# Patient Record
Sex: FEMALE | Race: WHITE | NOT HISPANIC OR LATINO | Employment: OTHER | URBAN - METROPOLITAN AREA
[De-identification: names, ages, dates, MRNs, and addresses within clinical notes are randomized per-mention and may not be internally consistent; named-entity substitution may affect disease eponyms.]

---

## 2018-03-06 ENCOUNTER — OFFICE VISIT (OUTPATIENT)
Dept: INTERNAL MEDICINE CLINIC | Facility: CLINIC | Age: 63
End: 2018-03-06

## 2018-03-06 VITALS
WEIGHT: 119.4 LBS | SYSTOLIC BLOOD PRESSURE: 150 MMHG | TEMPERATURE: 98.3 F | HEART RATE: 95 BPM | HEIGHT: 63 IN | DIASTOLIC BLOOD PRESSURE: 82 MMHG | BODY MASS INDEX: 21.16 KG/M2 | OXYGEN SATURATION: 97 %

## 2018-03-06 DIAGNOSIS — M62.830 BACK SPASM: ICD-10-CM

## 2018-03-06 DIAGNOSIS — J45.40 MODERATE PERSISTENT ASTHMATIC BRONCHITIS WITHOUT COMPLICATION: Primary | ICD-10-CM

## 2018-03-06 PROCEDURE — 99202 OFFICE O/P NEW SF 15 MIN: CPT | Performed by: INTERNAL MEDICINE

## 2018-03-06 RX ORDER — SULFAMETHOXAZOLE AND TRIMETHOPRIM 800; 160 MG/1; MG/1
1 TABLET ORAL EVERY 12 HOURS SCHEDULED
Qty: 20 TABLET | Refills: 0 | Status: SHIPPED | OUTPATIENT
Start: 2018-03-06 | End: 2018-03-16

## 2018-03-06 RX ORDER — LANOLIN ALCOHOL/MO/W.PET/CERES
CREAM (GRAM) TOPICAL DAILY
COMMUNITY
End: 2020-08-24 | Stop reason: ALTCHOICE

## 2018-03-06 RX ORDER — CALCIUM CARBONATE 300MG(750)
1 TABLET,CHEWABLE ORAL 2 TIMES DAILY
COMMUNITY
End: 2020-10-09 | Stop reason: ALTCHOICE

## 2018-03-06 RX ORDER — MULTIVITAMIN WITH IRON
100 TABLET ORAL DAILY
COMMUNITY
End: 2020-08-24 | Stop reason: ALTCHOICE

## 2018-03-06 RX ORDER — ALBUTEROL SULFATE 90 UG/1
2 AEROSOL, METERED RESPIRATORY (INHALATION) EVERY 6 HOURS PRN
Qty: 1 INHALER | Refills: 0 | Status: SHIPPED | OUTPATIENT
Start: 2018-03-06 | End: 2018-03-27 | Stop reason: SDUPTHER

## 2018-03-06 RX ORDER — CYCLOBENZAPRINE HCL 10 MG
10 TABLET ORAL 3 TIMES DAILY PRN
Qty: 30 TABLET | Refills: 0 | Status: SHIPPED | OUTPATIENT
Start: 2018-03-06 | End: 2018-04-04 | Stop reason: SDUPTHER

## 2018-03-06 NOTE — PROGRESS NOTES
Assessment/Plan:    No problem-specific Assessment & Plan notes found for this encounter  Diagnoses and all orders for this visit:    Moderate persistent asthmatic bronchitis without complication  -     sulfamethoxazole-trimethoprim (BACTRIM DS) 800-160 mg per tablet; Take 1 tablet by mouth every 12 (twelve) hours for 10 days  -     albuterol (PROAIR HFA) 90 mcg/act inhaler; Inhale 2 puffs every 6 (six) hours as needed for wheezing    Back spasm  -     cyclobenzaprine (FLEXERIL) 10 mg tablet; Take 1 tablet (10 mg total) by mouth 3 (three) times a day as needed for muscle spasms    Other orders  -     cyanocobalamin (VITAMIN B-12) 1,000 mcg tablet; Take by mouth daily  -     pyridoxine (VITAMIN B6) 100 mg tablet; Take 100 mg by mouth daily  -     Cholecalciferol (VITAMIN D3) 24005 units TABS; Take by mouth daily    -     Calcium Carbonate-Vitamin D (OSCAL 500/200 D-3 PO); Take by mouth 3 (three) times a day        A/P: Suspect sinusitis  No money  Will try bactrim  Pt had GI issues with celebrex and should be ok with bactrim  Continue mucinex  Would like to give steroids for the sinuses, bronchospasms and the LBP pain, but declines  Will try MDI for the wheezing  Obtain labs when pt has insurance  RTC one year or PRN  Subjective:      Patient ID: Eliezer Coles is a 46 y o  female  WF w/o a PCP and currently w/o insurance, presents to establish  No medical problems and only meds she takes is supplements  Very active and is a   Recently moved from [place] to [facility]  Several weeks of URI s/s and took a zpak  Continues with dry barking cough, especially when lying down  No SOB  No fever or chills  NO travel history  No smoking and no asthma  No reflux s/s  Also, notes one week h/o LBP and spasms after working out  No radiation  Unable to take NSAID's  Due for labs and pap  Also due for Td  Cough   Associated symptoms include wheezing   Pertinent negatives include no chest pain, chills, ear pain, eye redness, fever, headaches, myalgias, postnasal drip, rhinorrhea, sore throat or shortness of breath  The following portions of the patient's history were reviewed and updated as appropriate:   She  has a past medical history of Arthritis  She There are no active problems to display for this patient  She  has a past surgical history that includes Facial cosmetic surgery; Colonoscopy; and Dental surgery  Her family history includes Aneurysm in her mother; No Known Problems in her brother  She  reports that she has never smoked  She has never used smokeless tobacco  She reports that she does not drink alcohol or use drugs  Current Outpatient Prescriptions   Medication Sig Dispense Refill    Calcium Carbonate-Vitamin D (OSCAL 500/200 D-3 PO) Take by mouth 3 (three) times a day        Cholecalciferol (VITAMIN D3) 84148 units TABS Take by mouth daily        cyanocobalamin (VITAMIN B-12) 1,000 mcg tablet Take by mouth daily      pyridoxine (VITAMIN B6) 100 mg tablet Take 100 mg by mouth daily      albuterol (PROAIR HFA) 90 mcg/act inhaler Inhale 2 puffs every 6 (six) hours as needed for wheezing 1 Inhaler 0    cyclobenzaprine (FLEXERIL) 10 mg tablet Take 1 tablet (10 mg total) by mouth 3 (three) times a day as needed for muscle spasms 30 tablet 0    sulfamethoxazole-trimethoprim (BACTRIM DS) 800-160 mg per tablet Take 1 tablet by mouth every 12 (twelve) hours for 10 days 20 tablet 0     No current facility-administered medications for this visit  No current outpatient prescriptions on file prior to visit  No current facility-administered medications on file prior to visit  She is allergic to celebrex [celecoxib]; ibuprofen; and influenza vaccines       Review of Systems   Constitutional: Negative for activity change, chills, diaphoresis, fatigue and fever  HENT: Positive for congestion   Negative for ear pain, nosebleeds, postnasal drip, rhinorrhea, sinus pressure, sore throat and trouble swallowing  Eyes: Negative for redness  Respiratory: Positive for cough and wheezing  Negative for chest tightness and shortness of breath  Cardiovascular: Negative for chest pain, palpitations and leg swelling  Gastrointestinal: Negative for abdominal pain, constipation, diarrhea, nausea and vomiting  Endocrine: Negative for cold intolerance and heat intolerance  Genitourinary: Negative for difficulty urinating, dysuria and frequency  Musculoskeletal: Positive for back pain  Negative for arthralgias, gait problem and myalgias  Neurological: Negative for seizures, light-headedness and headaches  Psychiatric/Behavioral: Negative for confusion  The patient is not nervous/anxious  Objective:      /82 (BP Location: Left arm, Patient Position: Sitting, Cuff Size: Adult)   Pulse 95   Temp 98 3 °F (36 8 °C) (Tympanic)   Ht 5' 2 5" (1 588 m)   Wt 54 2 kg (119 lb 6 4 oz)   SpO2 97%   BMI 21 49 kg/m²          Physical Exam   Constitutional: She is oriented to person, place, and time  She appears well-developed and well-nourished  No distress  HENT:   Head: Normocephalic and atraumatic  Right Ear: External ear normal    Left Ear: External ear normal    Mouth/Throat: Oropharynx is clear and moist  No oropharyngeal exudate  Eyes: Conjunctivae and EOM are normal  Pupils are equal, round, and reactive to light  Neck: No JVD present  Cardiovascular: Normal rate, regular rhythm and normal heart sounds  Pulmonary/Chest: Effort normal  No respiratory distress  She has wheezes  She has no rales  Coarse and bronchospasms noted with barking cough and hypersensitivity  Abdominal: Soft  Bowel sounds are normal  There is no tenderness  Musculoskeletal: She exhibits tenderness  LS spine w/o gross deformity, increase temp, erythema, or swelling  Tenderness with spasms right of L3-S1  ROM wnl  LE strength 5/5 with tone and ROM wnl      Lymphadenopathy:     She has no cervical adenopathy  Neurological: She is alert and oriented to person, place, and time  Psychiatric: She has a normal mood and affect  Her behavior is normal  Judgment and thought content normal    Nursing note and vitals reviewed

## 2018-03-27 ENCOUNTER — TELEPHONE (OUTPATIENT)
Dept: INTERNAL MEDICINE CLINIC | Facility: CLINIC | Age: 63
End: 2018-03-27

## 2018-03-27 DIAGNOSIS — J45.40 MODERATE PERSISTENT ASTHMATIC BRONCHITIS WITHOUT COMPLICATION: ICD-10-CM

## 2018-03-27 RX ORDER — ALBUTEROL SULFATE 90 UG/1
2 AEROSOL, METERED RESPIRATORY (INHALATION) EVERY 6 HOURS PRN
Qty: 1 INHALER | Refills: 0 | Status: SHIPPED | OUTPATIENT
Start: 2018-03-27 | End: 2018-04-18 | Stop reason: SDUPTHER

## 2018-03-27 NOTE — TELEPHONE ENCOUNTER
Pt needs rx for pro air inhaler, pt wants a paper rx so she can check out prices, will p/u in [facility]

## 2018-04-04 ENCOUNTER — TELEPHONE (OUTPATIENT)
Dept: INTERNAL MEDICINE CLINIC | Facility: CLINIC | Age: 63
End: 2018-04-04

## 2018-04-04 DIAGNOSIS — M62.830 BACK SPASM: ICD-10-CM

## 2018-04-04 RX ORDER — CYCLOBENZAPRINE HCL 10 MG
10 TABLET ORAL 3 TIMES DAILY PRN
Qty: 90 TABLET | Refills: 0 | Status: SHIPPED | OUTPATIENT
Start: 2018-04-04 | End: 2018-05-09 | Stop reason: SDUPTHER

## 2018-04-04 NOTE — TELEPHONE ENCOUNTER
Pt needs Flexeril 10 mg refilled, she requests a hard copy to be picked up because she does not have insurance, new to the area and does not have a pharmacy local

## 2018-04-18 DIAGNOSIS — J45.40 MODERATE PERSISTENT ASTHMATIC BRONCHITIS WITHOUT COMPLICATION: ICD-10-CM

## 2018-04-18 RX ORDER — ALBUTEROL SULFATE 90 UG/1
2 AEROSOL, METERED RESPIRATORY (INHALATION) EVERY 6 HOURS PRN
Qty: 1 INHALER | Refills: 0 | Status: SHIPPED | OUTPATIENT
Start: 2018-04-18 | End: 2018-04-20 | Stop reason: SDUPTHER

## 2018-04-18 NOTE — TELEPHONE ENCOUNTER
Pt needs refill on the albuterol 90 mcg/act inhaler inhale 2 puff by mouth every 6 hrs for [facility]  [address]     Phone:  [phone]    Please advise    [phone]

## 2018-04-20 ENCOUNTER — TELEPHONE (OUTPATIENT)
Dept: INTERNAL MEDICINE CLINIC | Facility: CLINIC | Age: 63
End: 2018-04-20

## 2018-04-20 DIAGNOSIS — J45.40 MODERATE PERSISTENT ASTHMATIC BRONCHITIS WITHOUT COMPLICATION: ICD-10-CM

## 2018-04-20 RX ORDER — ALBUTEROL SULFATE 90 UG/1
2 AEROSOL, METERED RESPIRATORY (INHALATION) EVERY 6 HOURS PRN
Qty: 1 INHALER | Refills: 5 | Status: SHIPPED | OUTPATIENT
Start: 2018-04-20 | End: 2018-05-09 | Stop reason: SDUPTHER

## 2018-05-09 ENCOUNTER — OFFICE VISIT (OUTPATIENT)
Dept: INTERNAL MEDICINE CLINIC | Facility: CLINIC | Age: 63
End: 2018-05-09

## 2018-05-09 VITALS
DIASTOLIC BLOOD PRESSURE: 86 MMHG | OXYGEN SATURATION: 98 % | HEIGHT: 63 IN | TEMPERATURE: 97.9 F | SYSTOLIC BLOOD PRESSURE: 152 MMHG | HEART RATE: 114 BPM | WEIGHT: 120 LBS | BODY MASS INDEX: 21.26 KG/M2

## 2018-05-09 DIAGNOSIS — J45.40 MODERATE PERSISTENT ASTHMATIC BRONCHITIS WITHOUT COMPLICATION: ICD-10-CM

## 2018-05-09 DIAGNOSIS — J30.89 ENVIRONMENTAL AND SEASONAL ALLERGIES: ICD-10-CM

## 2018-05-09 DIAGNOSIS — J45.41 MODERATE PERSISTENT REACTIVE AIRWAY DISEASE WITH ACUTE EXACERBATION: Primary | ICD-10-CM

## 2018-05-09 DIAGNOSIS — M62.830 BACK SPASM: ICD-10-CM

## 2018-05-09 PROCEDURE — 99212 OFFICE O/P EST SF 10 MIN: CPT | Performed by: INTERNAL MEDICINE

## 2018-05-09 RX ORDER — ALBUTEROL SULFATE 90 UG/1
2 AEROSOL, METERED RESPIRATORY (INHALATION) EVERY 6 HOURS PRN
Qty: 1 INHALER | Refills: 0 | Status: SHIPPED | OUTPATIENT
Start: 2018-05-09 | End: 2018-06-04 | Stop reason: SDUPTHER

## 2018-05-09 RX ORDER — PREDNISONE 10 MG/1
TABLET ORAL
Qty: 42 TABLET | Refills: 0 | Status: SHIPPED | OUTPATIENT
Start: 2018-05-09 | End: 2018-05-29

## 2018-05-09 RX ORDER — CYCLOBENZAPRINE HCL 10 MG
10 TABLET ORAL 3 TIMES DAILY PRN
Qty: 90 TABLET | Refills: 0 | Status: SHIPPED | OUTPATIENT
Start: 2018-05-09 | End: 2018-06-08 | Stop reason: SDUPTHER

## 2018-05-09 NOTE — PROGRESS NOTES
Assessment/Plan:    No problem-specific Assessment & Plan notes found for this encounter  Diagnoses and all orders for this visit:    Moderate persistent reactive airway disease with acute exacerbation  -     predniSONE 10 mg tablet; 60mg po q day x2, then 50mg x 2, then 40mg x 2, then 30mg x2, then 20mg x 2, and then 10mg x 2, then d/c  Environmental and seasonal allergies  -     predniSONE 10 mg tablet; 60mg po q day x2, then 50mg x 2, then 40mg x 2, then 30mg x2, then 20mg x 2, and then 10mg x 2, then d/c  Back spasm  -     cyclobenzaprine (FLEXERIL) 10 mg tablet; Take 1 tablet (10 mg total) by mouth 3 (three) times a day as needed for muscle spasms    Moderate persistent asthmatic bronchitis without complication  -     albuterol (PROAIR HFA) 90 mcg/act inhaler; Inhale 2 puffs every 6 (six) hours as needed for wheezing      A/P: Suspect RAD due to allergies  Pt w/o insurance  With give a decadron 4mg IM now and start a steroid taper  Pt to start OTC allergy meds and continue with INS and MDI  Need PFT's, but no insurance  No s/s of infection and will hold on abx  RTC PRN  Subjective:      Patient ID: Louis Dear is a 46 y o  female  Non smoking WF w/o any PMH of any breathing issues, presents for ongoing SOB and wheezing  Pt recently moved to the area and since then, has been having a lot allergy s/s and now wheezing and SOB  Was seen two months ago and had a sinus infection  Treated with abx and MDI with pt refusing steroids  Initially got better, but then her s/s returned  No nasal d/c and a dry cough  Worse lying down or bending forward  No heartburn  Denies PND or rhinorrhea  Shortness of Breath   Associated symptoms include a sore throat and wheezing  Pertinent negatives include no abdominal pain, chest pain, ear pain, fever, headaches, leg swelling, rhinorrhea or vomiting         The following portions of the patient's history were reviewed and updated as appropriate:   She  has a past medical history of Arthritis  She There are no active problems to display for this patient  She  has a past surgical history that includes Facial cosmetic surgery; Colonoscopy; and Dental surgery  Her family history includes Aneurysm in her mother; No Known Problems in her brother  She  reports that she has never smoked  She has never used smokeless tobacco  She reports that she does not drink alcohol or use drugs  Current Outpatient Prescriptions   Medication Sig Dispense Refill    albuterol (PROAIR HFA) 90 mcg/act inhaler Inhale 2 puffs every 6 (six) hours as needed for wheezing 1 Inhaler 0    Calcium Carbonate-Vitamin D (OSCAL 500/200 D-3 PO) Take by mouth 3 (three) times a day        Cholecalciferol (VITAMIN D3) 98794 units TABS Take by mouth daily        cyanocobalamin (VITAMIN B-12) 1,000 mcg tablet Take by mouth daily      cyclobenzaprine (FLEXERIL) 10 mg tablet Take 1 tablet (10 mg total) by mouth 3 (three) times a day as needed for muscle spasms 90 tablet 0    predniSONE 10 mg tablet 60mg po q day x2, then 50mg x 2, then 40mg x 2, then 30mg x2, then 20mg x 2, and then 10mg x 2, then d/c  42 tablet 0    pyridoxine (VITAMIN B6) 100 mg tablet Take 100 mg by mouth daily       No current facility-administered medications for this visit        Current Outpatient Prescriptions on File Prior to Visit   Medication Sig    Calcium Carbonate-Vitamin D (OSCAL 500/200 D-3 PO) Take by mouth 3 (three) times a day      Cholecalciferol (VITAMIN D3) 74155 units TABS Take by mouth daily      cyanocobalamin (VITAMIN B-12) 1,000 mcg tablet Take by mouth daily    pyridoxine (VITAMIN B6) 100 mg tablet Take 100 mg by mouth daily    [DISCONTINUED] albuterol (PROAIR HFA) 90 mcg/act inhaler Inhale 2 puffs every 6 (six) hours as needed for wheezing    [DISCONTINUED] cyclobenzaprine (FLEXERIL) 10 mg tablet Take 1 tablet (10 mg total) by mouth 3 (three) times a day as needed for muscle spasms     No current facility-administered medications on file prior to visit  She is allergic to celebrex [celecoxib]; ibuprofen; and influenza vaccines       Review of Systems   Constitutional: Positive for activity change  Negative for chills, diaphoresis, fatigue and fever  HENT: Positive for sneezing, sore throat and voice change  Negative for congestion, ear discharge, ear pain, facial swelling, nosebleeds, postnasal drip, rhinorrhea, sinus pain and sinus pressure  Eyes: Negative for visual disturbance  Respiratory: Positive for cough, chest tightness, shortness of breath and wheezing  Cardiovascular: Negative for chest pain, palpitations and leg swelling  Gastrointestinal: Negative for abdominal pain, constipation, diarrhea, nausea and vomiting  Genitourinary: Negative for difficulty urinating, dysuria and frequency  Musculoskeletal: Negative for arthralgias, gait problem and myalgias  Neurological: Negative for dizziness, syncope, light-headedness and headaches  Psychiatric/Behavioral: Negative for confusion  The patient is not nervous/anxious  Objective:      /86   Pulse (!) 114   Temp 97 9 °F (36 6 °C)   Ht 5' 2 5" (1 588 m)   Wt 54 4 kg (120 lb)   SpO2 98%   BMI 21 60 kg/m²          Physical Exam   Constitutional: She is oriented to person, place, and time  She appears well-developed and well-nourished  No distress  HENT:   Head: Normocephalic and atraumatic  Right Ear: External ear normal    Mouth/Throat: Oropharynx is clear and moist  No oropharyngeal exudate  Eyes: Conjunctivae and EOM are normal  Pupils are equal, round, and reactive to light  Cardiovascular: Normal rate, regular rhythm and normal heart sounds  No murmur heard  Pulmonary/Chest: No respiratory distress  She has wheezes  Voice changed and sounds nasal  Diffuse wheezing with decrease BS  WOB wnl  No rales or rhonchi  Abdominal: Soft   Bowel sounds are normal    Neurological: She is alert and oriented to person, place, and time  Psychiatric: She has a normal mood and affect  Her behavior is normal  Judgment and thought content normal    Nursing note and vitals reviewed

## 2018-05-09 NOTE — PATIENT INSTRUCTIONS
Allergies   AMBULATORY CARE:   Allergies  are an immune system reaction to a substance called an allergen  Your immune system sees the allergen as harmful and attacks it  Common signs and symptoms include the following:   · Mild symptoms  include sneezing and a runny, itchy, or stuffy nose  You may also have swollen, watery, or itchy eyes, or skin itching  You may have swelling or pain where an insect bit or stung you  · Anaphylaxis symptoms  include trouble breathing or swallowing, a rash or hives, or severe swelling  You may also have a cough, wheezing, or feel lightheaded or dizzy  Anaphylaxis is a sudden, life-threatening reaction that needs immediate treatment  Call 911 for signs or symptoms of anaphylaxis,  such as trouble breathing, swelling in your mouth or throat, or wheezing  You may also have itching, a rash, hives, or feel like you are going to faint  Seek care immediately if:   · You have tingling in your hands or feet  · Your skin is red or flushed  Contact your healthcare provider if:   · You have questions or concerns about your condition or care  Steps to take for signs or symptoms of anaphylaxis:   · Immediately  give 1 shot of epinephrine only into the outer thigh muscle  · Leave the shot in place  as directed  Your healthcare provider may recommend you leave it in place for up to 10 seconds before you remove it  This helps make sure all of the epinephrine is delivered  · Call 911 and go to the emergency department,  even if the shot improved symptoms  Do not drive yourself  Bring the used epinephrine shot with you  Treatment for allergies  may include any of the following:  · Antihistamines  help decrease itching, sneezing, and swelling  You may take them as a pill or use drops in your nose or eyes  · Decongestants  help your nose feel less stuffy  · Steroids  decrease swelling and redness  · Topical treatments  help decrease itching or swelling   You also may be given nasal sprays or eyedrops  · Epinephrine  is medicine used to treat severe allergic reactions such as anaphylaxis  · Desensitization  gets your body used to allergens you cannot avoid  Your healthcare provider will give you a shot that contains a small amount of an allergen  He will treat any allergic reaction you have  He will give you more of the allergen a little at a time until your body gets used to it  Your reaction to the allergen may be less serious after this treatment  Your healthcare provider will tell you how long to get the shots  Safety precautions to take if you are at risk for anaphylaxis:   · Keep 2 shots of epinephrine with you at all times  You may need a second shot, because epinephrine only works for about 20 minutes and symptoms may return  Your healthcare provider can show you and family members how to give the shot  Check the expiration date every month and replace it before it expires  · Create an action plan  Your healthcare provider can help you create a written plan that explains the allergy and an emergency plan to treat a reaction  The plan explains when to give a second epinephrine shot if symptoms return or do not improve after the first  Give copies of the action plan and emergency instructions to family members, work and school staff, and  providers  Show them how to give a shot of epinephrine  · Be careful when you exercise  If you have had exercise-induced anaphylaxis, do not exercise right after you eat  Stop exercising right away if you start to develop any signs or symptoms of anaphylaxis  You may first feel tired, warm, or have itchy skin  Hives, swelling, and severe breathing problems may develop if you continue to exercise  · Carry medical alert identification  Wear medical alert jewelry or carry a card that explains the allergy  Ask your healthcare provider where to get these items  · Inform all healthcare providers of the allergy  This includes dentists, nurses, doctors, and surgeons  Manage allergies:   · Use nasal rinses as directed  Rinse with a saline solution daily to help clear your nose of allergens  · Do not smoke  Allergy symptoms may decrease if you are not around smoke  Nicotine and other chemicals in cigarettes and cigars can cause lung damage  Ask your healthcare provider for information if you currently smoke and need help to quit  E-cigarettes or smokeless tobacco still contain nicotine  Talk to your healthcare provider before you use these products  Prevent an allergic reaction:   · Do not go outside when pollen counts are high if you have seasonal allergies  Your symptoms may be better if you go outside only in the morning or evening  Use your air conditioner, and change air filters often  · Avoid dust, fur, and mold  Dust and vacuum your home often  You may want to wear a mask when you vacuum  Keep pets in certain rooms, and bathe them often  Use a dehumidifier (machine that decreases moisture) to help prevent mold  · Do not use products that contain latex if you have a latex allergy  Use nonlatex gloves if you work in healthcare or in food preparation  Always tell healthcare providers about a latex allergy  · Avoid areas that attract insects if you have an insect bite or sting allergy  Areas include trash cans, gardens, and picnics  Do not wear bright clothing or strong scents when you will be outside  Follow up with your healthcare provider as directed:  Write down your questions so you remember to ask them during your visits  When you have an allergic reaction, write down everything you were exposed to in the 2 hours before the reaction  Take that information to your next visit  © 2017 [employer] [employer] Information is for End User's use only and may not be sold, redistributed or otherwise used for commercial purposes   All illustrations and images included in CareNotes® are the copyrighted property of [employer]  or [address]  The above information is an  only  It is not intended as medical advice for individual conditions or treatments  Talk to your doctor, nurse or pharmacist before following any medical regimen to see if it is safe and effective for you

## 2018-05-17 ENCOUNTER — TELEPHONE (OUTPATIENT)
Dept: INTERNAL MEDICINE CLINIC | Facility: CLINIC | Age: 63
End: 2018-05-17

## 2018-05-17 NOTE — TELEPHONE ENCOUNTER
----- Message from Ray Zavala DO sent at 5/9/2018  1:41 PM EDT -----  Call pt in one week and get update on breathing

## 2018-05-29 ENCOUNTER — TELEPHONE (OUTPATIENT)
Dept: INTERNAL MEDICINE CLINIC | Facility: CLINIC | Age: 63
End: 2018-05-29

## 2018-05-29 DIAGNOSIS — J45.41 MODERATE PERSISTENT REACTIVE AIRWAY DISEASE WITH ACUTE EXACERBATION: ICD-10-CM

## 2018-05-29 DIAGNOSIS — J45.41 MODERATE PERSISTENT REACTIVE AIRWAY DISEASE WITH ACUTE EXACERBATION: Primary | ICD-10-CM

## 2018-05-29 RX ORDER — PREDNISONE 10 MG/1
TABLET ORAL
Qty: 42 TABLET | Refills: 0 | Status: SHIPPED | OUTPATIENT
Start: 2018-05-29 | End: 2018-05-29 | Stop reason: SDUPTHER

## 2018-05-29 RX ORDER — PREDNISONE 10 MG/1
TABLET ORAL
Qty: 42 TABLET | Refills: 0 | Status: SHIPPED | OUTPATIENT
Start: 2018-05-29 | End: 2018-06-25 | Stop reason: SDUPTHER

## 2018-05-29 NOTE — TELEPHONE ENCOUNTER
Pt called, saw you last time for cough, was given injection and also prednisone rx, you told her to hold off to get rx filled, now wants to fill it but lost the rx, can you send prednisone to [facility] ?  Still has cough

## 2018-06-04 ENCOUNTER — TELEPHONE (OUTPATIENT)
Dept: INTERNAL MEDICINE CLINIC | Facility: CLINIC | Age: 63
End: 2018-06-04

## 2018-06-04 DIAGNOSIS — J45.40 MODERATE PERSISTENT ASTHMATIC BRONCHITIS WITHOUT COMPLICATION: ICD-10-CM

## 2018-06-04 RX ORDER — ALBUTEROL SULFATE 90 UG/1
2 AEROSOL, METERED RESPIRATORY (INHALATION) EVERY 6 HOURS PRN
Qty: 18 G | Refills: 5 | Status: SHIPPED | OUTPATIENT
Start: 2018-06-04 | End: 2018-09-25

## 2018-06-04 NOTE — TELEPHONE ENCOUNTER
Pt needs Proair HFA refilled, she wants to  the script at the office, she needs to find a pharmacy in the area that is cheaper

## 2018-06-08 ENCOUNTER — TELEPHONE (OUTPATIENT)
Dept: INTERNAL MEDICINE CLINIC | Facility: CLINIC | Age: 63
End: 2018-06-08

## 2018-06-08 DIAGNOSIS — M62.830 BACK SPASM: ICD-10-CM

## 2018-06-08 RX ORDER — CYCLOBENZAPRINE HCL 10 MG
10 TABLET ORAL 3 TIMES DAILY PRN
Qty: 90 TABLET | Refills: 0 | Status: SHIPPED | OUTPATIENT
Start: 2018-06-08 | End: 2018-07-10 | Stop reason: SDUPTHER

## 2018-06-25 DIAGNOSIS — J45.41 MODERATE PERSISTENT REACTIVE AIRWAY DISEASE WITH ACUTE EXACERBATION: ICD-10-CM

## 2018-06-25 RX ORDER — PREDNISONE 10 MG/1
TABLET ORAL
Qty: 42 TABLET | Refills: 0 | Status: SHIPPED | OUTPATIENT
Start: 2018-06-25 | End: 2018-08-15

## 2018-07-10 DIAGNOSIS — M62.830 BACK SPASM: ICD-10-CM

## 2018-07-10 RX ORDER — CYCLOBENZAPRINE HCL 10 MG
10 TABLET ORAL 3 TIMES DAILY PRN
Qty: 90 TABLET | Refills: 3 | Status: SHIPPED | OUTPATIENT
Start: 2018-07-10 | End: 2018-11-06 | Stop reason: SDUPTHER

## 2018-07-10 NOTE — TELEPHONE ENCOUNTER
Pt needs refill on cyclobenzaprine 10 mg takes 3 time daily prn  # 90 Refill? Patient would like a Paper script  She doesn't know where she will be at to get is filled      Phone : [phone]

## 2018-08-15 ENCOUNTER — OFFICE VISIT (OUTPATIENT)
Dept: INTERNAL MEDICINE CLINIC | Facility: CLINIC | Age: 63
End: 2018-08-15
Payer: COMMERCIAL

## 2018-08-15 ENCOUNTER — TRANSCRIBE ORDERS (OUTPATIENT)
Dept: RADIOLOGY | Facility: CLINIC | Age: 63
End: 2018-08-15

## 2018-08-15 ENCOUNTER — APPOINTMENT (OUTPATIENT)
Dept: RADIOLOGY | Facility: CLINIC | Age: 63
End: 2018-08-15
Payer: COMMERCIAL

## 2018-08-15 VITALS
RESPIRATION RATE: 18 BRPM | OXYGEN SATURATION: 97 % | WEIGHT: 120.4 LBS | DIASTOLIC BLOOD PRESSURE: 80 MMHG | BODY MASS INDEX: 21.33 KG/M2 | TEMPERATURE: 98.3 F | HEIGHT: 63 IN | SYSTOLIC BLOOD PRESSURE: 132 MMHG | HEART RATE: 92 BPM

## 2018-08-15 DIAGNOSIS — M26.621 ARTHRALGIA OF RIGHT TEMPOROMANDIBULAR JOINT: ICD-10-CM

## 2018-08-15 DIAGNOSIS — Z12.11 SCREEN FOR COLON CANCER: ICD-10-CM

## 2018-08-15 DIAGNOSIS — R05.3 CHRONIC COUGH: ICD-10-CM

## 2018-08-15 DIAGNOSIS — J45.41 MODERATE PERSISTENT REACTIVE AIRWAY DISEASE WITH ACUTE EXACERBATION: Primary | ICD-10-CM

## 2018-08-15 DIAGNOSIS — J30.89 ENVIRONMENTAL AND SEASONAL ALLERGIES: ICD-10-CM

## 2018-08-15 DIAGNOSIS — Z12.39 SCREENING FOR BREAST CANCER: ICD-10-CM

## 2018-08-15 DIAGNOSIS — N92.0 MENORRHAGIA WITH REGULAR CYCLE: ICD-10-CM

## 2018-08-15 DIAGNOSIS — Z11.59 NEED FOR HEPATITIS C SCREENING TEST: ICD-10-CM

## 2018-08-15 DIAGNOSIS — Z13.220 SCREENING FOR LIPID DISORDERS: ICD-10-CM

## 2018-08-15 DIAGNOSIS — Z13.820 SCREENING FOR OSTEOPOROSIS: ICD-10-CM

## 2018-08-15 DIAGNOSIS — R51.9 ACUTE NONINTRACTABLE HEADACHE, UNSPECIFIED HEADACHE TYPE: ICD-10-CM

## 2018-08-15 PROBLEM — J45.909 REACTIVE AIRWAY DISEASE: Status: ACTIVE | Noted: 2018-08-15

## 2018-08-15 PROBLEM — Z78.0 POST-MENOPAUSAL: Status: RESOLVED | Noted: 2018-08-15 | Resolved: 2018-08-15

## 2018-08-15 PROBLEM — Z78.0 POST-MENOPAUSAL: Status: ACTIVE | Noted: 2018-08-15

## 2018-08-15 PROCEDURE — 71046 X-RAY EXAM CHEST 2 VIEWS: CPT

## 2018-08-15 PROCEDURE — 99214 OFFICE O/P EST MOD 30 MIN: CPT | Performed by: INTERNAL MEDICINE

## 2018-08-15 RX ORDER — BUTALBITAL, ASPIRIN, AND CAFFEINE 50; 325; 40 MG/1; MG/1; MG/1
1 CAPSULE ORAL EVERY 4 HOURS PRN
Qty: 30 CAPSULE | Refills: 0 | Status: SHIPPED | OUTPATIENT
Start: 2018-08-15 | End: 2018-08-27 | Stop reason: SDUPTHER

## 2018-08-15 NOTE — PROGRESS NOTES
Assessment/Plan:    No problem-specific Assessment & Plan notes found for this encounter  Diagnoses and all orders for this visit:    Moderate persistent reactive airway disease with acute exacerbation  -     CBC and differential; Future  -     Comprehensive metabolic panel; Future  -     TSH, 3rd generation with Free T4 reflex; Future    Environmental and seasonal allergies    Screening for lipid disorders  -     Lipid Panel with Direct LDL reflex; Future  -     TSH, 3rd generation with Free T4 reflex; Future    Screen for colon cancer  -     Ambulatory referral to Gastroenterology; Future    Screening for breast cancer  -     Mammo screening bilateral w 3d & cad; Future    Screening for osteoporosis  -     DXA bone density spine hip and pelvis; Future    Need for hepatitis C screening test  -     Hepatitis C antibody; Future    Acute nonintractable headache, unspecified headache type  -     CBC and differential; Future  -     Comprehensive metabolic panel; Future  -     butalbital-aspirin-caffeine (FIORINAL) -40 mg capsule; Take 1 capsule by mouth every 4 (four) hours as needed for headaches    Chronic cough  -     XR chest pa & lateral; Future  -     Complete PFT with Methacholine Challenge; Future    Menorrhagia with regular cycle  -     FSH and LH; Future      A/P; PE unimpressive  Will check labs including a Hep C  Suspect asthma and will check CXR and PFT with [facility]  Hold on any test so as to not interfere with the test  Suspect migraine  Will start fiorinal and check labs  Has been taking ASA w/o problems  May need additional treatment  ??not perimenopausal  Check labs  Order mammo  May need a dexa  Check on vaccines next visit  Order CRC  RTC two weeks  Subjective:      Patient ID: Alexus De La Torre is a 58 y o  female  WF presents with several months for worsening SOB and cough along with a three day h/o headaches  Has headaches every couple of months, but no official dx of migraines   No head injuries  No known triggers  Sharp pain behind the right eye and then spreads across the head  Taking ASA w/o relief since she can't take other NSAID's like motrin/aleve, etc  Some photo phobia  No prodrome or aura  No n/v  ??asthma, but never tested  No smoking or exposures  Had a rescue MDI and now not affective  Had steroids in the past  Dry cough  No heartburn  Still neneses regularly, but heavy at times  Overdue for labs and mammo and CRC  Migraine    Associated symptoms include coughing  Pertinent negatives include no abdominal pain, dizziness, ear pain, fever, nausea, rhinorrhea, seizures, sinus pressure, vomiting or weakness  Cough   Associated symptoms include headaches and shortness of breath  Pertinent negatives include no chest pain, chills, ear pain, fever, myalgias, postnasal drip, rhinorrhea or wheezing  Her past medical history is significant for environmental allergies  The following portions of the patient's history were reviewed and updated as appropriate:   She  has a past medical history of Arthritis  She   Patient Active Problem List    Diagnosis Date Noted    Reactive airway disease 08/15/2018    Environmental and seasonal allergies 08/15/2018     She  has a past surgical history that includes Facial cosmetic surgery; Colonoscopy; and Dental surgery  Her family history includes Aneurysm in her mother; No Known Problems in her brother  She  reports that she has never smoked  She has never used smokeless tobacco  She reports that she does not drink alcohol or use drugs    Current Outpatient Prescriptions   Medication Sig Dispense Refill    albuterol (PROAIR HFA) 90 mcg/act inhaler Inhale 2 puffs every 6 (six) hours as needed for wheezing 18 g 5    Calcium Carbonate-Vitamin D (OSCAL 500/200 D-3 PO) Take by mouth 3 (three) times a day        Cholecalciferol (VITAMIN D3) 86923 units TABS Take by mouth daily        cyanocobalamin (VITAMIN B-12) 1,000 mcg tablet Take by mouth daily      cyclobenzaprine (FLEXERIL) 10 mg tablet Take 1 tablet (10 mg total) by mouth 3 (three) times a day as needed for muscle spasms 90 tablet 3    pyridoxine (VITAMIN B6) 100 mg tablet Take 100 mg by mouth daily      butalbital-aspirin-caffeine (FIORINAL) -40 mg capsule Take 1 capsule by mouth every 4 (four) hours as needed for headaches 30 capsule 0     No current facility-administered medications for this visit  Current Outpatient Prescriptions on File Prior to Visit   Medication Sig    albuterol (PROAIR HFA) 90 mcg/act inhaler Inhale 2 puffs every 6 (six) hours as needed for wheezing    Calcium Carbonate-Vitamin D (OSCAL 500/200 D-3 PO) Take by mouth 3 (three) times a day      Cholecalciferol (VITAMIN D3) 48748 units TABS Take by mouth daily      cyanocobalamin (VITAMIN B-12) 1,000 mcg tablet Take by mouth daily    cyclobenzaprine (FLEXERIL) 10 mg tablet Take 1 tablet (10 mg total) by mouth 3 (three) times a day as needed for muscle spasms    pyridoxine (VITAMIN B6) 100 mg tablet Take 100 mg by mouth daily    [DISCONTINUED] predniSONE 10 mg tablet 60mg po q day x2, then 50mg x 2, then 40mg x 2, then 30mg x2, then 20mg x 2, and then 10mg x 2, then d/c  (Patient not taking: Reported on 8/15/2018 )     No current facility-administered medications on file prior to visit  She is allergic to celebrex [celecoxib]; ibuprofen; and influenza vaccines       Review of Systems   Constitutional: Negative for activity change, chills, diaphoresis, fatigue and fever  HENT: Negative for congestion, ear discharge, ear pain, postnasal drip, rhinorrhea, sinus pain and sinus pressure  Eyes: Negative for visual disturbance  Respiratory: Positive for cough and shortness of breath  Negative for chest tightness and wheezing  Cardiovascular: Negative for chest pain, palpitations and leg swelling  Gastrointestinal: Negative for abdominal pain, constipation, diarrhea, nausea and vomiting  Endocrine: Negative for cold intolerance and heat intolerance  Genitourinary: Positive for menstrual problem  Negative for difficulty urinating, dysuria, frequency and pelvic pain  Musculoskeletal: Negative for arthralgias, gait problem and myalgias  Allergic/Immunologic: Positive for environmental allergies  Neurological: Positive for headaches  Negative for dizziness, seizures, syncope, weakness and light-headedness  Psychiatric/Behavioral: Negative for confusion, dysphoric mood and sleep disturbance  The patient is not nervous/anxious  Objective:      /80 (BP Location: Left arm, Patient Position: Sitting, Cuff Size: Adult)   Pulse 92   Temp 98 3 °F (36 8 °C) (Tympanic)   Resp 18   Ht 5' 2 5" (1 588 m)   Wt 54 6 kg (120 lb 6 4 oz)   SpO2 97%   BMI 21 67 kg/m²          Physical Exam   Constitutional: She is oriented to person, place, and time  She appears well-developed and well-nourished  No distress  HENT:   Head: Normocephalic and atraumatic  Right Ear: External ear normal    Left Ear: External ear normal    Nose: Nose normal    Mouth/Throat: Oropharynx is clear and moist    Eyes: Conjunctivae and EOM are normal  Pupils are equal, round, and reactive to light  Neck: Neck supple  No JVD present  No tracheal deviation present  No thyromegaly present  Cardiovascular: Normal rate, regular rhythm and normal heart sounds  No murmur heard  Pulmonary/Chest: Effort normal and breath sounds normal  No respiratory distress  She has no wheezes  Abdominal: Soft  Bowel sounds are normal  There is no tenderness  Musculoskeletal: She exhibits no edema  Lymphadenopathy:     She has no cervical adenopathy  Neurological: She is alert and oriented to person, place, and time  No cranial nerve deficit  Coordination normal    Psychiatric: She has a normal mood and affect  Her behavior is normal  Judgment and thought content normal    Nursing note and vitals reviewed

## 2018-08-16 ENCOUNTER — TELEPHONE (OUTPATIENT)
Dept: INTERNAL MEDICINE CLINIC | Facility: CLINIC | Age: 63
End: 2018-08-16

## 2018-08-16 NOTE — TELEPHONE ENCOUNTER
Pt states she feels she needs an inhaler to help her breath  ProAir is no longer helping her  Is there another inhaler she could use? She is scheduled for pulmonary test 8/23/18 at [facility]  Could you please advise   Thanks

## 2018-08-16 NOTE — TELEPHONE ENCOUNTER
See if pt can get by until the PFT's with the albuterol  Don't want to mask anything  If she can't will add a steroid MDI, but well need to stop several days prior to testing

## 2018-08-17 ENCOUNTER — TELEPHONE (OUTPATIENT)
Dept: INTERNAL MEDICINE CLINIC | Facility: CLINIC | Age: 63
End: 2018-08-17

## 2018-08-17 DIAGNOSIS — J45.41 MODERATE PERSISTENT REACTIVE AIRWAY DISEASE WITH ACUTE EXACERBATION: Primary | ICD-10-CM

## 2018-08-17 DIAGNOSIS — J45.909 REACTIVE AIRWAY DISEASE WITHOUT COMPLICATION, UNSPECIFIED ASTHMA SEVERITY, UNSPECIFIED WHETHER PERSISTENT: Primary | ICD-10-CM

## 2018-08-17 NOTE — TELEPHONE ENCOUNTER
Generic advair sent in and needs to take one puff twice a day, every day and should stop it prior to her PFT's

## 2018-08-17 NOTE — TELEPHONE ENCOUNTER
Pt states she can't wait till PFT's next week   needs something now for breathing  She understands she must stop it prior to testing

## 2018-08-17 NOTE — TELEPHONE ENCOUNTER
Pt cannot get in for PFT appointment  8/23/18    But she cannot lay down, sit or bend over so she did not sleep because she can only stand up    Can you send in something for her to pharmacy for her breathing        Pharm[place] [facility] [place]    Phone :  [phone]

## 2018-08-23 ENCOUNTER — HOSPITAL ENCOUNTER (OUTPATIENT)
Dept: PULMONOLOGY | Facility: HOSPITAL | Age: 63
Discharge: HOME | End: 2018-08-23
Attending: INTERNAL MEDICINE
Payer: COMMERCIAL

## 2018-08-23 DIAGNOSIS — R05.3 CHRONIC COUGH: ICD-10-CM

## 2018-08-23 PROCEDURE — 94760 N-INVAS EAR/PLS OXIMETRY 1: CPT

## 2018-08-23 PROCEDURE — 94726 PLETHYSMOGRAPHY LUNG VOLUMES: CPT | Performed by: INTERNAL MEDICINE

## 2018-08-23 PROCEDURE — 94729 DIFFUSING CAPACITY: CPT | Performed by: INTERNAL MEDICINE

## 2018-08-23 PROCEDURE — 94070 EVALUATION OF WHEEZING: CPT | Performed by: INTERNAL MEDICINE

## 2018-08-23 PROCEDURE — 94729 DIFFUSING CAPACITY: CPT

## 2018-08-23 PROCEDURE — 94070 EVALUATION OF WHEEZING: CPT

## 2018-08-23 PROCEDURE — 94060 EVALUATION OF WHEEZING: CPT

## 2018-08-23 PROCEDURE — 94727 GAS DIL/WSHOT DETER LNG VOL: CPT

## 2018-08-23 RX ORDER — ALBUTEROL SULFATE 2.5 MG/3ML
2.5 SOLUTION RESPIRATORY (INHALATION) ONCE AS NEEDED
Status: COMPLETED | OUTPATIENT
Start: 2018-08-23 | End: 2018-08-23

## 2018-08-23 RX ADMIN — METHACHOLINE CHLORIDE 5 BREATH: 100 POWDER, FOR SOLUTION RESPIRATORY (INHALATION) at 14:02

## 2018-08-23 RX ADMIN — ALBUTEROL SULFATE 2.5 MG: 2.5 SOLUTION RESPIRATORY (INHALATION) at 14:28

## 2018-08-24 ENCOUNTER — TRANSCRIBE ORDERS (OUTPATIENT)
Dept: LAB | Facility: CLINIC | Age: 63
End: 2018-08-24

## 2018-08-24 ENCOUNTER — APPOINTMENT (OUTPATIENT)
Dept: LAB | Facility: CLINIC | Age: 63
End: 2018-08-24
Payer: COMMERCIAL

## 2018-08-24 DIAGNOSIS — Z13.220 SCREENING FOR LIPID DISORDERS: ICD-10-CM

## 2018-08-24 DIAGNOSIS — R51.9 ACUTE NONINTRACTABLE HEADACHE, UNSPECIFIED HEADACHE TYPE: ICD-10-CM

## 2018-08-24 DIAGNOSIS — N92.0 MENORRHAGIA WITH REGULAR CYCLE: Primary | ICD-10-CM

## 2018-08-24 DIAGNOSIS — J45.41 MODERATE PERSISTENT REACTIVE AIRWAY DISEASE WITH ACUTE EXACERBATION: ICD-10-CM

## 2018-08-24 DIAGNOSIS — Z11.59 NEED FOR HEPATITIS C SCREENING TEST: ICD-10-CM

## 2018-08-24 DIAGNOSIS — N92.0 MENORRHAGIA WITH REGULAR CYCLE: ICD-10-CM

## 2018-08-24 LAB
ALBUMIN SERPL BCP-MCNC: 3.5 G/DL (ref 3.5–5)
ALP SERPL-CCNC: 50 U/L (ref 46–116)
ALT SERPL W P-5'-P-CCNC: 20 U/L (ref 12–78)
ANION GAP SERPL CALCULATED.3IONS-SCNC: 5 MMOL/L (ref 4–13)
AST SERPL W P-5'-P-CCNC: 20 U/L (ref 5–45)
BASOPHILS # BLD AUTO: 0.05 THOUSANDS/ΜL (ref 0–0.1)
BASOPHILS NFR BLD AUTO: 1 % (ref 0–1)
BILIRUB SERPL-MCNC: 0.34 MG/DL (ref 0.2–1)
BUN SERPL-MCNC: 21 MG/DL (ref 5–25)
CALCIUM SERPL-MCNC: 9.7 MG/DL (ref 8.3–10.1)
CHLORIDE SERPL-SCNC: 104 MMOL/L (ref 100–108)
CHOLEST SERPL-MCNC: 211 MG/DL (ref 50–200)
CO2 SERPL-SCNC: 29 MMOL/L (ref 21–32)
CREAT SERPL-MCNC: 1.12 MG/DL (ref 0.6–1.3)
EOSINOPHIL # BLD AUTO: 0.75 THOUSAND/ΜL (ref 0–0.61)
EOSINOPHIL NFR BLD AUTO: 9 % (ref 0–6)
ERYTHROCYTE [DISTWIDTH] IN BLOOD BY AUTOMATED COUNT: 13 % (ref 11.6–15.1)
FSH SERPL-ACNC: 79.4 MIU/ML
GFR SERPL CREATININE-BSD FRML MDRD: 53 ML/MIN/1.73SQ M
GLUCOSE P FAST SERPL-MCNC: 88 MG/DL (ref 65–99)
HCT VFR BLD AUTO: 39.4 % (ref 34.8–46.1)
HDLC SERPL-MCNC: 73 MG/DL (ref 40–60)
HGB BLD-MCNC: 12.6 G/DL (ref 11.5–15.4)
IMM GRANULOCYTES # BLD AUTO: 0.02 THOUSAND/UL (ref 0–0.2)
IMM GRANULOCYTES NFR BLD AUTO: 0 % (ref 0–2)
LDLC SERPL CALC-MCNC: 126 MG/DL (ref 0–100)
LH SERPL-ACNC: 57 MIU/ML
LYMPHOCYTES # BLD AUTO: 3 THOUSANDS/ΜL (ref 0.6–4.47)
LYMPHOCYTES NFR BLD AUTO: 38 % (ref 14–44)
MCH RBC QN AUTO: 31 PG (ref 26.8–34.3)
MCHC RBC AUTO-ENTMCNC: 32 G/DL (ref 31.4–37.4)
MCV RBC AUTO: 97 FL (ref 82–98)
MONOCYTES # BLD AUTO: 0.6 THOUSAND/ΜL (ref 0.17–1.22)
MONOCYTES NFR BLD AUTO: 8 % (ref 4–12)
NEUTROPHILS # BLD AUTO: 3.56 THOUSANDS/ΜL (ref 1.85–7.62)
NEUTS SEG NFR BLD AUTO: 44 % (ref 43–75)
NRBC BLD AUTO-RTO: 0 /100 WBCS
PLATELET # BLD AUTO: 357 THOUSANDS/UL (ref 149–390)
PMV BLD AUTO: 10.3 FL (ref 8.9–12.7)
POTASSIUM SERPL-SCNC: 3.8 MMOL/L (ref 3.5–5.3)
PROT SERPL-MCNC: 6.9 G/DL (ref 6.4–8.2)
RBC # BLD AUTO: 4.07 MILLION/UL (ref 3.81–5.12)
SODIUM SERPL-SCNC: 138 MMOL/L (ref 136–145)
TRIGL SERPL-MCNC: 58 MG/DL
TSH SERPL DL<=0.05 MIU/L-ACNC: 1.91 UIU/ML (ref 0.36–3.74)
WBC # BLD AUTO: 7.98 THOUSAND/UL (ref 4.31–10.16)

## 2018-08-24 PROCEDURE — 83002 ASSAY OF GONADOTROPIN (LH): CPT

## 2018-08-24 PROCEDURE — 83001 ASSAY OF GONADOTROPIN (FSH): CPT

## 2018-08-24 PROCEDURE — 80061 LIPID PANEL: CPT

## 2018-08-24 PROCEDURE — 84443 ASSAY THYROID STIM HORMONE: CPT

## 2018-08-24 PROCEDURE — 85025 COMPLETE CBC W/AUTO DIFF WBC: CPT

## 2018-08-24 PROCEDURE — 86803 HEPATITIS C AB TEST: CPT

## 2018-08-24 PROCEDURE — 80053 COMPREHEN METABOLIC PANEL: CPT

## 2018-08-24 PROCEDURE — 36415 COLL VENOUS BLD VENIPUNCTURE: CPT

## 2018-08-25 LAB — HCV AB SER QL: NORMAL

## 2018-08-27 ENCOUNTER — TELEPHONE (OUTPATIENT)
Dept: INTERNAL MEDICINE CLINIC | Facility: CLINIC | Age: 63
End: 2018-08-27

## 2018-08-27 DIAGNOSIS — R51.9 ACUTE NONINTRACTABLE HEADACHE, UNSPECIFIED HEADACHE TYPE: ICD-10-CM

## 2018-08-27 RX ORDER — BUTALBITAL, ASPIRIN, AND CAFFEINE 50; 325; 40 MG/1; MG/1; MG/1
1 CAPSULE ORAL EVERY 4 HOURS PRN
Qty: 30 CAPSULE | Refills: 0 | Status: SHIPPED | OUTPATIENT
Start: 2018-08-27 | End: 2018-09-25

## 2018-08-29 ENCOUNTER — OFFICE VISIT (OUTPATIENT)
Dept: INTERNAL MEDICINE CLINIC | Facility: CLINIC | Age: 63
End: 2018-08-29
Payer: COMMERCIAL

## 2018-08-29 ENCOUNTER — APPOINTMENT (OUTPATIENT)
Dept: LAB | Facility: CLINIC | Age: 63
End: 2018-08-29
Payer: COMMERCIAL

## 2018-08-29 VITALS
WEIGHT: 121 LBS | HEIGHT: 63 IN | OXYGEN SATURATION: 97 % | HEART RATE: 83 BPM | DIASTOLIC BLOOD PRESSURE: 80 MMHG | SYSTOLIC BLOOD PRESSURE: 130 MMHG | BODY MASS INDEX: 21.44 KG/M2 | TEMPERATURE: 97.9 F | RESPIRATION RATE: 18 BRPM

## 2018-08-29 DIAGNOSIS — G89.29 CHRONIC HIP PAIN, LEFT: ICD-10-CM

## 2018-08-29 DIAGNOSIS — M25.552 CHRONIC HIP PAIN, LEFT: ICD-10-CM

## 2018-08-29 DIAGNOSIS — S72.92XA CLOSED FRACTURE OF LEFT FEMUR, UNSPECIFIED FRACTURE MORPHOLOGY, UNSPECIFIED PORTION OF FEMUR, INITIAL ENCOUNTER (HCC): ICD-10-CM

## 2018-08-29 DIAGNOSIS — R51.9 ACUTE NONINTRACTABLE HEADACHE, UNSPECIFIED HEADACHE TYPE: ICD-10-CM

## 2018-08-29 DIAGNOSIS — J45.41 MODERATE PERSISTENT REACTIVE AIRWAY DISEASE WITH ACUTE EXACERBATION: Primary | ICD-10-CM

## 2018-08-29 DIAGNOSIS — F43.23 ADJUSTMENT DISORDER WITH MIXED ANXIETY AND DEPRESSED MOOD: ICD-10-CM

## 2018-08-29 DIAGNOSIS — R05.3 CHRONIC COUGH: ICD-10-CM

## 2018-08-29 PROBLEM — E78.9 BORDERLINE HIGH SERUM CHOLESTEROL: Status: ACTIVE | Noted: 2018-08-29

## 2018-08-29 LAB — 25(OH)D3 SERPL-MCNC: 42.7 NG/ML (ref 30–100)

## 2018-08-29 PROCEDURE — 36415 COLL VENOUS BLD VENIPUNCTURE: CPT

## 2018-08-29 PROCEDURE — 99214 OFFICE O/P EST MOD 30 MIN: CPT | Performed by: INTERNAL MEDICINE

## 2018-08-29 PROCEDURE — 82306 VITAMIN D 25 HYDROXY: CPT

## 2018-08-29 RX ORDER — MONTELUKAST SODIUM 10 MG/1
10 TABLET ORAL
Qty: 90 TABLET | Refills: 0 | Status: SHIPPED | OUTPATIENT
Start: 2018-08-29 | End: 2018-11-26

## 2018-08-29 NOTE — PROGRESS NOTES
Assessment/Plan:    No problem-specific Assessment & Plan notes found for this encounter  Diagnoses and all orders for this visit:    Moderate persistent reactive airway disease with acute exacerbation  -     montelukast (SINGULAIR) 10 mg tablet; Take 1 tablet (10 mg total) by mouth daily at bedtime    Chronic cough  -     montelukast (SINGULAIR) 10 mg tablet; Take 1 tablet (10 mg total) by mouth daily at bedtime    Acute nonintractable headache, unspecified headache type    Chronic hip pain, left  -     Vitamin D 25 hydroxy; Future  -     Ambulatory referral to Physical Therapy; Future  -     DXA bone density spine hip and pelvis; Future  -     Ambulatory referral to Orthopedic Surgery; Future    Closed fracture of left femur, unspecified fracture morphology, unspecified portion of femur, initial encounter ([facility] [address] )  -     Vitamin D 25 hydroxy; Future  -     Ambulatory referral to Physical Therapy; Future  -     DXA bone density spine hip and pelvis; Future  -     Ambulatory referral to Orthopedic Surgery; Future    Adjustment disorder with mixed anxiety and depressed mood      A/P: Await official reading of PFT's  Discussed the need to use the maintenance MDI daily  Will add singulair and may need to increase her meds  Hold on steroids as she has had several courses and has femoral fx in the past  ??why she would have fx  Will check a vitamin d level and dexa  Refer to ortho and start PT  Consider adding an SNRI for pain, depression, etc next visit  Don't want to add too many meds at once  Given a temporary disability status for now  Subjective pain much greater than her objective  RTC in two weeks for f/u  Subjective:      Patient ID: Martin Torres is a 58 y o  female  WF RTC for f/u suspected RAD as the cause of her chronic cough and SOB  Also for f/u labs  Now reports worsening of her left hip pain that she fx w/o abnormal trauma over a year ago and is applying for SSI   Pain is 10/10 and using a lot of ASA  Unable to use tylenol due to liver issues, but recent CMP was ok  Not using the maintainence  MDI properly and using in PRN  PFTs results are pending  Wants disability papers filled out as well  Labs ok except for borderline total cholesterol  The following portions of the patient's history were reviewed and updated as appropriate:   She  has a past medical history of Arthritis  She   Patient Active Problem List    Diagnosis Date Noted    Borderline high serum cholesterol 08/29/2018    Chronic cough     Reactive airway disease 08/15/2018    Environmental and seasonal allergies 08/15/2018    Arthralgia of right temporomandibular joint 08/15/2018     She  has a past surgical history that includes Facial cosmetic surgery; Colonoscopy; and Dental surgery  Her family history includes Aneurysm in her mother; No Known Problems in her brother  She  reports that she has never smoked  She has never used smokeless tobacco  She reports that she does not drink alcohol or use drugs  Current Outpatient Prescriptions   Medication Sig Dispense Refill    albuterol (PROAIR HFA) 90 mcg/act inhaler Inhale 2 puffs every 6 (six) hours as needed for wheezing 18 g 5    butalbital-aspirin-caffeine (FIORINAL) -40 mg capsule Take 1 capsule by mouth every 4 (four) hours as needed for headaches 30 capsule 0    Calcium Carbonate-Vitamin D (OSCAL 500/200 D-3 PO) Take by mouth 3 (three) times a day        Cholecalciferol (VITAMIN D3) 29804 units TABS Take by mouth daily        cyanocobalamin (VITAMIN B-12) 1,000 mcg tablet Take by mouth daily      cyclobenzaprine (FLEXERIL) 10 mg tablet Take 1 tablet (10 mg total) by mouth 3 (three) times a day as needed for muscle spasms 90 tablet 3    mometasone-formoterol (DULERA) 100-5 MCG/ACT inhaler Inhale 2 puffs 2 (two) times a day Rinse mouth after use   1 Inhaler 5    mometasone-formoterol (DULERA) 100-5 MCG/ACT inhaler Inhale 2 puffs 2 (two) times a day Rinse mouth after use  3 Inhaler 0    pyridoxine (VITAMIN B6) 100 mg tablet Take 100 mg by mouth daily      montelukast (SINGULAIR) 10 mg tablet Take 1 tablet (10 mg total) by mouth daily at bedtime 90 tablet 0     No current facility-administered medications for this visit  Current Outpatient Prescriptions on File Prior to Visit   Medication Sig    albuterol (PROAIR HFA) 90 mcg/act inhaler Inhale 2 puffs every 6 (six) hours as needed for wheezing    butalbital-aspirin-caffeine (FIORINAL) -40 mg capsule Take 1 capsule by mouth every 4 (four) hours as needed for headaches    Calcium Carbonate-Vitamin D (OSCAL 500/200 D-3 PO) Take by mouth 3 (three) times a day      Cholecalciferol (VITAMIN D3) 56484 units TABS Take by mouth daily      cyanocobalamin (VITAMIN B-12) 1,000 mcg tablet Take by mouth daily    cyclobenzaprine (FLEXERIL) 10 mg tablet Take 1 tablet (10 mg total) by mouth 3 (three) times a day as needed for muscle spasms    mometasone-formoterol (DULERA) 100-5 MCG/ACT inhaler Inhale 2 puffs 2 (two) times a day Rinse mouth after use   mometasone-formoterol (DULERA) 100-5 MCG/ACT inhaler Inhale 2 puffs 2 (two) times a day Rinse mouth after use   pyridoxine (VITAMIN B6) 100 mg tablet Take 100 mg by mouth daily     No current facility-administered medications on file prior to visit  She is allergic to celebrex [celecoxib]; ibuprofen; and influenza vaccines       Review of Systems   Constitutional: Positive for activity change and fatigue  Negative for chills, diaphoresis and fever  HENT: Negative  Respiratory: Positive for cough, chest tightness, shortness of breath and wheezing  Cardiovascular: Negative for chest pain, palpitations and leg swelling  Gastrointestinal: Negative for abdominal pain, constipation, diarrhea, nausea and vomiting  Genitourinary: Negative for difficulty urinating, dysuria and frequency  Musculoskeletal: Positive for arthralgias and gait problem   Negative for myalgias  Allergic/Immunologic: Positive for environmental allergies  Neurological: Positive for headaches  Negative for dizziness, seizures, weakness and light-headedness  Psychiatric/Behavioral: Positive for dysphoric mood and sleep disturbance  Negative for confusion  The patient is nervous/anxious  Objective:      /80 (BP Location: Left arm, Patient Position: Sitting, Cuff Size: Adult)   Pulse 83   Temp 97 9 °F (36 6 °C) (Tympanic)   Resp 18   Ht 5' 2 5" (1 588 m)   Wt 54 9 kg (121 lb)   SpO2 97%   BMI 21 78 kg/m²          Physical Exam   Constitutional: She is oriented to person, place, and time  She appears well-developed and well-nourished  She appears distressed  HENT:   Head: Normocephalic and atraumatic  Eyes: Conjunctivae and EOM are normal  Pupils are equal, round, and reactive to light  Cardiovascular: Normal rate, regular rhythm and normal heart sounds  No murmur heard  Pulmonary/Chest: Effort normal  She has no wheezes  She has no rales  Hyperactive response with deep inspiration  Wheezing with forced expiration  Musculoskeletal: Normal range of motion  She exhibits tenderness  She exhibits no edema or deformity  Bilat hips w/o gross deformity, increase temp, erythema, or swelling  Tenderness over the femoral heads and bursa  ROM wnl  Joint integrity intact  Neurological: She is alert and oriented to person, place, and time  Psychiatric: Her behavior is normal  Judgment and thought content normal    Crying and very emotional     Nursing note and vitals reviewed

## 2018-08-31 ENCOUNTER — APPOINTMENT (OUTPATIENT)
Dept: RADIOLOGY | Facility: CLINIC | Age: 63
End: 2018-08-31
Payer: COMMERCIAL

## 2018-08-31 ENCOUNTER — OFFICE VISIT (OUTPATIENT)
Dept: OBGYN CLINIC | Facility: CLINIC | Age: 63
End: 2018-08-31
Payer: COMMERCIAL

## 2018-08-31 VITALS
HEIGHT: 63 IN | RESPIRATION RATE: 18 BRPM | SYSTOLIC BLOOD PRESSURE: 140 MMHG | BODY MASS INDEX: 21.44 KG/M2 | HEART RATE: 78 BPM | WEIGHT: 121 LBS | DIASTOLIC BLOOD PRESSURE: 92 MMHG

## 2018-08-31 DIAGNOSIS — G89.29 CHRONIC HIP PAIN, LEFT: ICD-10-CM

## 2018-08-31 DIAGNOSIS — M16.12 ARTHRITIS OF LEFT HIP: Primary | ICD-10-CM

## 2018-08-31 DIAGNOSIS — M25.552 CHRONIC HIP PAIN, LEFT: ICD-10-CM

## 2018-08-31 PROCEDURE — 99244 OFF/OP CNSLTJ NEW/EST MOD 40: CPT | Performed by: FAMILY MEDICINE

## 2018-08-31 PROCEDURE — 73503 X-RAY EXAM HIP UNI 4/> VIEWS: CPT

## 2018-08-31 RX ORDER — TRAMADOL HYDROCHLORIDE 50 MG/1
50 TABLET ORAL EVERY 8 HOURS PRN
Qty: 21 TABLET | Refills: 0 | Status: SHIPPED | OUTPATIENT
Start: 2018-08-31 | End: 2018-09-18 | Stop reason: SDUPTHER

## 2018-08-31 NOTE — PROGRESS NOTES
Assessment/Plan:  Assessment/Plan   Diagnoses and all orders for this visit:    Arthritis of left hip  -     MRI hip left wo contrast; Future    Chronic hip pain, left  -     Ambulatory referral to Orthopedic Surgery  -     XR hip/pelv 4+ vw left if performed  -     MRI hip left wo contrast; Future  -     traMADol (ULTRAM) 50 mg tablet; Take 1 tablet (50 mg total) by mouth every 8 (eight) hours as needed for moderate pain      58year-old female with left hip pain more  Than 1 year duration  Discussed with patient physical exam, radiographs, impression, and plan  X-rays noted for significant arthritis of the left hip  Physical exam is noted for limited range of motion in all planes due to pain, and significant reproduction of pain with EMILEE and FADDIR maneuvers  Since she has not improved with more than 3 months of formal physical therapy and rest, and with use of crutches to limit weight-bearing, I will refer her for MRI of the left hip to evaluate for avascular necrosis, as pending results surgical intervention may be required  She will follow up with me after getting MRI done  Subjective:   Patient ID: Mumtaz Buckley is a 58 y o  female  Chief Complaint   Patient presents with    Left Thigh - Pain, Fracture        70-year-old female presents for evaluation of left hip pain of more than 1 year duration  She reports that 1 and a half years ago while participating in a running competition some runners tripped causing her along with other runners to fall  She immediately had pain that was described as localized to the anterior groin of both hips, sharp, radiating distally along the anterior thighs, and worse with bearing weight  She reports still being able to bear weight however with significant antalgia  The next day she was unable to bear weight on the right lower extremity    She was referred for imaging by primary care provider and MRI of the right hip done April 2017 was noted for impacted fracture of the right femoral head to right femoral neck with right hip joint effusion and osteoarthritis of the superior lateral aspect of the left hip joint with effusion  She was advised on nonweightbearing on the right lower extremity and to do physical therapy  She reports having done physical therapy  Today she reports that right hip pain has improved however pain of the left hip has significantly worsened  She has pain that is described as localized to the left anterior groin, sharp, constant, radiates down to the left foot, and worse with movement  She works as an  has difficulty with doing tasks such as bending, squatting, and rising from a resting position, and reports that due to the difficulty she has not been working  She denies any associated numbness /tingling  Hip Pain   This is a chronic problem  The current episode started more than 1 year ago  The problem occurs constantly  The problem has been gradually worsening  Associated symptoms include arthralgias and joint swelling  Pertinent negatives include no abdominal pain, chest pain, chills, fever, numbness, rash, sore throat or weakness  The symptoms are aggravated by standing, walking and twisting  She has tried rest (Physical therapy) for the symptoms  The treatment provided no relief  The following portions of the patient's history were reviewed and updated as appropriate: She  has a past medical history of Arthritis  She  has a past surgical history that includes Facial cosmetic surgery; Colonoscopy; and Dental surgery  Her family history includes Aneurysm in her mother; No Known Problems in her brother  She  reports that she has never smoked  She has never used smokeless tobacco  She reports that she does not drink alcohol or use drugs  She is allergic to nsaids; celebrex [celecoxib]; ibuprofen; and influenza vaccines       Review of Systems   Constitutional: Negative for chills and fever     HENT: Negative for sore throat  Eyes: Negative for visual disturbance  Respiratory: Negative for shortness of breath  Cardiovascular: Negative for chest pain  Gastrointestinal: Negative for abdominal pain  Genitourinary: Negative for flank pain  Musculoskeletal: Positive for arthralgias and joint swelling  Skin: Negative for rash and wound  Neurological: Negative for weakness and numbness  Hematological: Does not bruise/bleed easily  Psychiatric/Behavioral: Negative for self-injury  Objective:  Vitals:    08/31/18 1129   BP: 140/92   BP Location: Right arm   Patient Position: Sitting   Cuff Size: Standard   Pulse: 78   Resp: 18   Weight: 54 9 kg (121 lb)   Height: 5' 2 5" (1 588 m)     Right Hip Exam     Muscle Strength   Flexion: 5/5     Tests   EMILEE: negative    Comments:  Mild pain with FADDIR      Left Hip Exam     Tenderness   The patient is experiencing tenderness in the anterior  Range of Motion   Internal Rotation: 5   External Rotation: 20     Muscle Strength   Flexion: 2/5     Tests   EMILEE: positive    Comments:  Positive FADDIR    2/5 strength hip flexion due to pain      Back Exam     Tenderness   The patient is experiencing no tenderness  Range of Motion   The patient has normal back ROM  Muscle Strength   Right Quadriceps:  5/5   Left Quadriceps:  5/5     Reflexes   Patellar: 1/4    Other   Sensation: normal            Physical Exam   Constitutional: She is oriented to person, place, and time  She appears well-developed  No distress  HENT:   Head: Normocephalic  Eyes: Conjunctivae are normal    Neck: No tracheal deviation present  Cardiovascular: Normal rate  Pulmonary/Chest: Effort normal  No respiratory distress  Abdominal: She exhibits no distension  Neurological: She is alert and oriented to person, place, and time  Skin: Skin is warm and dry  Psychiatric: She has a normal mood and affect  Her behavior is normal    Nursing note and vitals reviewed        I have personally reviewed pertinent films in PACS and my interpretation is Significant left hip arthritis

## 2018-08-31 NOTE — LETTER
August 31, 2018     Lynette Hernandez DO  [facility] [address] [facility]    Patient: Alexa Underwood   YOB: 1955   Date of Visit: 8/31/2018       Dear Dr Su Arias: Thank you for referring Alexa Underwood to me for evaluation  Below are my notes for this consultation  If you have questions, please do not hesitate to call me  I look forward to following your patient along with you  Sincerely,        [employer], DO        CC: No Recipients  [employer],   8/31/2018 12:40 PM  Sign at close encounter  Assessment/Plan:  Assessment/Plan   Diagnoses and all orders for this visit:    Arthritis of left hip  -     MRI hip left wo contrast; Future    Chronic hip pain, left  -     Ambulatory referral to Orthopedic Surgery  -     XR hip/pelv 4+ vw left if performed  -     MRI hip left wo contrast; Future  -     traMADol (ULTRAM) 50 mg tablet; Take 1 tablet (50 mg total) by mouth every 8 (eight) hours as needed for moderate pain      58year-old female with left hip pain more  Than 1 year duration  Discussed with patient physical exam, radiographs, impression, and plan  X-rays noted for significant arthritis of the left hip  Physical exam is noted for limited range of motion in all planes due to pain, and significant reproduction of pain with EMILEE and FADDIR maneuvers  Since she has not improved with more than 3 months of formal physical therapy and rest, and with use of crutches to limit weight-bearing, I will refer her for MRI of the left hip to evaluate for avascular necrosis, as pending results surgical intervention may be required  She will follow up with me after getting MRI done  Subjective:   Patient ID: Alexa Underwood is a 58 y o  female  Chief Complaint   Patient presents with    Left Thigh - Pain, Fracture        20-year-old female presents for evaluation of left hip pain of more than 1 year duration   She reports that 1 and a half years ago while participating in a running competition some runners tripped causing her along with other runners to fall  She immediately had pain that was described as localized to the anterior groin of both hips, sharp, radiating distally along the anterior thighs, and worse with bearing weight  She reports still being able to bear weight however with significant antalgia  The next day she was unable to bear weight on the right lower extremity  She was referred for imaging by primary care provider and MRI of the right hip done April 2017 was noted for impacted fracture of the right femoral head to right femoral neck with right hip joint effusion and osteoarthritis of the superior lateral aspect of the left hip joint with effusion  She was advised on nonweightbearing on the right lower extremity and to do physical therapy  She reports having done physical therapy  Today she reports that right hip pain has improved however pain of the left hip has significantly worsened  She has pain that is described as localized to the left anterior groin, sharp, constant, radiates down to the left foot, and worse with movement  She works as an  has difficulty with doing tasks such as bending, squatting, and rising from a resting position, and reports that due to the difficulty she has not been working  She denies any associated numbness /tingling  Hip Pain   This is a chronic problem  The current episode started more than 1 year ago  The problem occurs constantly  The problem has been gradually worsening  Associated symptoms include arthralgias and joint swelling  Pertinent negatives include no abdominal pain, chest pain, chills, fever, numbness, rash, sore throat or weakness  The symptoms are aggravated by standing, walking and twisting  She has tried rest (Physical therapy) for the symptoms  The treatment provided no relief             The following portions of the patient's history were reviewed and updated as appropriate: She  has a past medical history of Arthritis  She  has a past surgical history that includes Facial cosmetic surgery; Colonoscopy; and Dental surgery  Her family history includes Aneurysm in her mother; No Known Problems in her brother  She  reports that she has never smoked  She has never used smokeless tobacco  She reports that she does not drink alcohol or use drugs  She is allergic to nsaids; celebrex [celecoxib]; ibuprofen; and influenza vaccines       Review of Systems   Constitutional: Negative for chills and fever  HENT: Negative for sore throat  Eyes: Negative for visual disturbance  Respiratory: Negative for shortness of breath  Cardiovascular: Negative for chest pain  Gastrointestinal: Negative for abdominal pain  Genitourinary: Negative for flank pain  Musculoskeletal: Positive for arthralgias and joint swelling  Skin: Negative for rash and wound  Neurological: Negative for weakness and numbness  Hematological: Does not bruise/bleed easily  Psychiatric/Behavioral: Negative for self-injury  Objective:  Vitals:    08/31/18 1129   BP: 140/92   BP Location: Right arm   Patient Position: Sitting   Cuff Size: Standard   Pulse: 78   Resp: 18   Weight: 54 9 kg (121 lb)   Height: 5' 2 5" (1 588 m)     Right Hip Exam     Muscle Strength   Flexion: 5/5     Tests   EMILEE: negative    Comments:  Mild pain with FADDIR      Left Hip Exam     Tenderness   The patient is experiencing tenderness in the anterior  Range of Motion   Internal Rotation: 5   External Rotation: 20     Muscle Strength   Flexion: 2/5     Tests   EMILEE: positive    Comments:  Positive FADDIR    2/5 strength hip flexion due to pain      Back Exam     Tenderness   The patient is experiencing no tenderness  Range of Motion   The patient has normal back ROM      Muscle Strength   Right Quadriceps:  5/5   Left Quadriceps:  5/5     Reflexes   Patellar: 1/4    Other   Sensation: normal            Physical Exam   Constitutional: She is oriented to person, place, and time  She appears well-developed  No distress  HENT:   Head: Normocephalic  Eyes: Conjunctivae are normal    Neck: No tracheal deviation present  Cardiovascular: Normal rate  Pulmonary/Chest: Effort normal  No respiratory distress  Abdominal: She exhibits no distension  Neurological: She is alert and oriented to person, place, and time  Skin: Skin is warm and dry  Psychiatric: She has a normal mood and affect  Her behavior is normal    Nursing note and vitals reviewed  I have personally reviewed pertinent films in PACS and my interpretation is Significant left hip arthritis

## 2018-09-04 ENCOUNTER — TELEPHONE (OUTPATIENT)
Dept: INTERNAL MEDICINE CLINIC | Facility: CLINIC | Age: 63
End: 2018-09-04

## 2018-09-04 NOTE — TELEPHONE ENCOUNTER
Pt called and said her insur is not going to cover the proair  They will cover ventolin inhaler      Can you send in for ventolin for her-[facility]

## 2018-09-05 DIAGNOSIS — J45.909 ASTHMA, UNSPECIFIED ASTHMA SEVERITY, UNSPECIFIED WHETHER COMPLICATED, UNSPECIFIED WHETHER PERSISTENT: Primary | ICD-10-CM

## 2018-09-05 RX ORDER — ALBUTEROL SULFATE 90 UG/1
2 AEROSOL, METERED RESPIRATORY (INHALATION) EVERY 6 HOURS PRN
Qty: 18 G | Refills: 5 | Status: SHIPPED | OUTPATIENT
Start: 2018-09-05 | End: 2019-09-13 | Stop reason: SDUPTHER

## 2018-09-18 DIAGNOSIS — G89.29 CHRONIC HIP PAIN, LEFT: ICD-10-CM

## 2018-09-18 DIAGNOSIS — M25.552 CHRONIC HIP PAIN, LEFT: ICD-10-CM

## 2018-09-18 RX ORDER — TRAMADOL HYDROCHLORIDE 50 MG/1
50 TABLET ORAL EVERY 8 HOURS PRN
Qty: 21 TABLET | Refills: 0 | Status: SHIPPED | OUTPATIENT
Start: 2018-09-18 | End: 2018-10-05 | Stop reason: SDUPTHER

## 2018-09-19 ENCOUNTER — HOSPITAL ENCOUNTER (OUTPATIENT)
Dept: MRI IMAGING | Facility: HOSPITAL | Age: 63
Discharge: HOME | End: 2018-09-19
Payer: COMMERCIAL

## 2018-09-19 DIAGNOSIS — M16.12 ARTHRITIS OF LEFT HIP: ICD-10-CM

## 2018-09-19 DIAGNOSIS — G89.29 CHRONIC HIP PAIN, LEFT: ICD-10-CM

## 2018-09-19 DIAGNOSIS — M25.552 CHRONIC HIP PAIN, LEFT: ICD-10-CM

## 2018-09-19 PROCEDURE — 73721 MRI JNT OF LWR EXTRE W/O DYE: CPT

## 2018-09-21 ENCOUNTER — OFFICE VISIT (OUTPATIENT)
Dept: OBGYN CLINIC | Facility: CLINIC | Age: 63
End: 2018-09-21
Payer: COMMERCIAL

## 2018-09-21 VITALS
DIASTOLIC BLOOD PRESSURE: 80 MMHG | HEART RATE: 100 BPM | HEIGHT: 63 IN | BODY MASS INDEX: 21.44 KG/M2 | SYSTOLIC BLOOD PRESSURE: 180 MMHG | WEIGHT: 121 LBS

## 2018-09-21 DIAGNOSIS — M16.12 ARTHRITIS OF LEFT HIP: Primary | ICD-10-CM

## 2018-09-21 DIAGNOSIS — M25.552 PAIN IN LEFT HIP: ICD-10-CM

## 2018-09-21 PROCEDURE — 99213 OFFICE O/P EST LOW 20 MIN: CPT | Performed by: FAMILY MEDICINE

## 2018-09-21 NOTE — PROGRESS NOTES
Assessment/Plan:  Assessment/Plan   Diagnoses and all orders for this visit:    Arthritis of left hip  -     FL injection left hip (non arthrogram); Future    Pain in left hip  -     FL injection left hip (non arthrogram); Future        51-year-old female with chronic left hip pain  Discussed with patient physical exam, MRI findings, impression, and plan  MRI of the left hip is noted for advanced degenerative change in left hip with degenerative labral tear, without any evidence of AVN or focal osseous lesions  I discussed with patient treatment option of corticosteroid injection to which she agreed  I will refer her for fluoroscopic guided intra-articular hip injection and she will follow up with me after getting injection done  Subjective:   Patient ID: Ella Orozco is a 58 y o  female  Chief Complaint   Patient presents with    Left Hip - Follow-up       51-year-old female following up for left hip pain more than 1 year duration  She was last seen 3 weeks ago at which point x-rays reviewed her were noted for significant degenerative arthritis of the left hip  She was referred for MRI to evaluate for avascular necrosis  Today she reports still having significant pain of the left hip described as localized to the left anterior groin, sharp, constant, radiating along the medial aspect of the anterior thigh, worse with movement of the hip, and associated with limited range of motion with ambulation  Pain is improved with nonweightbearing so she has been using crutches  Hip Pain   This is a chronic problem  The current episode started more than 1 year ago  The problem occurs constantly  The problem has been gradually worsening  Associated symptoms include arthralgias  Pertinent negatives include no numbness or weakness  The symptoms are aggravated by twisting, walking and standing  She has tried rest for the symptoms  The treatment provided mild relief                 Review of Systems   Musculoskeletal: Positive for arthralgias  Neurological: Negative for weakness and numbness  Objective:  Vitals:    09/21/18 1500   BP: (!) 180/80   Pulse: 100   Weight: 54 9 kg (121 lb)   Height: 5' 2 5" (1 588 m)     Ortho Exam    Physical Exam   Constitutional: She is oriented to person, place, and time  She appears well-developed  No distress  HENT:   Head: Normocephalic  Eyes: Conjunctivae are normal    Neck: No tracheal deviation present  Cardiovascular: Normal rate  Pulmonary/Chest: Effort normal  No respiratory distress  Abdominal: She exhibits no distension  Neurological: She is alert and oriented to person, place, and time  Skin: Skin is warm and dry  Psychiatric: She has a normal mood and affect  Her behavior is normal    Nursing note and vitals reviewed  I have personally reviewed pertinent films in PACS and my interpretation is Significant osteoarthritis of the left hip

## 2018-09-24 ENCOUNTER — TELEPHONE (OUTPATIENT)
Dept: PAIN MEDICINE | Facility: CLINIC | Age: 63
End: 2018-09-24

## 2018-09-25 ENCOUNTER — OFFICE VISIT (OUTPATIENT)
Dept: INTERNAL MEDICINE CLINIC | Facility: CLINIC | Age: 63
End: 2018-09-25
Payer: COMMERCIAL

## 2018-09-25 ENCOUNTER — HOSPITAL ENCOUNTER (OUTPATIENT)
Dept: BONE DENSITY | Facility: HOSPITAL | Age: 63
Discharge: HOME | End: 2018-09-25
Payer: COMMERCIAL

## 2018-09-25 ENCOUNTER — TELEPHONE (OUTPATIENT)
Dept: RADIOLOGY | Facility: CLINIC | Age: 63
End: 2018-09-25

## 2018-09-25 VITALS
DIASTOLIC BLOOD PRESSURE: 84 MMHG | RESPIRATION RATE: 18 BRPM | HEIGHT: 63 IN | SYSTOLIC BLOOD PRESSURE: 180 MMHG | TEMPERATURE: 98.7 F | HEART RATE: 86 BPM

## 2018-09-25 DIAGNOSIS — G89.29 CHRONIC NONINTRACTABLE HEADACHE, UNSPECIFIED HEADACHE TYPE: Primary | ICD-10-CM

## 2018-09-25 DIAGNOSIS — R03.0 ELEVATED BP WITHOUT DIAGNOSIS OF HYPERTENSION: ICD-10-CM

## 2018-09-25 DIAGNOSIS — F43.23 ADJUSTMENT DISORDER WITH MIXED ANXIETY AND DEPRESSED MOOD: ICD-10-CM

## 2018-09-25 DIAGNOSIS — M25.552 CHRONIC HIP PAIN, LEFT: ICD-10-CM

## 2018-09-25 DIAGNOSIS — J45.909 ASTHMA, UNSPECIFIED ASTHMA SEVERITY, UNSPECIFIED WHETHER COMPLICATED, UNSPECIFIED WHETHER PERSISTENT: ICD-10-CM

## 2018-09-25 DIAGNOSIS — G89.29 CHRONIC HIP PAIN, LEFT: ICD-10-CM

## 2018-09-25 DIAGNOSIS — S72.92XA CLOSED FRACTURE OF LEFT FEMUR, UNSPECIFIED FRACTURE MORPHOLOGY, UNSPECIFIED PORTION OF FEMUR, INITIAL ENCOUNTER (HCC): ICD-10-CM

## 2018-09-25 DIAGNOSIS — J43.9 PULMONARY EMPHYSEMA, UNSPECIFIED EMPHYSEMA TYPE (HCC): ICD-10-CM

## 2018-09-25 DIAGNOSIS — R51.9 CHRONIC NONINTRACTABLE HEADACHE, UNSPECIFIED HEADACHE TYPE: Primary | ICD-10-CM

## 2018-09-25 DIAGNOSIS — H53.9 VISUAL DISTURBANCE: ICD-10-CM

## 2018-09-25 PROCEDURE — 99214 OFFICE O/P EST MOD 30 MIN: CPT | Performed by: INTERNAL MEDICINE

## 2018-09-25 PROCEDURE — 77080 DXA BONE DENSITY AXIAL: CPT

## 2018-09-25 RX ORDER — BUTALBITAL, ACETAMINOPHEN AND CAFFEINE 50; 325; 40 MG/1; MG/1; MG/1
1 TABLET ORAL EVERY 4 HOURS PRN
Qty: 30 TABLET | Refills: 0 | Status: SHIPPED | OUTPATIENT
Start: 2018-09-25 | End: 2018-11-26

## 2018-09-25 RX ORDER — FLUTICASONE PROPIONATE AND SALMETEROL 113; 14 UG/1; UG/1
1 POWDER, METERED RESPIRATORY (INHALATION) 2 TIMES DAILY
Qty: 3 EACH | Refills: 0 | Status: SHIPPED | OUTPATIENT
Start: 2018-09-25 | End: 2018-09-25

## 2018-09-25 NOTE — TELEPHONE ENCOUNTER
----- Message from Lennox Edward sent at 9/24/2018  7:32 AM EDT -----  Regarding: [place] hip inj ref?     ----- Message -----  From: Silva Lerner MA  Sent: 9/21/2018   3:42 PM  To: [facility]    Please call pt for fl lt hip corticosteroid injection     #     [facility] or [facility] would be ideal

## 2018-09-25 NOTE — PROGRESS NOTES
Assessment/Plan:    No problem-specific Assessment & Plan notes found for this encounter  Diagnoses and all orders for this visit:    Chronic nonintractable headache, unspecified headache type  -     butalbital-acetaminophen-caffeine (FIORICET,ESGIC) -40 mg per tablet; Take 1 tablet by mouth every 4 (four) hours as needed for headaches  -     Ambulatory Referral to Ophthalmology; Future    Asthma, unspecified asthma severity, unspecified whether complicated, unspecified whether persistent  -     Discontinue: Fluticasone-Salmeterol 113-14 MCG/ACT AEPB; Inhale 1 puff 2 (two) times a day  -     mometasone-formoterol (DULERA) 200-5 MCG/ACT inhaler; Inhale 2 puffs 2 (two) times a day Rinse mouth after use  Chronic hip pain, left    Visual disturbance  -     Ambulatory Referral to Ophthalmology; Future    Pulmonary emphysema, unspecified emphysema type (HCC)  -     Discontinue: Fluticasone-Salmeterol 113-14 MCG/ACT AEPB; Inhale 1 puff 2 (two) times a day  -     mometasone-formoterol (DULERA) 200-5 MCG/ACT inhaler; Inhale 2 puffs 2 (two) times a day Rinse mouth after use  Elevated BP without diagnosis of hypertension    Adjustment disorder with mixed anxiety and depressed mood      A/P: Will increase the maintenance MDI  Recommend she start PT and see ortho for injections  Discussed the headaches and recommended a trial of prophylactic treatment  Recommended topamax due to the COPD/asthma and not want to try beta blocker, but deferred both meds actually  Will switch to non NSAID butalbital compound for now  Refer to get her eye checked  Pt's BP is up, but very emotional  ??cause of her headaches  Will have pt RTC one week for f/u on BP, COPD, and ZACK  May benefit from treatment of her ZACK  Recommend flu shot, but reports an egg allergy and told her we have egg free vaccines and will think about it  Subjective:      Patient ID: Jammie Espinoza is a 58 y o  female      WF RTC for f/u chronic left hip pain that has worsened and difficulty breathing after starting maintenance inhaler for suspect RAD  PFT's with some COPD and ?asthma as well  Decent response to MDI use  Doing a little better with the breathing, but still needing the rescue MDI daily  Pt again is very emotional  Now using crutches for her left hip pain and seen by sports medicine and MRI showed a labral tear  Recommended to start PT and get injections, but hasn't seen ortho or started PT  Now reports daily frequent headaches and that the ASA in the meds are causing GI irritation  Was seen for SSI and told her vision is not normal          The following portions of the patient's history were reviewed and updated as appropriate:   She  has a past medical history of Arthritis  She   Patient Active Problem List    Diagnosis Date Noted    Borderline high serum cholesterol 08/29/2018    Chronic cough     Reactive airway disease 08/15/2018    Environmental and seasonal allergies 08/15/2018    Arthralgia of right temporomandibular joint 08/15/2018     She  has a past surgical history that includes Facial cosmetic surgery; Colonoscopy; and Dental surgery  Her family history includes Aneurysm in her mother; No Known Problems in her brother  She  reports that she has never smoked  She has never used smokeless tobacco  She reports that she does not drink alcohol or use drugs    Current Outpatient Prescriptions   Medication Sig Dispense Refill    albuterol (VENTOLIN HFA) 90 mcg/act inhaler Inhale 2 puffs every 6 (six) hours as needed for wheezing 18 g 5    Calcium Carbonate-Vitamin D (OSCAL 500/200 D-3 PO) Take by mouth 3 (three) times a day        Cholecalciferol (VITAMIN D3) 42146 units TABS Take by mouth daily        cyanocobalamin (VITAMIN B-12) 1,000 mcg tablet Take by mouth daily      cyclobenzaprine (FLEXERIL) 10 mg tablet Take 1 tablet (10 mg total) by mouth 3 (three) times a day as needed for muscle spasms 90 tablet 3    montelukast (SINGULAIR) 10 mg tablet Take 1 tablet (10 mg total) by mouth daily at bedtime 90 tablet 0    pyridoxine (VITAMIN B6) 100 mg tablet Take 100 mg by mouth daily      traMADol (ULTRAM) 50 mg tablet Take 1 tablet (50 mg total) by mouth every 8 (eight) hours as needed for moderate pain 21 tablet 0    butalbital-acetaminophen-caffeine (FIORICET,ESGIC) -40 mg per tablet Take 1 tablet by mouth every 4 (four) hours as needed for headaches 30 tablet 0    mometasone-formoterol (DULERA) 200-5 MCG/ACT inhaler Inhale 2 puffs 2 (two) times a day Rinse mouth after use  3 Inhaler 0     No current facility-administered medications for this visit  Current Outpatient Prescriptions on File Prior to Visit   Medication Sig    albuterol (VENTOLIN HFA) 90 mcg/act inhaler Inhale 2 puffs every 6 (six) hours as needed for wheezing    Calcium Carbonate-Vitamin D (OSCAL 500/200 D-3 PO) Take by mouth 3 (three) times a day      Cholecalciferol (VITAMIN D3) 98814 units TABS Take by mouth daily      cyanocobalamin (VITAMIN B-12) 1,000 mcg tablet Take by mouth daily    cyclobenzaprine (FLEXERIL) 10 mg tablet Take 1 tablet (10 mg total) by mouth 3 (three) times a day as needed for muscle spasms    montelukast (SINGULAIR) 10 mg tablet Take 1 tablet (10 mg total) by mouth daily at bedtime    pyridoxine (VITAMIN B6) 100 mg tablet Take 100 mg by mouth daily    traMADol (ULTRAM) 50 mg tablet Take 1 tablet (50 mg total) by mouth every 8 (eight) hours as needed for moderate pain    [DISCONTINUED] albuterol (PROAIR HFA) 90 mcg/act inhaler Inhale 2 puffs every 6 (six) hours as needed for wheezing    [DISCONTINUED] butalbital-aspirin-caffeine (FIORINAL) -40 mg capsule Take 1 capsule by mouth every 4 (four) hours as needed for headaches    [DISCONTINUED] mometasone-formoterol (DULERA) 100-5 MCG/ACT inhaler Inhale 2 puffs 2 (two) times a day Rinse mouth after use      [DISCONTINUED] mometasone-formoterol (DULERA) 100-5 MCG/ACT inhaler Inhale 2 puffs 2 (two) times a day Rinse mouth after use  (Patient not taking: Reported on 9/25/2018 )     No current facility-administered medications on file prior to visit  She is allergic to nsaids; celebrex [celecoxib]; ibuprofen; and influenza vaccines       Review of Systems   Constitutional: Positive for activity change  Negative for chills, diaphoresis, fatigue and fever  HENT: Negative  Eyes: Positive for visual disturbance  Respiratory: Negative for cough, chest tightness, shortness of breath and wheezing  Cardiovascular: Negative for chest pain, palpitations and leg swelling  Gastrointestinal: Positive for abdominal pain  Negative for constipation, diarrhea, nausea and vomiting  Genitourinary: Negative for difficulty urinating, dysuria and frequency  Musculoskeletal: Positive for arthralgias and gait problem  Negative for myalgias  Neurological: Positive for headaches  Negative for dizziness, tremors, seizures, syncope, weakness and light-headedness  Psychiatric/Behavioral: Positive for dysphoric mood and sleep disturbance  Negative for confusion  The patient is nervous/anxious  Objective:      BP (!) 180/84   Pulse 86   Temp 98 7 °F (37 1 °C) (Tympanic)   Resp 18   Ht 5' 2 5" (1 588 m)          Physical Exam   Constitutional: She is oriented to person, place, and time  She appears well-developed and well-nourished  HENT:   Head: Normocephalic and atraumatic  Mouth/Throat: Oropharynx is clear and moist    Eyes: Conjunctivae and EOM are normal  Pupils are equal, round, and reactive to light  Neck: Neck supple  No JVD present  Cardiovascular: Normal rate, regular rhythm and normal heart sounds  No murmur heard  Pulmonary/Chest: Effort normal  She has no wheezes  She has no rales  Coarse and decreased BS    Abdominal: Soft  Bowel sounds are normal  She exhibits no distension  There is no tenderness  There is no rebound and no guarding     Neurological: She is alert and oriented to person, place, and time  Psychiatric: Her behavior is normal  Judgment and thought content normal    Very tearful and anxious  Nursing note and vitals reviewed

## 2018-09-28 NOTE — TELEPHONE ENCOUNTER
I called and spoke with patient, she is a little apprehensive about getting the injection done and would like to first discuss with her PCP before scheduling any injections  Patient will call back if she decides to have the injection done   Patient was pleasant and appreciative of the calls

## 2018-10-05 ENCOUNTER — OFFICE VISIT (OUTPATIENT)
Dept: OBGYN CLINIC | Facility: CLINIC | Age: 63
End: 2018-10-05
Payer: COMMERCIAL

## 2018-10-05 VITALS
HEIGHT: 62 IN | BODY MASS INDEX: 22.26 KG/M2 | SYSTOLIC BLOOD PRESSURE: 136 MMHG | WEIGHT: 121 LBS | DIASTOLIC BLOOD PRESSURE: 90 MMHG

## 2018-10-05 DIAGNOSIS — M16.12 ARTHRITIS OF LEFT HIP: Primary | ICD-10-CM

## 2018-10-05 DIAGNOSIS — G89.29 CHRONIC HIP PAIN, LEFT: ICD-10-CM

## 2018-10-05 DIAGNOSIS — M25.552 CHRONIC HIP PAIN, LEFT: ICD-10-CM

## 2018-10-05 PROCEDURE — 99213 OFFICE O/P EST LOW 20 MIN: CPT | Performed by: FAMILY MEDICINE

## 2018-10-05 RX ORDER — TRAMADOL HYDROCHLORIDE 50 MG/1
50 TABLET ORAL EVERY 8 HOURS PRN
Qty: 21 TABLET | Refills: 0 | Status: SHIPPED | OUTPATIENT
Start: 2018-10-05 | End: 2018-10-23 | Stop reason: SDUPTHER

## 2018-10-05 NOTE — PROGRESS NOTES
Assessment/Plan:  Assessment/Plan   Diagnoses and all orders for this visit:    Arthritis of left hip    Chronic hip pain, left  -     traMADol (ULTRAM) 50 mg tablet; Take 1 tablet (50 mg total) by mouth every 8 (eight) hours as needed for moderate pain      69-year-old female with advanced arthritis of the left hip  Discussed with patient bone density results, impression, and plan  Bone density scan is noted for T-score -1 2 in the left hip consistent with osteopenia  She reports being concern about receiving corticosteroid injection to the left hip joint presence of osteopenia, and I discussed with patient that since steroid injection is not a prolonged course of treatment there is very low risk of worsening of bone density  I recommend that she proceed with intra-articular hip injection for treatment of her pain and she will follow up with me after getting injection done  Subjective:   Patient ID: Vikram Henson is a 58 y o  female  Chief Complaint   Patient presents with    Left Hip - Follow-up       69-year-old female presents for follow-up of significant left hip arthritis with left hip pain more than 1 year duration  She was last seen 2 weeks ago at which point MRI was reviewed her and found to be unremarkable for avascular necrosis, and she was referred for fluoroscopic guided intra-articular hip injection  She reports that she has since had bone density scan done and shows osteopenia of the left hip, and she is concerned about receiving corticosteroid injection due to having osteopenia  She still continues have pain that is described as high intensity, worse at the anterior groin, constant, worse with bearing weight and move the hip, and improved with rest   She has been ambulating with use of crutches to limit bearing weight  Hip Pain   This is a chronic problem  The current episode started more than 1 year ago  The problem occurs constantly  The problem has been unchanged   Associated symptoms include arthralgias  Pertinent negatives include no joint swelling, numbness or weakness  The symptoms are aggravated by standing, twisting and walking  She has tried rest and oral narcotics for the symptoms  The treatment provided mild relief  Review of Systems   Musculoskeletal: Positive for arthralgias  Negative for joint swelling  Neurological: Negative for weakness and numbness  Objective:  Vitals:    10/05/18 1528   BP: 136/90   Weight: 54 9 kg (121 lb)   Height: 5' 2" (1 575 m)     Ortho Exam    Physical Exam   Constitutional: She is oriented to person, place, and time  She appears well-developed  No distress  HENT:   Head: Normocephalic  Eyes: Conjunctivae are normal    Neck: No tracheal deviation present  Pulmonary/Chest: Effort normal  No respiratory distress  Abdominal: She exhibits no distension  Neurological: She is alert and oriented to person, place, and time  Skin: Skin is warm and dry  Psychiatric: She has a normal mood and affect  Her behavior is normal    Nursing note and vitals reviewed

## 2018-10-08 ENCOUNTER — TELEPHONE (OUTPATIENT)
Dept: INTERNAL MEDICINE CLINIC | Facility: CLINIC | Age: 63
End: 2018-10-08

## 2018-10-08 DIAGNOSIS — G89.29 CHRONIC HIP PAIN, UNSPECIFIED LATERALITY: Primary | ICD-10-CM

## 2018-10-08 DIAGNOSIS — M25.559 CHRONIC HIP PAIN, UNSPECIFIED LATERALITY: Primary | ICD-10-CM

## 2018-10-09 ENCOUNTER — EVALUATION (OUTPATIENT)
Dept: PHYSICAL THERAPY | Facility: CLINIC | Age: 63
End: 2018-10-09
Payer: COMMERCIAL

## 2018-10-09 DIAGNOSIS — G89.29 CHRONIC LEFT HIP PAIN: Primary | ICD-10-CM

## 2018-10-09 DIAGNOSIS — M25.552 CHRONIC LEFT HIP PAIN: Primary | ICD-10-CM

## 2018-10-09 DIAGNOSIS — M25.552 PAIN OF LEFT HIP JOINT: Primary | ICD-10-CM

## 2018-10-09 PROCEDURE — G8979 MOBILITY GOAL STATUS: HCPCS

## 2018-10-09 PROCEDURE — G8978 MOBILITY CURRENT STATUS: HCPCS

## 2018-10-09 PROCEDURE — 97163 PT EVAL HIGH COMPLEX 45 MIN: CPT

## 2018-10-09 NOTE — PROGRESS NOTES
PT Evaluation     Today's date: 10/9/2018  Patient name: Buddy Mustafa  : 1955  MRN: [id number]  Referring provider: Joselin Henderson DO  Dx:   Encounter Diagnosis     ICD-10-CM    1  Pain of left hip joint M25 552                   Assessment  Impairments: abnormal or restricted ROM, abnormal movement, activity intolerance, impaired physical strength, lacks appropriate home exercise program, pain with function and weight-bearing intolerance  Functional limitations: unable to run, unable to work, diff bathing and dressing, wears slip on shoes, diff with prolonged sitting, standing, diff wt bearing L hip  Symptom irritability: high  Assessment details: Buddy Mustafa is a 58 y o  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 and  presenting to outpatient physical therapy with diagnosis of Pain of left hip joint for approx 1 year  Pain began after a fall in a race where she fx'ed the R femoral head    Patient presents with severe L hip pain , reduced range of motion L hip, reduced strength, and reduced functional activity tolerance  Patient to benefit from skilled outpatient physical therapy to reduce pain, maximize pain free range of motion, increase strength and stability, and improve functional mobility/functional activity in order to maximize return to prior level of function with reduced limitations  She was dx'ed with OA  L hip by MRI   Currently seeking a second ortho opinion  Has applied for SSI  Thank you for your referral     Barriers to therapy: Pain L hip, Pt reluctance to participate  Understanding of Dx/Px/POC: excellent   Prognosis: fair  Prognosis details: Pt stating she is in need of a THR    Goals  STGs to be achieved in 4 weeks:  1  Pt to demonstrate reduced subjective pain rating "at worst" by at least 2-3 points from Initial Eval in order to allow for reduced pain with ADLs and improved functional activity tolerance     2  Pt to demonstrate increased AROM of L hip flex, Abd, IR and ER by at least 5-10 degrees in order to allow for greater ease and independence with ADLs and functional mobility  3  Pt to demonstrate full PROM of L hip  in order to maximize joint mobility and function and allow for progression of exercise program and achievement of goals  4  Pt to demonstrate increased MMT of L hip flex,Abd, IR and ER by at least 1/2-1 grade in order to improve safety and stability with ADLs and functional mobility  LTGs to be achieved in 6-8 weeks:  1  Pt will be I with HEP in order to continue to improve quality of life and independence and reduce risk for re-injury  2  Pt to demonstrate return to amb w/o crutches  without limitations or restrictions  3  Pt to demonstrate improved function as noted by achieving or exceeding predicted score on FOTO outcomes assessment tool  Plan  Planned modality interventions: thermotherapy: hydrocollator packs  Planned therapy interventions: therapeutic exercise, stretching, strengthening, home exercise program and gait training  Frequency: 2x week  Duration in visits: 12  Duration in weeks: 6  Plan of Care beginning date: 10/9/2018  Plan of Care expiration date: 11/20/2018  Treatment plan discussed with: patient        Re-eval Date: 11/20/18    Date 10/9/18       Visit Count        FOTO        Pain In        Pain Out              Subjective Evaluation    History of Present Illness  Date of onset: 3/10/2017  Mechanism of injury: Pt was running in a race and fell over someone who hadfallen in front of her  Finished the race walking and through the night pt could not walk to the bathroom  Pt called family phys and he ordered an MRI which  showed fx of R femoral neck  and L hip  OA and jt effusion  Pt reports severe pain with poor ROM L hip and recent onset of clicking from forward bent trunk to upright position  Recent bone density test showed osteopenia L hip and lumbar area  Pt reports she will be seeking a second opinion from another ortho phys because she does not want to have an injection  Recurrent probem    Quality of life: poor    Pain  Current pain rating: 10  At best pain ratin  At worst pain rating: 10  Location: L hip anterior area  Quality: burning, knife-like, sharp, throbbing and tight  Relieving factors: heat, change in position, rest, relaxation and medications  Aggravating factors: standing, walking, stair climbing and sitting  Progression: no change    Social Support  Steps to enter house: no  Stairs in house: no   Lives in: [place] house (ramps to enter)  Lives with: alone    Employment status: not working  Hand dominance: right  Exercise history: runner, 10 miles per day,  x 12 years      Diagnostic Tests  X-ray: abnormal  MRI studies: abnormal  Bone scan: abnormal  Treatments  Previous treatment: medication  Current treatment: medication and physical therapy  Patient Goals  Patient goals for therapy: decreased pain, increased strength, increased motion, return to sport/leisure activities, return to work and improved balance          Objective     Observations     Additional Observation Details  Pt arrived to PT amb with crutches WBAT LLE but placing approx 25% wt thru LLE    Lumbar Screen  Lumbar range of motion within normal limits      Neurological Testing     Sensation     Hip   Left Hip   Intact: light touch, hot/cold discrimination, kinesthesia and proprioception    Active Range of Motion   Left Hip   Flexion: 60 (supne) degrees   Abduction: 18 degrees   Adduction: 15 degrees   External rotation (90/90): 12 degrees   Internal rotation (90/90): 18 degrees     Passive Range of Motion   Left Hip   Flexion: 115 degrees     Strength/Myotome Testing     Left Hip   Planes of Motion   Flexion: 3-  Extension: 3-  Abduction: 3+  Adduction: 3+  External rotation: 3  Internal rotation: 3    Right Hip   Normal muscle strength    Tests     Additional Tests Details  Pt is unable to tolerate special testing            Precautions: severe L hip pain    Daily Treatment Diary     Manual  10/9            Ham stretch             L hip flex str                                                        Exercise Diary  10/9            Nustep             Heel slides             SAQs             SLRs              Sup Abd/Add                                                                                                                                                                                                                    Modalities  10/9            HP to L ant hip

## 2018-10-15 ENCOUNTER — OFFICE VISIT (OUTPATIENT)
Dept: PHYSICAL THERAPY | Facility: CLINIC | Age: 63
End: 2018-10-15
Payer: COMMERCIAL

## 2018-10-15 DIAGNOSIS — M25.552 PAIN OF LEFT HIP JOINT: Primary | ICD-10-CM

## 2018-10-15 PROCEDURE — 97010 HOT OR COLD PACKS THERAPY: CPT

## 2018-10-15 PROCEDURE — 97140 MANUAL THERAPY 1/> REGIONS: CPT

## 2018-10-15 NOTE — PROGRESS NOTES
Daily Note     Today's date: 10/15/2018  Patient name: Rober Sue  : 1955  MRN: [id number]  Referring provider: Taya Chamberlain DO  Dx:   Encounter Diagnosis     ICD-10-CM    1  Pain of left hip joint M25 552      Re-eval Date: 18    Date 10/9/18 10 15 18      Visit Count  2      FOTO        Pain In  10/10      Pain Out  10/10                   Subjective:  Pt  States her Pain level is = "10"/10 at L groin area  Objective: See treatment diary below      Assessment: Tolerated treatment poor  Patient not able to accomplish much due to high level of Pain  All exercises/stretches performed/applied gingerly and cautiously  t/o Rx session        Plan: Con't  services as per POC/Goals      Precautions: severe L hip pain    Daily Treatment Diary     Manual  10/9 10 15 18      Ham stretch  L 5x 5-10 sec  Gingerly, cautiously      L hip flex str  L 5x 5-10 sec  Gingerly, cautiously                                    Exercise Diary  10/9 10 15 18      Nustep  Not attempted        Heel slides  Not able to perform        SAQs  Not able to perform        SLRs   Not able to perform      Sup Abd/Add  Not able to perform                                                                                                                                      Modalities  10/9 10 15 18      HP to L ant hip  10 min  To begin Rx

## 2018-10-22 ENCOUNTER — OFFICE VISIT (OUTPATIENT)
Dept: PHYSICAL THERAPY | Facility: CLINIC | Age: 63
End: 2018-10-22
Payer: COMMERCIAL

## 2018-10-22 DIAGNOSIS — M25.552 PAIN OF LEFT HIP JOINT: Primary | ICD-10-CM

## 2018-10-22 PROCEDURE — 97010 HOT OR COLD PACKS THERAPY: CPT

## 2018-10-22 PROCEDURE — 97140 MANUAL THERAPY 1/> REGIONS: CPT

## 2018-10-22 NOTE — PROGRESS NOTES
Daily Note     Today's date: 10/22/2018  Patient name: Chiqui Pinzon  : 1955  MRN: [id number]  Referring provider: John Alba DO  Dx:   Encounter Diagnosis     ICD-10-CM    1  Pain of left hip joint M25 552          Re-eval Date: 18    Date 10/9/18 10 15 18 10 22 18     Visit Count  2 3     FOTO        Pain In  10/10 10/10     Pain Out  10/10 9/10               Subjective:  Pt  States pain level @ L ant  Hip area is still "10/10"  Says she is anticipating "second opinion" consult with Dr Stuart Moscoso in mid-November  Objective: See treatment diary below      Assessment: Tolerated treatment FA[facility]  Patient would benefit from continued PT      Plan: Progress treatment as tolerated        Precautions: severe L hip pain    Daily Treatment Diary     Manual  10/9 10 15 18 10 22 18     Ham stretch  L 5x 5-10 sec  Gingerly, cautiously L 5x 5-10 sec  Gingerly, cautiously     L hip flex str  L 5x 5-10 sec  Gingerly, cautiously   L 5x 5-10 sec  Gingerly, cautiously                                 Exercise Diary  10/9 10 15 18 10 22 18     Nustep  Not attempted   Not attempted     Heel slides  Not able to perform   Passively w/  Gentle movements  1 x 10 reps  L LE       SAQs  Not able to perform   Passively w/  Gentle movements  1 x 15 reps  L LE     SLRs   Not able to perform Passively w/  Gentle movements  1 x 10   L LE     Sup Abd/Add  Not able to perform   Passively w/  Gentle movements  1 x 10   L LE                                                                                                                                   Modalities  10/9 10 15 18 10 22 18     HP to L ant hip  10 min  To begin Rx 10 min  To begin Rx

## 2018-10-23 ENCOUNTER — OFFICE VISIT (OUTPATIENT)
Dept: INTERNAL MEDICINE CLINIC | Facility: CLINIC | Age: 63
End: 2018-10-23
Payer: COMMERCIAL

## 2018-10-23 VITALS
BODY MASS INDEX: 22.13 KG/M2 | HEART RATE: 84 BPM | SYSTOLIC BLOOD PRESSURE: 122 MMHG | TEMPERATURE: 98.2 F | DIASTOLIC BLOOD PRESSURE: 76 MMHG | HEIGHT: 62 IN

## 2018-10-23 DIAGNOSIS — G89.29 CHRONIC LEFT HIP PAIN: ICD-10-CM

## 2018-10-23 DIAGNOSIS — M25.552 CHRONIC HIP PAIN, LEFT: ICD-10-CM

## 2018-10-23 DIAGNOSIS — M85.89 OSTEOPENIA OF MULTIPLE SITES: ICD-10-CM

## 2018-10-23 DIAGNOSIS — G89.29 CHRONIC HIP PAIN, LEFT: ICD-10-CM

## 2018-10-23 DIAGNOSIS — J43.9 PULMONARY EMPHYSEMA, UNSPECIFIED EMPHYSEMA TYPE (HCC): ICD-10-CM

## 2018-10-23 DIAGNOSIS — M25.552 CHRONIC LEFT HIP PAIN: ICD-10-CM

## 2018-10-23 DIAGNOSIS — R51.9 CHRONIC NONINTRACTABLE HEADACHE, UNSPECIFIED HEADACHE TYPE: ICD-10-CM

## 2018-10-23 DIAGNOSIS — G89.29 CHRONIC NONINTRACTABLE HEADACHE, UNSPECIFIED HEADACHE TYPE: ICD-10-CM

## 2018-10-23 DIAGNOSIS — F43.23 ADJUSTMENT DISORDER WITH MIXED ANXIETY AND DEPRESSED MOOD: ICD-10-CM

## 2018-10-23 DIAGNOSIS — J45.40 MODERATE PERSISTENT ASTHMA WITHOUT COMPLICATION: Primary | ICD-10-CM

## 2018-10-23 DIAGNOSIS — R03.0 ELEVATED BP WITHOUT DIAGNOSIS OF HYPERTENSION: ICD-10-CM

## 2018-10-23 PROCEDURE — 99214 OFFICE O/P EST MOD 30 MIN: CPT | Performed by: INTERNAL MEDICINE

## 2018-10-23 PROCEDURE — 1036F TOBACCO NON-USER: CPT | Performed by: INTERNAL MEDICINE

## 2018-10-23 RX ORDER — TRAMADOL HYDROCHLORIDE 50 MG/1
50 TABLET ORAL EVERY 8 HOURS PRN
Qty: 21 TABLET | Refills: 0 | Status: SHIPPED | OUTPATIENT
Start: 2018-10-23 | End: 2018-12-11

## 2018-10-23 NOTE — PATIENT INSTRUCTIONS
Osteopenia   AMBULATORY CARE:   Osteopenia  is a condition that causes bone loss and low bone mineral density (BMD)  Low BMD can weaken your bones and increase your risk for fractures  Osteopenia does not cause signs or symptoms  You may not know you have it until you break a bone  Osteopenia must be managed or treated so it does not worsen and become osteoporosis  Osteoporosis is a serious condition that causes bones to become weak, brittle, and easily fractured  Treatment for osteopenia  depends on your risk for fracture  If your risk is low, you may only need to make exercise, nutrition, and other lifestyle changes to manage osteopenia  The changes can help prevent more bone mineral loss and reduce your risk for fractures  If your risk is high, you may be given medicines to help strengthen your bones, prevent bone breakdown, or increase bone formation  Manage osteopenia:   · Exercise as directed  Do weight-bearing exercises, such as brisk walking, dancing, or yoga  Weight-bearing exercises help build or maintain bone  Exercises such as swimming and bike riding are non-weight-bearing and will not build or maintain bone  Weightlifting also helps strengthen bones and build muscle  Extra muscle can help protect your bones  Your healthcare provider may recommend weightlifting 3 times per week as part of your exercise routine  · Increase calcium and vitamin D as directed  Calcium and vitamin D work together to help build bone  The body deposits calcium into the bones until about age 27  You will then need to get enough calcium and vitamin D to maintain what your bones are storing  Your healthcare provider may tell you to eat more dairy products, such as milk and cheese, for calcium  Spinach, salmon, and dried beans are also good sources of calcium  Cereal, bread, and orange juice may be fortified with vitamin D  You also get vitamin D from exposure to sunlight   Your healthcare provider may also suggest a calcium or vitamin D supplement  Do not take supplements unless directed  · Limit or do not drink alcohol as directed  Alcohol can take calcium from your bones and increase your risk for fractures  Ask your healthcare provider if it is safe for you to drink alcohol  Women should limit alcohol to 1 drink per day  Men should limit alcohol to 2 drinks per day  A drink of alcohol is 12 ounces of beer, 5 ounces of wine, or 1½ ounces of liquor  · Do not smoke  Nicotine can damage blood vessels and make it more difficult to manage your osteopenia  Smoking also affects bone density and increases your risk for bone fractures  Do not use e-cigarettes or smokeless tobacco in place of cigarettes or to help you quit  They still contain nicotine  Ask your healthcare provider for information if you currently smoke and need help quitting  Ask your healthcare provider for information if you need help quitting  · Prevent falls  Decrease your risk for falling by removing items from the floor and removing loose carpets  Turn the lights on where you will be walking  Follow up with your healthcare provider as directed:  Write down your questions so you remember to ask them during your visits  © 2017 [facility] Information is for End User's use only and may not be sold, redistributed or otherwise used for commercial purposes  All illustrations and images included in CareNotes® are the copyrighted property of A D A M [employer] Inc  or [employer]ribe  The above information is an  only  It is not intended as medical advice for individual conditions or treatments  Talk to your doctor, nurse or pharmacist before following any medical regimen to see if it is safe and effective for you

## 2018-10-23 NOTE — PROGRESS NOTES
Assessment/Plan:    No problem-specific Assessment & Plan notes found for this encounter  Diagnoses and all orders for this visit:    Moderate persistent asthma without complication  -     umeclidinium bromide (INCRUSE ELLIPTA) 62 5 mcg/inh AEPB inhaler; Inhale 1 puff daily    Elevated BP without diagnosis of hypertension    Chronic nonintractable headache, unspecified headache type    Chronic left hip pain    Pulmonary emphysema, unspecified emphysema type (HCC)  -     umeclidinium bromide (INCRUSE ELLIPTA) 62 5 mcg/inh AEPB inhaler; Inhale 1 puff daily    Adjustment disorder with mixed anxiety and depressed mood    Osteopenia of multiple sites      A/P: Overall doing a little better  BP is good and will monitor  ZACK is better and will continue with counseling  Await second opinion from ortho  Continue with PT  Recommend prophylactic treatment of migraines, but refuses  Continue vit d and calcium, but refusing other meds for osteopenia given past reactions  RAD not controlled and will add LAMA to her ICS/LABA  RTC two months for routine  Subjective:      Patient ID: Phillip Wellington is a 58 y o  female  WF RTC for f/u elevated BP, adjustment disorder, chronic left hip pain with gait abnormality, mixed RAD, migraines, and dexa results  Pt seen by sports medicine and recommended injections for the chronic hip pain and is in PT  Pt wants a second opinion and is to see Dr Martina Ibrahim in several weeks  Breathing is still not ideal with pt using her rescue MDI several times a week with the change in weather  Now seeing counselor and ZACK/MDD is improving  Headaches are slightly better, but several episodes a week  Has an appt for an eye exam  Dexa with osteopenia, but reports this is better since she had osteoporosis years ago   Reports being 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 and can't get the flu shot and had severe bone pain with fosamax in the past          The following portions of the patient's history were reviewed and updated as appropriate:   She  has a past medical history of Arthritis; Moderate persistent asthma without complication (99/40/3188); and Pulmonary emphysema ([facility] [address] ) (10/23/2018)  She   Patient Active Problem List    Diagnosis Date Noted    Moderate persistent asthma without complication 10/00/9240    Chronic nonintractable headache 10/23/2018    Pulmonary emphysema ([facility] [address] ) 10/23/2018    Adjustment disorder with mixed anxiety and depressed mood 10/23/2018    Borderline high serum cholesterol 08/29/2018    Chronic cough     Reactive airway disease 08/15/2018    Environmental and seasonal allergies 08/15/2018    Arthralgia of right temporomandibular joint 08/15/2018     She  has a past surgical history that includes Facial cosmetic surgery; Colonoscopy; and Dental surgery  Her family history includes Aneurysm in her mother; No Known Problems in her brother  She  reports that she has never smoked  She has never used smokeless tobacco  She reports that she does not drink alcohol or use drugs  Current Outpatient Prescriptions   Medication Sig Dispense Refill    albuterol (VENTOLIN HFA) 90 mcg/act inhaler Inhale 2 puffs every 6 (six) hours as needed for wheezing 18 g 5    butalbital-acetaminophen-caffeine (FIORICET,ESGIC) -40 mg per tablet Take 1 tablet by mouth every 4 (four) hours as needed for headaches 30 tablet 0    Calcium Carbonate-Vitamin D (OSCAL 500/200 D-3 PO) Take by mouth 3 (three) times a day        Cholecalciferol (VITAMIN D3) 01309 units TABS Take by mouth daily        cyanocobalamin (VITAMIN B-12) 1,000 mcg tablet Take by mouth daily      cyclobenzaprine (FLEXERIL) 10 mg tablet Take 1 tablet (10 mg total) by mouth 3 (three) times a day as needed for muscle spasms 90 tablet 3    mometasone-formoterol (DULERA) 200-5 MCG/ACT inhaler Inhale 2 puffs 2 (two) times a day Rinse mouth after use   3 Inhaler 0    montelukast (SINGULAIR) 10 mg tablet Take 1 tablet (10 mg total) by mouth daily at bedtime 90 tablet 0    pyridoxine (VITAMIN B6) 100 mg tablet Take 100 mg by mouth daily      traMADol (ULTRAM) 50 mg tablet Take 1 tablet (50 mg total) by mouth every 8 (eight) hours as needed for moderate pain 21 tablet 0    umeclidinium bromide (INCRUSE ELLIPTA) 62 5 mcg/inh AEPB inhaler Inhale 1 puff daily 3 Inhaler 0     No current facility-administered medications for this visit  Current Outpatient Prescriptions on File Prior to Visit   Medication Sig    albuterol (VENTOLIN HFA) 90 mcg/act inhaler Inhale 2 puffs every 6 (six) hours as needed for wheezing    butalbital-acetaminophen-caffeine (FIORICET,ESGIC) -40 mg per tablet Take 1 tablet by mouth every 4 (four) hours as needed for headaches    Calcium Carbonate-Vitamin D (OSCAL 500/200 D-3 PO) Take by mouth 3 (three) times a day      Cholecalciferol (VITAMIN D3) 51351 units TABS Take by mouth daily      cyanocobalamin (VITAMIN B-12) 1,000 mcg tablet Take by mouth daily    cyclobenzaprine (FLEXERIL) 10 mg tablet Take 1 tablet (10 mg total) by mouth 3 (three) times a day as needed for muscle spasms    mometasone-formoterol (DULERA) 200-5 MCG/ACT inhaler Inhale 2 puffs 2 (two) times a day Rinse mouth after use   montelukast (SINGULAIR) 10 mg tablet Take 1 tablet (10 mg total) by mouth daily at bedtime    pyridoxine (VITAMIN B6) 100 mg tablet Take 100 mg by mouth daily    [DISCONTINUED] traMADol (ULTRAM) 50 mg tablet Take 1 tablet (50 mg total) by mouth every 8 (eight) hours as needed for moderate pain     No current facility-administered medications on file prior to visit  She is allergic to nsaids; celebrex [celecoxib]; fosamax [alendronate]; ibuprofen; and influenza vaccines       Review of Systems   Constitutional: Negative for activity change, chills, diaphoresis, fatigue and fever  Respiratory: Positive for shortness of breath and wheezing  Negative for cough and chest tightness      Cardiovascular: Negative for chest pain, palpitations and leg swelling  Gastrointestinal: Negative for abdominal pain, constipation, diarrhea, nausea and vomiting  Genitourinary: Negative for difficulty urinating, dysuria and frequency  Musculoskeletal: Positive for arthralgias and gait problem  Negative for myalgias  Neurological: Negative for dizziness, seizures, weakness, light-headedness and headaches  Psychiatric/Behavioral: Negative for confusion and dysphoric mood  The patient is not nervous/anxious  Objective:      /76   Pulse 84   Temp 98 2 °F (36 8 °C) (Tympanic)   Ht 5' 2" (1 575 m)   BMI 22 13 kg/m²          Physical Exam   Constitutional: She is oriented to person, place, and time  She appears well-developed and well-nourished  No distress  HENT:   Head: Normocephalic and atraumatic  Mouth/Throat: Oropharynx is clear and moist    Eyes: Pupils are equal, round, and reactive to light  Conjunctivae and EOM are normal    Cardiovascular: Normal rate and regular rhythm  No murmur heard  Pulmonary/Chest: Effort normal and breath sounds normal  No respiratory distress  She has no wheezes  Abdominal: Soft  Bowel sounds are normal  She exhibits no distension  There is no tenderness  Musculoskeletal: She exhibits tenderness  She exhibits no edema  Neurological: She is alert and oriented to person, place, and time  Psychiatric: She has a normal mood and affect  Her behavior is normal  Judgment and thought content normal    Nursing note and vitals reviewed

## 2018-11-01 ENCOUNTER — OFFICE VISIT (OUTPATIENT)
Dept: PHYSICAL THERAPY | Facility: CLINIC | Age: 63
End: 2018-11-01
Payer: COMMERCIAL

## 2018-11-01 DIAGNOSIS — M25.552 PAIN OF LEFT HIP JOINT: Primary | ICD-10-CM

## 2018-11-01 PROCEDURE — 97010 HOT OR COLD PACKS THERAPY: CPT

## 2018-11-01 PROCEDURE — 97140 MANUAL THERAPY 1/> REGIONS: CPT

## 2018-11-01 PROCEDURE — 97110 THERAPEUTIC EXERCISES: CPT

## 2018-11-01 NOTE — PROGRESS NOTES
Daily Note     Today's date: 2018  Patient name: Beni Burton  : 1955  MRN: [id number]  Referring provider: Scott Flowers DO  Dx:   Encounter Diagnosis     ICD-10-CM    1  Pain of left hip joint M25 552          Re-eval Date: 18    Date 10/9/18 10 15 18 10 22 18 11  1 18    Visit Count  2 3 4    FOTO        Pain In  10/10 10/10 9/10    Pain Out  10/10 9/10 8/10              Subjective:  Pt  States Pain persists at L hip/acetab region  Says she has consult scheduled with Ortho MD next week  Objective: See treatment diary below      Assessment: Tolerated treatment FAIR Minus due to high level of Pain    Patient would benefit from continued PT      Plan: Progress treatment as tolerated  Precautions: severe L hip pain    Daily Treatment Diary     Manual  10/9 10 15 18 10 22 18 11  1 18    Ham stretch  L 5x 5-10 sec  Gingerly, cautiously L 5x 5-10 sec  Gingerly, cautiously L 5x 5-10 sec  Gingerly, cautiously    L hip flex str  L 5x 5-10 sec  Gingerly, cautiously   L 5x 5-10 sec  Gingerly, cautiously L 5x 5-10 sec  Gingerly, cautiously    PROM to L LE    15 min                        Exercise Diary  10/9 10 15 18 10 22 18 11  1 18    Nustep  Not attempted   Not attempted Not attempted    Heel slides  Not able to perform   Passively w/  Gentle movements  1 x 10 reps  L LE   Passively w/  Gentle movements  1 x 10 reps  L LE    *Active, short range  1x5    SAQs  Not able to perform   Passively w/  Gentle movements  1 x 15 reps  L LE Passively w/  Gentle movements  1 x 15 reps  L LE    *Active, short range  1x10    SLRs   Not able to perform Passively w/  Gentle movements  1 x 10   L LE Passively w/  Gentle movements  2 x 10   L LE    Sup Abd/Add  Not able to perform   Passively w/  Gentle movements  1 x 10   L LE Passively w/  Gentle movements  1 x 10   L LE    * Active, short range  1x10 Modalities  10/9 10 15 18 10 22 18 11  1 18    HP to L ant hip  10 min  To begin Rx 10 min  To begin Rx 10 min  To begin Rx

## 2018-11-06 ENCOUNTER — APPOINTMENT (OUTPATIENT)
Dept: PHYSICAL THERAPY | Facility: CLINIC | Age: 63
End: 2018-11-06
Payer: COMMERCIAL

## 2018-11-06 DIAGNOSIS — M62.830 BACK SPASM: ICD-10-CM

## 2018-11-06 RX ORDER — CYCLOBENZAPRINE HCL 10 MG
10 TABLET ORAL 3 TIMES DAILY PRN
Qty: 90 TABLET | Refills: 0 | Status: SHIPPED | OUTPATIENT
Start: 2018-11-06 | End: 2018-12-11 | Stop reason: SDUPTHER

## 2018-11-06 NOTE — TELEPHONE ENCOUNTER
Pt needs refill on cyclobenzaprine 10mg, pls send to [facility]

## 2018-11-19 ENCOUNTER — TELEPHONE (OUTPATIENT)
Dept: PAIN MEDICINE | Facility: MEDICAL CENTER | Age: 63
End: 2018-11-19

## 2018-11-19 NOTE — TELEPHONE ENCOUNTER
New pt being referred by Dr Coletta Kanner for hip pain    No prior pain mgt  Mri done in [facility]    Pt scheduled next week 11/26 in [facility]

## 2018-11-26 ENCOUNTER — TELEPHONE (OUTPATIENT)
Dept: OBGYN CLINIC | Facility: CLINIC | Age: 63
End: 2018-11-26

## 2018-11-26 ENCOUNTER — CONSULT (OUTPATIENT)
Dept: PAIN MEDICINE | Facility: CLINIC | Age: 63
End: 2018-11-26
Payer: COMMERCIAL

## 2018-11-26 VITALS
BODY MASS INDEX: 22.26 KG/M2 | HEIGHT: 62 IN | TEMPERATURE: 98.6 F | SYSTOLIC BLOOD PRESSURE: 154 MMHG | WEIGHT: 121 LBS | DIASTOLIC BLOOD PRESSURE: 84 MMHG | RESPIRATION RATE: 16 BRPM

## 2018-11-26 DIAGNOSIS — G89.4 CHRONIC PAIN SYNDROME: ICD-10-CM

## 2018-11-26 DIAGNOSIS — M16.12 OSTEOARTHRITIS OF LEFT HIP, UNSPECIFIED OSTEOARTHRITIS TYPE: Primary | ICD-10-CM

## 2018-11-26 DIAGNOSIS — S73.192S TEAR OF LEFT ACETABULAR LABRUM, SEQUELA: ICD-10-CM

## 2018-11-26 DIAGNOSIS — F11.20 UNCOMPLICATED OPIOID DEPENDENCE (HCC): ICD-10-CM

## 2018-11-26 PROBLEM — S73.192A TEAR OF LEFT ACETABULAR LABRUM: Status: ACTIVE | Noted: 2018-11-26

## 2018-11-26 PROCEDURE — 99204 OFFICE O/P NEW MOD 45 MIN: CPT | Performed by: ANESTHESIOLOGY

## 2018-11-26 RX ORDER — HYDROCODONE BITARTRATE AND ACETAMINOPHEN 5; 325 MG/1; MG/1
1 TABLET ORAL EVERY 6 HOURS
Refills: 0 | COMMUNITY
Start: 2018-11-15 | End: 2018-11-26

## 2018-11-26 RX ORDER — PENICILLIN V POTASSIUM 500 MG/1
500 TABLET ORAL EVERY 6 HOURS
Refills: 0 | COMMUNITY
Start: 2018-11-13 | End: 2018-11-26

## 2018-11-26 NOTE — PROGRESS NOTES
Pt c/o left hip pain    Assessment:  1  Osteoarthritis of left hip, unspecified osteoarthritis type    2  Tear of left acetabular labrum, sequela    3  Chronic pain syndrome    4  Uncomplicated opioid dependence ([address] )        Plan:  Patient is a 51-year-old female with complaints of left hip pain presents office for initial consultation  MRI of left hip shows advanced degenerative changes with associated of labral tear  Patient was seen by Ortho who requests a left hip intra-articular steroid injection  Patient is involved in physical therapy and not some ROM improvement  1   We will schedule patient for left hip intra-articular steroid injection  2  We will not be providing opioid pain regimen for pain control pt can go PCP for medication mgmt  At this time pt is not ready for injection  There are risks associated with opioid medications, including dependence, addiction and tolerance  The patient understands and agrees to use these medications only as prescribed  Potential side effects of the medications include, but are not limited to, constipation, drowsiness, addiction, impaired judgment and risk of fatal overdose if not taken as prescribed  The patient was warned against driving while taking sedation medications  Sharing medications is a felony  At this point in time, the patient is showing no signs of addiction, abuse, diversion or suicidal ideation  [place] Prescription Drug Monitoring Program report was reviewed and was appropriate     Complete risks and benefits including bleeding, infection, tissue reaction, nerve injury and allergic reaction were discussed  The approach was demonstrated using models and literature was provided  Verbal and written consent was obtained  My impressions and treatment recommendations were discussed in detail with the patient who verbalized understanding and had no further questions  Discharge instructions were provided   I personally saw and examined the patient and I agree with the above discussed plan of care  No orders of the defined types were placed in this encounter  No orders of the defined types were placed in this encounter  History of Present Illness:    Aleena Saul is a 61 y o  female with complaints of right hip pain presents office for initial consultation  Patient reports her left hip started hurting 1 year ago while participating in a marathon on 04/17 2017  Patient rates her pain 10/10, constant, worse in the morning  Patient describes her pain as throbbing associated weakness in the left extremity secondary to left hip pain  Pain is increased by lying down  Pain does not change with kneeling, standing, bending, sitting, walking, exercise, relaxation, coughing/sneezing, bowel movements  Patient requires crutches with walking at this time  Patient reported moderate relief from exercise, heat/ice treatment, osteopathic manipulation  Patient is currently taking tramadol q 8 hours which she reports provides a relief most of the time  I have personally reviewed and/or updated the patient's past medical history, past surgical history, family history, social history, current medications, allergies, and vital signs today  Review of Systems:    Review of Systems   Constitutional: Negative for fever and unexpected weight change  HENT: Negative for trouble swallowing  Eyes: Negative for visual disturbance  Respiratory: Positive for wheezing  Negative for shortness of breath  Cardiovascular: Negative for chest pain and palpitations  Gastrointestinal: Negative for constipation, diarrhea, nausea and vomiting  Endocrine: Negative for cold intolerance, heat intolerance and polydipsia  Genitourinary: Negative for difficulty urinating and frequency  Musculoskeletal: Negative for arthralgias, gait problem, joint swelling and myalgias  Skin: Negative for rash  Neurological: Positive for weakness   Negative for dizziness, seizures, syncope and headaches  Hematological: Does not bruise/bleed easily  Psychiatric/Behavioral: Negative for dysphoric mood  All other systems reviewed and are negative        Patient Active Problem List   Diagnosis    Reactive airway disease    Environmental and seasonal allergies    Arthralgia of right temporomandibular joint    Chronic cough    Borderline high serum cholesterol    Moderate persistent asthma without complication    Chronic nonintractable headache    Pulmonary emphysema (HCC)    Adjustment disorder with mixed anxiety and depressed mood       Past Medical History:   Diagnosis Date    Arthritis     Moderate persistent asthma without complication 50/66/9652    Pulmonary emphysema ([address] ) 10/23/2018       Past Surgical History:   Procedure Laterality Date    COLONOSCOPY      DENTAL SURGERY      FACIAL COSMETIC SURGERY         Family History   Problem Relation Age of Onset    Aneurysm Mother     No Known Problems Brother         eye issues       Social History     Occupational History    personal fitness      Social History Main Topics    Smoking status: Never Smoker    Smokeless tobacco: Never Used    Alcohol use No    Drug use: No    Sexual activity: Not Currently     Partners: Male       Current Outpatient Prescriptions on File Prior to Visit   Medication Sig    albuterol (VENTOLIN HFA) 90 mcg/act inhaler Inhale 2 puffs every 6 (six) hours as needed for wheezing    butalbital-acetaminophen-caffeine (FIORICET,ESGIC) -40 mg per tablet Take 1 tablet by mouth every 4 (four) hours as needed for headaches    Calcium Carbonate-Vitamin D (OSCAL 500/200 D-3 PO) Take by mouth 3 (three) times a day      Cholecalciferol (VITAMIN D3) 94016 units TABS Take by mouth daily      cyanocobalamin (VITAMIN B-12) 1,000 mcg tablet Take by mouth daily    cyclobenzaprine (FLEXERIL) 10 mg tablet Take 1 tablet (10 mg total) by mouth 3 (three) times a day as needed for muscle spasms    mometasone-formoterol (DULERA) 200-5 MCG/ACT inhaler Inhale 2 puffs 2 (two) times a day Rinse mouth after use   montelukast (SINGULAIR) 10 mg tablet Take 1 tablet (10 mg total) by mouth daily at bedtime    pyridoxine (VITAMIN B6) 100 mg tablet Take 100 mg by mouth daily    traMADol (ULTRAM) 50 mg tablet Take 1 tablet (50 mg total) by mouth every 8 (eight) hours as needed for moderate pain    umeclidinium bromide (INCRUSE ELLIPTA) 62 5 mcg/inh AEPB inhaler Inhale 1 puff daily     No current facility-administered medications on file prior to visit  Allergies   Allergen Reactions    Nsaids GI Bleeding    Celebrex [Celecoxib] Swelling    Fosamax [Alendronate]      Bone pain    Ibuprofen Swelling    Influenza Vaccines        Physical Exam:    There were no vitals taken for this visit  Constitutional: normal, well developed, well nourished, alert, in no distress and non-toxic and no overt pain behavior  Eyes: anicteric  HEENT: grossly intact  Neck: supple, symmetric, trachea midline and no masses   Pulmonary:even and unlabored  Cardiovascular:No edema or pitting edema present  Skin:Normal without rashes or lesions and well hydrated  Psychiatric:Mood and affect appropriate  Neurologic:Cranial Nerves II-XII grossly intact  Musculoskeletal:antalgic and With crutches for gait assistance  Patient has notable pain with internal external rotation of the left hip in addition to 3/5 weakness with hip flexion extension abduction and abduction    Imaging      INDICATION:  Unilateral primary osteoarthritis left hip  Left hip pain      ORDERING PROVIDER:  EDNA SALOMON      TECHNIQUE:  Multi planar fat and water sensitive sequences of the left hip  were obtained without intravenous contrast      COMPARISON:  Left hip XR 8/31/2018      FINDINGS:  Alignment and marrow signal of the pelvis is within normal limits    There is prominent reactive edema on both sides of the joint with some  mild femoral head flattening likely related to advanced chronic  degenerative change  There is advanced degenerative change in the left hip with degenerative  labral tear  Mild degenerative change in the right hip noted  There is no evidence of fracture, stress reaction, AVN, or focal osseous  lesions in the visualized portions of the pelvis and hips  Visualized intrapelvic contents are unremarkable      There is no significant joint effusion  No discrete labral tear can be identified  The anterior capsule is unremarkable      Rectus femoris tendon insertion is within normal limits  Iliopsoas tendon is of normal signal and morphology without evidence of  injury or surrounding inflammation  Gluteus medius and minimus tendon insertions on the greater trochanter  appear normal   Hamstring insertion on the ischial tuberosity appears normal aside from  minor insertional tendinopathy  Visualized short adductors of the hip are within normal limits  IMPRESSION:  1    Advanced degenerative change in the left hip with associated  degenerative labral tear               Signed by Mendoza Chun MD

## 2018-11-27 ENCOUNTER — TELEPHONE (OUTPATIENT)
Dept: OBGYN CLINIC | Facility: CLINIC | Age: 63
End: 2018-11-27

## 2018-12-11 ENCOUNTER — OFFICE VISIT (OUTPATIENT)
Dept: INTERNAL MEDICINE CLINIC | Facility: CLINIC | Age: 63
End: 2018-12-11
Payer: COMMERCIAL

## 2018-12-11 VITALS
OXYGEN SATURATION: 99 % | SYSTOLIC BLOOD PRESSURE: 160 MMHG | DIASTOLIC BLOOD PRESSURE: 98 MMHG | HEIGHT: 62 IN | RESPIRATION RATE: 18 BRPM | HEART RATE: 116 BPM | TEMPERATURE: 98.7 F | WEIGHT: 116 LBS | BODY MASS INDEX: 21.35 KG/M2

## 2018-12-11 DIAGNOSIS — M24.849 LOCKING FINGER JOINT: ICD-10-CM

## 2018-12-11 DIAGNOSIS — M79.644 PAIN IN FINGER OF RIGHT HAND: Primary | ICD-10-CM

## 2018-12-11 DIAGNOSIS — F41.1 GAD (GENERALIZED ANXIETY DISORDER): ICD-10-CM

## 2018-12-11 DIAGNOSIS — M62.830 BACK SPASM: ICD-10-CM

## 2018-12-11 PROCEDURE — 99213 OFFICE O/P EST LOW 20 MIN: CPT | Performed by: INTERNAL MEDICINE

## 2018-12-11 PROCEDURE — 1036F TOBACCO NON-USER: CPT | Performed by: INTERNAL MEDICINE

## 2018-12-11 PROCEDURE — 3008F BODY MASS INDEX DOCD: CPT | Performed by: INTERNAL MEDICINE

## 2018-12-11 RX ORDER — CYCLOBENZAPRINE HCL 10 MG
10 TABLET ORAL 3 TIMES DAILY PRN
Qty: 90 TABLET | Refills: 0 | Status: SHIPPED | OUTPATIENT
Start: 2018-12-11 | End: 2019-02-13 | Stop reason: SDUPTHER

## 2018-12-11 RX ORDER — TRAMADOL HYDROCHLORIDE 50 MG/1
50 TABLET ORAL EVERY 6 HOURS PRN
Qty: 5 TABLET | Refills: 0 | Status: SHIPPED | OUTPATIENT
Start: 2018-12-11 | End: 2019-01-21

## 2018-12-11 NOTE — PROGRESS NOTES
Assessment/Plan:    No problem-specific Assessment & Plan notes found for this encounter  Diagnoses and all orders for this visit:    Pain in finger of right hand  -     Ambulatory referral to Orthopedic Surgery; Future  -     traMADol (ULTRAM) 50 mg tablet; Take 1 tablet (50 mg total) by mouth every 6 (six) hours as needed for moderate pain    Locking finger joint  -     Ambulatory referral to Orthopedic Surgery; Future  -     traMADol (ULTRAM) 50 mg tablet; Take 1 tablet (50 mg total) by mouth every 6 (six) hours as needed for moderate pain    Back spasm  -     cyclobenzaprine (FLEXERIL) 10 mg tablet; Take 1 tablet (10 mg total) by mouth 3 (three) times a day as needed for muscle spasms    ZACK (generalized anxiety disorder)      A/P: PE not overly impressive  ??cause  Pt's subjective doesn't correspond to the objective finding  No s/s of CTS or cervical radiculopathy  No evidence of calcified tendinosis  Will refer to ortho and pt refusing to see [facility] ortho due to past experiences  Pt requesting stronger pain meds and discussed that I don't want to go down that road  Will give several days of ultram to be used at HS so she can sleep  RTC as scheduled  Subjective:      Patient ID: Tirso Power is a 61 y o  female  WF presents with a three day h/o acute onset of right ring and small finger pain and locking while sleeping  Awakens her several times a night and needs to physically open them with the other hand  Pt is right handed and no prior occurrences  No neck pain  No recent trauma  Some weakness  Pain is a 10/10  Taking tylenol w/o relief and needs stronger pain meds  Unable to take NSAID's  No numbness or tingling  Getting panic attacks due to the pain  The following portions of the patient's history were reviewed and updated as appropriate:   She  has a past medical history of Arthritis; ZACK (generalized anxiety disorder) (12/11/2018);  Moderate persistent asthma without complication (10/23/2018); and Pulmonary emphysema ([facility] [address] ) (10/23/2018)  She   Patient Active Problem List    Diagnosis Date Noted    ZACK (generalized anxiety disorder) 12/11/2018    Osteoarthritis of left hip 78/17/2430    Uncomplicated opioid dependence ([facility] [address] ) 11/26/2018    Chronic pain syndrome 11/26/2018    Tear of left acetabular labrum 11/26/2018    Moderate persistent asthma without complication 03/81/2570    Chronic nonintractable headache 10/23/2018    Pulmonary emphysema ([facility] ) 10/23/2018    Adjustment disorder with mixed anxiety and depressed mood 10/23/2018    Borderline high serum cholesterol 08/29/2018    Chronic cough     Reactive airway disease 08/15/2018    Environmental and seasonal allergies 08/15/2018    Arthralgia of right temporomandibular joint 08/15/2018     She  has a past surgical history that includes Facial cosmetic surgery; Colonoscopy; and Dental surgery  Her family history includes Aneurysm in her mother; No Known Problems in her brother  She  reports that she has never smoked  She has never used smokeless tobacco  She reports that she does not drink alcohol or use drugs  Current Outpatient Prescriptions   Medication Sig Dispense Refill    albuterol (VENTOLIN HFA) 90 mcg/act inhaler Inhale 2 puffs every 6 (six) hours as needed for wheezing 18 g 5    Calcium Carbonate-Vitamin D (OSCAL 500/200 D-3 PO) Take by mouth 3 (three) times a day        Cholecalciferol (VITAMIN D3) 28251 units TABS Take by mouth daily        cyanocobalamin (VITAMIN B-12) 1,000 mcg tablet Take by mouth daily      cyclobenzaprine (FLEXERIL) 10 mg tablet Take 1 tablet (10 mg total) by mouth 3 (three) times a day as needed for muscle spasms 90 tablet 0    mometasone-formoterol (DULERA) 200-5 MCG/ACT inhaler Inhale 2 puffs 2 (two) times a day Rinse mouth after use   3 Inhaler 0    pyridoxine (VITAMIN B6) 100 mg tablet Take 100 mg by mouth daily      traMADol (ULTRAM) 50 mg tablet Take 1 tablet (50 mg total) by mouth every 6 (six) hours as needed for moderate pain 5 tablet 0     No current facility-administered medications for this visit  Current Outpatient Prescriptions on File Prior to Visit   Medication Sig    albuterol (VENTOLIN HFA) 90 mcg/act inhaler Inhale 2 puffs every 6 (six) hours as needed for wheezing    Calcium Carbonate-Vitamin D (OSCAL 500/200 D-3 PO) Take by mouth 3 (three) times a day      Cholecalciferol (VITAMIN D3) 95595 units TABS Take by mouth daily      cyanocobalamin (VITAMIN B-12) 1,000 mcg tablet Take by mouth daily    mometasone-formoterol (DULERA) 200-5 MCG/ACT inhaler Inhale 2 puffs 2 (two) times a day Rinse mouth after use   pyridoxine (VITAMIN B6) 100 mg tablet Take 100 mg by mouth daily    [DISCONTINUED] cyclobenzaprine (FLEXERIL) 10 mg tablet Take 1 tablet (10 mg total) by mouth 3 (three) times a day as needed for muscle spasms    [DISCONTINUED] DULERA 100-5 MCG/ACT inhaler     [DISCONTINUED] traMADol (ULTRAM) 50 mg tablet Take 1 tablet (50 mg total) by mouth every 8 (eight) hours as needed for moderate pain (Patient not taking: Reported on 12/11/2018 )     No current facility-administered medications on file prior to visit  She is allergic to nsaids; celebrex [celecoxib]; fosamax [alendronate]; ibuprofen; and influenza vaccines       Review of Systems   Constitutional: Negative for activity change, chills, diaphoresis, fatigue and fever  Respiratory: Negative for cough, chest tightness, shortness of breath and wheezing  Cardiovascular: Negative for chest pain, palpitations and leg swelling  Gastrointestinal: Negative for abdominal pain, constipation, diarrhea, nausea and vomiting  Genitourinary: Negative for difficulty urinating, dysuria and frequency  Musculoskeletal: Positive for arthralgias  Negative for gait problem, joint swelling, myalgias, neck pain and neck stiffness  Neurological: Positive for weakness   Negative for light-headedness, numbness and headaches  Psychiatric/Behavioral: Positive for sleep disturbance  Negative for confusion  The patient is nervous/anxious  Objective:      /98 (BP Location: Left arm, Patient Position: Sitting, Cuff Size: Adult)   Pulse (!) 116   Temp 98 7 °F (37 1 °C) (Tympanic)   Resp 18   Ht 5' 2" (1 575 m)   Wt 52 6 kg (116 lb)   SpO2 99%   BMI 21 22 kg/m²          Physical Exam   Constitutional: She is oriented to person, place, and time  She appears well-developed and well-nourished  No distress  HENT:   Head: Normocephalic and atraumatic  Eyes: Pupils are equal, round, and reactive to light  Conjunctivae and EOM are normal    Musculoskeletal: She exhibits tenderness  She exhibits no edema or deformity  Cspine w/o any gross deformity, increase temp, erythema, or swelling  ROM wnl  No tenderness or spasms  Right UE strength 5/5 with tone/rom wnl  Wrist wnl an no tinel or phalen's  Fingers w/o any calcified nodes  ROM wnl  Lumbicles strength ok except ? Weakness in the ring and pinky  No locking at this time  Neurological: She is alert and oriented to person, place, and time  Psychiatric: She has a normal mood and affect  Her behavior is normal  Judgment and thought content normal    Nursing note and vitals reviewed

## 2019-01-21 ENCOUNTER — OFFICE VISIT (OUTPATIENT)
Dept: INTERNAL MEDICINE CLINIC | Facility: CLINIC | Age: 64
End: 2019-01-21
Payer: COMMERCIAL

## 2019-01-21 VITALS
HEART RATE: 113 BPM | DIASTOLIC BLOOD PRESSURE: 90 MMHG | SYSTOLIC BLOOD PRESSURE: 140 MMHG | HEIGHT: 62 IN | WEIGHT: 116.8 LBS | RESPIRATION RATE: 18 BRPM | BODY MASS INDEX: 21.49 KG/M2 | TEMPERATURE: 97.8 F | OXYGEN SATURATION: 99 %

## 2019-01-21 DIAGNOSIS — G89.4 CHRONIC PAIN SYNDROME: ICD-10-CM

## 2019-01-21 DIAGNOSIS — M24.849 LOCKING FINGER JOINT: ICD-10-CM

## 2019-01-21 DIAGNOSIS — R03.0 ELEVATED BP WITHOUT DIAGNOSIS OF HYPERTENSION: ICD-10-CM

## 2019-01-21 DIAGNOSIS — J43.9 PULMONARY EMPHYSEMA, UNSPECIFIED EMPHYSEMA TYPE (HCC): ICD-10-CM

## 2019-01-21 DIAGNOSIS — Z12.39 SCREENING FOR BREAST CANCER: ICD-10-CM

## 2019-01-21 DIAGNOSIS — F43.23 ADJUSTMENT DISORDER WITH MIXED ANXIETY AND DEPRESSED MOOD: ICD-10-CM

## 2019-01-21 DIAGNOSIS — Z12.11 SCREEN FOR COLON CANCER: ICD-10-CM

## 2019-01-21 DIAGNOSIS — J30.89 ENVIRONMENTAL AND SEASONAL ALLERGIES: ICD-10-CM

## 2019-01-21 DIAGNOSIS — M16.12 OSTEOARTHRITIS OF LEFT HIP, UNSPECIFIED OSTEOARTHRITIS TYPE: ICD-10-CM

## 2019-01-21 DIAGNOSIS — E28.39 MENOPAUSE OVARIAN FAILURE: ICD-10-CM

## 2019-01-21 DIAGNOSIS — J45.40 MODERATE PERSISTENT ASTHMA WITHOUT COMPLICATION: Primary | ICD-10-CM

## 2019-01-21 DIAGNOSIS — E78.9 BORDERLINE HIGH SERUM CHOLESTEROL: ICD-10-CM

## 2019-01-21 DIAGNOSIS — M79.644 PAIN IN FINGER OF RIGHT HAND: ICD-10-CM

## 2019-01-21 PROCEDURE — 99214 OFFICE O/P EST MOD 30 MIN: CPT | Performed by: INTERNAL MEDICINE

## 2019-01-21 RX ORDER — TRAMADOL HYDROCHLORIDE 50 MG/1
50 TABLET ORAL EVERY 6 HOURS PRN
Qty: 30 TABLET | Refills: 0 | Status: SHIPPED | OUTPATIENT
Start: 2019-01-21 | End: 2019-02-22 | Stop reason: SDUPTHER

## 2019-01-21 RX ORDER — MONTELUKAST SODIUM 10 MG/1
10 TABLET ORAL
Qty: 90 TABLET | Refills: 0 | Status: SHIPPED | OUTPATIENT
Start: 2019-01-21 | End: 2019-01-29

## 2019-01-21 NOTE — PATIENT INSTRUCTIONS
Asthma   AMBULATORY CARE:   Asthma  is a lung disease that makes breathing difficult  Chronic inflammation and reactions to triggers narrow the airways in your lungs  Asthma can become life-threatening if it is not managed  Cough-variant asthma  is a type of asthma that causes a dry cough that keeps coming back  A dry cough may be your only symptom, or you may also have chest tightness  These symptoms may be caused by exercise or exposure to odors, allergens, or respiratory tract infections  Cough-variant asthma is treated the same way as typical asthma  Common symptoms include the following:   · Coughing     · Wheezing     · Shortness of breath     · Chest tightness  Seek care immediately if:   · You have severe shortness of breath  · Your lips or nails turn blue or gray  · The skin around your neck and ribs pulls in with each breath  · You have shortness of breath, even after you take your short-term medicine as directed  · Your peak flow numbers are in the red zone of your AAP  Contact your healthcare provider if:   · You run out of medicine before your next refill is due  · Your symptoms get worse  · You need to take more medicine than usual to control your symptoms  · You have questions or concerns about your condition or care  Treatment for asthma  will depend on how severe your asthma is  Medicine may decrease inflammation, open airways, and make it easier to breathe  Medicines may be inhaled, taken as a pill, or injected  Short-term medicines relieve your symptoms quickly  Long-term medicines are used to prevent future attacks  You may also need medicine to help control your allergies  Manage and prevent future asthma attacks:   · Follow your asthma action plan  This is a written plan that you and your healthcare provider create  It explains which medicine you need and when to change doses if necessary   It also explains how you can monitor symptoms and use a peak flow meter  The meter measures how well your lungs are working  · Manage other health conditions , such as allergies, acid reflux, and sleep apnea  · Identify and avoid triggers  These may include pets, dust mites, mold, and cockroaches  · Do not smoke or be around others who smoke  Nicotine and other chemicals in cigarettes and cigars can cause lung damage  Ask your healthcare provider for information if you currently smoke and need help to quit  E-cigarettes or smokeless tobacco still contain nicotine  Talk to your healthcare provider before you use these products  · Ask about the flu vaccine  The flu can make your asthma worse  You may need a yearly flu shot  Follow up with your healthcare provider as directed: You will need to return to make sure your medicine is working and your symptoms are controlled  You may be referred to an asthma or allergy specialist  Jemmacharan Vázquez may be asked to keep a record of your peak flow values and bring it with you to your appointments  Write down your questions so you remember to ask them during your visits  © 2017 [address] Information is for End User's use only and may not be sold, redistributed or otherwise used for commercial purposes  All illustrations and images included in CareNotes® are the copyrighted property of [employer]  or [employer]  The above information is an  only  It is not intended as medical advice for individual conditions or treatments  Talk to your doctor, nurse or pharmacist before following any medical regimen to see if it is safe and effective for you

## 2019-01-21 NOTE — PROGRESS NOTES
Assessment/Plan:    No problem-specific Assessment & Plan notes found for this encounter  Diagnoses and all orders for this visit:    Moderate persistent asthma without complication  -     montelukast (SINGULAIR) 10 mg tablet; Take 1 tablet (10 mg total) by mouth daily at bedtime    Pulmonary emphysema, unspecified emphysema type (HCC)    Osteoarthritis of left hip, unspecified osteoarthritis type    Adjustment disorder with mixed anxiety and depressed mood    Borderline high serum cholesterol    Environmental and seasonal allergies  -     montelukast (SINGULAIR) 10 mg tablet; Take 1 tablet (10 mg total) by mouth daily at bedtime    Screen for colon cancer  -     Cancel: Occult Blood, Fecal Immunochemical; Future    Screening for breast cancer  -     Mammo screening bilateral w 3d & cad; Future    Menopause ovarian failure  -     Cancel: DXA bone density spine hip and pelvis; Future    Chronic pain syndrome  -     Cancel: Toxicology screen, urine  -     TRAMADOL, URINE    Elevated BP without diagnosis of hypertension    Pain in finger of right hand  -     traMADol (ULTRAM) 50 mg tablet; Take 1 tablet (50 mg total) by mouth every 6 (six) hours as needed for moderate pain    Locking finger joint  -     traMADol (ULTRAM) 50 mg tablet; Take 1 tablet (50 mg total) by mouth every 6 (six) hours as needed for moderate pain    Other orders  -     Discontinue: DULERA 100-5 MCG/ACT inhaler;       A/P: Will get pt to see our psych PA  Recommend she see another ortho to see if she can get her THR sooner  Will add singulair for the asthma and allergies  May need to consider a LAMA  Will give a few ultram for the pain  Has a referral for a mammo  Refusing all vaccines  Had a colonoscopy just a few years ago and states she isn't due for one  BP is better, but still borderline  Consider adding a beta blocker for ZACK and BP, but asthma may prevent this  RTC one month for f/u       Subjective:      Patient ID: Jenny Boyle is a 61 y o  female  WF RTC for f/u asthma, ZACK, etc  Doing poorly  Continues with limited activity due to ongoing hip issues and is having problems with pain management and ortho  Unable to get an injection and needs a THR, but can't get scheduled for several months  Pt's psych has canceled on her several times and her depression and ZACK is up  Reports allergies and asthma/COPD is bad  Increase use of the rescue inhaler noted  Due for mammo and vaccines  The following portions of the patient's history were reviewed and updated as appropriate:   She  has a past medical history of Arthritis; ZACK (generalized anxiety disorder) (12/11/2018); Moderate persistent asthma without complication (03/15/7694); and Pulmonary emphysema ([facility] [address] ) (10/23/2018)  She   Patient Active Problem List    Diagnosis Date Noted    ZACK (generalized anxiety disorder) 12/11/2018    Osteoarthritis of left hip 49/36/1601    Uncomplicated opioid dependence ([facility] [address] ) 11/26/2018    Chronic pain syndrome 11/26/2018    Tear of left acetabular labrum 11/26/2018    Moderate persistent asthma without complication 42/10/6625    Chronic nonintractable headache 10/23/2018    Pulmonary emphysema ([facility] [address] ) 10/23/2018    Adjustment disorder with mixed anxiety and depressed mood 10/23/2018    Borderline high serum cholesterol 08/29/2018    Chronic cough     Reactive airway disease 08/15/2018    Environmental and seasonal allergies 08/15/2018    Arthralgia of right temporomandibular joint 08/15/2018     She  has a past surgical history that includes Facial cosmetic surgery; Colonoscopy; and Dental surgery  Her family history includes Aneurysm in her mother; No Known Problems in her brother  She  reports that she has never smoked  She has never used smokeless tobacco  She reports that she does not drink alcohol or use drugs    Current Outpatient Prescriptions   Medication Sig Dispense Refill    albuterol (VENTOLIN HFA) 90 mcg/act inhaler Inhale 2 puffs every 6 (six) hours as needed for wheezing 18 g 5    Calcium Carbonate-Vitamin D (OSCAL 500/200 D-3 PO) Take by mouth 3 (three) times a day        Cholecalciferol (VITAMIN D3) 94514 units TABS Take by mouth daily        cyanocobalamin (VITAMIN B-12) 1,000 mcg tablet Take by mouth daily      cyclobenzaprine (FLEXERIL) 10 mg tablet Take 1 tablet (10 mg total) by mouth 3 (three) times a day as needed for muscle spasms 90 tablet 0    mometasone-formoterol (DULERA) 200-5 MCG/ACT inhaler Inhale 2 puffs 2 (two) times a day Rinse mouth after use  3 Inhaler 0    pyridoxine (VITAMIN B6) 100 mg tablet Take 100 mg by mouth daily      montelukast (SINGULAIR) 10 mg tablet Take 1 tablet (10 mg total) by mouth daily at bedtime 90 tablet 0    traMADol (ULTRAM) 50 mg tablet Take 1 tablet (50 mg total) by mouth every 6 (six) hours as needed for moderate pain 30 tablet 0     No current facility-administered medications for this visit  Current Outpatient Prescriptions on File Prior to Visit   Medication Sig    albuterol (VENTOLIN HFA) 90 mcg/act inhaler Inhale 2 puffs every 6 (six) hours as needed for wheezing    Calcium Carbonate-Vitamin D (OSCAL 500/200 D-3 PO) Take by mouth 3 (three) times a day      Cholecalciferol (VITAMIN D3) 15505 units TABS Take by mouth daily      cyanocobalamin (VITAMIN B-12) 1,000 mcg tablet Take by mouth daily    cyclobenzaprine (FLEXERIL) 10 mg tablet Take 1 tablet (10 mg total) by mouth 3 (three) times a day as needed for muscle spasms    mometasone-formoterol (DULERA) 200-5 MCG/ACT inhaler Inhale 2 puffs 2 (two) times a day Rinse mouth after use   pyridoxine (VITAMIN B6) 100 mg tablet Take 100 mg by mouth daily    [DISCONTINUED] traMADol (ULTRAM) 50 mg tablet Take 1 tablet (50 mg total) by mouth every 6 (six) hours as needed for moderate pain (Patient not taking: Reported on 1/21/2019 )     No current facility-administered medications on file prior to visit        She is allergic to nsaids; celebrex [celecoxib]; fosamax [alendronate]; ibuprofen; and influenza vaccines       Review of Systems   Constitutional: Negative for activity change, chills, diaphoresis, fatigue and fever  HENT: Positive for congestion and postnasal drip  Negative for rhinorrhea, sinus pain, sinus pressure and trouble swallowing  Eyes: Negative for visual disturbance  Respiratory: Positive for cough, chest tightness, shortness of breath and wheezing  Cardiovascular: Negative for chest pain, palpitations and leg swelling  Gastrointestinal: Negative for abdominal pain, constipation, diarrhea, nausea and vomiting  Endocrine: Negative for cold intolerance and heat intolerance  Genitourinary: Negative for difficulty urinating, dysuria and frequency  Musculoskeletal: Positive for arthralgias and gait problem  Negative for myalgias  Allergic/Immunologic: Positive for environmental allergies  Neurological: Negative for dizziness, seizures, syncope, weakness, light-headedness and headaches  Psychiatric/Behavioral: Positive for dysphoric mood and sleep disturbance  Negative for confusion and suicidal ideas  The patient is nervous/anxious  Objective:      /90 (BP Location: Left arm, Patient Position: Sitting, Cuff Size: Adult)   Pulse (!) 113   Temp 97 8 °F (36 6 °C) (Tympanic)   Resp 18   Ht 5' 2" (1 575 m)   Wt 53 kg (116 lb 12 8 oz)   SpO2 99%   BMI 21 36 kg/m²          Physical Exam   Constitutional: She is oriented to person, place, and time  She appears well-developed and well-nourished  No distress  HENT:   Head: Normocephalic and atraumatic  Mouth/Throat: Oropharynx is clear and moist    Eyes: Pupils are equal, round, and reactive to light  Conjunctivae and EOM are normal    Neck: Neck supple  No JVD present  Cardiovascular: Normal rate, regular rhythm and normal heart sounds  No murmur heard    Pulmonary/Chest: Effort normal and breath sounds normal  No respiratory distress  She has no wheezes  She has no rales  Abdominal: Soft  Bowel sounds are normal  She exhibits no distension  There is no tenderness  Musculoskeletal: She exhibits tenderness  She exhibits no edema or deformity  Neurological: She is alert and oriented to person, place, and time  Psychiatric: Her behavior is normal  Judgment and thought content normal    Crying and anxious  Nursing note and vitals reviewed

## 2019-01-24 ENCOUNTER — HOSPITAL ENCOUNTER (OUTPATIENT)
Dept: MAMMOGRAPHY | Facility: HOSPITAL | Age: 64
Discharge: HOME | End: 2019-01-24
Payer: COMMERCIAL

## 2019-01-24 VITALS — BODY MASS INDEX: 21.35 KG/M2 | HEIGHT: 62 IN | WEIGHT: 116 LBS

## 2019-01-24 DIAGNOSIS — Z12.39 SCREENING FOR BREAST CANCER: ICD-10-CM

## 2019-01-24 PROCEDURE — 77067 SCR MAMMO BI INCL CAD: CPT

## 2019-01-24 PROCEDURE — 77063 BREAST TOMOSYNTHESIS BI: CPT

## 2019-01-29 ENCOUNTER — TELEPHONE (OUTPATIENT)
Dept: INTERNAL MEDICINE CLINIC | Facility: CLINIC | Age: 64
End: 2019-01-29

## 2019-01-29 ENCOUNTER — OFFICE VISIT (OUTPATIENT)
Dept: INTERNAL MEDICINE CLINIC | Facility: CLINIC | Age: 64
End: 2019-01-29
Payer: COMMERCIAL

## 2019-01-29 VITALS
TEMPERATURE: 99.2 F | WEIGHT: 115.2 LBS | HEIGHT: 62 IN | RESPIRATION RATE: 16 BRPM | SYSTOLIC BLOOD PRESSURE: 186 MMHG | HEART RATE: 111 BPM | DIASTOLIC BLOOD PRESSURE: 90 MMHG | OXYGEN SATURATION: 93 % | BODY MASS INDEX: 21.2 KG/M2

## 2019-01-29 DIAGNOSIS — M16.12 OSTEOARTHRITIS OF LEFT HIP, UNSPECIFIED OSTEOARTHRITIS TYPE: ICD-10-CM

## 2019-01-29 DIAGNOSIS — F41.1 GAD (GENERALIZED ANXIETY DISORDER): Primary | ICD-10-CM

## 2019-01-29 PROCEDURE — 3008F BODY MASS INDEX DOCD: CPT | Performed by: PHYSICIAN ASSISTANT

## 2019-01-29 PROCEDURE — 99214 OFFICE O/P EST MOD 30 MIN: CPT | Performed by: PHYSICIAN ASSISTANT

## 2019-01-29 RX ORDER — CITALOPRAM 20 MG/1
20 TABLET ORAL DAILY
Qty: 30 TABLET | Refills: 5 | Status: SHIPPED | OUTPATIENT
Start: 2019-01-29 | End: 2019-04-03

## 2019-01-29 RX ORDER — BUSPIRONE HYDROCHLORIDE 10 MG/1
10 TABLET ORAL 2 TIMES DAILY
Qty: 60 TABLET | Refills: 1 | Status: SHIPPED | OUTPATIENT
Start: 2019-01-29 | End: 2019-04-30

## 2019-01-29 NOTE — PROGRESS NOTES
Assessment/Plan:    ZACK (generalized anxiety disorder)  Will start pt with celexa every evening and buspar BID prn for intermittent acute anxiety  Directions for use and possible side effects discussed and patient verbalized understanding of these  Diagnoses and all orders for this visit:    Osteoarthritis of left hip, unspecified osteoarthritis type  -     Ambulatory referral to Orthopedic Surgery; Future    ZACK (generalized anxiety disorder)  -     citalopram (CeleXA) 20 mg tablet; Take 1 tablet (20 mg total) by mouth daily  -     busPIRone (BUSPAR) 10 mg tablet; Take 1 tablet (10 mg total) by mouth 2 (two) times a day          Subjective:      Patient ID: Neha Jade is a 61 y o  female  Pt presents for initial psych evaluation  She c/o increased panic attacks and states she gets about 15 per day  She was seeing a therapist who had been cancelling routinely on her  Symptoms are worse in the evening when she tries to sleep  She notes increased sweating, racing heart  Pt notes constant worrying, thinking, and dwelling  No previous medications tried for this  She has stressors with her father who is ill with prostate CA and also with her chronic pain and need for a hip replacement  The following portions of the patient's history were reviewed and updated as appropriate:   She  has a past medical history of Arthritis; ZACK (generalized anxiety disorder) (12/11/2018); Moderate persistent asthma without complication (84/83/1246); and Pulmonary emphysema ([address] ) (10/23/2018)    She   Patient Active Problem List    Diagnosis Date Noted    ZACK (generalized anxiety disorder) 12/11/2018    Osteoarthritis of left hip 58/20/0070    Uncomplicated opioid dependence ([address] ) 11/26/2018    Chronic pain syndrome 11/26/2018    Tear of left acetabular labrum 11/26/2018    Moderate persistent asthma without complication 99/02/9498    Chronic nonintractable headache 10/23/2018    Pulmonary emphysema ([address] ) 10/23/2018  Adjustment disorder with mixed anxiety and depressed mood 10/23/2018    Borderline high serum cholesterol 08/29/2018    Chronic cough     Reactive airway disease 08/15/2018    Environmental and seasonal allergies 08/15/2018    Arthralgia of right temporomandibular joint 08/15/2018     She  has a past surgical history that includes Facial cosmetic surgery; Colonoscopy; and Dental surgery  Her family history includes Aneurysm in her mother; No Known Problems in her brother  She  reports that she has never smoked  She has never used smokeless tobacco  She reports that she does not drink alcohol or use drugs  Current Outpatient Prescriptions   Medication Sig Dispense Refill    albuterol (VENTOLIN HFA) 90 mcg/act inhaler Inhale 2 puffs every 6 (six) hours as needed for wheezing 18 g 5    Calcium Carbonate-Vitamin D (OSCAL 500/200 D-3 PO) Take by mouth 3 (three) times a day        Cholecalciferol (VITAMIN D3) 24073 units TABS Take by mouth daily        cyanocobalamin (VITAMIN B-12) 1,000 mcg tablet Take by mouth daily      cyclobenzaprine (FLEXERIL) 10 mg tablet Take 1 tablet (10 mg total) by mouth 3 (three) times a day as needed for muscle spasms 90 tablet 0    mometasone-formoterol (DULERA) 200-5 MCG/ACT inhaler Inhale 2 puffs 2 (two) times a day Rinse mouth after use  3 Inhaler 0    pyridoxine (VITAMIN B6) 100 mg tablet Take 100 mg by mouth daily      traMADol (ULTRAM) 50 mg tablet Take 1 tablet (50 mg total) by mouth every 6 (six) hours as needed for moderate pain 30 tablet 0    busPIRone (BUSPAR) 10 mg tablet Take 1 tablet (10 mg total) by mouth 2 (two) times a day 60 tablet 1    citalopram (CeleXA) 20 mg tablet Take 1 tablet (20 mg total) by mouth daily 30 tablet 5     No current facility-administered medications for this visit        Current Outpatient Prescriptions on File Prior to Visit   Medication Sig    albuterol (VENTOLIN HFA) 90 mcg/act inhaler Inhale 2 puffs every 6 (six) hours as needed for wheezing    Calcium Carbonate-Vitamin D (OSCAL 500/200 D-3 PO) Take by mouth 3 (three) times a day      Cholecalciferol (VITAMIN D3) 30885 units TABS Take by mouth daily      cyanocobalamin (VITAMIN B-12) 1,000 mcg tablet Take by mouth daily    cyclobenzaprine (FLEXERIL) 10 mg tablet Take 1 tablet (10 mg total) by mouth 3 (three) times a day as needed for muscle spasms    mometasone-formoterol (DULERA) 200-5 MCG/ACT inhaler Inhale 2 puffs 2 (two) times a day Rinse mouth after use   pyridoxine (VITAMIN B6) 100 mg tablet Take 100 mg by mouth daily    traMADol (ULTRAM) 50 mg tablet Take 1 tablet (50 mg total) by mouth every 6 (six) hours as needed for moderate pain    [DISCONTINUED] montelukast (SINGULAIR) 10 mg tablet Take 1 tablet (10 mg total) by mouth daily at bedtime (Patient not taking: Reported on 1/29/2019 )     No current facility-administered medications on file prior to visit  She is allergic to nsaids; celebrex [celecoxib]; fosamax [alendronate]; ibuprofen; and influenza vaccines       Review of Systems   Constitutional: Negative for chills and fever  HENT: Negative for congestion, ear pain, hearing loss, postnasal drip, rhinorrhea, sinus pain, sinus pressure, sore throat and trouble swallowing  Eyes: Negative for pain and visual disturbance  Respiratory: Negative for cough, chest tightness, shortness of breath and wheezing  Cardiovascular: Negative  Negative for chest pain, palpitations and leg swelling  Gastrointestinal: Negative for abdominal pain, blood in stool, constipation, diarrhea, nausea and vomiting  Endocrine: Negative for cold intolerance, heat intolerance, polydipsia, polyphagia and polyuria  Genitourinary: Negative for difficulty urinating, dysuria, flank pain and urgency  Musculoskeletal: Negative for arthralgias, back pain, gait problem and myalgias  Skin: Negative for rash  Allergic/Immunologic: Negative      Neurological: Negative for dizziness, weakness, light-headedness and headaches  Hematological: Negative  Psychiatric/Behavioral: Negative for behavioral problems, dysphoric mood and sleep disturbance  The patient is nervous/anxious  Objective:      BP (!) 186/90 (BP Location: Left arm, Patient Position: Sitting, Cuff Size: Large)   Pulse (!) 111   Temp 99 2 °F (37 3 °C)   Resp 16   Ht 5' 2" (1 575 m)   Wt 52 3 kg (115 lb 3 2 oz)   SpO2 93%   BMI 21 07 kg/m²          Physical Exam   Constitutional: She is oriented to person, place, and time  She appears well-developed and well-nourished  No distress  HENT:   Head: Normocephalic and atraumatic  Right Ear: External ear normal    Left Ear: External ear normal    Nose: Nose normal    Mouth/Throat: Oropharynx is clear and moist  No oropharyngeal exudate  Eyes: Pupils are equal, round, and reactive to light  Conjunctivae and EOM are normal  Right eye exhibits no discharge  Left eye exhibits no discharge  No scleral icterus  Neck: Normal range of motion  Neck supple  No thyromegaly present  Cardiovascular: Normal rate, regular rhythm and normal heart sounds  Exam reveals no gallop and no friction rub  No murmur heard  Pulmonary/Chest: Effort normal and breath sounds normal  No respiratory distress  She has no wheezes  She has no rales  Abdominal: Soft  Bowel sounds are normal  She exhibits no distension  There is no tenderness  Musculoskeletal: Normal range of motion  She exhibits no edema, tenderness or deformity  Neurological: She is alert and oriented to person, place, and time  No cranial nerve deficit  Skin: Skin is warm and dry  She is not diaphoretic  Psychiatric: Her behavior is normal  Judgment and thought content normal  Her mood appears anxious

## 2019-01-29 NOTE — ASSESSMENT & PLAN NOTE
Will start pt with celexa every evening and buspar BID prn for intermittent acute anxiety  Directions for use and possible side effects discussed and patient verbalized understanding of these

## 2019-02-05 ENCOUNTER — HOSPITAL ENCOUNTER (OUTPATIENT)
Dept: ULTRASOUND IMAGING | Facility: HOSPITAL | Age: 64
Discharge: HOME | End: 2019-02-05
Payer: COMMERCIAL

## 2019-02-05 ENCOUNTER — HOSPITAL ENCOUNTER (OUTPATIENT)
Dept: MAMMOGRAPHY | Facility: HOSPITAL | Age: 64
Discharge: HOME | End: 2019-02-05
Payer: COMMERCIAL

## 2019-02-05 DIAGNOSIS — R92.8 ABNORMAL MAMMOGRAM: ICD-10-CM

## 2019-02-05 PROCEDURE — 76642 ULTRASOUND BREAST LIMITED: CPT

## 2019-02-05 PROCEDURE — G0279 TOMOSYNTHESIS, MAMMO: HCPCS

## 2019-02-05 PROCEDURE — 77065 DX MAMMO INCL CAD UNI: CPT

## 2019-02-13 DIAGNOSIS — M62.830 BACK SPASM: ICD-10-CM

## 2019-02-13 RX ORDER — CYCLOBENZAPRINE HCL 10 MG
10 TABLET ORAL 3 TIMES DAILY PRN
Qty: 90 TABLET | Refills: 1 | Status: SHIPPED | OUTPATIENT
Start: 2019-02-13 | End: 2019-05-07 | Stop reason: SDUPTHER

## 2019-02-13 NOTE — TELEPHONE ENCOUNTER
Pt needs refill on cyclobenzaprine 10mg,  pls send to [facility]

## 2019-02-22 ENCOUNTER — OFFICE VISIT (OUTPATIENT)
Dept: INTERNAL MEDICINE CLINIC | Facility: CLINIC | Age: 64
End: 2019-02-22
Payer: COMMERCIAL

## 2019-02-22 VITALS
RESPIRATION RATE: 12 BRPM | HEART RATE: 80 BPM | SYSTOLIC BLOOD PRESSURE: 158 MMHG | BODY MASS INDEX: 20.98 KG/M2 | WEIGHT: 114 LBS | TEMPERATURE: 99 F | DIASTOLIC BLOOD PRESSURE: 90 MMHG | HEIGHT: 62 IN

## 2019-02-22 DIAGNOSIS — J30.89 ENVIRONMENTAL AND SEASONAL ALLERGIES: ICD-10-CM

## 2019-02-22 DIAGNOSIS — G89.4 CHRONIC PAIN SYNDROME: ICD-10-CM

## 2019-02-22 DIAGNOSIS — R03.0 ELEVATED BP WITHOUT DIAGNOSIS OF HYPERTENSION: ICD-10-CM

## 2019-02-22 DIAGNOSIS — J43.9 PULMONARY EMPHYSEMA, UNSPECIFIED EMPHYSEMA TYPE (HCC): ICD-10-CM

## 2019-02-22 DIAGNOSIS — M16.12 OSTEOARTHRITIS OF LEFT HIP, UNSPECIFIED OSTEOARTHRITIS TYPE: Primary | ICD-10-CM

## 2019-02-22 DIAGNOSIS — F43.23 ADJUSTMENT DISORDER WITH MIXED ANXIETY AND DEPRESSED MOOD: ICD-10-CM

## 2019-02-22 DIAGNOSIS — J45.909 ASTHMA, UNSPECIFIED ASTHMA SEVERITY, UNSPECIFIED WHETHER COMPLICATED, UNSPECIFIED WHETHER PERSISTENT: ICD-10-CM

## 2019-02-22 PROCEDURE — 99214 OFFICE O/P EST MOD 30 MIN: CPT | Performed by: INTERNAL MEDICINE

## 2019-02-22 PROCEDURE — 1036F TOBACCO NON-USER: CPT | Performed by: INTERNAL MEDICINE

## 2019-02-22 RX ORDER — TRAMADOL HYDROCHLORIDE 50 MG/1
50 TABLET ORAL EVERY 6 HOURS PRN
Qty: 30 TABLET | Refills: 0 | Status: SHIPPED | OUTPATIENT
Start: 2019-02-22 | End: 2019-03-20 | Stop reason: SDUPTHER

## 2019-02-22 NOTE — PROGRESS NOTES
Assessment/Plan:    No problem-specific Assessment & Plan notes found for this encounter  Diagnoses and all orders for this visit:    Osteoarthritis of left hip, unspecified osteoarthritis type    Adjustment disorder with mixed anxiety and depressed mood    Pulmonary emphysema, unspecified emphysema type ([address] )  -     mometasone-formoterol (DULERA) 200-5 MCG/ACT inhaler; Inhale 2 puffs 2 (two) times a day Rinse mouth after use  Asthma, unspecified asthma severity, unspecified whether complicated, unspecified whether persistent  -     mometasone-formoterol (DULERA) 200-5 MCG/ACT inhaler; Inhale 2 puffs 2 (two) times a day Rinse mouth after use  Environmental and seasonal allergies  -     mometasone-formoterol (DULERA) 200-5 MCG/ACT inhaler; Inhale 2 puffs 2 (two) times a day Rinse mouth after use  Chronic pain syndrome  -     traMADol (ULTRAM) 50 mg tablet; Take 1 tablet (50 mg total) by mouth every 6 (six) hours as needed for moderate pain    Elevated BP without diagnosis of hypertension      A/P: Doing a little better whether or not the pt believes it  Will continue current treatment  Discussed the BP and will hold on treating it for now  Pt to get additional readings as an OP  Keep f/u with new counselor, psych PA, and ortho  RTC one month for f/u  Subjective:      Patient ID: Aleena Saul is a 61 y o  female  WF RTC for f/u several issues including adjustment disorder, asthma/COPD, allergies, etc  Seen by psych PA and started an SSRI and buspar  Seen by a new ortho and THR in the near future scheduled  Started on singulair for the allergies and RAD  Reports the RAD and allergies are better with minimal rescue MDI use  ZACK/depresson not much better, but just started the meds  Has appt with a new counsellor  BP is up, but reports OP readings are good  No new issues         The following portions of the patient's history were reviewed and updated as appropriate:   She  has a past medical history of Arthritis, ZACK (generalized anxiety disorder) (12/11/2018), Moderate persistent asthma without complication (78/38/9089), and Pulmonary emphysema ([facility] [address] ) (10/23/2018)  She   Patient Active Problem List    Diagnosis Date Noted    ZACK (generalized anxiety disorder) 12/11/2018    Osteoarthritis of left hip 52/05/5703    Uncomplicated opioid dependence ([facility] [address] ) 11/26/2018    Chronic pain syndrome 11/26/2018    Tear of left acetabular labrum 11/26/2018    Moderate persistent asthma without complication 54/66/2346    Chronic nonintractable headache 10/23/2018    Pulmonary emphysema ([facility] [address] ) 10/23/2018    Adjustment disorder with mixed anxiety and depressed mood 10/23/2018    Borderline high serum cholesterol 08/29/2018    Chronic cough     Reactive airway disease 08/15/2018    Environmental and seasonal allergies 08/15/2018    Arthralgia of right temporomandibular joint 08/15/2018     She  has a past surgical history that includes Facial cosmetic surgery; Colonoscopy; and Dental surgery  Her family history includes Aneurysm in her mother; No Known Problems in her brother  She  reports that she has never smoked  She has never used smokeless tobacco  She reports that she does not drink alcohol or use drugs    Current Outpatient Medications   Medication Sig Dispense Refill    albuterol (VENTOLIN HFA) 90 mcg/act inhaler Inhale 2 puffs every 6 (six) hours as needed for wheezing 18 g 5    busPIRone (BUSPAR) 10 mg tablet Take 1 tablet (10 mg total) by mouth 2 (two) times a day 60 tablet 1    Calcium Carbonate-Vitamin D (OSCAL 500/200 D-3 PO) Take by mouth 3 (three) times a day        Cholecalciferol (VITAMIN D3) 49832 units TABS Take by mouth daily        citalopram (CeleXA) 20 mg tablet Take 1 tablet (20 mg total) by mouth daily 30 tablet 5    cyanocobalamin (VITAMIN B-12) 1,000 mcg tablet Take by mouth daily      cyclobenzaprine (FLEXERIL) 10 mg tablet Take 1 tablet (10 mg total) by mouth 3 (three) times a day as needed for muscle spasms 90 tablet 1    mometasone-formoterol (DULERA) 200-5 MCG/ACT inhaler Inhale 2 puffs 2 (two) times a day Rinse mouth after use  3 Inhaler 0    pyridoxine (VITAMIN B6) 100 mg tablet Take 100 mg by mouth daily      traMADol (ULTRAM) 50 mg tablet Take 1 tablet (50 mg total) by mouth every 6 (six) hours as needed for moderate pain 30 tablet 0     No current facility-administered medications for this visit  Current Outpatient Medications on File Prior to Visit   Medication Sig    albuterol (VENTOLIN HFA) 90 mcg/act inhaler Inhale 2 puffs every 6 (six) hours as needed for wheezing    busPIRone (BUSPAR) 10 mg tablet Take 1 tablet (10 mg total) by mouth 2 (two) times a day    Calcium Carbonate-Vitamin D (OSCAL 500/200 D-3 PO) Take by mouth 3 (three) times a day      Cholecalciferol (VITAMIN D3) 07015 units TABS Take by mouth daily      citalopram (CeleXA) 20 mg tablet Take 1 tablet (20 mg total) by mouth daily    cyanocobalamin (VITAMIN B-12) 1,000 mcg tablet Take by mouth daily    cyclobenzaprine (FLEXERIL) 10 mg tablet Take 1 tablet (10 mg total) by mouth 3 (three) times a day as needed for muscle spasms    pyridoxine (VITAMIN B6) 100 mg tablet Take 100 mg by mouth daily    [DISCONTINUED] mometasone-formoterol (DULERA) 200-5 MCG/ACT inhaler Inhale 2 puffs 2 (two) times a day Rinse mouth after use   [DISCONTINUED] traMADol (ULTRAM) 50 mg tablet Take 1 tablet (50 mg total) by mouth every 6 (six) hours as needed for moderate pain     No current facility-administered medications on file prior to visit  She is allergic to nsaids; celebrex [celecoxib]; fosamax [alendronate]; ibuprofen; and influenza vaccines       Review of Systems   Constitutional: Positive for activity change  Negative for chills, diaphoresis, fatigue and fever  HENT: Negative  Respiratory: Negative for cough, chest tightness, shortness of breath and wheezing      Cardiovascular: Negative for chest pain, palpitations and leg swelling  Gastrointestinal: Negative for abdominal pain, constipation, diarrhea, nausea and vomiting  Genitourinary: Negative for difficulty urinating, dysuria and frequency  Musculoskeletal: Negative for arthralgias, gait problem and myalgias  Neurological: Negative for light-headedness and headaches  Psychiatric/Behavioral: Positive for dysphoric mood  Negative for confusion, self-injury, sleep disturbance and suicidal ideas  The patient is nervous/anxious  Objective:      /90   Pulse 80   Temp 99 °F (37 2 °C)   Resp 12   Ht 5' 2" (1 575 m)   Wt 51 7 kg (114 lb)   BMI 20 85 kg/m²          Physical Exam   Constitutional: She is oriented to person, place, and time  She appears well-developed and well-nourished  No distress  HENT:   Head: Normocephalic and atraumatic  Mouth/Throat: Oropharynx is clear and moist    Eyes: Pupils are equal, round, and reactive to light  Conjunctivae and EOM are normal    Cardiovascular: Normal rate, regular rhythm and normal heart sounds  Pulmonary/Chest: Effort normal and breath sounds normal  No respiratory distress  She has no wheezes  She has no rales  Neurological: She is alert and oriented to person, place, and time  Psychiatric: She has a normal mood and affect  Her behavior is normal  Judgment and thought content normal    Nursing note and vitals reviewed

## 2019-03-18 ENCOUNTER — TELEPHONE (OUTPATIENT)
Dept: INTERNAL MEDICINE CLINIC | Facility: CLINIC | Age: 64
End: 2019-03-18

## 2019-03-18 DIAGNOSIS — J30.89 ENVIRONMENTAL AND SEASONAL ALLERGIES: ICD-10-CM

## 2019-03-18 DIAGNOSIS — J45.909 ASTHMA, UNSPECIFIED ASTHMA SEVERITY, UNSPECIFIED WHETHER COMPLICATED, UNSPECIFIED WHETHER PERSISTENT: ICD-10-CM

## 2019-03-18 DIAGNOSIS — J43.9 PULMONARY EMPHYSEMA, UNSPECIFIED EMPHYSEMA TYPE (HCC): ICD-10-CM

## 2019-03-18 NOTE — TELEPHONE ENCOUNTER
INHALER MATA WAS SENT INTO PHARMACY [facility] BUT HER INSURANCE WILL NOT COVER  PLEASE SEND IN [facility] TO [facility]

## 2019-03-20 DIAGNOSIS — G89.4 CHRONIC PAIN SYNDROME: ICD-10-CM

## 2019-03-20 RX ORDER — TRAMADOL HYDROCHLORIDE 50 MG/1
50 TABLET ORAL EVERY 6 HOURS PRN
Qty: 30 TABLET | Refills: 0 | Status: SHIPPED | OUTPATIENT
Start: 2019-03-20 | End: 2019-04-18 | Stop reason: SDUPTHER

## 2019-04-03 ENCOUNTER — OFFICE VISIT (OUTPATIENT)
Dept: INTERNAL MEDICINE CLINIC | Facility: CLINIC | Age: 64
End: 2019-04-03
Payer: COMMERCIAL

## 2019-04-03 VITALS
HEART RATE: 88 BPM | WEIGHT: 115 LBS | DIASTOLIC BLOOD PRESSURE: 88 MMHG | TEMPERATURE: 97.5 F | SYSTOLIC BLOOD PRESSURE: 150 MMHG | HEIGHT: 62 IN | RESPIRATION RATE: 18 BRPM | BODY MASS INDEX: 21.16 KG/M2

## 2019-04-03 DIAGNOSIS — F41.1 GAD (GENERALIZED ANXIETY DISORDER): ICD-10-CM

## 2019-04-03 DIAGNOSIS — G89.4 CHRONIC PAIN SYNDROME: ICD-10-CM

## 2019-04-03 DIAGNOSIS — F43.23 ADJUSTMENT DISORDER WITH MIXED ANXIETY AND DEPRESSED MOOD: Primary | ICD-10-CM

## 2019-04-03 PROCEDURE — 99213 OFFICE O/P EST LOW 20 MIN: CPT | Performed by: PHYSICIAN ASSISTANT

## 2019-04-03 PROCEDURE — 1036F TOBACCO NON-USER: CPT | Performed by: PHYSICIAN ASSISTANT

## 2019-04-03 PROCEDURE — 3008F BODY MASS INDEX DOCD: CPT | Performed by: PHYSICIAN ASSISTANT

## 2019-04-03 RX ORDER — DULOXETIN HYDROCHLORIDE 60 MG/1
60 CAPSULE, DELAYED RELEASE ORAL DAILY
Qty: 30 CAPSULE | Refills: 2 | Status: SHIPPED | OUTPATIENT
Start: 2019-04-03 | End: 2019-04-30

## 2019-04-03 RX ORDER — TRAMADOL HYDROCHLORIDE 50 MG/1
50 TABLET ORAL EVERY 6 HOURS PRN
Qty: 30 TABLET | Refills: 0 | Status: CANCELLED | OUTPATIENT
Start: 2019-04-03

## 2019-04-18 DIAGNOSIS — G89.4 CHRONIC PAIN SYNDROME: ICD-10-CM

## 2019-04-18 RX ORDER — TRAMADOL HYDROCHLORIDE 50 MG/1
50 TABLET ORAL EVERY 6 HOURS PRN
Qty: 30 TABLET | Refills: 0 | Status: SHIPPED | OUTPATIENT
Start: 2019-04-18 | End: 2019-05-15 | Stop reason: SDUPTHER

## 2019-04-30 ENCOUNTER — OFFICE VISIT (OUTPATIENT)
Dept: INTERNAL MEDICINE CLINIC | Facility: CLINIC | Age: 64
End: 2019-04-30
Payer: COMMERCIAL

## 2019-04-30 VITALS
RESPIRATION RATE: 18 BRPM | BODY MASS INDEX: 21.38 KG/M2 | TEMPERATURE: 98.4 F | DIASTOLIC BLOOD PRESSURE: 84 MMHG | HEART RATE: 96 BPM | OXYGEN SATURATION: 96 % | WEIGHT: 116.2 LBS | SYSTOLIC BLOOD PRESSURE: 180 MMHG | HEIGHT: 62 IN

## 2019-04-30 DIAGNOSIS — I10 ESSENTIAL HYPERTENSION: Primary | ICD-10-CM

## 2019-04-30 DIAGNOSIS — J45.40 MODERATE PERSISTENT ASTHMA WITHOUT COMPLICATION: ICD-10-CM

## 2019-04-30 DIAGNOSIS — R94.31 ABNORMAL EKG: ICD-10-CM

## 2019-04-30 DIAGNOSIS — M65.341 TRIGGER RING FINGER OF RIGHT HAND: ICD-10-CM

## 2019-04-30 DIAGNOSIS — J30.2 SEASONAL ALLERGIES: ICD-10-CM

## 2019-04-30 DIAGNOSIS — Z12.11 SCREEN FOR COLON CANCER: ICD-10-CM

## 2019-04-30 DIAGNOSIS — F43.23 ADJUSTMENT DISORDER WITH MIXED ANXIETY AND DEPRESSED MOOD: ICD-10-CM

## 2019-04-30 DIAGNOSIS — M16.12 OSTEOARTHRITIS OF LEFT HIP, UNSPECIFIED OSTEOARTHRITIS TYPE: ICD-10-CM

## 2019-04-30 PROCEDURE — 99214 OFFICE O/P EST MOD 30 MIN: CPT | Performed by: INTERNAL MEDICINE

## 2019-04-30 PROCEDURE — 93000 ELECTROCARDIOGRAM COMPLETE: CPT | Performed by: INTERNAL MEDICINE

## 2019-04-30 RX ORDER — LISINOPRIL 10 MG/1
10 TABLET ORAL DAILY
Qty: 90 TABLET | Refills: 3 | Status: SHIPPED | OUTPATIENT
Start: 2019-04-30 | End: 2019-11-19

## 2019-04-30 RX ORDER — AMLODIPINE BESYLATE 5 MG/1
5 TABLET ORAL DAILY
Qty: 90 TABLET | Refills: 0 | Status: SHIPPED | OUTPATIENT
Start: 2019-04-30 | End: 2019-04-30

## 2019-05-06 ENCOUNTER — TELEPHONE (OUTPATIENT)
Dept: INTERNAL MEDICINE CLINIC | Facility: CLINIC | Age: 64
End: 2019-05-06

## 2019-05-07 DIAGNOSIS — M62.830 BACK SPASM: ICD-10-CM

## 2019-05-07 RX ORDER — CYCLOBENZAPRINE HCL 10 MG
10 TABLET ORAL 3 TIMES DAILY PRN
Qty: 90 TABLET | Refills: 1 | Status: SHIPPED | OUTPATIENT
Start: 2019-05-07 | End: 2019-12-26 | Stop reason: ALTCHOICE

## 2019-05-09 ENCOUNTER — OFFICE VISIT (OUTPATIENT)
Dept: INTERNAL MEDICINE CLINIC | Facility: CLINIC | Age: 64
End: 2019-05-09
Payer: COMMERCIAL

## 2019-05-09 VITALS
RESPIRATION RATE: 18 BRPM | HEART RATE: 86 BPM | BODY MASS INDEX: 20.54 KG/M2 | HEIGHT: 62 IN | SYSTOLIC BLOOD PRESSURE: 156 MMHG | TEMPERATURE: 99.2 F | DIASTOLIC BLOOD PRESSURE: 84 MMHG | OXYGEN SATURATION: 96 % | WEIGHT: 111.6 LBS

## 2019-05-09 DIAGNOSIS — F41.1 GAD (GENERALIZED ANXIETY DISORDER): Primary | ICD-10-CM

## 2019-05-09 PROCEDURE — 99213 OFFICE O/P EST LOW 20 MIN: CPT | Performed by: PHYSICIAN ASSISTANT

## 2019-05-09 RX ORDER — AMLODIPINE BESYLATE 5 MG/1
5 TABLET ORAL DAILY
Refills: 0 | COMMUNITY
Start: 2019-04-30 | End: 2019-08-07

## 2019-05-09 RX ORDER — QUETIAPINE FUMARATE 50 MG/1
50 TABLET, EXTENDED RELEASE ORAL
Qty: 30 TABLET | Refills: 1 | Status: SHIPPED | OUTPATIENT
Start: 2019-05-09 | End: 2019-11-19 | Stop reason: ALTCHOICE

## 2019-05-15 DIAGNOSIS — G89.4 CHRONIC PAIN SYNDROME: ICD-10-CM

## 2019-05-15 RX ORDER — TRAMADOL HYDROCHLORIDE 50 MG/1
50 TABLET ORAL EVERY 6 HOURS PRN
Qty: 30 TABLET | Refills: 0 | Status: SHIPPED | OUTPATIENT
Start: 2019-05-15 | End: 2019-06-11 | Stop reason: SDUPTHER

## 2019-06-03 ENCOUNTER — OFFICE VISIT (OUTPATIENT)
Dept: INTERNAL MEDICINE CLINIC | Facility: CLINIC | Age: 64
End: 2019-06-03
Payer: COMMERCIAL

## 2019-06-03 VITALS
WEIGHT: 107.2 LBS | TEMPERATURE: 98.2 F | RESPIRATION RATE: 18 BRPM | DIASTOLIC BLOOD PRESSURE: 92 MMHG | OXYGEN SATURATION: 99 % | BODY MASS INDEX: 19.73 KG/M2 | HEIGHT: 62 IN | SYSTOLIC BLOOD PRESSURE: 160 MMHG | HEART RATE: 86 BPM

## 2019-06-03 DIAGNOSIS — R94.31 ABNORMAL EKG: ICD-10-CM

## 2019-06-03 DIAGNOSIS — J30.2 SEASONAL ALLERGIES: ICD-10-CM

## 2019-06-03 DIAGNOSIS — I10 ESSENTIAL HYPERTENSION: Primary | ICD-10-CM

## 2019-06-03 DIAGNOSIS — M54.31 SCIATICA OF RIGHT SIDE: ICD-10-CM

## 2019-06-03 DIAGNOSIS — J45.40 MODERATE PERSISTENT ASTHMA WITHOUT COMPLICATION: ICD-10-CM

## 2019-06-03 DIAGNOSIS — F43.23 ADJUSTMENT DISORDER WITH MIXED ANXIETY AND DEPRESSED MOOD: ICD-10-CM

## 2019-06-03 PROCEDURE — 3008F BODY MASS INDEX DOCD: CPT | Performed by: INTERNAL MEDICINE

## 2019-06-03 PROCEDURE — 1036F TOBACCO NON-USER: CPT | Performed by: INTERNAL MEDICINE

## 2019-06-03 PROCEDURE — 99214 OFFICE O/P EST MOD 30 MIN: CPT | Performed by: INTERNAL MEDICINE

## 2019-06-03 RX ORDER — METHYLPREDNISOLONE 4 MG/1
TABLET ORAL
Qty: 21 EACH | Refills: 0 | Status: SHIPPED | OUTPATIENT
Start: 2019-06-03 | End: 2019-08-07

## 2019-06-05 ENCOUNTER — APPOINTMENT (OUTPATIENT)
Dept: LAB | Facility: CLINIC | Age: 64
End: 2019-06-05
Payer: COMMERCIAL

## 2019-06-06 DIAGNOSIS — R73.9 ELEVATED BLOOD SUGAR: Primary | ICD-10-CM

## 2019-06-11 DIAGNOSIS — G89.4 CHRONIC PAIN SYNDROME: ICD-10-CM

## 2019-06-11 RX ORDER — TRAMADOL HYDROCHLORIDE 50 MG/1
50 TABLET ORAL EVERY 6 HOURS PRN
Qty: 30 TABLET | Refills: 0 | Status: SHIPPED | OUTPATIENT
Start: 2019-06-11 | End: 2019-11-01 | Stop reason: SDUPTHER

## 2019-07-26 ENCOUNTER — TELEPHONE (OUTPATIENT)
Dept: INTERNAL MEDICINE CLINIC | Facility: CLINIC | Age: 64
End: 2019-07-26

## 2019-07-26 DIAGNOSIS — B37.0 THRUSH: Primary | ICD-10-CM

## 2019-07-26 NOTE — TELEPHONE ENCOUNTER
Pt has oral thrush from using her inhaler  She used the oral Nystatin before to treat this  Will you prescribe this for her? She uses [facility] on [address] [employer]

## 2019-08-02 DIAGNOSIS — I10 ESSENTIAL HYPERTENSION: ICD-10-CM

## 2019-08-03 RX ORDER — AMLODIPINE BESYLATE 5 MG/1
TABLET ORAL
Qty: 90 TABLET | Refills: 0 | Status: SHIPPED | OUTPATIENT
Start: 2019-08-03 | End: 2019-11-01 | Stop reason: SDUPTHER

## 2019-08-07 ENCOUNTER — OFFICE VISIT (OUTPATIENT)
Dept: INTERNAL MEDICINE CLINIC | Facility: CLINIC | Age: 64
End: 2019-08-07
Payer: COMMERCIAL

## 2019-08-07 VITALS
BODY MASS INDEX: 18.95 KG/M2 | TEMPERATURE: 98.3 F | OXYGEN SATURATION: 98 % | WEIGHT: 103 LBS | RESPIRATION RATE: 18 BRPM | DIASTOLIC BLOOD PRESSURE: 78 MMHG | HEART RATE: 85 BPM | SYSTOLIC BLOOD PRESSURE: 140 MMHG | HEIGHT: 62 IN

## 2019-08-07 DIAGNOSIS — I10 ESSENTIAL HYPERTENSION: Primary | ICD-10-CM

## 2019-08-07 DIAGNOSIS — Z13.1 SCREENING FOR DIABETES MELLITUS (DM): ICD-10-CM

## 2019-08-07 DIAGNOSIS — M16.12 OSTEOARTHRITIS OF LEFT HIP, UNSPECIFIED OSTEOARTHRITIS TYPE: ICD-10-CM

## 2019-08-07 DIAGNOSIS — E78.9 BORDERLINE HIGH SERUM CHOLESTEROL: ICD-10-CM

## 2019-08-07 DIAGNOSIS — F43.23 ADJUSTMENT DISORDER WITH MIXED ANXIETY AND DEPRESSED MOOD: ICD-10-CM

## 2019-08-07 DIAGNOSIS — G89.4 CHRONIC PAIN SYNDROME: ICD-10-CM

## 2019-08-07 DIAGNOSIS — J45.40 MODERATE PERSISTENT ASTHMA WITHOUT COMPLICATION: ICD-10-CM

## 2019-08-07 DIAGNOSIS — J43.9 PULMONARY EMPHYSEMA, UNSPECIFIED EMPHYSEMA TYPE (HCC): ICD-10-CM

## 2019-08-07 PROCEDURE — 3008F BODY MASS INDEX DOCD: CPT | Performed by: INTERNAL MEDICINE

## 2019-08-07 PROCEDURE — 99214 OFFICE O/P EST MOD 30 MIN: CPT | Performed by: INTERNAL MEDICINE

## 2019-08-07 PROCEDURE — 1036F TOBACCO NON-USER: CPT | Performed by: INTERNAL MEDICINE

## 2019-08-07 RX ORDER — ASPIRIN 81 MG/1
81 TABLET ORAL DAILY
COMMUNITY
End: 2019-10-24

## 2019-08-07 NOTE — PATIENT INSTRUCTIONS

## 2019-08-07 NOTE — PROGRESS NOTES
Assessment/Plan:  Problem List Items Addressed This Visit        Respiratory    Moderate persistent asthma without complication    Pulmonary emphysema (HCC)       Cardiovascular and Mediastinum    Essential hypertension - Primary    Relevant Orders    Comprehensive metabolic panel       Musculoskeletal and Integument    Osteoarthritis of left hip       Other    Borderline high serum cholesterol    Relevant Orders    LDL cholesterol, direct    Triglycerides    Adjustment disorder with mixed anxiety and depressed mood    Chronic pain syndrome      Other Visit Diagnoses     Screening for diabetes mellitus (DM)        Relevant Orders    Hemoglobin A1C           Diagnoses and all orders for this visit:    Essential hypertension  -     Comprehensive metabolic panel; Future    Moderate persistent asthma without complication    Pulmonary emphysema, unspecified emphysema type (HCC)    Osteoarthritis of left hip, unspecified osteoarthritis type    Adjustment disorder with mixed anxiety and depressed mood    Borderline high serum cholesterol  -     LDL cholesterol, direct; Future  -     Triglycerides; Future    Chronic pain syndrome    Screening for diabetes mellitus (DM)  -     Hemoglobin A1C; Future    Other orders  -     aspirin (ECOTRIN LOW STRENGTH) 81 mg EC tablet; Take 81 mg by mouth daily        No problem-specific Assessment & Plan notes found for this encounter  A/P: Doing better overall  BP borderline, but OP are good and reportedly has some pain currently  Will hold on adjusting the meds  Will check labs  Continue current treatment and keep f/u with ortho and PT  RTC four months for routine and will need vaccines  Subjective:      Patient ID: Mumtaz Buckley is a 61 y o  female  WF RTC for f/u htn, borderline cholesterol, etc  Doing better and is s/p left hip sx  Attending PT  Some pain yet  Activity is improving  RAD is better with the addition of the LAMA  Reports OP BP are good  No new c/o's   Still with pain in the right knee and wrist and needs sx as well  ZACK/MDD is stable  Due for labs  The following portions of the patient's history were reviewed and updated as appropriate:   She has a past medical history of Arthritis, Essential hypertension (8/7/2019), ZACK (generalized anxiety disorder) (12/11/2018), Moderate persistent asthma without complication (46/29/5311), and Pulmonary emphysema ([address] ) (10/23/2018)  ,  does not have any pertinent problems on file  ,   has a past surgical history that includes Facial cosmetic surgery; Colonoscopy; and Dental surgery  ,  family history includes Aneurysm in her mother; No Known Problems in her brother  ,   reports that she has never smoked  She has never used smokeless tobacco  She reports that she does not drink alcohol or use drugs  ,  is allergic to nsaids; celebrex [celecoxib]; fosamax [alendronate]; ibuprofen; and influenza vaccines     Current Outpatient Medications   Medication Sig Dispense Refill    albuterol (VENTOLIN HFA) 90 mcg/act inhaler Inhale 2 puffs every 6 (six) hours as needed for wheezing 18 g 5    amLODIPine (NORVASC) 5 mg tablet take 1 tablet by mouth once daily 90 tablet 0    aspirin (ECOTRIN LOW STRENGTH) 81 mg EC tablet Take 81 mg by mouth daily      Calcium Carbonate-Vitamin D (OSCAL 500/200 D-3 PO) Take by mouth 3 (three) times a day        Cholecalciferol (VITAMIN D3) 03703 units TABS Take by mouth daily        cyanocobalamin (VITAMIN B-12) 1,000 mcg tablet Take by mouth daily      cyclobenzaprine (FLEXERIL) 10 mg tablet Take 1 tablet (10 mg total) by mouth 3 (three) times a day as needed for muscle spasms 90 tablet 1    lisinopril (ZESTRIL) 10 mg tablet Take 1 tablet (10 mg total) by mouth daily 90 tablet 3    mometasone-formoterol (DULERA) 100-5 MCG/ACT inhaler Inhale 2 puffs 2 (two) times a day Rinse mouth after use   1 Inhaler 5    nystatin (MYCOSTATIN) 500,000 units/5 mL suspension Apply 5 mL (500,000 Units total) to the mouth or throat 4 (four) times a day 473 mL 0    pyridoxine (VITAMIN B6) 100 mg tablet Take 100 mg by mouth daily      QUEtiapine (SEROquel XR) 50 mg Take 1 tablet (50 mg total) by mouth daily at bedtime 30 tablet 1    traMADol (ULTRAM) 50 mg tablet Take 1 tablet (50 mg total) by mouth every 6 (six) hours as needed for moderate pain 30 tablet 0     No current facility-administered medications for this visit  Review of Systems   Constitutional: Positive for activity change  Negative for chills, diaphoresis, fatigue and fever  HENT: Negative  Eyes: Negative for visual disturbance  Respiratory: Negative for cough, chest tightness, shortness of breath and wheezing  Cardiovascular: Negative for chest pain, palpitations and leg swelling  Gastrointestinal: Negative for abdominal pain, constipation, diarrhea, nausea and vomiting  Endocrine: Negative for cold intolerance and heat intolerance  Genitourinary: Negative for difficulty urinating, dysuria and frequency  Musculoskeletal: Positive for arthralgias and gait problem  Negative for myalgias  Neurological: Negative for dizziness, seizures, syncope, weakness, light-headedness, numbness and headaches  Psychiatric/Behavioral: Positive for dysphoric mood  Negative for confusion and sleep disturbance  The patient is nervous/anxious  Objective:  Vitals:    08/07/19 1049   BP: 140/78   BP Location: Left arm   Patient Position: Sitting   Cuff Size: Adult   Pulse: 85   Resp: 18   Temp: 98 3 °F (36 8 °C)   TempSrc: Tympanic   SpO2: 98%   Weight: 46 7 kg (103 lb)   Height: 5' 2" (1 575 m)     Body mass index is 18 84 kg/m²  Physical Exam   Constitutional: She is oriented to person, place, and time  She appears well-developed and well-nourished  No distress  HENT:   Head: Normocephalic and atraumatic  Mouth/Throat: Oropharynx is clear and moist    Eyes: Pupils are equal, round, and reactive to light   Conjunctivae and EOM are normal    Neck: Normal range of motion  Neck supple  No JVD present  Cardiovascular: Normal rate, regular rhythm and normal heart sounds  Pulmonary/Chest: Effort normal and breath sounds normal  No respiratory distress  She has no wheezes  She has no rales  Abdominal: Soft  Bowel sounds are normal  She exhibits no distension  There is no tenderness  Musculoskeletal: Normal range of motion  She exhibits no edema, tenderness or deformity  Neurological: She is alert and oriented to person, place, and time  Psychiatric: She has a normal mood and affect  Her behavior is normal  Judgment and thought content normal    Nursing note and vitals reviewed

## 2019-08-08 ENCOUNTER — APPOINTMENT (OUTPATIENT)
Dept: LAB | Facility: CLINIC | Age: 64
End: 2019-08-08
Payer: COMMERCIAL

## 2019-08-08 DIAGNOSIS — Z13.1 SCREENING FOR DIABETES MELLITUS (DM): ICD-10-CM

## 2019-08-08 DIAGNOSIS — R73.9 ELEVATED BLOOD SUGAR: ICD-10-CM

## 2019-08-08 DIAGNOSIS — E78.9 BORDERLINE HIGH SERUM CHOLESTEROL: ICD-10-CM

## 2019-08-08 DIAGNOSIS — I10 ESSENTIAL HYPERTENSION: ICD-10-CM

## 2019-08-08 LAB
ALBUMIN SERPL BCP-MCNC: 3.8 G/DL (ref 3.5–5)
ALP SERPL-CCNC: 69 U/L (ref 46–116)
ALT SERPL W P-5'-P-CCNC: 18 U/L (ref 12–78)
ANION GAP SERPL CALCULATED.3IONS-SCNC: 4 MMOL/L (ref 4–13)
AST SERPL W P-5'-P-CCNC: 16 U/L (ref 5–45)
BILIRUB SERPL-MCNC: 0.19 MG/DL (ref 0.2–1)
BUN SERPL-MCNC: 22 MG/DL (ref 5–25)
CALCIUM SERPL-MCNC: 9.7 MG/DL (ref 8.3–10.1)
CHLORIDE SERPL-SCNC: 107 MMOL/L (ref 100–108)
CO2 SERPL-SCNC: 27 MMOL/L (ref 21–32)
CREAT SERPL-MCNC: 1.51 MG/DL (ref 0.6–1.3)
EST. AVERAGE GLUCOSE BLD GHB EST-MCNC: 103 MG/DL
GFR SERPL CREATININE-BSD FRML MDRD: 37 ML/MIN/1.73SQ M
GLUCOSE P FAST SERPL-MCNC: 86 MG/DL (ref 65–99)
HBA1C MFR BLD: 5.2 % (ref 4.2–6.3)
LDLC SERPL DIRECT ASSAY-MCNC: 91 MG/DL (ref 0–100)
POTASSIUM SERPL-SCNC: 4.3 MMOL/L (ref 3.5–5.3)
PROT SERPL-MCNC: 7.2 G/DL (ref 6.4–8.2)
SODIUM SERPL-SCNC: 138 MMOL/L (ref 136–145)
TRIGL SERPL-MCNC: 46 MG/DL

## 2019-08-08 PROCEDURE — 83036 HEMOGLOBIN GLYCOSYLATED A1C: CPT

## 2019-08-08 PROCEDURE — 84478 ASSAY OF TRIGLYCERIDES: CPT

## 2019-08-08 PROCEDURE — 36415 COLL VENOUS BLD VENIPUNCTURE: CPT

## 2019-08-08 PROCEDURE — 83721 ASSAY OF BLOOD LIPOPROTEIN: CPT

## 2019-08-08 PROCEDURE — 80053 COMPREHEN METABOLIC PANEL: CPT

## 2019-08-21 ENCOUNTER — TELEPHONE (OUTPATIENT)
Dept: INTERNAL MEDICINE CLINIC | Facility: CLINIC | Age: 64
End: 2019-08-21

## 2019-08-21 DIAGNOSIS — I10 ESSENTIAL HYPERTENSION: Primary | ICD-10-CM

## 2019-08-21 NOTE — TELEPHONE ENCOUNTER
Pt called office stating she is to repeat blood work for kidney function  I do not see any open labs in chart, do you want her to repeat these labs? If so, could you please put labs in and we can call her back and let her know   Thanks

## 2019-08-22 ENCOUNTER — APPOINTMENT (OUTPATIENT)
Dept: LAB | Facility: CLINIC | Age: 64
End: 2019-08-22
Payer: COMMERCIAL

## 2019-08-22 DIAGNOSIS — I10 ESSENTIAL HYPERTENSION: ICD-10-CM

## 2019-08-22 LAB
ANION GAP SERPL CALCULATED.3IONS-SCNC: 8 MMOL/L (ref 4–13)
BUN SERPL-MCNC: 22 MG/DL (ref 5–25)
CALCIUM SERPL-MCNC: 10 MG/DL (ref 8.3–10.1)
CHLORIDE SERPL-SCNC: 107 MMOL/L (ref 100–108)
CO2 SERPL-SCNC: 26 MMOL/L (ref 21–32)
CREAT SERPL-MCNC: 1.31 MG/DL (ref 0.6–1.3)
GFR SERPL CREATININE-BSD FRML MDRD: 43 ML/MIN/1.73SQ M
GLUCOSE SERPL-MCNC: 82 MG/DL (ref 65–140)
POTASSIUM SERPL-SCNC: 4.4 MMOL/L (ref 3.5–5.3)
SODIUM SERPL-SCNC: 141 MMOL/L (ref 136–145)

## 2019-08-22 PROCEDURE — 80048 BASIC METABOLIC PNL TOTAL CA: CPT

## 2019-08-22 PROCEDURE — 36415 COLL VENOUS BLD VENIPUNCTURE: CPT

## 2019-09-13 DIAGNOSIS — J45.909 ASTHMA, UNSPECIFIED ASTHMA SEVERITY, UNSPECIFIED WHETHER COMPLICATED, UNSPECIFIED WHETHER PERSISTENT: ICD-10-CM

## 2019-09-13 RX ORDER — ALBUTEROL SULFATE 90 UG/1
2 AEROSOL, METERED RESPIRATORY (INHALATION) EVERY 6 HOURS PRN
Qty: 18 G | Refills: 5 | Status: SHIPPED | OUTPATIENT
Start: 2019-09-13 | End: 2020-07-08 | Stop reason: SDUPTHER

## 2019-10-01 DIAGNOSIS — J30.89 ENVIRONMENTAL AND SEASONAL ALLERGIES: ICD-10-CM

## 2019-10-01 DIAGNOSIS — J45.909 ASTHMA, UNSPECIFIED ASTHMA SEVERITY, UNSPECIFIED WHETHER COMPLICATED, UNSPECIFIED WHETHER PERSISTENT: ICD-10-CM

## 2019-10-01 DIAGNOSIS — J43.9 PULMONARY EMPHYSEMA, UNSPECIFIED EMPHYSEMA TYPE (HCC): ICD-10-CM

## 2019-10-23 ENCOUNTER — TELEPHONE (OUTPATIENT)
Dept: OBGYN CLINIC | Facility: HOSPITAL | Age: 64
End: 2019-10-23

## 2019-10-23 NOTE — TELEPHONE ENCOUNTER
Pt is calling for a 2nd opinion after having a hip replacement back in June of 2019 she would like to see Dr Jose Menon for another opinion  Pt will bring in surgery notes and most recent xray for Dr Jose Menon to review

## 2019-10-24 ENCOUNTER — OFFICE VISIT (OUTPATIENT)
Dept: INTERNAL MEDICINE CLINIC | Facility: CLINIC | Age: 64
End: 2019-10-24
Payer: COMMERCIAL

## 2019-10-24 ENCOUNTER — APPOINTMENT (OUTPATIENT)
Dept: LAB | Facility: CLINIC | Age: 64
End: 2019-10-24
Payer: COMMERCIAL

## 2019-10-24 ENCOUNTER — APPOINTMENT (OUTPATIENT)
Dept: RADIOLOGY | Facility: CLINIC | Age: 64
End: 2019-10-24
Payer: COMMERCIAL

## 2019-10-24 VITALS
WEIGHT: 94.6 LBS | TEMPERATURE: 98 F | RESPIRATION RATE: 16 BRPM | DIASTOLIC BLOOD PRESSURE: 82 MMHG | SYSTOLIC BLOOD PRESSURE: 116 MMHG | BODY MASS INDEX: 17.41 KG/M2 | OXYGEN SATURATION: 100 % | HEIGHT: 62 IN | HEART RATE: 84 BPM

## 2019-10-24 DIAGNOSIS — M79.652 LEFT THIGH PAIN: ICD-10-CM

## 2019-10-24 DIAGNOSIS — R63.4 WEIGHT LOSS: ICD-10-CM

## 2019-10-24 DIAGNOSIS — F41.8 ANXIETY WITH DEPRESSION: ICD-10-CM

## 2019-10-24 DIAGNOSIS — R10.84 GENERALIZED ABDOMINAL PAIN: ICD-10-CM

## 2019-10-24 DIAGNOSIS — J02.9 SORE THROAT: ICD-10-CM

## 2019-10-24 DIAGNOSIS — G89.29 CHRONIC NONINTRACTABLE HEADACHE, UNSPECIFIED HEADACHE TYPE: ICD-10-CM

## 2019-10-24 DIAGNOSIS — F32.0 MILD MAJOR DEPRESSION, SINGLE EPISODE (HCC): ICD-10-CM

## 2019-10-24 DIAGNOSIS — R63.4 WEIGHT LOSS: Primary | ICD-10-CM

## 2019-10-24 DIAGNOSIS — R11.0 NAUSEA: ICD-10-CM

## 2019-10-24 DIAGNOSIS — R51.9 CHRONIC NONINTRACTABLE HEADACHE, UNSPECIFIED HEADACHE TYPE: ICD-10-CM

## 2019-10-24 DIAGNOSIS — Z13.31 POSITIVE DEPRESSION SCREENING: ICD-10-CM

## 2019-10-24 LAB
ALBUMIN SERPL BCP-MCNC: 4.3 G/DL (ref 3.5–5)
ALP SERPL-CCNC: 58 U/L (ref 46–116)
ALT SERPL W P-5'-P-CCNC: 16 U/L (ref 12–78)
AMYLASE SERPL-CCNC: 80 IU/L (ref 25–115)
ANION GAP SERPL CALCULATED.3IONS-SCNC: 3 MMOL/L (ref 4–13)
AST SERPL W P-5'-P-CCNC: 15 U/L (ref 5–45)
BACTERIA UR QL AUTO: NORMAL /HPF
BILIRUB SERPL-MCNC: 0.32 MG/DL (ref 0.2–1)
BILIRUB UR QL STRIP: NEGATIVE
BUN SERPL-MCNC: 38 MG/DL (ref 5–25)
CALCIUM SERPL-MCNC: 9.6 MG/DL (ref 8.3–10.1)
CHLORIDE SERPL-SCNC: 105 MMOL/L (ref 100–108)
CLARITY UR: CLEAR
CO2 SERPL-SCNC: 30 MMOL/L (ref 21–32)
COLOR UR: YELLOW
CREAT SERPL-MCNC: 1.81 MG/DL (ref 0.6–1.3)
EST. AVERAGE GLUCOSE BLD GHB EST-MCNC: 131 MG/DL
GFR SERPL CREATININE-BSD FRML MDRD: 29 ML/MIN/1.73SQ M
GLUCOSE SERPL-MCNC: 84 MG/DL (ref 65–140)
GLUCOSE UR STRIP-MCNC: NEGATIVE MG/DL
HBA1C MFR BLD: 6.2 % (ref 4.2–6.3)
HGB UR QL STRIP.AUTO: ABNORMAL
HYALINE CASTS #/AREA URNS LPF: NORMAL /LPF
KETONES UR STRIP-MCNC: NEGATIVE MG/DL
LEUKOCYTE ESTERASE UR QL STRIP: NEGATIVE
LIPASE SERPL-CCNC: 162 U/L (ref 73–393)
NITRITE UR QL STRIP: NEGATIVE
NON-SQ EPI CELLS URNS QL MICRO: NORMAL /HPF
PH UR STRIP.AUTO: 6 [PH]
POTASSIUM SERPL-SCNC: 4.3 MMOL/L (ref 3.5–5.3)
PROT SERPL-MCNC: 7 G/DL (ref 6.4–8.2)
PROT UR STRIP-MCNC: NEGATIVE MG/DL
RBC #/AREA URNS AUTO: NORMAL /HPF
SODIUM SERPL-SCNC: 138 MMOL/L (ref 136–145)
SP GR UR STRIP.AUTO: 1.02 (ref 1–1.03)
TSH SERPL DL<=0.05 MIU/L-ACNC: 1.03 UIU/ML (ref 0.36–3.74)
UROBILINOGEN UR QL STRIP.AUTO: 0.2 E.U./DL
WBC #/AREA URNS AUTO: NORMAL /HPF

## 2019-10-24 PROCEDURE — 81001 URINALYSIS AUTO W/SCOPE: CPT | Performed by: INTERNAL MEDICINE

## 2019-10-24 PROCEDURE — 71046 X-RAY EXAM CHEST 2 VIEWS: CPT

## 2019-10-24 PROCEDURE — 74019 RADEX ABDOMEN 2 VIEWS: CPT

## 2019-10-24 PROCEDURE — 84443 ASSAY THYROID STIM HORMONE: CPT

## 2019-10-24 PROCEDURE — 73552 X-RAY EXAM OF FEMUR 2/>: CPT

## 2019-10-24 PROCEDURE — 86430 RHEUMATOID FACTOR TEST QUAL: CPT

## 2019-10-24 PROCEDURE — 36415 COLL VENOUS BLD VENIPUNCTURE: CPT

## 2019-10-24 PROCEDURE — 83036 HEMOGLOBIN GLYCOSYLATED A1C: CPT

## 2019-10-24 PROCEDURE — 99214 OFFICE O/P EST MOD 30 MIN: CPT | Performed by: INTERNAL MEDICINE

## 2019-10-24 PROCEDURE — 80053 COMPREHEN METABOLIC PANEL: CPT

## 2019-10-24 PROCEDURE — 86038 ANTINUCLEAR ANTIBODIES: CPT

## 2019-10-24 PROCEDURE — 82150 ASSAY OF AMYLASE: CPT

## 2019-10-24 PROCEDURE — 83690 ASSAY OF LIPASE: CPT

## 2019-10-24 PROCEDURE — 86618 LYME DISEASE ANTIBODY: CPT

## 2019-10-24 RX ORDER — FAMOTIDINE 20 MG/1
20 TABLET, FILM COATED ORAL 2 TIMES DAILY
Qty: 180 TABLET | Refills: 0 | Status: SHIPPED | OUTPATIENT
Start: 2019-10-24 | End: 2020-10-09 | Stop reason: ALTCHOICE

## 2019-10-24 NOTE — PATIENT INSTRUCTIONS
Depression   AMBULATORY CARE:   Depression  is a medical condition that causes feelings of sadness or hopelessness that do not go away  Depression may cause you to lose interest in things you used to enjoy  These feelings may interfere with your daily life  Common symptoms include the following:   · Appetite changes, or weight gain or loss    · Trouble going to sleep or staying asleep, or sleeping too much    · Fatigue or lack of energy    · Feeling restless, irritable, or withdrawn    · Feeling worthless, hopeless, discouraged, or guilty    · Trouble concentrating, remembering things, doing daily tasks, or making decisions    · Thoughts about hurting or killing yourself  Call 911 for any of the following:   · You think about harming yourself or someone else  Contact your healthcare provider if:   · Your symptoms do not improve  · You cannot make it to your next appointment  · You have new symptoms  · You have questions or concerns about your condition or care  Treatment for depression  may include medicine to improve or balance your mood  Therapy may also be used to treat your depression  A therapist will help you learn to cope with your thoughts and feelings  Therapy can be done alone or in a group  It may also be done with family members or a significant other  Self-care:   · Get regular physical activity  Try to exercise for 30 minutes, 3 to 5 days a week  Work with your healthcare provider to develop an exercise plan that you enjoy  Physical activity may improve your symptoms  · Get enough sleep  Create a routine to help you relax before bed  You can listen to music, read, or do yoga  Try to go to bed and wake up at the same time every day  Sleep is important for emotional health  · Eat a variety of healthy foods from all of the food groups  A healthy meal plan is low in fat, salt, and added sugar   Ask your healthcare provider for more information about a meal plan that is right for you  · Do not drink alcohol or use drugs  Alcohol and drugs can make your symptoms worse  Follow up with your healthcare provider as directed: Your healthcare provider will monitor your progress at follow-up visits  He or she will also monitor your medicine if you take antidepressants  Your healthcare provider will ask if the medicine is helping  Tell him or her about any side effects or problems you may have with your medicine  The type or amount of medicine may need to be changed  Write down your questions so you remember to ask them during your visits  © 2017 [employer]  Information is for End User's use only and may not be sold, redistributed or otherwise used for commercial purposes  All illustrations and images included in CareNotes® are the copyrighted property of [employer]  or [employer]  The above information is an  only  It is not intended as medical advice for individual conditions or treatments  Talk to your doctor, nurse or pharmacist before following any medical regimen to see if it is safe and effective for you

## 2019-10-25 DIAGNOSIS — R94.4 ABNORMAL RENAL FUNCTION TEST: Primary | ICD-10-CM

## 2019-10-25 LAB
B BURGDOR IGG+IGM SER-ACNC: <0.91 ISR (ref 0–0.9)
RHEUMATOID FACT SER QL LA: NEGATIVE
RYE IGE QN: NEGATIVE

## 2019-10-28 ENCOUNTER — EVALUATION (OUTPATIENT)
Dept: PHYSICAL THERAPY | Facility: CLINIC | Age: 64
End: 2019-10-28
Payer: COMMERCIAL

## 2019-10-28 DIAGNOSIS — M70.62 TROCHANTERIC BURSITIS OF LEFT HIP: ICD-10-CM

## 2019-10-28 DIAGNOSIS — Z96.642 HISTORY OF TOTAL HIP REPLACEMENT, LEFT: ICD-10-CM

## 2019-10-28 DIAGNOSIS — M16.11 PRIMARY OSTEOARTHRITIS OF RIGHT HIP: Primary | ICD-10-CM

## 2019-10-28 PROCEDURE — 97163 PT EVAL HIGH COMPLEX 45 MIN: CPT

## 2019-10-28 NOTE — PROGRESS NOTES
PT Evaluation     Today's date: 10/28/2019  Patient name: Jaret Joiner  : 1955  MRN: [id number]  Referring provider: Sunil Brock DO  Dx:   Encounter Diagnosis     ICD-10-CM    1  Primary osteoarthritis of right hip M16 11    2  Trochanteric bursitis of left hip M70 62    3  History of total hip replacement, left [id number] [id number]                   Assessment  Assessment details: Jaret Joiner is a 59 y o  female presenting to outpatient physical therapy with diagnosis of Primary osteoarthritis of right hip and   Trochanteric bursitis of left hip  History of total hip replacement, left 19  Patient's current impairments include L hip and thigh pain, impaired soft tissue mobility, reduced L hip range of motion, reduced L hip  strength, reduced postural awareness, and reduced activity tolerance  Patient's present functional limitations include difficulty with ADLs with increased need for assistance, reliance on medication and/or modalities for pain relief, poor tolerance for functional mobility and activity, and difficulty completing [place]  responsibilities  Patient to benefit from skilled outpatient physical therapy 2x/week for 6-8 weeks in order to reduce pain, maximize pain free range of motion, increase strength and stability, and improve functional mobility/functional activity in order to maximize return to prior level of function with reduced limitations  Thank you for your referral   Impairments: abnormal or restricted ROM, activity intolerance, impaired physical strength, pain with function and poor posture   Barriers to therapy: Perceived pain  Understanding of Dx/Px/POC: good   Prognosis: fair    Goals  STGs to be achieved in 4 weeks:  1  Pt to demonstrate reduced subjective pain rating "at worst" by at least 2-3 points from Initial Eval in order to allow for reduced pain with ADLs and improved functional activity tolerance     2  Pt to demonstrate increased AROM of L hip  by at least 5-10 degrees in order to allow for greater ease and independence with ADLs and functional mobility  3  Pt to demonstrate full PROM of L hip in order to maximize joint mobility and function and allow for progression of exercise program and achievement of goals  4  Pt to demonstrate increased MMT of L hip  by at least 1/2-1 grade in order to improve safety and stability with ADLs and functional mobility  LTGs to be achieved in 6-8 weeks:  1  Pt will be I with HEP in order to continue to improve quality of life and independence and reduce risk for re-injury  2  Pt to demonstrate return to painfree amb  without limitations or restrictions  3  Pt to demonstrate improved function as noted by achieving or exceeding predicted score on FOTO outcomes assessment tool  Plan  Patient would benefit from: skilled physical therapy  Planned modality interventions: thermotherapy: hydrocollator packs  Planned therapy interventions: manual therapy, strengthening, stretching, therapeutic exercise, therapeutic activities, gait training and balance  Frequency: 2x week  Duration in weeks: 6  Plan of Care beginning date: 10/28/2019  Plan of Care expiration date: 12/9/2019  Treatment plan discussed with: patient        Subjective Evaluation    History of Present Illness  Date of surgery: 6/14/2019  Mechanism of injury: surgery  Mechanism of injury: Pt underwent L THR 6/14/19 at [facility] [address]  Pt had anterior approach L THR  Pt is having a CTscan of the L hip tomorrow due to ongoing pain  Pt has been attending PT at [facility] since the surgery  Pt states she has had complications after surgery including a swollen face and scratched cornea, kidney ds and R leg injury from the OR table  States RLE was swollen and ecchymotic  Pt is having pain in L groin wrapping around to posterior L hip and down to knee  Dxed with troch bursitis  Pt states doctor wanted to inject the hip but pt refused stating it is too tender            Not a recurrent problem   Quality of life: poor    Pain  Current pain ratin  At best pain ratin  At worst pain rating: 10  Quality: knife-like, sharp and burning  Relieving factors: heat and medications  Aggravating factors: standing, walking and sitting  Progression: worsening    Social Support  Steps to enter house: yes (6)  Stairs in house: no   Lives in: [place]  Lives with: alone    Employment status: not working  Hand dominance: right      Diagnostic Tests  Abnormal x-ray: back, L hip   Abnormal CT scan: tomorrow  Treatments  Previous treatment: physical therapy and medication  Current treatment: medication and physical therapy  Patient Goals  Patient goals for therapy: decreased pain, increased strength and independence with ADLs/IADLs  Patient goal: painfree mobility         Objective     Observations   Left Hip  Positive for incision (well healed anterior L hip )  Additional Observation Details  Leg length discrep L longer by approx 1/4 inch in supine    Tenderness     Left Hip   Tenderness in the greater trochanter  Additional Tenderness Details  Extremely sensitive to touch L gr troch and thigh  (symptom magnification)    Lumbar Screen  Lumbar range of motion within normal limits  Neurological Testing     Sensation     Hip   Left Hip   Diminished: light touch    Reflexes   Left   Patellar (L4): normal (2+)    Right   Patellar (L4): normal (2+)    Active Range of Motion   Left Hip   Flexion: 125 degrees   Abduction: 30 degrees     Right Hip   Flexion: 135 degrees   Abduction: 45 degrees     Strength/Myotome Testing     Left Hip   Planes of Motion   Flexion: 4-  Extension: 4  Abduction: 4+  Adduction: 4+    Right Hip   Planes of Motion   Flexion: 4+  Extension: 4+  Abduction: 4+  Adduction: 4+    Left Knee   Flexion: 4-  Extension: 3+    Right Knee   Flexion: 4+  Extension: 5    Additional Strength Details  Pt puts forth questionable effort for MMT      Ambulation   Weight-Bearing Status Weight-Bearing Status (Left): full weight bearing   Weight-Bearing Status (Right): full weight-bearing    Assistive device used: none (pt states she may need to go back on crutches )    Ambulation: Level Surfaces   Ambulation without assistive device: independent    Ambulation: Stairs   Ascend stairs: independent  Pattern: non-reciprocal  Railings: one rail  Descend stairs: independent  Pattern: non-reciprocal  Railings: one rail    Observational Gait   Gait: antalgic   Right step length within functional limits  Increased right stance time  Decreased walking speed, stride length, left stance time, left swing time, right swing time and left step length     Left foot contact pattern: foot flat  Right foot contact pattern: heel to toe      Flowsheet Rows      Most Recent Value   PT/OT G-Codes   Current Score  28   Projected Score  40             Precautions: symptom magnification,chronic pain syndrome, anxiety    Re-eval Date:12/9/19    Date 10/28       Visit Count 1       FOTO completed       Pain In See IE       Pain Out                Manual  10/28            Ham stretch             Piriformis stretch             ITB stretch                                         Date 10/28            [facility] vs  Nustep             Hip flexor stretch               Heel slides             SAQs             Hip add squeezes              hooklying abd w/TB                Clamshells                90/90 SL abd              SLRs                 Bridges               TR/HR               Mini squats               Step ups fwd/lateral               Leg press                         Modalities  10/28            HP to L thigh/hip

## 2019-10-30 ENCOUNTER — APPOINTMENT (OUTPATIENT)
Dept: LAB | Facility: CLINIC | Age: 64
End: 2019-10-30
Payer: COMMERCIAL

## 2019-10-30 DIAGNOSIS — R94.4 ABNORMAL RENAL FUNCTION TEST: ICD-10-CM

## 2019-10-30 LAB
ANION GAP SERPL CALCULATED.3IONS-SCNC: 5 MMOL/L (ref 4–13)
BUN SERPL-MCNC: 27 MG/DL (ref 5–25)
CALCIUM SERPL-MCNC: 9.6 MG/DL (ref 8.3–10.1)
CHLORIDE SERPL-SCNC: 105 MMOL/L (ref 100–108)
CO2 SERPL-SCNC: 27 MMOL/L (ref 21–32)
CREAT SERPL-MCNC: 1.62 MG/DL (ref 0.6–1.3)
GFR SERPL CREATININE-BSD FRML MDRD: 33 ML/MIN/1.73SQ M
GLUCOSE SERPL-MCNC: 142 MG/DL (ref 65–140)
POTASSIUM SERPL-SCNC: 3.9 MMOL/L (ref 3.5–5.3)
SODIUM SERPL-SCNC: 137 MMOL/L (ref 136–145)

## 2019-10-30 PROCEDURE — 36415 COLL VENOUS BLD VENIPUNCTURE: CPT

## 2019-10-30 PROCEDURE — 80048 BASIC METABOLIC PNL TOTAL CA: CPT

## 2019-11-01 ENCOUNTER — OFFICE VISIT (OUTPATIENT)
Dept: INTERNAL MEDICINE CLINIC | Facility: CLINIC | Age: 64
End: 2019-11-01
Payer: COMMERCIAL

## 2019-11-01 VITALS
SYSTOLIC BLOOD PRESSURE: 140 MMHG | HEART RATE: 85 BPM | HEIGHT: 63 IN | WEIGHT: 95.13 LBS | BODY MASS INDEX: 16.86 KG/M2 | OXYGEN SATURATION: 95 % | TEMPERATURE: 98.7 F | DIASTOLIC BLOOD PRESSURE: 80 MMHG

## 2019-11-01 DIAGNOSIS — Z86.19 HISTORY OF THRUSH: ICD-10-CM

## 2019-11-01 DIAGNOSIS — G89.29 CHRONIC NONINTRACTABLE HEADACHE, UNSPECIFIED HEADACHE TYPE: ICD-10-CM

## 2019-11-01 DIAGNOSIS — R63.4 WEIGHT LOSS: ICD-10-CM

## 2019-11-01 DIAGNOSIS — R31.9 HEMATURIA, UNSPECIFIED TYPE: ICD-10-CM

## 2019-11-01 DIAGNOSIS — R94.4 ABNORMAL RENAL FUNCTION TEST: Primary | ICD-10-CM

## 2019-11-01 DIAGNOSIS — G89.4 CHRONIC PAIN SYNDROME: ICD-10-CM

## 2019-11-01 DIAGNOSIS — F41.8 ANXIETY WITH DEPRESSION: ICD-10-CM

## 2019-11-01 DIAGNOSIS — R11.0 NAUSEA: ICD-10-CM

## 2019-11-01 DIAGNOSIS — R10.84 GENERALIZED ABDOMINAL PAIN: ICD-10-CM

## 2019-11-01 DIAGNOSIS — I10 ESSENTIAL HYPERTENSION: ICD-10-CM

## 2019-11-01 DIAGNOSIS — M79.652 LEFT THIGH PAIN: ICD-10-CM

## 2019-11-01 DIAGNOSIS — R51.9 CHRONIC NONINTRACTABLE HEADACHE, UNSPECIFIED HEADACHE TYPE: ICD-10-CM

## 2019-11-01 PROCEDURE — 3008F BODY MASS INDEX DOCD: CPT | Performed by: INTERNAL MEDICINE

## 2019-11-01 PROCEDURE — 99214 OFFICE O/P EST MOD 30 MIN: CPT | Performed by: INTERNAL MEDICINE

## 2019-11-01 RX ORDER — TRAMADOL HYDROCHLORIDE 50 MG/1
50 TABLET ORAL EVERY 6 HOURS PRN
Qty: 30 TABLET | Refills: 0 | OUTPATIENT
Start: 2019-11-01

## 2019-11-01 RX ORDER — AMLODIPINE BESYLATE 5 MG/1
5 TABLET ORAL DAILY
Qty: 90 TABLET | Refills: 0 | Status: SHIPPED | OUTPATIENT
Start: 2019-11-01 | End: 2020-01-28 | Stop reason: SDUPTHER

## 2019-11-01 RX ORDER — TRAMADOL HYDROCHLORIDE 50 MG/1
50 TABLET ORAL EVERY 6 HOURS PRN
Qty: 30 TABLET | Refills: 0 | Status: SHIPPED | OUTPATIENT
Start: 2019-11-01

## 2019-11-01 RX ORDER — TRAMADOL HYDROCHLORIDE 50 MG/1
50 TABLET ORAL EVERY 6 HOURS PRN
Qty: 30 TABLET | Refills: 0 | Status: SHIPPED | OUTPATIENT
Start: 2019-11-01 | End: 2019-11-01

## 2019-11-01 NOTE — PROGRESS NOTES
Assessment/Plan:  Problem List Items Addressed This Visit        Cardiovascular and Mediastinum    Essential hypertension    Relevant Medications    amLODIPine (NORVASC) 5 mg tablet       Other    Chronic nonintractable headache    Chronic pain syndrome    Relevant Medications    traMADol (ULTRAM) 50 mg tablet      Other Visit Diagnoses     Abnormal renal function test    -  Primary    Relevant Orders    Basic metabolic panel    Weight loss        Relevant Orders    Ambulatory referral to Gastroenterology    Generalized abdominal pain        Relevant Orders    Ambulatory referral to Gastroenterology    Nausea        Relevant Orders    Ambulatory referral to Gastroenterology    Left thigh pain        Anxiety with depression        Hematuria, unspecified type        History of thrush        Relevant Orders    Ambulatory referral to Gastroenterology           Diagnoses and all orders for this visit:    Abnormal renal function test  -     Basic metabolic panel; Future    Weight loss  -     Ambulatory referral to Gastroenterology; Future    Chronic nonintractable headache, unspecified headache type    Generalized abdominal pain  -     Ambulatory referral to Gastroenterology; Future    Nausea  -     Ambulatory referral to Gastroenterology; Future    Left thigh pain    Anxiety with depression    Hematuria, unspecified type    Chronic pain syndrome  -     Discontinue: traMADol (ULTRAM) 50 mg tablet; Take 1 tablet (50 mg total) by mouth every 6 (six) hours as needed for moderate pain  -     traMADol (ULTRAM) 50 mg tablet; Take 1 tablet (50 mg total) by mouth every 6 (six) hours as needed for moderate pain    Essential hypertension  -     amLODIPine (NORVASC) 5 mg tablet; Take 1 tablet (5 mg total) by mouth daily    History of thrush  -     Ambulatory referral to Gastroenterology; Future        No problem-specific Assessment & Plan notes found for this encounter  A/P: Doing poorly emotionally  PE not overly impressive  Discussed labs and xrays  Started PT and will continue  Has an appt with a rehab doctor next week and distant appointment with pain management, but needs her meds refilled  Discussed the renal function  Will continue off the ACEI and increase po fluids  Recheck labs next week and if up, order US and consider referral to nephro  Urine with trace blood and ?need for US or cysto  Unable to get into her GI doctor and will give a referral  If s/s continue, consider abdominal US and ? UGI with SBF  Watch bp, may need additional meds next visit  RTC 10 days for f/u  Subjective:      Patient ID: Lotus Elisa is a 59 y o  female  WF RTC for f/u wt loss, abdominal pain, left thigh pain, etc  Xray was negative  Labs ok except renal function was elevated  Stopped the ACEI and slight improvement in f/u lab  UA with a trace of blood  Pt no better except abdominal pain is less  Did not start the H2 blocker as it is on back order  Pain persists and is having major conflicts with her ortho doctor  The following portions of the patient's history were reviewed and updated as appropriate:   She has a past medical history of Arthritis, Essential hypertension (8/7/2019), ZACK (generalized anxiety disorder) (12/11/2018), Moderate persistent asthma without complication (33/97/0312), and Pulmonary emphysema ([address] ) (10/23/2018)  ,  does not have any pertinent problems on file  ,   has a past surgical history that includes Facial cosmetic surgery; Colonoscopy; Dental surgery; and Hip surgery (Left, 06/2019)  ,  family history includes Aneurysm in her mother; No Known Problems in her brother  ,   reports that she has never smoked  She has never used smokeless tobacco  She reports that she does not drink alcohol or use drugs  ,  is allergic to nsaids; celebrex [celecoxib]; fosamax [alendronate]; ibuprofen; and influenza vaccines     Current Outpatient Medications   Medication Sig Dispense Refill    albuterol (VENTOLIN HFA) 90 mcg/act inhaler Inhale 2 puffs every 6 (six) hours as needed for wheezing 18 g 5    amLODIPine (NORVASC) 5 mg tablet Take 1 tablet (5 mg total) by mouth daily 90 tablet 0    Calcium Carbonate-Vitamin D (OSCAL 500/200 D-3 PO) Take by mouth 3 (three) times a day        Cholecalciferol (VITAMIN D3) 28924 units TABS Take by mouth daily        cyanocobalamin (VITAMIN B-12) 1,000 mcg tablet Take by mouth daily      cyclobenzaprine (FLEXERIL) 10 mg tablet Take 1 tablet (10 mg total) by mouth 3 (three) times a day as needed for muscle spasms 90 tablet 1    famotidine (PEPCID) 20 mg tablet Take 1 tablet (20 mg total) by mouth 2 (two) times a day 180 tablet 0    lisinopril (ZESTRIL) 10 mg tablet Take 1 tablet (10 mg total) by mouth daily 90 tablet 3    mometasone-formoterol (DULERA) 100-5 MCG/ACT inhaler Inhale 2 puffs 2 (two) times a day Rinse mouth after use  1 Inhaler 5    nystatin (MYCOSTATIN) 500,000 units/5 mL suspension Apply 5 mL (500,000 Units total) to the mouth or throat 4 (four) times a day 473 mL 0    pyridoxine (VITAMIN B6) 100 mg tablet Take 100 mg by mouth daily      QUEtiapine (SEROquel XR) 50 mg Take 1 tablet (50 mg total) by mouth daily at bedtime 30 tablet 1    traMADol (ULTRAM) 50 mg tablet Take 1 tablet (50 mg total) by mouth every 6 (six) hours as needed for moderate pain 30 tablet 0     No current facility-administered medications for this visit  Review of Systems   Constitutional: Positive for activity change, appetite change and unexpected weight change  Negative for chills, diaphoresis, fatigue and fever  Respiratory: Negative for cough, chest tightness, shortness of breath and wheezing  Cardiovascular: Negative for chest pain, palpitations and leg swelling  Gastrointestinal: Negative for abdominal pain, constipation, diarrhea, nausea and vomiting  Genitourinary: Negative for difficulty urinating, dysuria and frequency     Musculoskeletal: Positive for arthralgias, back pain, gait problem and myalgias  Negative for joint swelling  Neurological: Negative for seizures, weakness, light-headedness and headaches  Psychiatric/Behavioral: Positive for dysphoric mood  Negative for confusion  The patient is nervous/anxious  PHQ-9 Depression Screening    PHQ-9:    Frequency of the following problems over the past two weeks:             Objective:  Vitals:    11/01/19 1359   BP: 140/80   Pulse: 85   Temp: 98 7 °F (37 1 °C)   SpO2: 95%   Weight: 43 1 kg (95 lb 2 oz)   Height: 5' 3" (1 6 m)     Body mass index is 16 85 kg/m²  Physical Exam   Constitutional: She appears well-developed and well-nourished  She appears distressed  HENT:   Head: Normocephalic and atraumatic  Mouth/Throat: Oropharynx is clear and moist    Eyes: Pupils are equal, round, and reactive to light  Conjunctivae and EOM are normal    Cardiovascular: Normal rate, regular rhythm and normal heart sounds  Pulmonary/Chest: Effort normal and breath sounds normal  No respiratory distress  She has no wheezes  She has no rales  Abdominal: Soft  Bowel sounds are normal    Neurological: She is alert  Psychiatric: She has a normal mood and affect  Her behavior is normal  Judgment and thought content normal    Nursing note and vitals reviewed

## 2019-11-04 ENCOUNTER — OFFICE VISIT (OUTPATIENT)
Dept: PHYSICAL THERAPY | Facility: CLINIC | Age: 64
End: 2019-11-04
Payer: COMMERCIAL

## 2019-11-04 DIAGNOSIS — M16.12 PRIMARY OSTEOARTHRITIS OF LEFT HIP: Primary | ICD-10-CM

## 2019-11-04 DIAGNOSIS — Z96.642 HISTORY OF TOTAL HIP REPLACEMENT, LEFT: ICD-10-CM

## 2019-11-04 DIAGNOSIS — M70.62 TROCHANTERIC BURSITIS OF LEFT HIP: ICD-10-CM

## 2019-11-04 PROCEDURE — 97110 THERAPEUTIC EXERCISES: CPT

## 2019-11-05 ENCOUNTER — OFFICE VISIT (OUTPATIENT)
Dept: PHYSICAL THERAPY | Facility: CLINIC | Age: 64
End: 2019-11-05
Payer: COMMERCIAL

## 2019-11-05 ENCOUNTER — TELEPHONE (OUTPATIENT)
Dept: GASTROENTEROLOGY | Facility: CLINIC | Age: 64
End: 2019-11-05

## 2019-11-05 DIAGNOSIS — M16.12 PRIMARY OSTEOARTHRITIS OF LEFT HIP: Primary | ICD-10-CM

## 2019-11-05 PROCEDURE — 97110 THERAPEUTIC EXERCISES: CPT

## 2019-11-05 PROCEDURE — 97140 MANUAL THERAPY 1/> REGIONS: CPT

## 2019-11-05 NOTE — TELEPHONE ENCOUNTER
Patients GI provider: New pt    Number to return call: [phone]    Reason for call: Pt called to r/s her sched office visit w/Dr Jaclyn Petersen from 11/07/19  Pt would like to be r/s sched w/Dr Jaclyn Petersen before the end of the year      Scheduled procedure/appointment date if applicable: NA

## 2019-11-05 NOTE — PROGRESS NOTES
Daily Note     Today's date: 2019  Patient name: Lotus Pérez  : 1955  MRN: [id number]  Referring provider: Nan Do DO  Dx:   Encounter Diagnosis     ICD-10-CM    1  Primary osteoarthritis of left hip M16 12                   Subjective:  Pt  requests to perform only exercises/activities here in clinic that she cannot perform at home, i e , machines, Aerobic equip, Manual therapy , and MHP applic  Objective: See treatment diary below      Assessment: Tolerated treatment Fair to Fair+ overall with performance and tolerance to ther exer  and manual stretch   Plan: Progress treatment as tolerated  Con't services 2x/week            Precautions: symptom magnification,chronic pain syndrome, anxiety    Re-eval Date:19    Date 10/28 11/4 11 5 19     Visit Count 1 2 3     FOTO completed       Pain In See IE 7 8/10     Pain Out   -6/10             Manual  10/28 11/4 11 5 19     Ham stretch   *L 4x/30"       Piriformis stretch    **    ITB stretch    **    Gastroc/soleus   *L "                 Date 10/28 11/4 11 5 19     [address] vs  [facility]  L1 10' L 1 x 7 min     Hip flexor stretch      **    Heel slides  30 *Pt  requests to perform as HEP Only       SAQs  2# 30x *Pt  requests to perform as HEP Only       Hip add squeezes   [id number] *Pt  requests to perform as HEP Only     hooklying abd w/TB     grn  [id number] *Pt  requests to perform as HEP Only     Clamshells     SL R *Pt  requests to perform as HEP Only     90/90 SL abd         SLRs      2x10 *Pt  requests to perform as HEP Only     Bridges    30x5 sec *Pt  requests to perform as HEP Only     TR/HR          Mini squats          Step ups fwd/lateral          Leg ext mach    11#  30 11# 35x     Leg  curl mach    11#  30 11# 35x     Resisted amb in [place]  4 dir      **            Leg press    60#  30 60# 30               Modalities  10/28  11 5 19     HP to L thigh/hip   Semi-reclined to L hip 10 min

## 2019-11-06 ENCOUNTER — TELEPHONE (OUTPATIENT)
Dept: INTERNAL MEDICINE CLINIC | Facility: CLINIC | Age: 64
End: 2019-11-06

## 2019-11-06 DIAGNOSIS — K08.89 PAIN, DENTAL: Primary | ICD-10-CM

## 2019-11-06 RX ORDER — AMOXICILLIN 500 MG/1
CAPSULE ORAL
Qty: 4 CAPSULE | Refills: 2 | Status: SHIPPED | OUTPATIENT
Start: 2019-11-06 | End: 2019-11-07

## 2019-11-11 ENCOUNTER — OFFICE VISIT (OUTPATIENT)
Dept: PHYSICAL THERAPY | Facility: CLINIC | Age: 64
End: 2019-11-11
Payer: COMMERCIAL

## 2019-11-11 DIAGNOSIS — M16.11 PRIMARY OSTEOARTHRITIS OF RIGHT HIP: ICD-10-CM

## 2019-11-11 DIAGNOSIS — M16.12 PRIMARY OSTEOARTHRITIS OF LEFT HIP: Primary | ICD-10-CM

## 2019-11-11 DIAGNOSIS — M70.62 TROCHANTERIC BURSITIS OF LEFT HIP: ICD-10-CM

## 2019-11-11 DIAGNOSIS — Z96.642 HISTORY OF TOTAL HIP REPLACEMENT, LEFT: ICD-10-CM

## 2019-11-11 PROCEDURE — 97110 THERAPEUTIC EXERCISES: CPT

## 2019-11-11 PROCEDURE — 97140 MANUAL THERAPY 1/> REGIONS: CPT

## 2019-11-11 NOTE — PROGRESS NOTES
Daily Note     Today's date: 2019  Patient name: Ignacia Santana  : 1955  MRN: [id number]  Referring provider: Delaney Huertas DO  Dx:   Encounter Diagnosis     ICD-10-CM    1  Primary osteoarthritis of left hip M16 12    2  Trochanteric bursitis of left hip M70 62    3  History of total hip replacement, left Z96 642    4  Primary osteoarthritis of right hip M16 11                   Subjective: Pt states she has a new Rx for PT for back pain  States pain level is 8/10 L hip  Objective: See treatment diary below      Assessment: Tolerated treatment fair  Patient would benefit from continued PT   Pt demonstrated fair technique with ex  Has very limited tolerance to ex  Pt amb with slight antalgic gt today but improved from last visit  No asst devices for gait  Plan: Continue per plan of care   Will re-eval to add back tx to program      Precautions: symptom magnification,chronic pain syndrome, anxiety    Re-eval Date:19    Date 10/28 11/4 11 5 19 19    Visit Count 1 2 3 4    FOTO completed       Pain In See IE 7 8/10 8    Pain Out   5-6/10 8            Manual  10/28 11/4 11 5 19 19    Ham stretch   *L 4x/30"   B 3x30"    Piriformis stretch        ITB stretch    **B 3x30"    Gastroc/soleus   *L 5/30"                 Date 10/28 11/4 11 5 19 19    [address] [facility] vs  [facility]  L1 10' L 1 x 7 min L1  10'    Hip flexor stretch          Heel slides  30 *Pt  requests to perform as HEP Only       SAQs  2# 30x *Pt  requests to perform as HEP Only       Hip add squeezes   [id number] *Pt  requests to perform as HEP Only     hooklying abd w/TB     grn  [id number] *Pt  requests to perform as HEP Only     Clamshells     SL R *Pt  requests to perform as HEP Only     90/90 SL abd         SLRs      2x10 *Pt  requests to perform as HEP Only     Bridges    30x5 sec *Pt  requests to perform as HEP Only     TR/HR          Wobble board  Side-side  Fwd/back      30 ea    elliptical          Leg ext mach    11#  30 11# 35x 11# 40    Leg  curl mach    11#  30 11# 35x 11#  40    Resisted amb in [place]  4 dir      **3# 5x ea dir    Obstacle course        Leg press    60#  30 60# 30 65# 40              Modalities  10/28  11 5 19 11/11    HP to L thigh/hip   Semi-reclined to L hip 10 min L hip 10'

## 2019-11-14 NOTE — TELEPHONE ENCOUNTER
Trying to get patient records from [facility],   will call pt to schedule an appointment

## 2019-11-14 NOTE — TELEPHONE ENCOUNTER
Called patient to set up appointment, patient does need to get all records for [facility] also with any disk for X-ray's or MRI's along with reports

## 2019-11-15 ENCOUNTER — APPOINTMENT (OUTPATIENT)
Dept: LAB | Facility: CLINIC | Age: 64
End: 2019-11-15
Payer: COMMERCIAL

## 2019-11-15 ENCOUNTER — EVALUATION (OUTPATIENT)
Dept: PHYSICAL THERAPY | Facility: CLINIC | Age: 64
End: 2019-11-15
Payer: COMMERCIAL

## 2019-11-15 DIAGNOSIS — M70.62 TROCHANTERIC BURSITIS OF LEFT HIP: ICD-10-CM

## 2019-11-15 DIAGNOSIS — Z96.642 HISTORY OF TOTAL HIP REPLACEMENT, LEFT: ICD-10-CM

## 2019-11-15 DIAGNOSIS — M16.11 PRIMARY OSTEOARTHRITIS OF RIGHT HIP: ICD-10-CM

## 2019-11-15 DIAGNOSIS — G89.29 CHRONIC BILATERAL LOW BACK PAIN WITHOUT SCIATICA: ICD-10-CM

## 2019-11-15 DIAGNOSIS — R94.4 ABNORMAL RENAL FUNCTION TEST: ICD-10-CM

## 2019-11-15 DIAGNOSIS — M54.50 CHRONIC BILATERAL LOW BACK PAIN WITHOUT SCIATICA: ICD-10-CM

## 2019-11-15 DIAGNOSIS — M16.12 PRIMARY OSTEOARTHRITIS OF LEFT HIP: Primary | ICD-10-CM

## 2019-11-15 LAB
ANION GAP SERPL CALCULATED.3IONS-SCNC: 7 MMOL/L (ref 4–13)
BUN SERPL-MCNC: 27 MG/DL (ref 5–25)
CALCIUM SERPL-MCNC: 9.4 MG/DL (ref 8.3–10.1)
CHLORIDE SERPL-SCNC: 109 MMOL/L (ref 100–108)
CO2 SERPL-SCNC: 28 MMOL/L (ref 21–32)
CREAT SERPL-MCNC: 1.01 MG/DL (ref 0.6–1.3)
GFR SERPL CREATININE-BSD FRML MDRD: 59 ML/MIN/1.73SQ M
GLUCOSE P FAST SERPL-MCNC: 77 MG/DL (ref 65–99)
POTASSIUM SERPL-SCNC: 3.8 MMOL/L (ref 3.5–5.3)
SODIUM SERPL-SCNC: 144 MMOL/L (ref 136–145)

## 2019-11-15 PROCEDURE — 97110 THERAPEUTIC EXERCISES: CPT

## 2019-11-15 PROCEDURE — 80048 BASIC METABOLIC PNL TOTAL CA: CPT

## 2019-11-15 PROCEDURE — 36415 COLL VENOUS BLD VENIPUNCTURE: CPT

## 2019-11-15 PROCEDURE — 95851 RANGE OF MOTION MEASUREMENTS: CPT

## 2019-11-15 NOTE — PROGRESS NOTES
PT Evaluation     Today's date: 11/15/2019  Patient name: Eileen Hunter  : 1955  MRN: [id number]  Referring provider: Sean Richey DO  Dx:   Encounter Diagnosis     ICD-10-CM    1  Primary osteoarthritis of left hip M16 12    2  Trochanteric bursitis of left hip M70 62    3  History of total hip replacement, left Z96 642    4  Primary osteoarthritis of right hip M16 11    5  Chronic bilateral low back pain without sciatica M54 5     G89 29                   Assessment  Assessment details: Eileen Hunter is a 59 y o  female presenting to outpatient physical therapy with diagnosis of Primary osteoarthritis of right hip and   Trochanteric bursitis of left hip  History of total hip replacement, left 19  Patient's current impairments include L hip and thigh pain, impaired soft tissue mobility, reduced L hip range of motion, reduced L hip  strength, reduced postural awareness, and reduced activity tolerance  Patient's present functional limitations include difficulty with ADLs with increased need for assistance, reliance on medication and/or modalities for pain relief, poor tolerance for functional mobility and activity, and difficulty completing [place]  responsibilities  Patient to benefit from skilled outpatient physical therapy 2x/week for 6-8 weeks in order to reduce pain, maximize pain free range of motion, increase strength and stability, and improve functional mobility/functional activity in order to maximize return to prior level of function with reduced limitations  Thank you for your referral     11/15/19 Re-eval for LBP  Pt has long hx of B LBP w/o radic sx  Pt reports diff standing,walking,cleaning,bending and performing self care due to same  Has functional limitations as listed above  Plan to add back stretching and strengthening ex to current program and cont with current goals    Impairments: abnormal or restricted ROM, activity intolerance, impaired physical strength, pain with function and poor posture   Barriers to therapy: Perceived pain ex to current program   Understanding of Dx/Px/POC: good   Prognosis: fair    Goals  STGs to be achieved in 4 weeks:  1  Pt to demonstrate reduced subjective pain rating "at worst" by at least 2-3 points from Initial Eval in order to allow for reduced pain with ADLs and improved functional activity tolerance  2  Pt to demonstrate increased AROM of L hip  by at least 5-10 degrees in order to allow for greater ease and independence with ADLs and functional mobility  3  Pt to demonstrate full PROM of L hip in order to maximize joint mobility and function and allow for progression of exercise program and achievement of goals  4  Pt to demonstrate increased MMT of L hip  by at least 1/2-1 grade in order to improve safety and stability with ADLs and functional mobility  LTGs to be achieved in 6-8 weeks:  1  Pt will be I with HEP in order to continue to improve quality of life and independence and reduce risk for re-injury  2  Pt to demonstrate return to painfree amb  without limitations or restrictions  3  Pt to demonstrate improved function as noted by achieving or exceeding predicted score on FOTO outcomes assessment tool  Plan  Patient would benefit from: skilled physical therapy  Planned modality interventions: thermotherapy: hydrocollator packs  Planned therapy interventions: manual therapy, strengthening, stretching, therapeutic exercise, therapeutic activities, gait training and balance  Frequency: 2x week  Duration in weeks: 6  Plan of Care beginning date: 11/15/2019  Plan of Care expiration date: 12/9/2019  Treatment plan discussed with: patient        Subjective Evaluation    History of Present Illness  Date of surgery: 6/14/2019  Mechanism of injury: surgery  Mechanism of injury: Pt underwent L THR 6/14/19 at [facility] [employer]  Pt had anterior approach L THR  Pt is having a CTscan of the L hip tomorrow due to ongoing pain   Pt has been attending PT at [facility] since the surgery  Pt states she has had complications after surgery including a swollen face and scratched cornea, kidney ds and R leg injury from the OR table  States RLE was swollen and ecchymotic  Pt is having pain in L groin wrapping around to posterior L hip and down to knee  Dxed with troch bursitis  Pt states doctor wanted to inject the hip but pt refused stating it is too tender  11/15/19 LBP eval;: Pt states she has near constant pain in her low back and she feels she has to stretch it  Sleeping is difficult as is bending  Pain w/rotation, tieing shoes, lifting objects, showering, cleaning, vacuuming  Recurrent probem    Quality of life: poor    Pain  Current pain ratin  At best pain ratin  At worst pain ratin  Location: B LBP L>R  Quality: dull ache  Relieving factors: heat and medications (stretching)  Aggravating factors: standing, walking and lifting (bending)  Progression: worsening    Social Support  Steps to enter house: yes (6)  Stairs in house: no   Lives in: [place] house  Lives with: alone    Employment status: not working  Hand dominance: right      Diagnostic Tests  Abnormal x-ray: back, L hip   Abnormal CT scan: tomorrow  Treatments  Previous treatment: medication and chiropractic  Current treatment: massage, medication and physical therapy  Patient Goals  Patient goals for therapy: decreased pain, increased strength, independence with ADLs/IADLs and increased motion  Patient goal: painfree mobility         Objective     Concurrent Complaints  Positive for disturbed sleep, kidney problem and stomach problem  Postural Observations  Seated posture: good  Correction of posture: makes symptoms better    Additional Postural Observation Details  Decreased thoracic kyphosis and decreased lumbar curve    Observations   Left Hip  Positive for incision (well healed anterior L hip )       Additional Observation Details  Leg length discrep L longer by approx 1/4 inch in supine    Palpation   Left   Tenderness of the erector spinae, iliacus, iliopsoas, lumbar interspinals, lumbar paraspinals and quadratus lumborum  Right   Tenderness of the erector spinae  Tenderness     Left Hip   Tenderness in the greater trochanter  Additional Tenderness Details  Extremely sensitive to touch L gr troch and thigh  (symptom magnification)    Lumbar Screen  Lumbar range of motion within normal limits  Neurological Testing     Sensation     Lumbar   Left   Diminished: light touch    Right   Intact: light touch    Comments   Left light touch: L thigh, lat thigh, glut    Hip   Left Hip   Diminished: light touch    Reflexes   Left   Patellar (L4): normal (2+)    Right   Patellar (L4): normal (2+)    Active Range of Motion     Lumbar   Flexion:  WFL  Extension:  Restriction level: moderate  Left lateral flexion:  WFL Restriction level: minimal  Right lateral flexion:  WFL Restriction level: minimal  Left rotation:  WFL Restriction level: minimal  Right rotation:  WFL Restriction level: minimal  Left Hip   Flexion: 125 degrees   Abduction: 30 degrees     Right Hip   Flexion: 135 degrees   Abduction: 45 degrees   Mechanical Assessment    Cervical      Thoracic      Lumbar    Standing flexion: repeated movements   Pain location:no change  Pain intensity: worse  Pain level: increased  Standing extension: repeated movements  Pain location: no change  Pain intensity: worse  Pain level: increased    Strength/Myotome Testing     Left Hip   Planes of Motion   Flexion: 4-  Extension: 4  Abduction: 4+  Adduction: 4+    Right Hip   Planes of Motion   Flexion: 4+  Extension: 4+  Abduction: 4+  Adduction: 4+    Left Knee   Flexion: 4-  Extension: 3+    Right Knee   Flexion: 4+  Extension: 5    Additional Strength Details  Pt puts forth questionable effort for MMT      Tests     Lumbar     Left   Negative slump test      Right   Negative slump test      Left Pelvic Girdle/Sacrum Negative: active SLR test      Right Pelvic Girdle/Sacrum   Negative: active SLR test      Left Hip   Negative long sit  Right Hip   Negative long sit  Ambulation   Weight-Bearing Status   Weight-Bearing Status (Left): full weight bearing   Weight-Bearing Status (Right): full weight-bearing    Assistive device used: none (pt states she may need to go back on crutches )    Ambulation: Level Surfaces   Ambulation without assistive device: independent    Ambulation: Stairs   Ascend stairs: independent  Pattern: non-reciprocal  Railings: one rail  Descend stairs: independent  Pattern: non-reciprocal  Railings: one rail    Observational Gait   Gait: antalgic   Right step length within functional limits  Increased right stance time  Decreased walking speed, stride length, left stance time, left swing time, right swing time and left step length  Left foot contact pattern: foot flat  Right foot contact pattern: heel to toe    General Comments:    Lower quarter screen   Knees: unremarkable  Foot/ankle: unremarkable    Hip Comments   Recent L anterior approach hip repl   6/14/19             Precautions: symptom magnification,chronic pain syndrome, anxiety    Re-eval Date:12/9/19        Date 10/28 11/4 11 5 19 11/11/19 11/15   Visit Count 1 2 3 4 5   FOTO completed       Pain In See IE 7 8/10 8 8   Pain Out   5-6/10 8 8           Manual  10/28 11/4 11 5 19 11/11/19 11/15   Ham stretch   *L 4x/30"   B 3x30" B 3x30"   Piriformis stretch        ITB stretch    **B 3x30" B 3x30"   Gastroc/soleus   *L 5/30"                 Date 10/28 11/4 11 5 19 11/11/19 11/15   [facility] vs  [place]  L1 10' L 1 x 7 min L1  10'    Hip flexor stretch          Heel slides  30 *Pt  requests to perform as HEP Only       SAQs  2# 30x *Pt  requests to perform as HEP Only       Hip add squeezes   [id number] *Pt  requests to perform as HEP Only     hooklying abd w/TB     grn  [id number] *Pt  requests to perform as HEP Only     Clamshells     SL R *Pt  requests to perform as HEP Only     90/90 SL abd         SLRs      2x10 *Pt  requests to perform as HEP Only     Bridges    30x5 sec *Pt  requests to perform as HEP Only     TR/HR          Wobble board  Side-side  Fwd/back      30 ea    elliptical          Leg ext mach    11#  30 11# 35x 11# 40    Leg  curl mach    11#  30 11# 35x 11#  40    Resisted amb in [place]  4 dir      **3# 5x ea dir    Obstacle course        Leg press    60#  30 60# 30 65# 40              Modalities  10/28  11 5 19 11/11 11/15   HP to L thigh/hip   Semi-reclined to L hip 10 min L hip 10'

## 2019-11-15 NOTE — LETTER
2019    Samantha Ferrara [address] [place] [address] [id number] [address]    Patient: Cas Wu   YOB: 1955   Date of Visit: 11/15/2019     Encounter Diagnosis     ICD-10-CM    1  Primary osteoarthritis of left hip M16 12    2  Trochanteric bursitis of left hip M70 62    3  History of total hip replacement, left Z96 642    4  Primary osteoarthritis of right hip M16 11    5  Chronic bilateral low back pain without sciatica M54 5     G89 29        Dear Dr Mihaela Simon: Thank you for your recent referral of Cas Wu  Please review the attached evaluation summary from Lay's recent visit  Please verify that you agree with the plan of care by signing the attached order  If you have any questions or concerns, please do not hesitate to call  I sincerely appreciate the opportunity to share in the care of one of your patients and hope to have another opportunity to work with you in the near future  Sincerely,    Pooja Whitley, PT      Referring Provider:      I certify that I have read the below Plan of Care and certify the need for these services furnished under this plan of treatment while under my care  Samantha Ferrara [address] [place] [address] [id number]  VIA Facsimile: [phone]          PT Evaluation     Today's date: 11/15/2019  Patient name: Cas Wu  : 1955  MRN: [id number]  Referring provider: Lina Estrada DO  Dx:   Encounter Diagnosis     ICD-10-CM    1  Primary osteoarthritis of left hip M16 12    2  Trochanteric bursitis of left hip M70 62    3  History of total hip replacement, left Z96 642    4  Primary osteoarthritis of right hip M16 11    5   Chronic bilateral low back pain without sciatica M54 5     G89 29                   Assessment  Assessment details: Cas Wu is a 59 y o  female presenting to outpatient physical therapy with diagnosis of Primary osteoarthritis of right hip and   Trochanteric bursitis of left hip  History of total hip replacement, left 6/14/19  Patient's current impairments include L hip and thigh pain, impaired soft tissue mobility, reduced L hip range of motion, reduced L hip  strength, reduced postural awareness, and reduced activity tolerance  Patient's present functional limitations include difficulty with ADLs with increased need for assistance, reliance on medication and/or modalities for pain relief, poor tolerance for functional mobility and activity, and difficulty completing [facility]  responsibilities  Patient to benefit from skilled outpatient physical therapy 2x/week for 6-8 weeks in order to reduce pain, maximize pain free range of motion, increase strength and stability, and improve functional mobility/functional activity in order to maximize return to prior level of function with reduced limitations  Thank you for your referral     11/15/19 Re-eval for LBP  Pt has long hx of B LBP w/o radic sx  Pt reports diff standing,walking,cleaning,bending and performing self care due to same  Has functional limitations as listed above  Plan to add back stretching and strengthening ex to current program and cont with current goals  Impairments: abnormal or restricted ROM, activity intolerance, impaired physical strength, pain with function and poor posture   Barriers to therapy: Perceived pain ex to current program   Understanding of Dx/Px/POC: good   Prognosis: fair    Goals  STGs to be achieved in 4 weeks:  1  Pt to demonstrate reduced subjective pain rating "at worst" by at least 2-3 points from Initial Eval in order to allow for reduced pain with ADLs and improved functional activity tolerance  2  Pt to demonstrate increased AROM of L hip  by at least 5-10 degrees in order to allow for greater ease and independence with ADLs and functional mobility     3  Pt to demonstrate full PROM of L hip in order to maximize joint mobility and function and allow for progression of exercise program and achievement of goals  4  Pt to demonstrate increased MMT of L hip  by at least 1/2-1 grade in order to improve safety and stability with ADLs and functional mobility  LTGs to be achieved in 6-8 weeks:  1  Pt will be I with HEP in order to continue to improve quality of life and independence and reduce risk for re-injury  2  Pt to demonstrate return to painfree amb  without limitations or restrictions  3  Pt to demonstrate improved function as noted by achieving or exceeding predicted score on FOTO outcomes assessment tool  Plan  Patient would benefit from: skilled physical therapy  Planned modality interventions: thermotherapy: hydrocollator packs  Planned therapy interventions: manual therapy, strengthening, stretching, therapeutic exercise, therapeutic activities, gait training and balance  Frequency: 2x week  Duration in weeks: 6  Plan of Care beginning date: 11/15/2019  Plan of Care expiration date: 12/9/2019  Treatment plan discussed with: patient        Subjective Evaluation    History of Present Illness  Date of surgery: 6/14/2019  Mechanism of injury: surgery  Mechanism of injury: Pt underwent L THR 6/14/19 at [facility]  Pt had anterior approach L THR  Pt is having a CTscan of the L hip tomorrow due to ongoing pain  Pt has been attending PT at [facility] since the surgery  Pt states she has had complications after surgery including a swollen face and scratched cornea, kidney ds and R leg injury from the OR table  States RLE was swollen and ecchymotic  Pt is having pain in L groin wrapping around to posterior L hip and down to knee  Dxed with troch bursitis  Pt states doctor wanted to inject the hip but pt refused stating it is too tender  11/15/19 LBP eval;: Pt states she has near constant pain in her low back and she feels she has to stretch it  Sleeping is difficult as is bending   Pain w/rotation, tieing shoes, lifting objects, showering, cleaning, vacuuming  Recurrent probem    Quality of life: poor    Pain  Current pain ratin  At best pain ratin  At worst pain ratin  Location: B LBP L>R  Quality: dull ache  Relieving factors: heat and medications (stretching)  Aggravating factors: standing, walking and lifting (bending)  Progression: worsening    Social Support  Steps to enter house: yes (6)  Stairs in house: no   Lives in: [place] house  Lives with: alone    Employment status: not working  Hand dominance: right      Diagnostic Tests  Abnormal x-ray: back, L hip   Abnormal CT scan: tomorrow  Treatments  Previous treatment: medication and chiropractic  Current treatment: massage, medication and physical therapy  Patient Goals  Patient goals for therapy: decreased pain, increased strength, independence with ADLs/IADLs and increased motion  Patient goal: painfree mobility         Objective     Concurrent Complaints  Positive for disturbed sleep, kidney problem and stomach problem  Postural Observations  Seated posture: good  Correction of posture: makes symptoms better    Additional Postural Observation Details  Decreased thoracic kyphosis and decreased lumbar curve    Observations   Left Hip  Positive for incision (well healed anterior L hip )  Additional Observation Details  Leg length discrep L longer by approx 1/4 inch in supine    Palpation   Left   Tenderness of the erector spinae, iliacus, iliopsoas, lumbar interspinals, lumbar paraspinals and quadratus lumborum  Right   Tenderness of the erector spinae  Tenderness     Left Hip   Tenderness in the greater trochanter  Additional Tenderness Details  Extremely sensitive to touch L gr troch and thigh  (symptom magnification)    Lumbar Screen  Lumbar range of motion within normal limits      Neurological Testing     Sensation     Lumbar   Left   Diminished: light touch    Right   Intact: light touch    Comments   Left light touch: L thigh, lat thigh, glut    Hip   Left Hip   Diminished: light touch    Reflexes   Left   Patellar (L4): normal (2+)    Right   Patellar (L4): normal (2+)    Active Range of Motion     Lumbar   Flexion:  WFL  Extension:  Restriction level: moderate  Left lateral flexion:  WFL Restriction level: minimal  Right lateral flexion:  WFL Restriction level: minimal  Left rotation:  WFL Restriction level: minimal  Right rotation:  WFL Restriction level: minimal  Left Hip   Flexion: 125 degrees   Abduction: 30 degrees     Right Hip   Flexion: 135 degrees   Abduction: 45 degrees   Mechanical Assessment    Cervical      Thoracic      Lumbar    Standing flexion: repeated movements   Pain location:no change  Pain intensity: worse  Pain level: increased  Standing extension: repeated movements  Pain location: no change  Pain intensity: worse  Pain level: increased    Strength/Myotome Testing     Left Hip   Planes of Motion   Flexion: 4-  Extension: 4  Abduction: 4+  Adduction: 4+    Right Hip   Planes of Motion   Flexion: 4+  Extension: 4+  Abduction: 4+  Adduction: 4+    Left Knee   Flexion: 4-  Extension: 3+    Right Knee   Flexion: 4+  Extension: 5    Additional Strength Details  Pt puts forth questionable effort for MMT  Tests     Lumbar     Left   Negative slump test      Right   Negative slump test      Left Pelvic Girdle/Sacrum   Negative: active SLR test      Right Pelvic Girdle/Sacrum   Negative: active SLR test      Left Hip   Negative long sit  Right Hip   Negative long sit       Ambulation   Weight-Bearing Status   Weight-Bearing Status (Left): full weight bearing   Weight-Bearing Status (Right): full weight-bearing    Assistive device used: none (pt states she may need to go back on crutches )    Ambulation: Level Surfaces   Ambulation without assistive device: independent    Ambulation: Stairs   Ascend stairs: independent  Pattern: non-reciprocal  Railings: one rail  Descend stairs: independent  Pattern: non-reciprocal  Railings: one rail    Observational Gait   Gait: antalgic   Right step length within functional limits  Increased right stance time  Decreased walking speed, stride length, left stance time, left swing time, right swing time and left step length     Left foot contact pattern: foot flat  Right foot contact pattern: heel to toe    General Comments:    Lower quarter screen   Knees: unremarkable  Foot/ankle: unremarkable    Hip Comments   Recent L anterior approach hip repl   6/14/19             Precautions: symptom magnification,chronic pain syndrome, anxiety    Re-eval Date:12/9/19        Date 10/28 11/4 11 5 19 11/11/19 11/15   Visit Count 1 2 3 4 5   FOTO completed       Pain In See IE 7 8/10 8 8   Pain Out   5-6/10 8 8           Manual  10/28 11/4 11 5 19 11/11/19 11/15   Ham stretch   *L 4x/30"   B 3x30" B 3x30"   Piriformis stretch        ITB stretch    **B 3x30" B 3x30"   Gastroc/soleus   *L 5/30"                 Date 10/28 11/4 11 5 19 11/11/19 11/15   [facility] vs  [employer]  L1 10' L 1 x 7 min L1  10'    Hip flexor stretch          Heel slides  30 *Pt  requests to perform as HEP Only       SAQs  2# 30x *Pt  requests to perform as HEP Only       Hip add squeezes   [id number] *Pt  requests to perform as HEP Only     hooklying abd w/TB     grn  [id number] *Pt  requests to perform as HEP Only     Clamshells     SL R *Pt  requests to perform as HEP Only     90/90 SL abd         SLRs      2x10 *Pt  requests to perform as HEP Only     Bridges    30x5 sec *Pt  requests to perform as HEP Only     TR/HR          Wobble board  Side-side  Fwd/back      30 ea    elliptical          Leg ext mach    11#  30 11# 35x 11# 40    Leg  curl mach    11#  30 11# 35x 11#  40    Resisted amb in [place]  4 dir      **3# 5x ea dir    Obstacle course        Leg press    60#  30 60# 30 65# 40              Modalities  10/28  11 5 19 11/11 11/15   HP to L thigh/hip   Semi-reclined to L hip 10 min L hip 10'

## 2019-11-18 ENCOUNTER — OFFICE VISIT (OUTPATIENT)
Dept: PHYSICAL THERAPY | Facility: CLINIC | Age: 64
End: 2019-11-18
Payer: COMMERCIAL

## 2019-11-18 ENCOUNTER — TRANSCRIBE ORDERS (OUTPATIENT)
Dept: PHYSICAL THERAPY | Facility: CLINIC | Age: 64
End: 2019-11-18

## 2019-11-18 DIAGNOSIS — M16.12 PRIMARY OSTEOARTHRITIS OF LEFT HIP: Primary | ICD-10-CM

## 2019-11-18 PROCEDURE — 97140 MANUAL THERAPY 1/> REGIONS: CPT

## 2019-11-18 PROCEDURE — 97110 THERAPEUTIC EXERCISES: CPT

## 2019-11-18 NOTE — PROGRESS NOTES
Daily Note     Today's date: 2019  Patient name: Cas Wu  : 1955  MRN: [id number]  Referring provider: Adela Lisa DO  Dx:   Encounter Diagnosis     ICD-10-CM    1  Primary osteoarthritis of left hip M16 12                   Subjective:  Pt  States she's "a mess" today  Says her LB, L hip, and kidneys are all an issue  Objective: See treatment diary below      Assessment: Tolerated treatment Fair+ Fairly well overall with performance and tolerance to Ther Exer, Manual Stretch,  and also re-intro to past exer  which have been added, as per 1* therapist's discretion, for LB  Plan: Progress treatment as tolerated  Con't services 2x/week            Precautions: symptom magnification,chronic pain syndrome, anxiety    Re-eval Date:19        Date  11  19 11/11/19 11/15   Visit Count 6 2 3 4 5   FOTO Due NV **      Pain In 8/10 7 8/10 8 8   Pain Out 8/10  5-6/10 8 8   Next MD Sandor ?               Manual   11 5 19 11/11/19 11/15   Ham stretch B 4x30"  *L 4x/30"   B 3x30" B 3x30"   Piriformis stretch **Light, gentle  3x/30"       ITB stretch B 4x30"   **B 3x30" B 3x30"   Gastroc/soleus L 5x/30"  *L 5/30"                 Date  11 5 19 11/11/19 11/15   [address] [facility] vs  [facility] No Resistance  10 min *SRC L1 10' L 1 x 7 min L1  10'    Hip flexor stretch   *B 3x/30"       Heel slides  30 *Pt  requests to perform as HEP Only       SAQs  2# 30x *Pt  requests to perform as HEP Only       Hip add squeezes   [id number] *Pt  requests to perform as HEP Only     hooklying abd w/TB     grn  [id number] *Pt  requests to perform as HEP Only     Clamshells    *re-introduced  sidely on R 30x SL R *Pt  requests to perform as HEP Only     90/90 SL abd         SLRs      2x10 *Pt  requests to perform as HEP Only     Bridges   *Re-introduced  15x5 sec 30x5 sec *Pt  requests to perform as HEP Only     TR/HR          Wobble board  Side-side  Fwd/back      30 ea    elliptical          Leg ext mach   22# 30x 11#  30 11# 35x 11# 40    Leg  curl mach   11# 40x 11#  30 11# 35x 11#  40    Resisted amb in [place]  4 dir   *deferred    **3# 5x ea dir    Obstacle course        Leg press   65# 40 60#  30 60# 30 65# 40    SKTC  DKTC *B 10x/5"  *10x/5"       LTR *15x/5"       Bridge *10x/5"       Clamshells *Sidely on R only 30x                         Modalities  11 18 19  11 5 19 11/11 11/15   HP to L thigh/hip Semi-reclined to L hip and LB   10 min  Semi-reclined to L hip 10 min L hip 10'

## 2019-11-19 ENCOUNTER — OFFICE VISIT (OUTPATIENT)
Dept: INTERNAL MEDICINE CLINIC | Facility: CLINIC | Age: 64
End: 2019-11-19
Payer: COMMERCIAL

## 2019-11-19 VITALS
HEART RATE: 81 BPM | HEIGHT: 63 IN | TEMPERATURE: 97.2 F | SYSTOLIC BLOOD PRESSURE: 144 MMHG | WEIGHT: 95 LBS | DIASTOLIC BLOOD PRESSURE: 82 MMHG | BODY MASS INDEX: 16.83 KG/M2 | OXYGEN SATURATION: 98 %

## 2019-11-19 DIAGNOSIS — R10.84 GENERALIZED ABDOMINAL PAIN: ICD-10-CM

## 2019-11-19 DIAGNOSIS — R63.4 WEIGHT LOSS: ICD-10-CM

## 2019-11-19 DIAGNOSIS — I10 ESSENTIAL HYPERTENSION: ICD-10-CM

## 2019-11-19 DIAGNOSIS — R94.4 ABNORMAL RENAL FUNCTION TEST: Primary | ICD-10-CM

## 2019-11-19 DIAGNOSIS — R11.0 NAUSEA: ICD-10-CM

## 2019-11-19 DIAGNOSIS — M79.652 LEFT THIGH PAIN: ICD-10-CM

## 2019-11-19 PROCEDURE — 99214 OFFICE O/P EST MOD 30 MIN: CPT | Performed by: INTERNAL MEDICINE

## 2019-11-19 RX ORDER — METOPROLOL SUCCINATE 25 MG/1
25 TABLET, EXTENDED RELEASE ORAL DAILY
Qty: 90 TABLET | Refills: 1 | Status: SHIPPED | OUTPATIENT
Start: 2019-11-19 | End: 2020-04-21 | Stop reason: SDUPTHER

## 2019-11-19 RX ORDER — PREDNISONE 10 MG/1
TABLET ORAL
Refills: 0 | COMMUNITY
Start: 2019-11-07 | End: 2019-12-11

## 2019-11-19 RX ORDER — SERTRALINE HYDROCHLORIDE 25 MG/1
25 TABLET, FILM COATED ORAL DAILY
Refills: 1 | COMMUNITY
Start: 2019-11-15 | End: 2020-08-24 | Stop reason: ALTCHOICE

## 2019-11-19 NOTE — PROGRESS NOTES
WF RAssessment/Plan:  Problem List Items Addressed This Visit        Cardiovascular and Mediastinum    Essential hypertension    Relevant Medications    metoprolol succinate (TOPROL XL) 25 mg 24 hr tablet      Other Visit Diagnoses     Abnormal renal function test    -  Primary    Weight loss        Relevant Orders    US abdomen complete    Generalized abdominal pain        Relevant Orders    US abdomen complete    Nausea        Relevant Orders    US abdomen complete    Left thigh pain        Relevant Orders    MRI hip left wo contrast           Diagnoses and all orders for this visit:    Abnormal renal function test    Weight loss  -     US abdomen complete; Future    Generalized abdominal pain  -     US abdomen complete; Future    Nausea  -     US abdomen complete; Future    Left thigh pain  -     MRI hip left wo contrast; Future    Essential hypertension  -     metoprolol succinate (TOPROL XL) 25 mg 24 hr tablet; Take 1 tablet (25 mg total) by mouth daily    Other orders  -     predniSONE 10 mg tablet; take 4 tablets by mouth once daily for 3 days 3 once daily for 3    (REFER TO PRESCRIPTION NOTES)  -     sertraline (ZOLOFT) 25 mg tablet        No problem-specific Assessment & Plan notes found for this encounter  A/P: Doing poorly, especially emotionally  Wanted to switch to an SNRI for ZACK, MDD, sleep, and pain, but reports she tried this and others and only zoloft works  Wanted to increase the zoloft, but reports that higher doses cause side effects  Discussed lyrica, boyd, etc for pain and reports they caused side effects or aren't covered  Discussed labs and will d/c ACEI and start beta blocker for the BP and ZACK  Watch due to the asthma  Pain management has her on steroids and is at risk of GI issues  Pt to see GI and will continue the H2 blocker  Will check an US and may need a HIDA since s/s are now more related to food ingestion  Will most likely need an EGD and colonoscopy   Need to r/o UGI bleed gastritis given the steroid use  Continue to push po  Discussed the MRI and will order it with the understanding that she will pay for it  Keep f/u with PT, ortho, and GI  RTC one month for f/u BP and the rest and will need labs again  Subjective:      Patient ID: Stacy Sanchez is a 59 y o  female  WF RTC for f/u CINTHIA and stopping her ACEI, left thigh pain s/p hip sx, Htn after holding the ACEI, depression, wt loss and generalized abd pain with nausea  Repeat labs showed renal function back to normal  Attending PT  Started an H2 blocker  No better  Continues with generalized abd pain and nausea  Wt is stable  Continues with severe left hip and thigh pain along with LBP  No new c/o's  Requesting an MRI of the hip and she will pay for it  The following portions of the patient's history were reviewed and updated as appropriate:   She has a past medical history of Arthritis, Essential hypertension (8/7/2019), ZACK (generalized anxiety disorder) (12/11/2018), Moderate persistent asthma without complication (33/60/1046), and Pulmonary emphysema ([facility] [address] ) (10/23/2018)  ,  does not have any pertinent problems on file  ,   has a past surgical history that includes Facial cosmetic surgery; Colonoscopy; Dental surgery; and Hip surgery (Left, 06/2019)  ,  family history includes Aneurysm in her mother; No Known Problems in her brother  ,   reports that she has never smoked  She has never used smokeless tobacco  She reports that she does not drink alcohol or use drugs  ,  is allergic to nsaids; celebrex [celecoxib]; fosamax [alendronate]; ibuprofen; and influenza vaccines     Current Outpatient Medications   Medication Sig Dispense Refill    albuterol (VENTOLIN HFA) 90 mcg/act inhaler Inhale 2 puffs every 6 (six) hours as needed for wheezing 18 g 5    amLODIPine (NORVASC) 5 mg tablet Take 1 tablet (5 mg total) by mouth daily 90 tablet 0    Calcium Carbonate-Vitamin D (OSCAL 500/200 D-3 PO) Take by mouth 3 (three) times a day  Cholecalciferol (VITAMIN D3) 51157 units TABS Take by mouth daily        cyanocobalamin (VITAMIN B-12) 1,000 mcg tablet Take by mouth daily      cyclobenzaprine (FLEXERIL) 10 mg tablet Take 1 tablet (10 mg total) by mouth 3 (three) times a day as needed for muscle spasms 90 tablet 1    famotidine (PEPCID) 20 mg tablet Take 1 tablet (20 mg total) by mouth 2 (two) times a day 180 tablet 0    mometasone-formoterol (DULERA) 100-5 MCG/ACT inhaler Inhale 2 puffs 2 (two) times a day Rinse mouth after use  1 Inhaler 5    nystatin (MYCOSTATIN) 500,000 units/5 mL suspension Apply 5 mL (500,000 Units total) to the mouth or throat 4 (four) times a day 473 mL 0    predniSONE 10 mg tablet take 4 tablets by mouth once daily for 3 days 3 once daily for 3    (REFER TO PRESCRIPTION NOTES)  0    pyridoxine (VITAMIN B6) 100 mg tablet Take 100 mg by mouth daily      sertraline (ZOLOFT) 25 mg tablet   1    traMADol (ULTRAM) 50 mg tablet Take 1 tablet (50 mg total) by mouth every 6 (six) hours as needed for moderate pain 30 tablet 0    metoprolol succinate (TOPROL XL) 25 mg 24 hr tablet Take 1 tablet (25 mg total) by mouth daily 90 tablet 1     No current facility-administered medications for this visit  Review of Systems   Constitutional: Positive for activity change  Negative for chills, diaphoresis, fatigue and fever  HENT: Negative  Eyes: Negative for visual disturbance  Respiratory: Negative for cough, chest tightness, shortness of breath and wheezing  Cardiovascular: Negative for chest pain, palpitations and leg swelling  Gastrointestinal: Positive for abdominal pain and nausea  Negative for abdominal distention, anal bleeding, blood in stool, constipation, diarrhea, rectal pain and vomiting  Endocrine: Negative for cold intolerance and heat intolerance  Genitourinary: Negative for difficulty urinating, dysuria and frequency  Musculoskeletal: Positive for arthralgias and back pain   Negative for gait problem, joint swelling and myalgias  Neurological: Negative for dizziness, seizures, syncope, weakness, light-headedness and headaches  Psychiatric/Behavioral: Positive for dysphoric mood and sleep disturbance  Negative for confusion and suicidal ideas  The patient is nervous/anxious  PHQ-9 Depression Screening    PHQ-9:    Frequency of the following problems over the past two weeks:             Objective:  Vitals:    11/19/19 1320   BP: 144/82   Pulse: 81   Temp: (!) 97 2 °F (36 2 °C)   SpO2: 98%   Weight: 43 1 kg (95 lb)   Height: 5' 3" (1 6 m)     Body mass index is 16 83 kg/m²  Physical Exam   Constitutional: She is oriented to person, place, and time  She appears well-developed and well-nourished  No distress  HENT:   Head: Normocephalic and atraumatic  Mouth/Throat: Oropharynx is clear and moist    Eyes: Pupils are equal, round, and reactive to light  Conjunctivae and EOM are normal    Neck: Neck supple  No JVD present  Cardiovascular: Normal rate, regular rhythm and normal heart sounds  Pulmonary/Chest: Effort normal and breath sounds normal  No respiratory distress  She has no wheezes  She has no rales  Abdominal: Soft  Bowel sounds are normal  She exhibits no distension  There is no tenderness  Musculoskeletal: She exhibits no edema  Neurological: She is alert and oriented to person, place, and time  Psychiatric: She has a normal mood and affect  Her behavior is normal  Judgment and thought content normal    Nursing note and vitals reviewed

## 2019-11-20 ENCOUNTER — CONSULT (OUTPATIENT)
Dept: GASTROENTEROLOGY | Facility: MEDICAL CENTER | Age: 64
End: 2019-11-20
Payer: COMMERCIAL

## 2019-11-20 VITALS
DIASTOLIC BLOOD PRESSURE: 60 MMHG | HEART RATE: 80 BPM | WEIGHT: 96 LBS | BODY MASS INDEX: 17.01 KG/M2 | HEIGHT: 63 IN | SYSTOLIC BLOOD PRESSURE: 110 MMHG | TEMPERATURE: 99 F

## 2019-11-20 DIAGNOSIS — R10.84 GENERALIZED ABDOMINAL PAIN: ICD-10-CM

## 2019-11-20 DIAGNOSIS — R63.4 WEIGHT LOSS: Primary | ICD-10-CM

## 2019-11-20 DIAGNOSIS — R11.0 NAUSEA: ICD-10-CM

## 2019-11-20 DIAGNOSIS — Z86.19 HISTORY OF THRUSH: ICD-10-CM

## 2019-11-20 PROCEDURE — 99244 OFF/OP CNSLTJ NEW/EST MOD 40: CPT | Performed by: INTERNAL MEDICINE

## 2019-11-20 RX ORDER — ONDANSETRON 4 MG/1
4 TABLET, FILM COATED ORAL EVERY 8 HOURS PRN
Qty: 20 TABLET | Refills: 0 | Status: SHIPPED | OUTPATIENT
Start: 2019-11-20 | End: 2019-12-26 | Stop reason: ALTCHOICE

## 2019-11-20 RX ORDER — FERROUS SULFATE 325(65) MG
325 TABLET ORAL
COMMUNITY
End: 2020-10-09 | Stop reason: ALTCHOICE

## 2019-11-20 RX ORDER — FLUCONAZOLE 100 MG/1
100 TABLET ORAL DAILY
Qty: 14 TABLET | Refills: 0 | Status: SHIPPED | OUTPATIENT
Start: 2019-11-20 | End: 2019-12-04

## 2019-11-20 NOTE — PATIENT INSTRUCTIONS
you will take Zofran every 8 hours while you taking your prep for your colonoscopy  We will plan for EGD and colonoscopy at our outpatient ambulatory center  Will also give you GoLYTELY lesion distal the straw or chill to help the taste    The patient is scheduled on 12/17/19 with Dr Dasia Liang at [facility] for a colon/egd  Golytely/Dulcolax prep and all instructions have been gone over with the patient in the office

## 2019-11-20 NOTE — PROGRESS NOTES
[facility] - Outpatient Consultation  Agustina Justice 59 y o  female MRN: [id number]  Encounter: [id number]          ASSESSMENT AND PLAN:      1  Weight loss  - Colonoscopy; Future  - EGD; Future  - polyethylene glycol (GOLYTELY) 4000 mL solution; Take 4,000 mL by mouth once for 1 dose  Dispense: 4000 mL; Refill: 0    2  Generalized abdominal pain  3  Nausea  - ondansetron (ZOFRAN) 4 mg tablet; Take 1 tablet (4 mg total) by mouth every 8 (eight) hours as needed for nausea or vomiting  Dispense: 20 tablet; Refill: 0    4  History of thrush  - fluconazole (DIFLUCAN) 100 mg tablet; Take 1 tablet (100 mg total) by mouth daily for 14 days  Dispense: 14 tablet; Refill: 0    I suspect her overall symptoms are likely in setting of IBS and candidiasis  She does have oral thrush I will empirically treat her with fluconazole with renal dosing  She likely has symptoms in setting of multifactorial causes  She underwent surgery of the hip and has had complicated course over the last several months  Fortunately things are stable now but she still continues to lose weight  Weight loss is likely secondary to oral candidiasis and possible esophageal involvement  Nystatin has not been effective in treating this  Will plan for EGD and colonoscopy for further evaluation as well due to her BMI of 17  I will provide her with medication for nausea so she can tolerate the preparation as well  Generalized abdominal pain may be secondary to irritable bowel syndrome in setting of excessive stress  Will plan for EGD and colonoscopy evaluation if negative can consider further treatment for IBS D with rifaximin, FODMAP diet and probiotics      We also discuss there is no recommendations for preop antibiotics despite having recent hip replacement   ______________________________________________________________________    HPI:        She is a 70-year-old presents here for evaluation of unintentional weight loss over the last several months currently with a BMI of 17, nausea, generalized abdominal discomfort and poor appetite/taste  Her last colonoscopy was over 20 years ago and was asked repeat in 10 years  Denies any other acute distress at this time  She has had progressive weight loss which has been unintentional   This is associated nausea and generalized abdominal discomfort  She states symptoms started after she underwent hip surgery which was complicated  Overall symptoms of abdominal discomfort diffuse and debilitating  Symptoms are constant associated with distension and bloating  She also has ongoing nausea  She does attribute some of her symptoms to stress  No family history of colorectal cancer  REVIEW OF SYSTEMS:    CONSTITUTIONAL: Denies any fever, chills, rigors, and weight loss  HEENT: No earache or tinnitus  Denies hearing loss or visual disturbances  CARDIOVASCULAR: No chest pain or palpitations  RESPIRATORY: Denies any cough, hemoptysis, shortness of breath or dyspnea on exertion  GASTROINTESTINAL: As noted in the History of Present Illness  GENITOURINARY: No problems with urination  Denies any hematuria or dysuria  NEUROLOGIC: No dizziness or vertigo, denies headaches  MUSCULOSKELETAL: Denies any muscle or joint pain  SKIN: Denies skin rashes or itching  ENDOCRINE: Denies excessive thirst  Denies intolerance to heat or cold  PSYCHOSOCIAL: Denies depression or anxiety  Denies any recent memory loss         Historical Information   Past Medical History:   Diagnosis Date    Arthritis     Essential hypertension 8/7/2019    ZACK (generalized anxiety disorder) 12/11/2018    Moderate persistent asthma without complication 16/85/4539    Pulmonary emphysema ([facility] [address] ) 10/23/2018     Past Surgical History:   Procedure Laterality Date    COLONOSCOPY      DENTAL SURGERY      FACIAL COSMETIC SURGERY      HIP SURGERY Left 06/2019     Social History   Social History     Substance and Sexual Activity   Alcohol Use No     Social History     Substance and Sexual Activity   Drug Use No     Social History     Tobacco Use   Smoking Status Never Smoker   Smokeless Tobacco Never Used     Family History   Problem Relation Age of Onset    Aneurysm Mother     No Known Problems Brother         eye issues       Meds/Allergies       Current Outpatient Medications:     albuterol (VENTOLIN HFA) 90 mcg/act inhaler    amLODIPine (NORVASC) 5 mg tablet    Calcium Carbonate-Vitamin D (OSCAL 500/200 D-3 PO)    Cholecalciferol (VITAMIN D3) 78772 units TABS    cyanocobalamin (VITAMIN B-12) 1,000 mcg tablet    cyclobenzaprine (FLEXERIL) 10 mg tablet    ferrous sulfate 325 (65 Fe) mg tablet    metoprolol succinate (TOPROL XL) 25 mg 24 hr tablet    mometasone-formoterol (DULERA) 100-5 MCG/ACT inhaler    nystatin (MYCOSTATIN) 500,000 units/5 mL suspension    pyridoxine (VITAMIN B6) 100 mg tablet    sertraline (ZOLOFT) 25 mg tablet    traMADol (ULTRAM) 50 mg tablet    famotidine (PEPCID) 20 mg tablet    fluconazole (DIFLUCAN) 100 mg tablet    ondansetron (ZOFRAN) 4 mg tablet    polyethylene glycol (GOLYTELY) 4000 mL solution    predniSONE 10 mg tablet    Allergies   Allergen Reactions    Nsaids GI Bleeding    Celebrex [Celecoxib] Swelling    Fosamax [Alendronate]      Bone pain    Ibuprofen Swelling    Influenza Vaccines            Objective     Blood pressure 110/60, pulse 80, temperature 99 °F (37 2 °C), temperature source Tympanic, height 5' 3" (1 6 m), weight 43 5 kg (96 lb)  Body mass index is 17 01 kg/m²  PHYSICAL EXAM:      General Appearance:   Alert, cooperative, no distress   HEENT:   Normocephalic, atraumatic, anicteric      Neck:  Supple, symmetrical, trachea midline   Lungs:   Clear to auscultation bilaterally; no rales, rhonchi or wheezing; respirations unlabored    Heart[de-identified]   Regular rate and rhythm; no murmur, rub, or gallop     Abdomen:   Soft, non-tender, non-distended; normal bowel sounds; no masses, no organomegaly    Genitalia:   Deferred    Rectal:   Deferred    Extremities:  No cyanosis, clubbing or edema    Pulses:  2+ and symmetric    Skin:  No jaundice, rashes, or lesions    Lymph nodes:  No palpable cervical lymphadenopathy        Lab Results:   No visits with results within 1 Day(s) from this visit  Latest known visit with results is:   Appointment on 11/15/2019   Component Date Value    Sodium 11/15/2019 144     Potassium 11/15/2019 3 8     Chloride 11/15/2019 109*    CO2 11/15/2019 28     ANION GAP 11/15/2019 7     BUN 11/15/2019 27*    Creatinine 11/15/2019 1 01     Glucose, Fasting 11/15/2019 77     Calcium 11/15/2019 9 4     eGFR 11/15/2019 59          Radiology Results:   Xr Chest Pa & Lateral    Result Date: 10/25/2019  Narrative: CHEST INDICATION:   R63 4: Abnormal weight loss R51: Headache R10 84: Generalized abdominal pain R11 0: Nausea M79 652: Pain in left thigh  COMPARISON:  Two-view chest 8/15/2018 EXAM PERFORMED/VIEWS:  XR CHEST PA & LATERAL FINDINGS: Cardiomediastinal silhouette appears unremarkable  The lungs are clear  No pneumothorax or pleural effusion  Osseous structures appear within normal limits for patient age  Impression: No acute cardiopulmonary disease  Workstation performed: [id number]     Xr Femur 2 Vw Left    Result Date: 10/25/2019  Narrative: LEFT FEMUR INDICATION:   R63 4: Abnormal weight loss R51: Headache R10 84: Generalized abdominal pain R11 0: Nausea M79 652: Pain in left thigh  COMPARISON:  None VIEWS:  XR FEMUR 2 VW LEFT FINDINGS: Intact left total hip arthroplasty  No complications No fracture or dislocation  No degenerative changes  No lytic or blastic lesions are seen  Soft tissues are unremarkable  Impression: Intact left total hip arthroplasty  No complications No fracture or dislocation   Workstation performed: [id number]     Xr Abdomen Complete Inc Upright And/or Decubitus    Result Date: 10/25/2019  Narrative: ABDOMEN INDICATION:   R63 4: Abnormal weight loss R51: Headache R10 84: Generalized abdominal pain R11 0: Nausea M79 652: Pain in left thigh  COMPARISON:  None VIEWS:  AP supine and erect FINDINGS: There is a nonobstructive bowel gas pattern  No discernible free air on this supine study  Upright or left lateral decubitus imaging is more sensitive to detect subtle free air in the appropriate setting  No pathologic calcifications or soft tissue masses  Visualized lung bases are clear  Partially imaged, intact left total hip arthroplasty  Lower lumbar spine degenerative changes     Impression: Normal bowel gas pattern   Workstation performed: [id number]

## 2019-11-21 ENCOUNTER — ANESTHESIA EVENT (OUTPATIENT)
Dept: GASTROENTEROLOGY | Facility: MEDICAL CENTER | Age: 64
End: 2019-11-21

## 2019-11-21 ENCOUNTER — OFFICE VISIT (OUTPATIENT)
Dept: PHYSICAL THERAPY | Facility: CLINIC | Age: 64
End: 2019-11-21
Payer: COMMERCIAL

## 2019-11-21 ENCOUNTER — HOSPITAL ENCOUNTER (OUTPATIENT)
Dept: ULTRASOUND IMAGING | Facility: HOSPITAL | Age: 64
Discharge: HOME | End: 2019-11-21
Payer: COMMERCIAL

## 2019-11-21 DIAGNOSIS — G89.29 CHRONIC BILATERAL LOW BACK PAIN WITHOUT SCIATICA: ICD-10-CM

## 2019-11-21 DIAGNOSIS — M16.12 PRIMARY OSTEOARTHRITIS OF LEFT HIP: Primary | ICD-10-CM

## 2019-11-21 DIAGNOSIS — R11.0 NAUSEA: ICD-10-CM

## 2019-11-21 DIAGNOSIS — M54.50 CHRONIC BILATERAL LOW BACK PAIN WITHOUT SCIATICA: ICD-10-CM

## 2019-11-21 DIAGNOSIS — Z96.642 HISTORY OF TOTAL HIP REPLACEMENT, LEFT: ICD-10-CM

## 2019-11-21 DIAGNOSIS — M16.11 PRIMARY OSTEOARTHRITIS OF RIGHT HIP: ICD-10-CM

## 2019-11-21 DIAGNOSIS — R10.84 GENERALIZED ABDOMINAL PAIN: ICD-10-CM

## 2019-11-21 DIAGNOSIS — M70.62 TROCHANTERIC BURSITIS OF LEFT HIP: ICD-10-CM

## 2019-11-21 DIAGNOSIS — R63.4 WEIGHT LOSS: ICD-10-CM

## 2019-11-21 PROCEDURE — 76700 US EXAM ABDOM COMPLETE: CPT

## 2019-11-21 PROCEDURE — 97110 THERAPEUTIC EXERCISES: CPT

## 2019-11-21 PROCEDURE — 97140 MANUAL THERAPY 1/> REGIONS: CPT

## 2019-11-21 NOTE — PROGRESS NOTES
Daily Note     Today's date: 2019  Patient name: Bambi Adhikari  : 1955  MRN: [id number]  Referring provider: Marcelina Zavala DO  Dx:   Encounter Diagnosis     ICD-10-CM    1  Primary osteoarthritis of left hip M16 12    2  Trochanteric bursitis of left hip M70 62    3  Chronic bilateral low back pain without sciatica M54 5     G89 29    4  Primary osteoarthritis of right hip M16 11    5  History of total hip replacement, left Z96 642                   Subjective: Pt having an abdominal US today  States pain in hip and back is rated 7/10  Objective: See treatment diary below      Assessment: Tolerated treatment fair  Patient exhibited good technique with therapeutic exercises and would benefit from continued PT  Pt declined amb in [place] in order to prep for test this afternoon  Plan: Continue per plan of care        Precautions: symptom magnification,chronic pain syndrome, anxiety    Re-eval Date:19        Date 19      Visit Count 6 7      FOTO Due NV       Pain In 8/10 7/10 back and L hip      Pain Out 8/10       Next MD Sandor ?               Manual        Ham stretch B 4x30" B 4x30"      Piriformis stretch **Light, gentle  3x/30"       ITB stretch B 4x30" B 4x30"      Gastroc/soleus L 5x/30"                   Date       [facility] vs  [facility] NS L1  10' NS  L2  13"      Hip flexor stretch   *B 3x/30" declined      Heel slides HEP       SAQs HEP       Hip add squeezes  HEP       hooklying abd w/TB    HEP       Clamshells    *re-introduced  sidely on R 30x SL R  30x  As below      90/90 SL abd         SLRs     HEP       Bridges   *Re-introduced  15x5 sec 30x5 sec      TR/HR          Wobble board  Side-side  Fwd/back          elliptical          Leg ext mach   22# 30x 22#  30      Leg  curl mach   22# 40x 11# 40      Resisted amb in [place]  4 dir   *deferred  declined      Obstacle course        Leg press   65# 40 65# 40      SKTC  DKTC *B 10x/5"  *10x/5" B 10X5"  10x5"      LTR *15x/5" 15x5"      Bridge *10x/5" 15x5"      Clamshells *Sidely on R only 30x 30x  SL R                        Modalities  11 18 19 11/21      HP to L thigh/hip Semi-reclined to L hip and LB   10 min 10 min

## 2019-11-25 ENCOUNTER — OFFICE VISIT (OUTPATIENT)
Dept: PHYSICAL THERAPY | Facility: CLINIC | Age: 64
End: 2019-11-25
Payer: COMMERCIAL

## 2019-11-25 DIAGNOSIS — G89.29 CHRONIC BILATERAL LOW BACK PAIN WITHOUT SCIATICA: ICD-10-CM

## 2019-11-25 DIAGNOSIS — M16.12 PRIMARY OSTEOARTHRITIS OF LEFT HIP: Primary | ICD-10-CM

## 2019-11-25 DIAGNOSIS — M70.62 TROCHANTERIC BURSITIS OF LEFT HIP: ICD-10-CM

## 2019-11-25 DIAGNOSIS — M54.50 CHRONIC BILATERAL LOW BACK PAIN WITHOUT SCIATICA: ICD-10-CM

## 2019-11-25 DIAGNOSIS — M16.11 PRIMARY OSTEOARTHRITIS OF RIGHT HIP: ICD-10-CM

## 2019-11-25 PROCEDURE — 97110 THERAPEUTIC EXERCISES: CPT

## 2019-11-25 NOTE — PROGRESS NOTES
Daily Note     Today's date: 2019  Patient name: Bahman Castro  : 1955  MRN: [id number]  Referring provider: Kurt Sepulveda DO  Dx:   Encounter Diagnosis     ICD-10-CM    1  Primary osteoarthritis of left hip M16 12    2  Trochanteric bursitis of left hip M70 62    3  Chronic bilateral low back pain without sciatica M54 5     G89 29    4  Primary osteoarthritis of right hip M16 11                   Subjective: Pt c/o swelling L hip and pain rated 7-8/10 in L hip and low back      Objective: See treatment diary below      Assessment: Tolerated treatment that she participated in well  Patient exhibited good technique with therapeutic exercises and would benefit from continued PT      Plan: Progress treatment as tolerated  Precautions: symptom magnification,chronic pain syndrome, anxiety    Re-eval Date:19        Date 19     Visit Count 6 7 8     FOTO Due NV completed      Pain In 8/10 7/10 back and L hip 7/10 hip and back     Pain Out 8/10       Next MD Sandor ?               Manual       Ham stretch B 4x30" B 4x30" B 4x30" self     Piriformis stretch **Light, gentle  3x/30"       ITB stretch B 4x30" B 4x30" declined     Gastroc/soleus L 5x/30"                   Date      [address] [facility] vs  [employer] NS L1  10' NS  L2  13" NS  L2 10'     Hip flexor stretch   *B 3x/30" declined      Heel slides HEP       SAQs HEP       Hip add squeezes  HEP       hooklying abd w/TB    HEP       Clamshells    *re-introduced  sidely on R 30x SL R  30x  As below See below     90/90 SL abd         SLRs     HEP       Bridges   *Re-introduced  15x5 sec 30x5 sec 30x5"     TR/HR          Wobble board  Side-side  Fwd/back          elliptical          Leg ext mach   22# 30x 22#  30 22#  3 x 20     Leg  curl mach   22# 40x 11# 40 11#  3 x 20     Resisted amb in [place]  4 dir   *deferred  declined Declined  "It makes my nerves in the L leg hurt       Obstacle course        Leg press   65# 40 65# 40 70#  40x     SKTC  DKTC *B 10x/5"  *10x/5" B 10X5"  10x5" B 10x5"  10x5"     LTR *15x/5" 15x5" 20x5"     Bridge *10x/5" 15x5" 20x5"     Clamshells *Sidely on R only 30x 30x  SL R 30x  SL R                       Modalities  11 18 19 11/21 11/25     HP to L thigh/hip Semi-reclined to L hip and LB   10 min 10 min 10 min to low back, declined hip

## 2019-11-26 DIAGNOSIS — R10.84 GENERALIZED ABDOMINAL PAIN: ICD-10-CM

## 2019-11-26 DIAGNOSIS — R63.4 WEIGHT LOSS: Primary | ICD-10-CM

## 2019-11-27 ENCOUNTER — APPOINTMENT (OUTPATIENT)
Dept: PHYSICAL THERAPY | Facility: CLINIC | Age: 64
End: 2019-11-27
Payer: COMMERCIAL

## 2019-11-29 DIAGNOSIS — R10.84 GENERALIZED ABDOMINAL PAIN: Primary | ICD-10-CM

## 2019-11-29 DIAGNOSIS — R94.4 ABNORMAL RENAL FUNCTION TEST: ICD-10-CM

## 2019-12-02 ENCOUNTER — OFFICE VISIT (OUTPATIENT)
Dept: PHYSICAL THERAPY | Facility: CLINIC | Age: 64
End: 2019-12-02
Payer: COMMERCIAL

## 2019-12-02 DIAGNOSIS — Z96.642 HISTORY OF TOTAL HIP REPLACEMENT, LEFT: ICD-10-CM

## 2019-12-02 DIAGNOSIS — M16.11 PRIMARY OSTEOARTHRITIS OF RIGHT HIP: ICD-10-CM

## 2019-12-02 DIAGNOSIS — M70.62 TROCHANTERIC BURSITIS OF LEFT HIP: ICD-10-CM

## 2019-12-02 DIAGNOSIS — M54.50 CHRONIC BILATERAL LOW BACK PAIN WITHOUT SCIATICA: ICD-10-CM

## 2019-12-02 DIAGNOSIS — M16.12 PRIMARY OSTEOARTHRITIS OF LEFT HIP: Primary | ICD-10-CM

## 2019-12-02 DIAGNOSIS — G89.29 CHRONIC BILATERAL LOW BACK PAIN WITHOUT SCIATICA: ICD-10-CM

## 2019-12-02 PROCEDURE — 97110 THERAPEUTIC EXERCISES: CPT

## 2019-12-02 NOTE — PROGRESS NOTES
Daily Note     Today's date: 2019  Patient name: Alonzo Ballard  : 1955  MRN: [id number]  Referring provider: Karlee Perea DO  Dx:   Encounter Diagnosis     ICD-10-CM    1  Primary osteoarthritis of left hip M16 12    2  Trochanteric bursitis of left hip M70 62    3  Chronic bilateral low back pain without sciatica M54 5     G89 29    4  Primary osteoarthritis of right hip M16 11    5  History of total hip replacement, left Z96 642                   Subjective: No new complaints      Objective: See treatment diary below      Assessment: Tolerated treatment well  Patient exhibited good technique with therapeutic exercises and would benefit from continued PT   No outward expressions of pain  Amb with shuffle gt LLE  Plan: Continue per plan of care        Precautions: symptom magnification,chronic pain syndrome, anxiety    Re-eval Date:19        Date 19    Visit Count 6 7 8 9    FOTO Due NV completed      Pain In 8/10 7/10 back and L hip 7/10 hip and back 6-7/low back and hip    Pain Out 8/10       Next MD Sandor ?               Manual      Ham stretch B 4x30" B 4x30" B 4x30" self B 4x30" self    Piriformis stretch **Light, gentle  3x/30"       ITB stretch B 4x30" B 4x30" declined     Gastroc/soleus L 5x/30"                   Date     [address] [facility] vs  [facility] NS L1  10' NS  L2  13" NS  L2 10' NS  L2 11'    Hip flexor stretch   *B 3x/30" declined      Heel slides HEP       SAQs HEP       Hip add squeezes  HEP       hooklying abd w/TB    HEP       Clamshells    *re-introduced  sidely on R 30x SL R  30x  As below See below     90/90 SL abd         SLRs     HEP       Bridges   *Re-introduced  15x5 sec 30x5 sec 30x5"     TR/HR          Wobble board  Side-side  Fwd/back          elliptical          Leg ext mach   22# 30x 22#  30 22#  3 x 20 22#  3x20    Leg  curl mach   22# 40x 11# 40 11#  3 x 20 11# 3x20    Resisted amb in [place]  4 dir *deferred  declined Declined  "It makes my nerves in the L leg hurt       Obstacle course        Leg press   65# 40 65# 40 70#  40x 70#  3x20    SKTC  DKTC *B 10x/5"  *10x/5" B 10X5"  10x5" B 10x5"  10x5" B  10 x 5"       10x 5"    LTR *15x/5" 15x5" 20x5" 20 x 5"    Bridge *10x/5" 15x5" 20x5" 20 x 5"    Clamshells *Sidely on R only 30x 30x  SL R 30x  SL R 30x SL R                      Modalities  11 18 19 11/21 11/25 12/2    HP to L thigh/hip Semi-reclined to L hip and LB   10 min 10 min 10 min to low back, declined hip 10 min to LB

## 2019-12-04 ENCOUNTER — TELEPHONE (OUTPATIENT)
Dept: INTERNAL MEDICINE CLINIC | Facility: CLINIC | Age: 64
End: 2019-12-04

## 2019-12-04 NOTE — TELEPHONE ENCOUNTER
Pt asking for clearifcation on how to take her medication    She has been taking the amlodipine 5 mg and the   Metoprolol succinate  25 mg medications for Blood Pressure together every morning  Is this correct?

## 2019-12-05 ENCOUNTER — OFFICE VISIT (OUTPATIENT)
Dept: PHYSICAL THERAPY | Facility: CLINIC | Age: 64
End: 2019-12-05
Payer: COMMERCIAL

## 2019-12-05 ENCOUNTER — HOSPITAL ENCOUNTER (OUTPATIENT)
Dept: MRI IMAGING | Facility: HOSPITAL | Age: 64
Discharge: HOME | End: 2019-12-05
Payer: COMMERCIAL

## 2019-12-05 DIAGNOSIS — R10.84 GENERALIZED ABDOMINAL PAIN: ICD-10-CM

## 2019-12-05 DIAGNOSIS — M16.12 PRIMARY OSTEOARTHRITIS OF LEFT HIP: Primary | ICD-10-CM

## 2019-12-05 DIAGNOSIS — R63.4 WEIGHT LOSS: ICD-10-CM

## 2019-12-05 PROCEDURE — 97110 THERAPEUTIC EXERCISES: CPT

## 2019-12-05 PROCEDURE — 74183 MRI ABD W/O CNTR FLWD CNTR: CPT

## 2019-12-05 PROCEDURE — 97140 MANUAL THERAPY 1/> REGIONS: CPT

## 2019-12-05 PROCEDURE — A9585 GADOBUTROL INJECTION: HCPCS | Performed by: INTERNAL MEDICINE

## 2019-12-05 RX ADMIN — GADOBUTROL 5 ML: 604.72 INJECTION INTRAVENOUS at 15:58

## 2019-12-05 NOTE — PROGRESS NOTES
Daily Note     Today's date: 2019  Patient name: Prem eLe  : 1955  MRN: [id number]  Referring provider: Paul Lora DO  Dx:   Encounter Diagnosis     ICD-10-CM    1  Primary osteoarthritis of left hip M16 12                   Subjective:  No specific complaints or concerns voiced today by pt  Objective: See treatment diary below      Assessment: Tolerated treatment Fairly Well overall with performance and tolerance to ther exer and Manual Stretch    Patient progressing consistently thus far through PT intervention  Plan: Continue per plan of care             Precautions: symptom magnification,chronic pain syndrome, anxiety    Re-eval Date:19        Date 19 12 5 19   Visit Count 6 7 8 9 10   FOTO Due NV completed      Pain In 8/10 7/10 back and L hip 7/10 hip and back 6-7/low back and hip 6/10l hip  8/10 LB   Pain Out 8/10    5-6/10l hip  7-8/10 LB   Next MD     Dec 19   LB MD       Dr Ayala           Manual  2 12 5 19   Ham stretch B 4x30" B 4x30" B 4x30" self B 4x30" self B 5x30"      Piriformis stretch **Light, gentle  3x/30"    Light, gentle  3x/30"   ITB stretch B 4x30" B 4x30" declined  B 4x30"   Gastroc/soleus L 5x/30"    B 5x30"               Date 2 12 5 19   [address] [facility] vs  [place] NS L1  10' NS  L2  13" NS  L2 10' NS  L2 11' NS  L2 14'   Hip flexor stretch   *B 3x/30" declined   B 3x/30"   Heel slides HEP       SAQs HEP       Hip add squeezes  HEP       hooklying abd w/TB    HEP       Clamshells    *re-introduced  sidely on R 30x SL R  30x  As below See below  See below   90/90 SL abd         SLRs     HEP       Bridges   *Re-introduced  15x5 sec 30x5 sec 30x5"  35x5"   TR/HR          Wobble board  Side-side  Fwd/back          elliptical          Leg ext mach   22# 30x 22#  30 22#  3 x 20 22#  3x20 22#  3x20   Leg  curl mach   22# 40x 11# 40 11#  3 x 20 11# 3x20 11#  3x20   Resisted amb in [place]  4 dir   *deferred  declined Declined  "It makes my nerves in the L leg hurt    Deferred   Obstacle course        Leg press   65# 40 65# 40 70#  40x 70#  3x20 75#  3x10   SKTC  DKTC *B 10x/5"  *10x/5" B 10X5"  10x5" B 10x5"  10x5" B  10 x 5"       10x 5" B  10 x 5"       10x 5"   LTR *15x/5" 15x5" 20x5" 20 x 5" HEP     Bridge *10x/5" 15x5" 20x5" 20 x 5" 20 x 5"     Clamshells *Sidely on R only 30x 30x  SL R 30x  SL R 30x SL R 40x SL R                     Modalities  11 18 19 11/21 11/25 12/2 12 5 19   HP to L thigh/hip Semi-reclined to L hip and LB   10 min 10 min 10 min to low back, declined hip 10 min to LB 10 min to Thoracic and LB  **extra toweling applied

## 2019-12-06 ENCOUNTER — APPOINTMENT (OUTPATIENT)
Dept: PHYSICAL THERAPY | Facility: CLINIC | Age: 64
End: 2019-12-06
Payer: COMMERCIAL

## 2019-12-09 ENCOUNTER — OFFICE VISIT (OUTPATIENT)
Dept: PHYSICAL THERAPY | Facility: CLINIC | Age: 64
End: 2019-12-09
Payer: COMMERCIAL

## 2019-12-09 DIAGNOSIS — M54.50 CHRONIC BILATERAL LOW BACK PAIN WITHOUT SCIATICA: ICD-10-CM

## 2019-12-09 DIAGNOSIS — G89.29 CHRONIC BILATERAL LOW BACK PAIN WITHOUT SCIATICA: ICD-10-CM

## 2019-12-09 DIAGNOSIS — M16.11 PRIMARY OSTEOARTHRITIS OF RIGHT HIP: ICD-10-CM

## 2019-12-09 DIAGNOSIS — M16.12 PRIMARY OSTEOARTHRITIS OF LEFT HIP: Primary | ICD-10-CM

## 2019-12-09 DIAGNOSIS — M70.62 TROCHANTERIC BURSITIS OF LEFT HIP: ICD-10-CM

## 2019-12-09 PROCEDURE — 97110 THERAPEUTIC EXERCISES: CPT

## 2019-12-09 PROCEDURE — 97140 MANUAL THERAPY 1/> REGIONS: CPT

## 2019-12-09 NOTE — PROGRESS NOTES
Daily Note     Today's date: 2019  Patient name: Loly Vega  : 1955  MRN: [id number]  Referring provider: Ludmila Schmitz DO  Dx:   Encounter Diagnosis     ICD-10-CM    1  Primary osteoarthritis of left hip M16 12    2  Trochanteric bursitis of left hip M70 62    3  Chronic bilateral low back pain without sciatica M54 5     G89 29    4  Primary osteoarthritis of right hip M16 11                   Subjective: Pt states she had her abdominal MRI to assess her kidneys but does not have results yet  States she gets good relief with use of MHP to back  Objective: See treatment diary below      Assessment: Tolerated treatment well  Patient exhibited good technique with therapeutic exercises and would benefit from continued PT  Pt demonstrates good AROM and strength L hip  Plan: Progress treatment as tolerated         Precautions: symptom magnification,chronic pain syndrome, anxiety    Re-eval Date:19        Date 19       Visit Count 11       FOTO        Pain In 6/10       Pain Out 6/10       Next MD  [place]               Manual         Ham stretch B 4x30"self       Piriformis stretch        ITB stretch B 3x30"       Gastroc/soleus L 5x/30"                   Date 19       [address] [facility] vs  [place] NS L2  10'       Hip flexor stretch   B 3x/30"       Heel slides HEP       SAQs HEP       Hip add squeezes  HEP       hooklying abd w/TB    HEP       Clamshells    See below       90/90 SL abd         SLRs     HEP       Bridges   See below       TR/HR          [employer]  Side-side  Fwd/back          elliptical          Leg ext mach   22# 40x       Leg  curl mach   11# 40x       Resisted amb in [place]  4 dir          Obstacle course        Leg press   70# 60       SKTC  DKTC B 10x/5"  10x/5"       LTR 10x5"       Bridge 20x/5"       Clamshells Sidely on R  40x                         Modalities   12 5 19   HP to L thigh/hip Semi-reclined to L hip and LB 10 min 10 min 10 min to low back, declined hip 10 min to LB 10 min to Thoracic and LB  **extra toweling applied

## 2019-12-10 ENCOUNTER — TELEPHONE (OUTPATIENT)
Dept: NEPHROLOGY | Facility: CLINIC | Age: 64
End: 2019-12-10

## 2019-12-10 NOTE — TELEPHONE ENCOUNTER
Pt called wanting to see if we had any appts earlier with our doctors  Fannie Mcintosh had an opening tmm 12/11 in [facility] @2 30

## 2019-12-11 ENCOUNTER — TELEPHONE (OUTPATIENT)
Dept: INTERNAL MEDICINE CLINIC | Facility: CLINIC | Age: 64
End: 2019-12-11

## 2019-12-11 ENCOUNTER — OFFICE VISIT (OUTPATIENT)
Dept: PHYSICAL THERAPY | Facility: CLINIC | Age: 64
End: 2019-12-11
Payer: COMMERCIAL

## 2019-12-11 ENCOUNTER — CONSULT (OUTPATIENT)
Dept: NEPHROLOGY | Facility: CLINIC | Age: 64
End: 2019-12-11
Payer: COMMERCIAL

## 2019-12-11 VITALS
HEIGHT: 63 IN | DIASTOLIC BLOOD PRESSURE: 72 MMHG | WEIGHT: 95.2 LBS | SYSTOLIC BLOOD PRESSURE: 142 MMHG | BODY MASS INDEX: 16.87 KG/M2 | HEART RATE: 80 BPM

## 2019-12-11 DIAGNOSIS — M54.50 CHRONIC BILATERAL LOW BACK PAIN WITHOUT SCIATICA: ICD-10-CM

## 2019-12-11 DIAGNOSIS — I10 ESSENTIAL HYPERTENSION: ICD-10-CM

## 2019-12-11 DIAGNOSIS — N18.30 STAGE 3 CHRONIC KIDNEY DISEASE (HCC): Primary | ICD-10-CM

## 2019-12-11 DIAGNOSIS — K83.1 STRICTURE OF BILE DUCT: Primary | ICD-10-CM

## 2019-12-11 DIAGNOSIS — G89.29 CHRONIC BILATERAL LOW BACK PAIN WITHOUT SCIATICA: ICD-10-CM

## 2019-12-11 DIAGNOSIS — M16.12 PRIMARY OSTEOARTHRITIS OF LEFT HIP: Primary | ICD-10-CM

## 2019-12-11 DIAGNOSIS — M16.11 PRIMARY OSTEOARTHRITIS OF RIGHT HIP: ICD-10-CM

## 2019-12-11 DIAGNOSIS — M70.62 TROCHANTERIC BURSITIS OF LEFT HIP: ICD-10-CM

## 2019-12-11 PROCEDURE — 99244 OFF/OP CNSLTJ NEW/EST MOD 40: CPT | Performed by: INTERNAL MEDICINE

## 2019-12-11 PROCEDURE — 97110 THERAPEUTIC EXERCISES: CPT

## 2019-12-11 NOTE — TELEPHONE ENCOUNTER
Pt aware of mri results, needs referral put in epic to gastro?  Pt  Is scheduled for EGD on Tuesday, 12-17-19

## 2019-12-11 NOTE — PROGRESS NOTES
Nephrology Initial Consultation  Venkat Hernandez 59 y o  female MRN: [id number]            REASON FOR CONSULTATION:  Venkat Hernandez is a 59 y  o female who was referred by Eduardo Ayala DO for evaluation of Consult and Hypertension    ASSESSMENT / PLAN:   59 y o   female with pmh of multiple co-morbidities including  ZACK, asthma, arthritis, anemia, hypertension (x yr)and CKD stage 3 presents to the office for evaluation and management of abnormal renal parameters  1  CKD stage III A:  - Patient has a baseline creatinine of 1 0-1 3 mg/dL  Most recent labs show a Creatinine of 1 01 mg/dL  Renal function remains stable  - likely has underlying CKD secondary to hypertensive nephrosclerosis plus age-related nephron loss +2 prior recent episodes of acute kidney injury non dialysis requiring, there may be some component of under estimation of her renal parameters  Will still rule out paraproteinemia check SPEP, UPEP, free light chain ratio  Will hold off on checking cyst statin C at this time  - status post 2 episodes within the last year of acute kidney injury non dialysis requiring likely secondary to the use of Ace inhibitors and failure to auto regulate   - MRI abdomen from December 5, 2019 showed renal collecting systems are decompressed, miniscule subcentimeter cyst no suspicious renal parenchymal masses or perinephric collections  - abdominal ultrasound from November 21, 2019 showed right kidney 8 5 cm left kidney 9 3 cm consistent with medical renal disease  No masses noted  - Proteinuria - UA from October 2019 showed small blood no RBCs no proteinuria  Will check UA, spot urine protein and creatinine    - Acid base and lytes stable  - Clinically the patient appears to be euvolemic to hypovolemic advised patient to increase fluid intake to minimum 6-8 glasses per day  - Recommend to avoid use of NSAIDs, nephrotoxins   Caution advised with regards to exposure to IV contrast dye    - Discussed with the patient in depth her renal status, including the possible etiologies for CKD  - Advised the patient that when her GFR is close to 20mL/min then will start discussing about RRT(renal replacement therapy) options such as renal transplant, peritoneal dialysis and hemodialysis  - Informed the patient about the various options for Renal Replacement therapy  - Discussed with the patient how we need to work together to delay the progression of CKD with optimal BP control based on their age and co-morbidities and trying to reduce proteinuria by the use of anti-proteinuric agents  - try to advised patient that her GI symptoms are not related to her CKD, she was under the impression that she is uremic because of her renal dysfunction I assured her that based on the renal parameters that we have in our record her peak creatinine was 1 8 mg/dL and likely that was not responsible for uremic symptoms and her symptoms of nausea decreased p o  Intake and weight loss are likely secondary to GI issues and not renal related at this time  2  Hypertension:  - Patient is on Norvasc 5 mg p o  Q day, Toprol-XL 25 mg p o  Q day    - advised patient to routinely check blood pressures at home and if SBP is consistently less than 120 then to hold the Norvasc   - Goal BP of <  140/90 based on age and comorbidities  - Instructed to follow low sodium (2gm)diet   - Advised to hold ACEI/ARBs if patient suffers from dehydration due to gastrointestinal losses due to risk of MEAGHAN secondary to failure to autoregulate  3  Hemoglobin:  - Goal Hb of 10-12 g/dL  - Most recent labs suggestive of 12 8 grams/deciliter  - no role for IV iron  - for egd and colonoscopy 12/17/19    4  CKD-MBD(Mineral Bone Disease): - Based on patients CKD stage following is the goal of therapy    - Maintain calcium phosphorus product of < 55   - Stage 3 CKD - Goal Ca 8 5-10 mg/dL , goal Phos 2 7-4 6 mg/dL  , goal iPTH 30-70 pg/mL  - Patient is currently at goal   - Continue patient on vitamin-D 1000 units p o  Q day, [place] t i d   - Patients' most recent vitamin D level is 42 7 as of August 2018  - check intact PTH and vitamin-D levels    5  Lipids:  - goal LDL less than 70  - Management as per PCP    6  ZACK:  - management as per Primary team  - on Zoloft    7  Nutrition:  - Encouraged patient to follow a renal diet comprising of moderate potassium, low phosphorus and protein restriction to 0 8gm/kg  - Will check serum albumin with next blood work  8  Followup:  - Patient is to follow-up in 4 months, with lab work to be performed after the visit and then again in a few days prior to the next visit  Advised patient to call me in 10 days to review the results if they do not hear back from me, as I may have not received the results  Holly Pereira MD, [facility], 12/11/2019, 3:38 PM             HISTORY OF PRESENT ILLNESS: 59 y o  female with past medical history of ZACK, asthma, arthritis, anemia, hypertension (x yr)and CKD stage 3 presents to the office for evaluation and management of abnormal renal parameters  Review of records shows that patient has a baseline creatinine of 1-1 3 mg/dL, had an episode of acute kidney injury in August 2019 with a creatinine of 1 51 mg/dL and subsequently another episode of acute kidney injury on October 24, 2019 with a creatinine of 1 81 mg/dL  Creatinine most recently from November 15, 2019 down to 1 01 mg/dL  Had elective surgery June 2019  Has been having weight loss  No h/o MEAGHAN needing RRT or kidney stones  Reports home SBP of 110-120  Stopped lisinopril nov 2019 was started in June 2019 and started toprol  No NSAID use  No CT IV contrast recently  For colonoscopy and egd later this month for gi symptoms  Overall very anxious  Repeatedly upset at coordinated [employer] that they did not inform her of her abnormal renal parameters  Review of Systems   Constitutional: Negative for appetite change, chills, fatigue and fever  HENT: Negative for congestion, postnasal drip and sore throat  Respiratory: Negative for cough, shortness of breath and wheezing  Cardiovascular: Negative for chest pain, palpitations and leg swelling  Gastrointestinal: Positive for abdominal pain and nausea  Negative for constipation, diarrhea and vomiting  Genitourinary: Negative for difficulty urinating, dysuria and hematuria  Musculoskeletal: Positive for back pain  Chronic   Skin: Negative for rash and wound  Neurological: Positive for headaches  Negative for dizziness and light-headedness  Psychiatric/Behavioral: Negative for agitation and confusion  The patient is hyperactive  All other systems reviewed and are negative        PAST MEDICAL HISTORY:  Past Medical History:   Diagnosis Date    Arthritis     Essential hypertension 8/7/2019    ZACK (generalized anxiety disorder) 12/11/2018    Moderate persistent asthma without complication 42/93/2013    Pulmonary emphysema ([facility] ) 10/23/2018    Stage 3 chronic kidney disease ([facility] ) 12/11/2019       PROBLEM LIST    Patient Active Problem List   Diagnosis    Reactive airway disease    Environmental and seasonal allergies    Arthralgia of right temporomandibular joint    Chronic cough    Borderline high serum cholesterol    Moderate persistent asthma without complication    Chronic nonintractable headache    Pulmonary emphysema (HCC)    Adjustment disorder with mixed anxiety and depressed mood    Osteoarthritis of left hip    Uncomplicated opioid dependence ([address] )    Chronic pain syndrome    Tear of left acetabular labrum    ZACK (generalized anxiety disorder)    Essential hypertension    Weight loss    Generalized abdominal pain    Nausea    History of thrush    Stage 3 chronic kidney disease ([facility] )       PAST SURGICAL HISTORY:  Past Surgical History:   Procedure Laterality Date    COLONOSCOPY      DENTAL SURGERY      FACIAL COSMETIC SURGERY      HIP SURGERY Left 06/2019       SOCIAL HISTORY :   reports that she has never smoked  She has never used smokeless tobacco  She reports that she does not drink alcohol or use drugs  FAMILY HISTORY:  Family History   Problem Relation Age of Onset    Aneurysm Mother     No Known Problems Father     No Known Problems Brother         eye issues       ALLERGIES:  Allergies   Allergen Reactions    Nsaids GI Bleeding    Celebrex [Celecoxib] Swelling    Fosamax [Alendronate]      Bone pain    Ibuprofen Swelling    Influenza Vaccines            PHYSICAL EXAM:  Vitals:    12/11/19 1429 12/11/19 1450   BP: 160/82 142/72   BP Location: Right arm    Patient Position: Sitting    Cuff Size: Adult    Pulse: 80    Weight: 43 2 kg (95 lb 3 2 oz)    Height: 5' 3" (1 6 m)      Body mass index is 16 86 kg/m²  Physical Exam   Constitutional: She is oriented to person, place, and time  She appears well-developed and well-nourished  No distress  Cachectic female   HENT:   Head: Normocephalic and atraumatic  Mouth/Throat: No oropharyngeal exudate  Dry mucous membranes   Eyes: Conjunctivae are normal  No scleral icterus  Neck: Neck supple  No JVD present  No tracheal deviation present  No thyromegaly present  Cardiovascular: Normal heart sounds  Exam reveals no friction rub  Pulmonary/Chest: Effort normal  She has no wheezes  She has no rales  Abdominal: Soft  She exhibits no mass  There is no tenderness  Musculoskeletal: She exhibits no edema  Neurological: She is alert and oriented to person, place, and time  Skin: Skin is warm and dry  No rash noted  She is not diaphoretic  Poor skin turgor   Psychiatric: She has a normal mood and affect  Her behavior is normal    Vitals reviewed          LABORATORY DATA:     Results from last 6 Months   Lab Units 11/15/19  0932 10/30/19  1256 10/24/19  1139   POTASSIUM mmol/L 3 8 3 9 4 3   CHLORIDE mmol/L 109* 105 105   CO2 mmol/L 28 27 30   BUN mg/dL 27* 27* 38*   CREATININE mg/dL 1 01 1 62* 1 81*   CALCIUM mg/dL 9 4 9 6 9 6        rest all reviewed    RADIOLOGY:  No orders to display     Rest all reviewed        MEDICATIONS:    Current Outpatient Medications:     albuterol (VENTOLIN HFA) 90 mcg/act inhaler, Inhale 2 puffs every 6 (six) hours as needed for wheezing, Disp: 18 g, Rfl: 5    amLODIPine (NORVASC) 5 mg tablet, Take 1 tablet (5 mg total) by mouth daily, Disp: 90 tablet, Rfl: 0    Calcium Carbonate-Vitamin D (OSCAL 500/200 D-3 PO), Take by mouth 3 (three) times a day  , Disp: , Rfl:     Cholecalciferol (VITAMIN D3) 25 MCG (1000 UT) CHEW, Chew 1 tablet 2 (two) times a day , Disp: , Rfl:     cyanocobalamin (VITAMIN B-12) 1,000 mcg tablet, Take by mouth daily, Disp: , Rfl:     cyclobenzaprine (FLEXERIL) 10 mg tablet, Take 1 tablet (10 mg total) by mouth 3 (three) times a day as needed for muscle spasms, Disp: 90 tablet, Rfl: 1    famotidine (PEPCID) 20 mg tablet, Take 1 tablet (20 mg total) by mouth 2 (two) times a day, Disp: 180 tablet, Rfl: 0    metoprolol succinate (TOPROL XL) 25 mg 24 hr tablet, Take 1 tablet (25 mg total) by mouth daily, Disp: 90 tablet, Rfl: 1    mometasone-formoterol (DULERA) 100-5 MCG/ACT inhaler, Inhale 2 puffs 2 (two) times a day Rinse mouth after use , Disp: 1 Inhaler, Rfl: 5    nystatin (MYCOSTATIN) 500,000 units/5 mL suspension, Apply 5 mL (500,000 Units total) to the mouth or throat 4 (four) times a day, Disp: 473 mL, Rfl: 0    ondansetron (ZOFRAN) 4 mg tablet, Take 1 tablet (4 mg total) by mouth every 8 (eight) hours as needed for nausea or vomiting, Disp: 20 tablet, Rfl: 0    polyethylene glycol (GOLYTELY) 4000 mL solution, Take 4,000 mL by mouth once for 1 dose, Disp: 4000 mL, Rfl: 0    pyridoxine (VITAMIN B6) 100 mg tablet, Take 100 mg by mouth daily, Disp: , Rfl:     sertraline (ZOLOFT) 25 mg tablet, Take 25 mg by mouth daily , Disp: , Rfl: 1    traMADol (ULTRAM) 50 mg tablet, Take 1 tablet (50 mg total) by mouth every 6 (six) hours as needed for moderate pain, Disp: 30 tablet, Rfl: 0    ferrous sulfate 325 (65 Fe) mg tablet, Take 325 mg by mouth daily with breakfast, Disp: , Rfl:         Portions of the record may have been created with voice recognition software  Occasional wrong word or "sound a like" substitutions may have occurred due to the inherent limitations of voice recognition software  Read the chart carefully and recognize, using context, where substitutions have occurred  If you have any questions, please contact the dictating provider

## 2019-12-11 NOTE — PROGRESS NOTES
Daily Note     Today's date: 2019  Patient name: Agustina Justice  : 1955  MRN: [id number]  Referring provider: Michael Canas DO  Dx:   Encounter Diagnosis     ICD-10-CM    1  Primary osteoarthritis of left hip M16 12    2  Trochanteric bursitis of left hip M70 62    3  Chronic bilateral low back pain without sciatica M54 5     G89 29    4  Primary osteoarthritis of right hip M16 11                   Subjective: Pt notes L thigh pain with [address], no pain with NS  Objective: See treatment diary below      Assessment: Tolerated treatment well  Patient exhibited good technique with therapeutic exercises and would benefit from continued PT      Plan: Continue per plan of care        Precautions: symptom magnification,chronic pain syndrome, anxiety    Re-eval Date:19        Date 19      Visit Count 11 12      FOTO        Pain In 6/10 7/10 hip and back      Pain Out 6/10       Next MD  [place]               Manual        Ham stretch B 4x30"self declined      Piriformis stretch        ITB stretch B 3x30" declined      Gastroc/soleus L 5x/30" declined                  Date 19      [address] [facility] vs  Nustep NS L2  10' [address] [place] [address]  L2 12'      Hip flexor stretch   B 3x/30" declined      Heel slides HEP       SAQs HEP       Hip add squeezes  HEP       hooklying abd w/TB    HEP       Clamshells    See below       90/90 SL abd         SLRs     HEP       Bridges   See below       TR/HR          [employer]  Side-side  Fwd/back          elliptical          Leg ext mach   22# 40x 22# 3x20      Leg  curl mach   11# 40x 11#  3x20      Resisted amb in [place]  4 dir          Obstacle course        Leg press   70# 60 70# 60      SKTC  DKTC B 10x/5"  10x/5" B 10x5"  10x5"      LTR 10x5" 12x5"      Bridge 20x/5" 20x5"      Clamshells Sidely on R  40x SL on R  40x                        Modalities   12 5 19   HP to L thigh/hip Semi-reclined to L hip and LB   10 min 10 min 10 min to low back, declined hip 10 min to LB 10 min to Thoracic and LB  **extra toweling applied

## 2019-12-11 NOTE — PATIENT INSTRUCTIONS
- get blood work this week  - if blood work top number is consistently less than 120 then hold the norvasc    - Please call me in 10 days after having your blood work done to review the results if you do not hear back from me or my office, as I may have not received the results  - please remember to perform blood work prior to the next visit  - Please call if the blood pressure top number is greater than 150 or less than 110 consistently  - Please call if you are gaining more than 2lbs in 2 days for adjustment of water pills   ~ Please AVOID the following pain medications  LIST OF NSAIDS (NONSTEROIDAL ANTI-INFLAMMATORY DRUGS) AND BARRON-2 INHIBITORS    DIFLUNISAL (DOLOBID)  IBUPROFEN (MOTRIN, ADVIL)  FLURBIPROFEN (ANSAID)  KETOPROFEN (ORUDIS, ORUVAIL)  FENOPROFEN (NALFON)  NABUMETONE (RELAFEN)  PIROXICAM (FELDENE)  NAPROXEN (ALEVE, NAPROSYN, NAPRELAN, ANAPROX)  DICLOFENAC (VOLTAREN, CATAFLAM)  INDOMETHACIN (INDOCIN)  SULINDAC (CLINORIL)  TOLMETIN (TOLETIN)  ETODOLAC (LODINE)  MELOXICAM (MOBIC)  KETOROLAC (TORADOL)  OXAPROZIN (DAYPRO)  CELECOXIB (CELEBREX)    Phosphorus diet  Follow a low phosphorus diet      Avoid these higher phosphorus foods: Choose these lower phosphorus foods:   Milk, pudding or yogurt (from animals and from many soy varieties) Rice milk (unfortified), nondairy creamer (if it doesn't have terms in the ingredients list that contain the letters "phos")   Hard cheeses, ricotta or cottage cheese, fat-free cream cheese Regular and low-fat cream cheese   Ice cream or frozen yogurt Sherbet or frozen fruit pops   Soups made with higher phosphorus ingredients (milk, dried peas, beans, lentils) Soups made with lower phosphorus ingredients (broth- or water-based with other lower phosphorus ingredients)   Whole grains, including whole-grain breads, crackers, cereal, rice and pasta Refined grains, including white bread, crackers, cereals, rice and pasta   Quick breads, biscuits, cornbread, muffins, pancakes or waffles Homemade refined (white) dinner rolls, bagels or English muffins   Dried peas (split, black-eyed), beans (black, garbanzo, lima, kidney, navy, irizarry) or lentils Green peas (canned, frozen), green beans or wax beans   Organ meats, walleye, pollock or sardines Lean beef, pork, lamb, poultry or other fish   Nuts and seeds Popcorn   Peanut butter and other nut butters Jam, jelly or honey   Chocolate, including chocolate drinks Carob (chocolate-flavored) candy, hard candy or gumdrops   Marbin and pepper-type sodas, flavored young, bottled teas (if a term in the ingredients list contains the letters "phos") Lemon-lime soda, ginger ale or root beer, plain water     Follow a moderate potassium diet  Things to do to reduce your blood pressure include working with all your physician to do the following:  ~ stop smoking if you smoke  ~ increase cardiovascular exercise like walking and swimming    ~ modify your diet to decrease fat and salt intake  ~ reduce your weight if you are overweight or obese   ~ increase the consumption of fruits, vegetables and whole grains  ~ decrease alcohol consumption if you consume alcohol    ~ try to minimize stress in your life with lifestyle modifications  ~ be compliant with your anti-hypertensive medications  ~ adjust your medications to help improve your vascular stiffness and decrease risks for heart attacks and strokes

## 2019-12-12 ENCOUNTER — TRANSCRIBE ORDERS (OUTPATIENT)
Dept: LAB | Facility: CLINIC | Age: 64
End: 2019-12-12

## 2019-12-12 ENCOUNTER — TELEPHONE (OUTPATIENT)
Dept: GASTROENTEROLOGY | Facility: CLINIC | Age: 64
End: 2019-12-12

## 2019-12-12 ENCOUNTER — APPOINTMENT (OUTPATIENT)
Dept: LAB | Facility: CLINIC | Age: 64
End: 2019-12-12
Payer: COMMERCIAL

## 2019-12-12 DIAGNOSIS — N18.30 STAGE 3 CHRONIC KIDNEY DISEASE (HCC): ICD-10-CM

## 2019-12-12 DIAGNOSIS — I10 ESSENTIAL HYPERTENSION: ICD-10-CM

## 2019-12-12 LAB
25(OH)D3 SERPL-MCNC: 59.7 NG/ML (ref 30–100)
ALBUMIN SERPL BCP-MCNC: 3.9 G/DL (ref 3.5–5)
ANION GAP SERPL CALCULATED.3IONS-SCNC: 5 MMOL/L (ref 4–13)
BACTERIA UR QL AUTO: NORMAL /HPF
BILIRUB UR QL STRIP: NEGATIVE
BUN SERPL-MCNC: 22 MG/DL (ref 5–25)
CALCIUM SERPL-MCNC: 9.1 MG/DL (ref 8.3–10.1)
CHLORIDE SERPL-SCNC: 107 MMOL/L (ref 100–108)
CLARITY UR: CLEAR
CO2 SERPL-SCNC: 27 MMOL/L (ref 21–32)
COLOR UR: YELLOW
CREAT SERPL-MCNC: 1.53 MG/DL (ref 0.6–1.3)
CREAT UR-MCNC: 51.4 MG/DL
GFR SERPL CREATININE-BSD FRML MDRD: 36 ML/MIN/1.73SQ M
GLUCOSE SERPL-MCNC: 80 MG/DL (ref 65–140)
GLUCOSE UR STRIP-MCNC: NEGATIVE MG/DL
HGB UR QL STRIP.AUTO: NEGATIVE
HYALINE CASTS #/AREA URNS LPF: NORMAL /LPF
KETONES UR STRIP-MCNC: NEGATIVE MG/DL
LEUKOCYTE ESTERASE UR QL STRIP: NEGATIVE
MAGNESIUM SERPL-MCNC: 2 MG/DL (ref 1.6–2.6)
NITRITE UR QL STRIP: NEGATIVE
NON-SQ EPI CELLS URNS QL MICRO: NORMAL /HPF
PH UR STRIP.AUTO: 6 [PH]
PHOSPHATE SERPL-MCNC: 3.3 MG/DL (ref 2.3–4.1)
POTASSIUM SERPL-SCNC: 4 MMOL/L (ref 3.5–5.3)
PROT UR STRIP-MCNC: NEGATIVE MG/DL
PROT UR-MCNC: 7 MG/DL
PROT/CREAT UR: 0.14 MG/G{CREAT} (ref 0–0.1)
PTH-INTACT SERPL-MCNC: 41.4 PG/ML (ref 18.4–80.1)
RBC #/AREA URNS AUTO: NORMAL /HPF
SODIUM SERPL-SCNC: 139 MMOL/L (ref 136–145)
SP GR UR STRIP.AUTO: 1.01 (ref 1–1.03)
UROBILINOGEN UR QL STRIP.AUTO: 0.2 E.U./DL
WBC #/AREA URNS AUTO: NORMAL /HPF

## 2019-12-12 PROCEDURE — 36415 COLL VENOUS BLD VENIPUNCTURE: CPT

## 2019-12-12 PROCEDURE — 84166 PROTEIN E-PHORESIS/URINE/CSF: CPT

## 2019-12-12 PROCEDURE — 84166 PROTEIN E-PHORESIS/URINE/CSF: CPT | Performed by: PATHOLOGY

## 2019-12-12 PROCEDURE — 83735 ASSAY OF MAGNESIUM: CPT

## 2019-12-12 PROCEDURE — 81001 URINALYSIS AUTO W/SCOPE: CPT

## 2019-12-12 PROCEDURE — 84165 PROTEIN E-PHORESIS SERUM: CPT

## 2019-12-12 PROCEDURE — 82306 VITAMIN D 25 HYDROXY: CPT

## 2019-12-12 PROCEDURE — 84165 PROTEIN E-PHORESIS SERUM: CPT | Performed by: PATHOLOGY

## 2019-12-12 PROCEDURE — 83970 ASSAY OF PARATHORMONE: CPT

## 2019-12-12 PROCEDURE — 82570 ASSAY OF URINE CREATININE: CPT

## 2019-12-12 PROCEDURE — 84156 ASSAY OF PROTEIN URINE: CPT

## 2019-12-12 PROCEDURE — 80069 RENAL FUNCTION PANEL: CPT

## 2019-12-12 PROCEDURE — 83883 ASSAY NEPHELOMETRY NOT SPEC: CPT

## 2019-12-12 NOTE — TELEPHONE ENCOUNTER
I spoke to Stockton NELSON and went over MRI results which show common bile duct dilation  Unclear whether she has a bile duct stricture or possible stone  We recommend EUS +/- ERCP  I explained both procedures, and she is agreeable  I will enter order and ask our procedure  to call  [facility] - Can you please schedule EUS/ERCP in the next 2-4 weeks?

## 2019-12-12 NOTE — TELEPHONE ENCOUNTER
Patients GI provider:  Dr Pisano     Number to return call:     Reason for call: Pt called stating she had and MRI last week and wanted Dr Emily Pittman to review the MRI results before her up coming colon/egd procedures  Pt also states that she had a hip replacement 6 months ago and usual have to take an antibiotic before any procedures  Do she have to take one for this? If so, she will need something sent to pharmacy      Scheduled procedure/appointment date if applicable: Procedure - 12/17/19

## 2019-12-13 ENCOUNTER — TELEPHONE (OUTPATIENT)
Dept: NEPHROLOGY | Facility: CLINIC | Age: 64
End: 2019-12-13

## 2019-12-13 DIAGNOSIS — N18.30 STAGE 3 CHRONIC KIDNEY DISEASE (HCC): Primary | ICD-10-CM

## 2019-12-13 LAB
KAPPA LC FREE SER-MCNC: 33.4 MG/L (ref 3.3–19.4)
KAPPA LC FREE/LAMBDA FREE SER: 1.34 {RATIO} (ref 0.26–1.65)
LAMBDA LC FREE SERPL-MCNC: 25 MG/L (ref 5.7–26.3)

## 2019-12-13 NOTE — TELEPHONE ENCOUNTER
Called and left a detailed message with regards to blood work from 12/12/2019  Creatinine at 1 5, electrolytes stable UA with no blood or proteinuria  SPEP UPEP still pending  Advised patient to hold all vitamin-D since her vitamin-D level is elevated  Also advised that this actually may be her baseline creatinine since her insult in August of 2019  And that the lab work from November 15th may be an error  Advised patient that we will mail her script for BMP to be done in 1 month just to make sure renal parameters continued stay stable  And to call back if she has any questions or concerns

## 2019-12-14 LAB
ALBUMIN SERPL ELPH-MCNC: 4.17 G/DL (ref 3.5–5)
ALBUMIN SERPL ELPH-MCNC: 65.2 % (ref 52–65)
ALBUMIN UR ELPH-MCNC: 100 %
ALPHA1 GLOB MFR UR ELPH: 0 %
ALPHA1 GLOB SERPL ELPH-MCNC: 0.26 G/DL (ref 0.1–0.4)
ALPHA1 GLOB SERPL ELPH-MCNC: 4.1 % (ref 2.5–5)
ALPHA2 GLOB MFR UR ELPH: 0 %
ALPHA2 GLOB SERPL ELPH-MCNC: 0.56 G/DL (ref 0.4–1.2)
ALPHA2 GLOB SERPL ELPH-MCNC: 8.8 % (ref 7–13)
B-GLOBULIN MFR UR ELPH: 0 %
BETA GLOB ABNORMAL SERPL ELPH-MCNC: 0.43 G/DL (ref 0.4–0.8)
BETA1 GLOB SERPL ELPH-MCNC: 6.7 % (ref 5–13)
BETA2 GLOB SERPL ELPH-MCNC: 4 % (ref 2–8)
BETA2+GAMMA GLOB SERPL ELPH-MCNC: 0.26 G/DL (ref 0.2–0.5)
GAMMA GLOB ABNORMAL SERPL ELPH-MCNC: 0.72 G/DL (ref 0.5–1.6)
GAMMA GLOB MFR UR ELPH: 0 %
GAMMA GLOB SERPL ELPH-MCNC: 11.2 % (ref 12–22)
IGG/ALB SER: 1.87 {RATIO} (ref 1.1–1.8)
PROT PATTERN SERPL ELPH-IMP: ABNORMAL
PROT PATTERN UR ELPH-IMP: NORMAL
PROT SERPL-MCNC: 6.4 G/DL (ref 6.4–8.2)
PROT UR-MCNC: 7 MG/DL

## 2019-12-16 ENCOUNTER — TELEPHONE (OUTPATIENT)
Dept: NEPHROLOGY | Facility: CLINIC | Age: 64
End: 2019-12-16

## 2019-12-16 ENCOUNTER — OFFICE VISIT (OUTPATIENT)
Dept: PHYSICAL THERAPY | Facility: CLINIC | Age: 64
End: 2019-12-16
Payer: COMMERCIAL

## 2019-12-16 DIAGNOSIS — M16.12 PRIMARY OSTEOARTHRITIS OF LEFT HIP: Primary | ICD-10-CM

## 2019-12-16 DIAGNOSIS — Z96.642 HISTORY OF TOTAL HIP REPLACEMENT, LEFT: ICD-10-CM

## 2019-12-16 DIAGNOSIS — M70.62 TROCHANTERIC BURSITIS OF LEFT HIP: ICD-10-CM

## 2019-12-16 DIAGNOSIS — G89.29 CHRONIC BILATERAL LOW BACK PAIN WITHOUT SCIATICA: ICD-10-CM

## 2019-12-16 DIAGNOSIS — M16.11 PRIMARY OSTEOARTHRITIS OF RIGHT HIP: ICD-10-CM

## 2019-12-16 DIAGNOSIS — M54.50 CHRONIC BILATERAL LOW BACK PAIN WITHOUT SCIATICA: ICD-10-CM

## 2019-12-16 PROCEDURE — 97110 THERAPEUTIC EXERCISES: CPT

## 2019-12-16 NOTE — TELEPHONE ENCOUNTER
----- Message from Ollie Samuel MD sent at 12/16/2019  2:01 PM EST -----  Please let the patient know that most recent lab work results are stable  Will discuss further at the upcoming visit, let me know if they have any questions or concerns      Thanks

## 2019-12-16 NOTE — PROGRESS NOTES
Daily Note     Today's date: 2019  Patient name: Prem Lee  : 1955  MRN: [id number]  Referring provider: Paul Lora DO  Dx:   Encounter Diagnosis     ICD-10-CM    1  Primary osteoarthritis of left hip M16 12    2  Trochanteric bursitis of left hip M70 62    3  Chronic bilateral low back pain without sciatica M54 5     G89 29    4  Primary osteoarthritis of right hip M16 11    5  History of total hip replacement, left [id number]                   Subjective: Pt states she was dxed with stg 3 kidney ds due to not being taken off Lisinopril post surgery  Is also sched for EGD and Colonoscopy tomorrow  Objective: See treatment diary below      Assessment: Tolerated treatment well  Patient exhibited good technique with therapeutic exercises and would benefit from continued PT      Plan: Progress treatment as tolerated         Precautions: symptom magnification,chronic pain syndrome, anxiety    Re-eval Date:19        Date 19     Visit Count 11 12 13     FOTO        Pain In 6/10 7/10 hip and back 6/10 hip  8/10 LB     Pain Out 6/10  same     Next MD  [place]               Manual       Ham stretch B 4x30"self declined declined     Piriformis stretch        ITB stretch B 3x30" declined declined     Gastroc/soleus L 5x/30" declined decclined                 Date 19     [address] [facility] vs  Nustep NS L2  10' [facility]  L2 12' NS L1 12'     Hip flexor stretch   B 3x/30" declined      Heel slides HEP       SAQs HEP       Hip add squeezes  HEP       hooklying abd w/TB    HEP       Clamshells    See below       90/90 SL abd         SLRs     HEP       Bridges   See below       TR/HR          [employer]  Side-side  Fwd/back          elliptical          Leg ext mach   22# 40x 22# 3x20 22# 4x20     Leg  curl mach   11# 40x 11#  3x20 11# 4x20     Resisted amb in [place]  4 dir          Obstacle course        Leg press   70# 60 70# 60 70# 60x [address] B 10x/5"  10x/5" B 10x5"  10x5" B 10x5"  10x5"     LTR 10x5" 12x5" 12x5"     Bridge 20x/5" 20x5" 20x5"     Clamshells Sidely on R  40x SL on R  40x SL  on R  40x                       Modalities  12/9 12/11 12/16 12/2 12 5 19   HP to L thigh/hip Semi-reclined to L hip and LB   10 min 10 min 10 min to low back 10 min to LB 10 min to Thoracic and LB  **extra toweling applied

## 2019-12-17 ENCOUNTER — HOSPITAL ENCOUNTER (OUTPATIENT)
Dept: GASTROENTEROLOGY | Facility: MEDICAL CENTER | Age: 64
Setting detail: OUTPATIENT SURGERY
Discharge: HOME | End: 2019-12-17
Attending: INTERNAL MEDICINE | Admitting: INTERNAL MEDICINE
Payer: COMMERCIAL

## 2019-12-17 ENCOUNTER — ANESTHESIA (OUTPATIENT)
Dept: GASTROENTEROLOGY | Facility: MEDICAL CENTER | Age: 64
End: 2019-12-17

## 2019-12-17 VITALS
DIASTOLIC BLOOD PRESSURE: 71 MMHG | HEART RATE: 62 BPM | HEIGHT: 63 IN | SYSTOLIC BLOOD PRESSURE: 136 MMHG | TEMPERATURE: 98.5 F | RESPIRATION RATE: 20 BRPM | BODY MASS INDEX: 16.83 KG/M2 | WEIGHT: 95 LBS | OXYGEN SATURATION: 99 %

## 2019-12-17 DIAGNOSIS — R63.4 WEIGHT LOSS: ICD-10-CM

## 2019-12-17 PROCEDURE — 45380 COLONOSCOPY AND BIOPSY: CPT | Performed by: INTERNAL MEDICINE

## 2019-12-17 PROCEDURE — 45385 COLONOSCOPY W/LESION REMOVAL: CPT | Performed by: INTERNAL MEDICINE

## 2019-12-17 PROCEDURE — 43239 EGD BIOPSY SINGLE/MULTIPLE: CPT | Performed by: INTERNAL MEDICINE

## 2019-12-17 PROCEDURE — 88305 TISSUE EXAM BY PATHOLOGIST: CPT | Performed by: PATHOLOGY

## 2019-12-17 RX ORDER — MIDAZOLAM HYDROCHLORIDE 2 MG/2ML
2 INJECTION, SOLUTION INTRAMUSCULAR; INTRAVENOUS ONCE
Status: COMPLETED | OUTPATIENT
Start: 2019-12-17 | End: 2019-12-17

## 2019-12-17 RX ORDER — LIDOCAINE HYDROCHLORIDE 20 MG/ML
INJECTION, SOLUTION EPIDURAL; INFILTRATION; INTRACAUDAL; PERINEURAL AS NEEDED
Status: DISCONTINUED | OUTPATIENT
Start: 2019-12-17 | End: 2019-12-17 | Stop reason: SURG

## 2019-12-17 RX ORDER — PROPOFOL 10 MG/ML
INJECTION, EMULSION INTRAVENOUS CONTINUOUS PRN
Status: DISCONTINUED | OUTPATIENT
Start: 2019-12-17 | End: 2019-12-17 | Stop reason: SURG

## 2019-12-17 RX ORDER — PROPOFOL 10 MG/ML
INJECTION, EMULSION INTRAVENOUS AS NEEDED
Status: DISCONTINUED | OUTPATIENT
Start: 2019-12-17 | End: 2019-12-17 | Stop reason: SURG

## 2019-12-17 RX ORDER — SODIUM CHLORIDE 9 MG/ML
125 INJECTION, SOLUTION INTRAVENOUS CONTINUOUS
Status: DISCONTINUED | OUTPATIENT
Start: 2019-12-17 | End: 2019-12-17

## 2019-12-17 RX ORDER — ONDANSETRON 2 MG/ML
INJECTION INTRAMUSCULAR; INTRAVENOUS AS NEEDED
Status: DISCONTINUED | OUTPATIENT
Start: 2019-12-17 | End: 2019-12-17 | Stop reason: SURG

## 2019-12-17 RX ORDER — ALBUTEROL SULFATE 2.5 MG/3ML
2.5 SOLUTION RESPIRATORY (INHALATION) ONCE AS NEEDED
Status: DISCONTINUED | OUTPATIENT
Start: 2019-12-17 | End: 2019-12-21 | Stop reason: HOSPADM

## 2019-12-17 RX ORDER — ONDANSETRON 2 MG/ML
4 INJECTION INTRAMUSCULAR; INTRAVENOUS ONCE AS NEEDED
Status: DISCONTINUED | OUTPATIENT
Start: 2019-12-17 | End: 2019-12-21 | Stop reason: HOSPADM

## 2019-12-17 RX ADMIN — MIDAZOLAM HYDROCHLORIDE 2 MG: 1 INJECTION, SOLUTION INTRAMUSCULAR; INTRAVENOUS at 12:17

## 2019-12-17 RX ADMIN — PROPOFOL 100 MCG/KG/MIN: 10 INJECTION, EMULSION INTRAVENOUS at 12:28

## 2019-12-17 RX ADMIN — PROPOFOL 20 MG: 10 INJECTION, EMULSION INTRAVENOUS at 12:26

## 2019-12-17 RX ADMIN — ONDANSETRON 4 MG: 2 INJECTION INTRAMUSCULAR; INTRAVENOUS at 12:37

## 2019-12-17 RX ADMIN — LIDOCAINE HYDROCHLORIDE 3 ML: 20 INJECTION, SOLUTION EPIDURAL; INFILTRATION; INTRACAUDAL; PERINEURAL at 12:23

## 2019-12-17 RX ADMIN — PROPOFOL 100 MG: 10 INJECTION, EMULSION INTRAVENOUS at 12:23

## 2019-12-17 RX ADMIN — SODIUM CHLORIDE: 0.9 INJECTION, SOLUTION INTRAVENOUS at 12:35

## 2019-12-17 RX ADMIN — SODIUM CHLORIDE 125 ML/HR: 0.9 INJECTION, SOLUTION INTRAVENOUS at 12:02

## 2019-12-17 NOTE — ANESTHESIA PREPROCEDURE EVALUATION
Review of Systems/Medical History  Patient summary reviewed  Chart reviewed      Cardiovascular  Hypertension ,    Pulmonary  COPD mild- PRN medication , Asthma , well controlled/ stable ,        GI/Hepatic    Bowel prep       Chronic kidney disease stage 3,        Endo/Other  Negative endo/other ROS      GYN  , Prior pregnancy/OB history : 1 Parity: 1,          Hematology  Negative hematology ROS      Musculoskeletal    Arthritis     Neurology    Headaches,    Psychology   Anxiety,              Physical Exam    Airway    Mallampati score: I  TM Distance: <3 FB  Neck ROM: full     Dental   No notable dental hx     Cardiovascular  Rhythm: regular, Rate: normal, Cardiovascular exam normal    Pulmonary  Pulmonary exam normal     Other Findings        Anesthesia Plan  ASA Score- 3     Anesthesia Type- IV sedation with anesthesia with ASA Monitors  Additional Monitors:   Airway Plan:         Plan Factors- Patient instructed to abstain from smoking on day of procedure  Patient did not smoke on day of surgery      Induction-     Postoperative Plan-     Informed Consent-

## 2019-12-17 NOTE — DISCHARGE INSTRUCTIONS
Upper Endoscopy   WHAT YOU NEED TO KNOW:   An upper endoscopy is also called an upper gastrointestinal (GI) endoscopy, or an esophagogastroduodenoscopy (EGD)  You may feel bloated, gassy, or have some abdominal discomfort after your procedure  Your throat may be sore for 24 to 36 hours  You may burp or pass gas from air that is still inside your body  DISCHARGE INSTRUCTIONS:   Call 911 if:   · You have sudden chest pain or trouble breathing  Seek care immediately if:   · You feel dizzy or faint  · You have trouble swallowing  · You have severe throat pain  · Your bowel movements are very dark or black  · Your abdomen is hard and firm and you have severe pain  · You vomit blood  Contact your healthcare provider if:   · You feel full or bloated and cannot burp or pass gas  · You have not had a bowel movement for 3 days after your procedure  · You have neck pain  · You have a fever or chills  · You have nausea or are vomiting  · You have a rash or hives  · You have questions or concerns about your endoscopy  Relieve a sore throat:  Suck on throat lozenges or crushed ice  Gargle with a small amount of warm salt water  Mix 1 teaspoon of salt and 1 cup of warm water to make salt water  Relieve gas and discomfort from bloating:  Lie on your right side with a heating pad on your abdomen  Take short walks to help pass gas  Eat small meals until bloating is relieved  Rest after your procedure:  Do not drive or make important decisions until the day after your procedure  Return to your normal activity as directed  You can usually return to work the day after your procedure  Follow up with your healthcare provider as directed:  Write down your questions so you remember to ask them during your visits  © 2017 [employer] Information is for End User's use only and may not be sold, redistributed or otherwise used for commercial purposes   All illustrations and images included in CareNotes® are the copyrighted property of [employer]  or Yrn Uribe  The above information is an  only  It is not intended as medical advice for individual conditions or treatments  Talk to your doctor, nurse or pharmacist before following any medical regimen to see if it is safe and effective for you  Colonoscopy   WHAT YOU NEED TO KNOW:   A colonoscopy is a procedure to examine the inside of your colon (intestine) with a scope  Polyps or tissue growths may have been removed during your colonoscopy  It is normal to feel bloated and to have some abdominal discomfort  You should be passing gas  If you have hemorrhoids or you had polyps removed, you may have a small amount of bleeding  DISCHARGE INSTRUCTIONS:   Seek care immediately if:   · You have a large amount of bright red blood in your bowel movements  · Your abdomen is hard and firm and you have severe pain  · You have sudden trouble breathing  Contact your healthcare provider if:   · You develop a rash or hives  · You have a fever within 24 hours of your procedure  · You have not had a bowel movement for 3 days after your procedure  · You have questions or concerns about your condition or care  Activity:   · Do not lift, strain, or run  for 3 days after your procedure  · Rest after your procedure  You have been given medicine to relax you  Do not  drive or make important decisions until the day after your procedure  Return to your normal activity as directed  · Relieve gas and discomfort from bloating  by lying on your right side with a heating pad on your abdomen  You may need to take short walks to help the gas move out  Eat small meals until bloating is relieved  If you had polyps removed: For 7 days after your procedure:  · Do not  take aspirin  · Do not  go on long car rides  Help prevent constipation:   · Eat a variety of healthy foods    Healthy foods include fruit, vegetables, whole-grain breads, low-fat dairy products, beans, lean meat, and fish  Ask if you need to be on a special diet  Your healthcare provider may recommend that you eat high-fiber foods such as cooked beans  Fiber helps you have regular bowel movements  · Drink liquids as directed  Adults should drink between 9 and 13 eight-ounce cups of liquid every day  Ask what amount is best for you  For most people, good liquids to drink are water, juice, and milk  · Exercise as directed  Talk to your healthcare provider about the best exercise plan for you  Exercise can help prevent constipation, decrease your blood pressure and improve your health  Follow up with your healthcare provider as directed:  Write down your questions so you remember to ask them during your visits  © 2017 [address] [employer] Information is for End User's use only and may not be sold, redistributed or otherwise used for commercial purposes  All illustrations and images included in CareNotes® are the copyrighted property of [employer] M [employer]  or Yrn Uribe  The above information is an  only  It is not intended as medical advice for individual conditions or treatments  Talk to your doctor, nurse or pharmacist before following any medical regimen to see if it is safe and effective for you  Colorectal Polyps   WHAT YOU NEED TO KNOW:   What are colorectal polyps? Colorectal polyps are small growths of tissue in the lining of the colon and rectum  Most polyps are hyperplastic polyps and are usually benign (noncancerous)  Certain types of polyps, called adenomatous polyps, may turn into cancer  What increases my risk of colorectal polyps? The exact cause of colorectal polyps is unknown   The following may increase your risk:  · Older age    · A diet of foods high in fat and low in fiber     · Family history of polyps    · Intestinal diseases, such as Crohn's disease or ulcerative colitis    · An unhealthy lifestyle, such as physical inactivity, smoking, or drinking alcohol    · Obesity  What are the signs and symptoms of colorectal polyps? · Blood in your bowel movement or bleeding from the rectum    · Change in bowel movement habits, such as diarrhea and constipation    · Abdominal pain  How are colorectal polyps diagnosed? You should have fecal blood screening once a year for colorectal disease if you are over 48years old  You should be screened earlier if you have an intestinal disease or a family history of polyps or colorectal cancer  During this screening, a sample of your bowel movement is checked for blood, which may be an early sign of colorectal polyps or cancer  You may also need any of the following tests:  · Digital rectal exam:  Your healthcare provider will examine your anus and use a finger to check your rectum for polyps  · Barium enema: A barium enema is an x-ray of the colon  A tube is put into your anus, and a liquid called barium is put through the tube  Barium is used so that caregivers can see your colon better on the x-ray film  · Virtual colonoscopy: This is a CT scan that takes pictures of the inside of your colon and rectum  A small, flexible tube is put into your rectum and air or carbon dioxide (gas) is used to expand your colon  This lets healthcare providers clearly see your colon and any polyps on a monitor  · Colonoscopy or sigmoidoscopy: These procedures help your healthcare provider see the inside of your colon using a flexible tube with a small light and camera on the end  During a sigmoidoscopy, your healthcare provider will only look at rectum and lower colon  During a colonoscopy, healthcare providers will look at the full length of your colon  Healthcare providers may remove a small amount of tissue from the colon for a biopsy  How are colorectal polyps treated?    · Polyp removal:  Polyps may be removed during your sigmoidoscopy or colonoscopy  · Polypectomy: This is surgery to remove your polyps  You may need laparoscopic or open surgery, depending on the type, size, and number of polyps that you have  Laparoscopy is done by inserting a small, flexible scope into incisions made on your abdomen  Open surgery is done by making a larger incision on your abdomen   What are the risks of colorectal polyps? You may bleed during a colonoscopy procedure  Your bowel may be perforated (torn) when polyps are removed  This may lead to an open abdominal surgery  During surgery, you may bleed too much or get an infection  Adenomatous polyps that are not removed may turn into cancer and become more difficult to treat  Where can I find support and more information? · [address] ([facility])  [address], [place] [address]  Phone: [phone]  Web Address: Rosemarie Hicks  [facility] [employer]  When should I contact my healthcare provider? · You have a fever  · You have chills, a cough, or feel weak and achy  · You have abdominal pain that does not go away or gets worse after you take medicine  · Your abdomen is swollen  · You are losing weight without trying  · You have questions or concerns about your condition or care  When should I seek immediate care or call 911? · You have sudden shortness of breath  · You have a fast heart rate, fast breathing, or are too dizzy to stand up  · You have severe abdominal pain  · You see blood in your bowel movement  CARE AGREEMENT:   You have the right to help plan your care  Learn about your health condition and how it may be treated  Discuss treatment options with your caregivers to decide what care you want to receive  You always have the right to refuse treatment  The above information is an  only  It is not intended as medical advice for individual conditions or treatments   Talk to your doctor, nurse or pharmacist before following any medical regimen to see if it is safe and effective for you  © 2017 [address] [employer] Information is for End User's use only and may not be sold, redistributed or otherwise used for commercial purposes  All illustrations and images included in CareNotes® are the copyrighted property of [employer]  or Yrn Cruz   WHAT YOU NEED TO KNOW:   What is diverticulosis? Diverticulosis is a condition that causes small pockets called diverticula to form in your intestine  These pockets make it difficult for bowel movements to pass through your digestive system  What causes diverticulosis? Diverticula form when muscles have to work hard to move bowel movements through the intestine  The force causes bulges to form at weak areas in the intestine  This may happen if you eat foods that are low in fiber  Fiber helps give your bowel movements more bulk so they are larger and easier to move through your colon  The following may increase your risk of diverticulosis:  · A history of constipation    · Age 36 or older    · Obesity    · Lack of exercise  What are the signs and symptoms of diverticulosis? Diverticulosis usually does not cause any signs or symptoms  It may cause any of the following in some people:  · Pain or discomfort in your lower abdomen    · Abdominal bloating    · Constipation or diarrhea  How is diverticulosis diagnosed? Your healthcare provider will examine you and ask about your bowel movements, diet, and symptoms  He or she will also ask about any medical conditions you have or medicines you take  You may need any of the following:  · Blood tests  may be done to check for signs of inflammation  · A barium enema  is an x-ray of your colon that may show diverticula  A tube is put into your anus, and a liquid called barium is put through the tube   Barium is used so that healthcare providers can see your colon more clearly  · Flexible sigmoidoscopy  is a test to look for any changes in your lower intestines and rectum  It may also show the cause of any bleeding or pain  A soft, bendable tube with a light on the end will be put into your anus  It will then be moved forward into your intestine  · A colonoscopy  is used to look at your whole colon  A scope (long bendable tube with a light on the end) is used to take pictures  This test may show diverticula  · A CT scan , or CAT scan, may show diverticula  You may be given contrast liquid before the scan  Tell the healthcare provider if you have ever had an allergic reaction to contrast liquid  How is diverticulosis managed? The goal of treatment is to manage any symptoms you have and prevent other problems such as diverticulitis  Diverticulitis is swelling or infection of the diverticula  Your healthcare provider may recommend any of the following:  · Eat a variety of high-fiber foods  High-fiber foods help you have regular bowel movements  High-fiber foods include cooked beans, fruits, vegetables, and some cereals  Most adults need 25 to 35 grams of fiber each day  Your healthcare provider may recommend that you have more  Ask your healthcare provider how much fiber you need  Increase fiber slowly  You may have abdominal discomfort, bloating, and gas if you add fiber to your diet too quickly  You may need to take a fiber supplement if you are not getting enough fiber from food  · Medicines  to soften your bowel movements may be given  You may also need medicines to treat symptoms such as bloating and pain  · Drink liquids as directed  You may need to drink 2 to 3 liters (8 to 12 cups) of liquids every day  Ask your healthcare provider how much liquid to drink each day and which liquids are best for you  · Apply heat  on your abdomen for 20 to 30 minutes every 2 hours for as many days as directed  Heat helps decrease pain and muscle spasms    How can I help prevent diverticulitis or other symptoms? The following may help decrease your risk for diverticulitis or symptoms, such as bleeding  Talk to your provider about these or other things you can do to prevent problems that may occur with diverticulosis  · Exercise regularly  Ask your healthcare provider about the best exercise plan for you  Exercise can help you have regular bowel movements  Get 30 minutes of exercise on most days of the week  · Maintain a healthy weight  Ask your healthcare provider how much you should weigh  Ask him or her to help you create a weight loss plan if you are overweight  · Do not smoke  Nicotine and other chemicals in cigarettes increase your risk for diverticulitis  Ask your healthcare provider for information if you currently smoke and need help to quit  E-cigarettes or smokeless tobacco still contain nicotine  Talk to your healthcare provider before you use these products  · Ask your healthcare provider if it is safe to take NSAIDs  NSAIDs may increase your risk of diverticulitis  When should I seek immediate care? · You have severe pain on the left side of your lower abdomen  · Your bowel movements are bright or dark red  When should I contact my healthcare provider? · You have a fever and chills  · You feel dizzy or lightheaded  · You have nausea, or you are vomiting  · You have a change in your bowel movements  · You have questions or concerns about your condition or care  CARE AGREEMENT:   You have the right to help plan your care  Learn about your health condition and how it may be treated  Discuss treatment options with your caregivers to decide what care you want to receive  You always have the right to refuse treatment  The above information is an  only  It is not intended as medical advice for individual conditions or treatments   Talk to your doctor, nurse or pharmacist before following any medical regimen to see if it is safe and effective for you  © 2017 [address] [employer] Information is for End User's use only and may not be sold, redistributed or otherwise used for commercial purposes  All illustrations and images included in CareNotes® are the copyrighted property of [employer]  or Yrn Uribe

## 2019-12-17 NOTE — ANESTHESIA POSTPROCEDURE EVALUATION
Post-Op Assessment Note    CV Status:  Stable    Pain management: adequate     Mental Status:  Alert and awake   Hydration Status:  Euvolemic   PONV Controlled:  Controlled   Airway Patency:  Patent   Post Op Vitals Reviewed: Yes      Staff: Anesthesiologist           /71 (12/17/19 1318)    Temp      Pulse 62 (12/17/19 1318)   Resp 20 (12/17/19 1318)    SpO2 99 % (12/17/19 1318)

## 2019-12-17 NOTE — H&P
History and Physical -  [facility]  Bi Causey 59 y o  female MRN: [id number]                  HPI: Bi Causey is a 59y o  year old female who presents for EGD and colonoscopy for weight loss  REVIEW OF SYSTEMS: Per the HPI, and otherwise unremarkable  Historical Information   Past Medical History:   Diagnosis Date    Arthritis     Essential hypertension 8/7/2019    ZACK (generalized anxiety disorder) 12/11/2018    Moderate persistent asthma without complication 67/88/1099    Pulmonary emphysema ([facility] [address] ) 10/23/2018    Stage 3 chronic kidney disease ([facility] [address] ) 12/11/2019     Past Surgical History:   Procedure Laterality Date    COLONOSCOPY      DENTAL SURGERY      FACIAL COSMETIC SURGERY      HIP SURGERY Left 06/2019     Social History   Social History     Substance and Sexual Activity   Alcohol Use No     Social History     Substance and Sexual Activity   Drug Use No     Social History     Tobacco Use   Smoking Status Never Smoker   Smokeless Tobacco Never Used     Family History   Problem Relation Age of Onset    Aneurysm Mother     No Known Problems Father     No Known Problems Brother         eye issues       Meds/Allergies       (Not in a hospital admission)    Allergies   Allergen Reactions    Nsaids GI Bleeding    Celebrex [Celecoxib] Swelling    Fosamax [Alendronate]      Bone pain    Ibuprofen Swelling    Influenza Vaccines        Objective     BP (!) 188/88   Pulse 70   Temp 98 5 °F (36 9 °C) (Temporal)   Resp 16   Ht 5' 3" (1 6 m)   Wt 43 1 kg (95 lb)   SpO2 100%   BMI 16 83 kg/m²       PHYSICAL EXAM    Gen: NAD  CV: RRR  CHEST: Clear  ABD: soft, NT/ND  EXT: no edema      ASSESSMENT/PLAN:  This is a 59y o  year old female here for EGD and colonoscopy evaluation, and she is stable and optimized for her procedure

## 2019-12-19 ENCOUNTER — EVALUATION (OUTPATIENT)
Dept: PHYSICAL THERAPY | Facility: CLINIC | Age: 64
End: 2019-12-19
Payer: COMMERCIAL

## 2019-12-19 ENCOUNTER — OFFICE VISIT (OUTPATIENT)
Dept: INTERNAL MEDICINE CLINIC | Facility: CLINIC | Age: 64
End: 2019-12-19
Payer: COMMERCIAL

## 2019-12-19 VITALS
WEIGHT: 97 LBS | SYSTOLIC BLOOD PRESSURE: 140 MMHG | HEART RATE: 66 BPM | TEMPERATURE: 98.1 F | OXYGEN SATURATION: 98 % | BODY MASS INDEX: 17.19 KG/M2 | HEIGHT: 63 IN | DIASTOLIC BLOOD PRESSURE: 70 MMHG

## 2019-12-19 DIAGNOSIS — R10.84 GENERALIZED ABDOMINAL PAIN: Primary | ICD-10-CM

## 2019-12-19 DIAGNOSIS — R63.4 WEIGHT LOSS: ICD-10-CM

## 2019-12-19 DIAGNOSIS — M54.50 CHRONIC BILATERAL LOW BACK PAIN WITHOUT SCIATICA: ICD-10-CM

## 2019-12-19 DIAGNOSIS — M16.12 PRIMARY OSTEOARTHRITIS OF LEFT HIP: Primary | ICD-10-CM

## 2019-12-19 DIAGNOSIS — R94.4 ABNORMAL RENAL FUNCTION TEST: ICD-10-CM

## 2019-12-19 DIAGNOSIS — M79.652 LEFT THIGH PAIN: ICD-10-CM

## 2019-12-19 DIAGNOSIS — K83.1 STRICTURE OF BILE DUCT: ICD-10-CM

## 2019-12-19 DIAGNOSIS — K57.90 DIVERTICULOSIS: ICD-10-CM

## 2019-12-19 DIAGNOSIS — M16.11 PRIMARY OSTEOARTHRITIS OF RIGHT HIP: ICD-10-CM

## 2019-12-19 DIAGNOSIS — K63.5 POLYP OF COLON, UNSPECIFIED PART OF COLON, UNSPECIFIED TYPE: ICD-10-CM

## 2019-12-19 DIAGNOSIS — M70.62 TROCHANTERIC BURSITIS OF LEFT HIP: ICD-10-CM

## 2019-12-19 DIAGNOSIS — I10 ESSENTIAL HYPERTENSION: ICD-10-CM

## 2019-12-19 DIAGNOSIS — G89.29 CHRONIC BILATERAL LOW BACK PAIN WITHOUT SCIATICA: ICD-10-CM

## 2019-12-19 DIAGNOSIS — G89.4 CHRONIC PAIN SYNDROME: ICD-10-CM

## 2019-12-19 PROCEDURE — 3008F BODY MASS INDEX DOCD: CPT | Performed by: INTERNAL MEDICINE

## 2019-12-19 PROCEDURE — 97164 PT RE-EVAL EST PLAN CARE: CPT

## 2019-12-19 PROCEDURE — 1036F TOBACCO NON-USER: CPT | Performed by: INTERNAL MEDICINE

## 2019-12-19 PROCEDURE — 99214 OFFICE O/P EST MOD 30 MIN: CPT | Performed by: INTERNAL MEDICINE

## 2019-12-19 PROCEDURE — 97110 THERAPEUTIC EXERCISES: CPT

## 2019-12-19 NOTE — PROGRESS NOTES
Assessment/Plan:  Problem List Items Addressed This Visit        Digestive    Diverticulosis    Polyp of colon       Cardiovascular and Mediastinum    Essential hypertension       Other    Chronic pain syndrome    Weight loss    Generalized abdominal pain - Primary      Other Visit Diagnoses     Abnormal renal function test        Left thigh pain        Stricture of bile duct               Diagnoses and all orders for this visit:    Generalized abdominal pain    Abnormal renal function test    Weight loss    Left thigh pain    Essential hypertension    Chronic pain syndrome    Stricture of bile duct    Polyp of colon, unspecified part of colon, unspecified type    Diverticulosis        No problem-specific Assessment & Plan notes found for this encounter  A/P: Doing a little better  Appreciate nephro and GI input  Awaiting further studies and labs, including path  Has an ERCP coming up  Will continue current treatment  ZACK is better  Avoid nephro toxins and maintain hydration  Keep f/u with the specialist  RTC one month for f/u /    Subjective:      Patient ID: Agustina Justice is a 59 y o  female  WF RTC for f/u several issues  First, CKD3  Seen by nephro and further w/u in progress  Meds adjusted and pt given parameters for BP  Second, wt loss, abdominal pain, and ?sphincter of Eliseo stricture  Seen by GI and had an EGD and colonoscopy with findings of polyps, diverticulosis, and non specific mucosa changes  Bx pending  Next, left thigh and hip pain  Seeing pain management, ortho, and PT  Finally, ZACK  Doing a little better with slight gain in weight  Pt less anxious and less emotional  Hip no better  Taste is off yet         The following portions of the patient's history were reviewed and updated as appropriate:   She has a past medical history of Arthritis, Essential hypertension (8/7/2019), ZACK (generalized anxiety disorder) (12/11/2018), Moderate persistent asthma without complication (40/35/9505), Pulmonary emphysema ([facility] [address] ) (10/23/2018), and Stage 3 chronic kidney disease ([facility] ) (12/11/2019)  ,  does not have any pertinent problems on file  ,   has a past surgical history that includes Facial cosmetic surgery; Colonoscopy; Dental surgery; and Hip surgery (Left, 06/2019)  ,  family history includes Aneurysm in her mother; No Known Problems in her brother and father  ,   reports that she has never smoked  She has never used smokeless tobacco  She reports that she does not drink alcohol or use drugs  ,  is allergic to nsaids; celebrex [celecoxib]; fosamax [alendronate]; ibuprofen; and influenza vaccines     Current Outpatient Medications   Medication Sig Dispense Refill    albuterol (VENTOLIN HFA) 90 mcg/act inhaler Inhale 2 puffs every 6 (six) hours as needed for wheezing 18 g 5    amLODIPine (NORVASC) 5 mg tablet Take 1 tablet (5 mg total) by mouth daily 90 tablet 0    Calcium Carbonate-Vitamin D (OSCAL 500/200 D-3 PO) Take by mouth 3 (three) times a day        Cholecalciferol (VITAMIN D3) 25 MCG (1000 UT) CHEW Chew 1 tablet 2 (two) times a day       cyanocobalamin (VITAMIN B-12) 1,000 mcg tablet Take by mouth daily      famotidine (PEPCID) 20 mg tablet Take 1 tablet (20 mg total) by mouth 2 (two) times a day 180 tablet 0    ferrous sulfate 325 (65 Fe) mg tablet Take 325 mg by mouth daily with breakfast      metoprolol succinate (TOPROL XL) 25 mg 24 hr tablet Take 1 tablet (25 mg total) by mouth daily 90 tablet 1    mometasone-formoterol (DULERA) 100-5 MCG/ACT inhaler Inhale 2 puffs 2 (two) times a day Rinse mouth after use   1 Inhaler 5    pyridoxine (VITAMIN B6) 100 mg tablet Take 100 mg by mouth daily      sertraline (ZOLOFT) 25 mg tablet Take 25 mg by mouth daily   1    traMADol (ULTRAM) 50 mg tablet Take 1 tablet (50 mg total) by mouth every 6 (six) hours as needed for moderate pain 30 tablet 0    cyclobenzaprine (FLEXERIL) 10 mg tablet Take 1 tablet (10 mg total) by mouth 3 (three) times a day as needed for muscle spasms (Patient not taking: Reported on 12/19/2019) 90 tablet 1    nystatin (MYCOSTATIN) 500,000 units/5 mL suspension Apply 5 mL (500,000 Units total) to the mouth or throat 4 (four) times a day (Patient not taking: Reported on 12/19/2019) 473 mL 0    ondansetron (ZOFRAN) 4 mg tablet Take 1 tablet (4 mg total) by mouth every 8 (eight) hours as needed for nausea or vomiting (Patient not taking: Reported on 12/19/2019) 20 tablet 0     No current facility-administered medications for this visit  Facility-Administered Medications Ordered in Other Visits   Medication Dose Route Frequency Provider Last Rate Last Dose    albuterol inhalation solution 2 5 mg  2 5 mg Nebulization Once PRN Rafaela Hall,         ondansetron (ZOFRAN) injection 4 mg  4 mg Intravenous Once PRN Brody Pepe, DO           Review of Systems   Constitutional: Negative for activity change, chills, diaphoresis, fatigue and fever  HENT: Negative  Eyes: Negative for visual disturbance  Respiratory: Negative for cough, chest tightness, shortness of breath and wheezing  Cardiovascular: Negative for chest pain, palpitations and leg swelling  Gastrointestinal: Negative for abdominal pain, constipation, diarrhea, nausea and vomiting  Endocrine: Negative for cold intolerance and heat intolerance  Genitourinary: Negative for difficulty urinating, dysuria and frequency  Musculoskeletal: Positive for arthralgias and gait problem  Negative for joint swelling and myalgias  Neurological: Negative for dizziness, seizures, syncope, weakness, light-headedness, numbness and headaches  Psychiatric/Behavioral: Positive for dysphoric mood  Negative for confusion and sleep disturbance  The patient is nervous/anxious          PHQ-9 Depression Screening    PHQ-9:    Frequency of the following problems over the past two weeks:             Objective:  Vitals:    12/19/19 1239   BP: 140/70   Pulse: 66   Temp: 98 1 °F (36 7 °C)   SpO2: 98% Weight: 44 kg (97 lb)   Height: 5' 3" (1 6 m)     Body mass index is 17 18 kg/m²  Physical Exam   Constitutional: She is oriented to person, place, and time  She appears well-developed and well-nourished  No distress  HENT:   Head: Normocephalic and atraumatic  Mouth/Throat: Oropharynx is clear and moist    Eyes: Pupils are equal, round, and reactive to light  Conjunctivae and EOM are normal    Cardiovascular: Normal rate, regular rhythm and normal heart sounds  Pulmonary/Chest: Effort normal and breath sounds normal  No respiratory distress  She has no wheezes  She has no rales  Abdominal: Soft  Bowel sounds are normal  She exhibits no distension and no mass  There is no tenderness  There is no rebound and no guarding  Musculoskeletal: She exhibits tenderness  She exhibits no edema or deformity  Neurological: She is alert and oriented to person, place, and time  Psychiatric: She has a normal mood and affect  Her behavior is normal  Judgment and thought content normal    Nursing note and vitals reviewed

## 2019-12-19 NOTE — PROGRESS NOTES
PT Re-Evaluation     Today's date: 2019  Patient name: Mc Rico  : 1955  MRN: [id number]  Referring provider: Sam De Jesus DO  Dx:   Encounter Diagnosis     ICD-10-CM    1  Primary osteoarthritis of left hip M16 12    2  Trochanteric bursitis of left hip M70 62    3  Chronic bilateral low back pain without sciatica M54 5     G89 29    4  Primary osteoarthritis of right hip M16 11                   Assessment  Assessment details: Mc Rico is a 59 y o  female presenting to outpatient physical therapy with diagnosis of Primary osteoarthritis of right hip and   Trochanteric bursitis of left hip  History of total hip replacement, left 19  Patient's current impairments include L hip and thigh pain, impaired soft tissue mobility, reduced L hip range of motion, reduced L hip  strength, reduced postural awareness, and reduced activity tolerance  Patient's present functional limitations include difficulty with ADLs with increased need for assistance, reliance on medication and/or modalities for pain relief, poor tolerance for functional mobility and activity, and difficulty completing [facility]  responsibilities  Patient to benefit from skilled outpatient physical therapy 2x/week for 6-8 weeks in order to reduce pain, maximize pain free range of motion, increase strength and stability, and improve functional mobility/functional activity in order to maximize return to prior level of function with reduced limitations  Thank you for your referral  11/15/19 Re-eval for LBP  Pt has long hx of B LBP w/o radic sx  Pt reports diff standing,walking,cleaning,bending and performing self care due to same  Has functional limitations as listed above  Plan to add back stretching and strengthening ex to current program and cont with current goals  19: Pt seen for re-eval today  She has had 14 OPPT visits to date  She reports an overall 20% improvement in L hip and notes no change in back symptoms   Exam today revealed improved AROM and strength L hip  Pt cont to report high pain levels with no outward expressions of pain  Smiling and conversing pleasantly t/o tx  Impairments: abnormal or restricted ROM, activity intolerance, impaired physical strength, pain with function and poor posture   Barriers to therapy: Perceived pain  to current ex  program   Understanding of Dx/Px/POC: good   Prognosis: fair    Goals  STGs to be achieved in 4 weeks:  1  Pt to demonstrate reduced subjective pain rating "at worst" by at least 2-3 points from Initial Eval in order to allow for reduced pain with ADLs and improved functional activity tolerance  CONT'D  2  Pt to demonstrate increased AROM of L hip  by at least 5-10 degrees in order to allow for greater ease and independence with ADLs and functional mobility  MET  3  Pt to demonstrate full PROM of L hip in order to maximize joint mobility and function and allow for progression of exercise program and achievement of goals  MET  4  Pt to demonstrate increased MMT of L hip  by at least 1/2-1 grade in order to improve safety and stability with ADLs and functional mobility  MET    LTGs to be achieved in 6-8 weeks:  1  Pt will be I with HEP in order to continue to improve quality of life and independence and reduce risk for re-injury  MET  2  Pt to demonstrate return to painfree amb  without limitations or restrictions  Cont'd  3  Pt to demonstrate improved function as noted by achieving or exceeding predicted score on FOTO outcomes assessment tool   CONT'D      Plan  Patient would benefit from: skilled physical therapy  Planned modality interventions: thermotherapy: hydrocollator packs  Planned therapy interventions: manual therapy, strengthening, stretching, therapeutic exercise, therapeutic activities, gait training and balance  Frequency: 2x week  Duration in weeks: 6  Plan of Care beginning date: 12/19/2019  Plan of Care expiration date: 1/30/2019  Treatment plan discussed with: patient        Subjective Evaluation    History of Present Illness  Date of surgery: 2019  Mechanism of injury: surgery  Mechanism of injury: Pt underwent L THR 19 at [facility] [address]  Pt had anterior approach L THR  Pt is having a CTscan of the L hip tomorrow due to ongoing pain  Pt has been attending PT at [facility] since the surgery  Pt states she has had complications after surgery including a swollen face and scratched cornea, kidney ds and R leg injury from the OR table  States RLE was swollen and ecchymotic  Pt is having pain in L groin wrapping around to posterior L hip and down to knee  Dxed with troch bursitis  Pt states doctor wanted to inject the hip but pt refused stating it is too tender  11/15/19 LBP eval;: Pt states she has near constant pain in her low back and she feels she has to stretch it  Sleeping is difficult as is bending  Pain w/rotation, tieing shoes, lifting objects, showering, cleaning, vacuuming  19 UPDATE: Pt states she feels her L hip strength is improving but nerve pain and reduced sensation L thigh persist States she notes no change in her back  overall 20% improvement in hip  Recurrent probem    Quality of life: poor    Pain  Current pain ratin (back 9, hip 7)  At best pain ratin  At worst pain ratin  Location: B LBP L>R  Quality: dull ache, throbbing and sharp  Relieving factors: heat, medications and change in position (stretching)  Aggravating factors: standing, walking, lifting, stair climbing and sitting (bending)  Progression: improved    Social Support  Steps to enter house: yes (6)  Stairs in house: no   Lives in: [place] house  Lives with: alone    Employment status: not working  Hand dominance: right      Diagnostic Tests  Abnormal x-ray: back, L hip   Abnormal CT scan: tomorrow    Treatments  Previous treatment: medication and chiropractic  Current treatment: massage, medication and physical therapy  Patient Goals  Patient goals for therapy: decreased pain, increased strength, independence with ADLs/IADLs and increased motion  Patient goal: painfree mobility         Objective     Concurrent Complaints  Positive for disturbed sleep, kidney problem and stomach problem  Postural Observations  Seated posture: good  Correction of posture: makes symptoms better    Additional Postural Observation Details  Decreased thoracic kyphosis and decreased lumbar curve    Observations   Left Hip  Positive for incision (well healed anterior L hip i)  Additional Observation Details  Leg length discrep L longer by approx 1/4 inch in supine, no apparent leg length discrep at this time    Palpation   Left   Tenderness of the erector spinae, iliacus, iliopsoas, lumbar interspinals, lumbar paraspinals and quadratus lumborum  Right   Tenderness of the erector spinae  Tenderness     Left Hip   Tenderness in the greater trochanter  Additional Tenderness Details  Extremely sensitive to touch L gr troch and thigh  (symptom magnification)    Lumbar Screen  Lumbar range of motion within normal limits      Neurological Testing     Sensation     Lumbar   Left   Diminished: light touch    Right   Intact: light touch    Comments   Left light touch: L thigh, lat thigh, glut    Hip   Left Hip   Diminished: light touch    Reflexes   Left   Patellar (L4): normal (2+)    Right   Patellar (L4): normal (2+)    Active Range of Motion     Lumbar   Flexion:  WFL  Extension:  Restriction level: moderate  Left lateral flexion:  WFL Restriction level: minimal  Right lateral flexion:  WFL Restriction level: minimal  Left rotation:  WFL Restriction level: minimal  Right rotation:  WFL Restriction level: minimal  Left Hip   Flexion: 125 degrees   Abduction: 38 degrees     Right Hip   Flexion: 135 degrees   Abduction: 45 degrees   Mechanical Assessment    Cervical      Thoracic      Lumbar    Standing flexion: repeated movements   Pain location:no change  Pain intensity: worse  Pain level: increased  Standing extension: repeated movements  Pain location: no change  Pain intensity: worse  Pain level: increased    Strength/Myotome Testing     Left Hip   Planes of Motion   Flexion: 4+  Extension: 4  Abduction: 4+  Adduction: 5    Right Hip   Planes of Motion   Flexion: 5  Extension: 4+  Abduction: 5  Adduction: 5    Left Knee   Flexion: 5  Extension: 5    Right Knee   Flexion: 5  Extension: 5    Additional Strength Details  Pt puts forth questionable effort for MMT  Tests     Lumbar     Left   Negative slump test      Right   Negative slump test      Left Pelvic Girdle/Sacrum   Negative: active SLR test      Right Pelvic Girdle/Sacrum   Negative: active SLR test      Left Hip   Negative long sit  Right Hip   Negative long sit  Ambulation   Weight-Bearing Status   Weight-Bearing Status (Left): full weight bearing   Weight-Bearing Status (Right): full weight-bearing    Assistive device used: none (pt states she may need to go back on crutches )    Ambulation: Level Surfaces   Ambulation without assistive device: independent    Ambulation: Stairs   Ascend stairs: independent  Pattern: non-reciprocal  Railings: one rail  Descend stairs: independent  Pattern: non-reciprocal  Railings: one rail    Observational Gait   Gait: antalgic   Right step length within functional limits  Increased right stance time  Decreased walking speed, stride length, left stance time, left swing time, right swing time and left step length  Left foot contact pattern: foot flat  Right foot contact pattern: heel to toe    General Comments:    Lower quarter screen   Knees: unremarkable  Foot/ankle: unremarkable    Hip Comments   Recent L anterior approach hip repl   6/14/19 12/19: Pt states her stomach is still upset from colonoscopy yest and would only like to go on Nustep         Precautions: symptom magnification,chronic pain syndrome, anxiety    Re-eval Date:12/9/19          Date 12/9/19 12/11 12/16/19 12/19/19    Visit Count 11 12 13 14    FOTO    NV    Pain In 6/10 7/10 hip and back 6/10 hip  8/10 LB     Pain Out 6/10  same     Next MD 1/17 Sunday 12/19 [place]               Manual  12/9 12/11 12/16 12/19    Ham stretch B 4x30"self declined declined     Piriformis stretch        ITB stretch B 3x30" declined declined     Gastroc/soleus L 5x/30" declined decclined                 Date 12/9/19 12/11 12/16/19 12/19    [address] [facility] vs  Nustep NS L2  10' [address] [facility]  L2 12' NS L1 12' NS  L!  11'    Hip flexor stretch   B 3x/30" declined      Heel slides HEP       SAQs HEP       Hip add squeezes  HEP       hooklying abd w/TB    HEP       Clamshells    See below       90/90 SL abd         SLRs     HEP       Bridges   See below       TR/HR          [employer]  Side-side  Fwd/back          elliptical          Leg ext mach   22# 40x 22# 3x20 22# 4x20     Leg  curl mach   11# 40x 11#  3x20 11# 4x20     Resisted amb in [place]  4 dir          Obstacle course        Leg press   70# 60 70# 60 70# 60x     SKTC  DKTC B 10x/5"  10x/5" B 10x5"  10x5" B 10x5"  10x5"     LTR 10x5" 12x5" 12x5"     Bridge 20x/5" 20x5" 20x5"     Clamshells Sidely on R  40x SL on R  40x SL  on R  40x                       Modalities  12/9 12/11 12/16     HP to L thigh/hip Semi-reclined to L hip and LB   10 min 10 min 10 min to low back

## 2019-12-23 ENCOUNTER — APPOINTMENT (OUTPATIENT)
Dept: PHYSICAL THERAPY | Facility: CLINIC | Age: 64
End: 2019-12-23
Payer: COMMERCIAL

## 2019-12-26 ENCOUNTER — TELEPHONE (OUTPATIENT)
Dept: INTERNAL MEDICINE CLINIC | Facility: CLINIC | Age: 64
End: 2019-12-26

## 2019-12-26 ENCOUNTER — TELEPHONE (OUTPATIENT)
Dept: GASTROENTEROLOGY | Facility: MEDICAL CENTER | Age: 64
End: 2019-12-26

## 2019-12-26 ENCOUNTER — APPOINTMENT (EMERGENCY)
Dept: CT IMAGING | Facility: HOSPITAL | Age: 64
End: 2019-12-26
Payer: COMMERCIAL

## 2019-12-26 ENCOUNTER — APPOINTMENT (OUTPATIENT)
Dept: PHYSICAL THERAPY | Facility: CLINIC | Age: 64
End: 2019-12-26
Payer: COMMERCIAL

## 2019-12-26 ENCOUNTER — APPOINTMENT (EMERGENCY)
Dept: RADIOLOGY | Facility: HOSPITAL | Age: 64
End: 2019-12-26
Payer: COMMERCIAL

## 2019-12-26 ENCOUNTER — HOSPITAL ENCOUNTER (EMERGENCY)
Facility: HOSPITAL | Age: 64
Discharge: HOME | End: 2019-12-26
Attending: EMERGENCY MEDICINE | Admitting: EMERGENCY MEDICINE
Payer: COMMERCIAL

## 2019-12-26 VITALS
TEMPERATURE: 99.7 F | SYSTOLIC BLOOD PRESSURE: 156 MMHG | HEART RATE: 64 BPM | DIASTOLIC BLOOD PRESSURE: 71 MMHG | WEIGHT: 95 LBS | BODY MASS INDEX: 16.83 KG/M2 | RESPIRATION RATE: 18 BRPM | OXYGEN SATURATION: 99 %

## 2019-12-26 DIAGNOSIS — R10.9 ABDOMINAL PAIN: Primary | ICD-10-CM

## 2019-12-26 LAB
ALBUMIN SERPL BCP-MCNC: 4.2 G/DL (ref 3.5–5.7)
ALP SERPL-CCNC: 47 U/L (ref 55–165)
ALT SERPL W P-5'-P-CCNC: 11 U/L (ref 7–52)
AMORPH URATE CRY URNS QL MICRO: ABNORMAL /HPF
ANION GAP SERPL CALCULATED.3IONS-SCNC: 8 MMOL/L (ref 4–13)
AST SERPL W P-5'-P-CCNC: 23 U/L (ref 13–39)
ATRIAL RATE: 62 BPM
BACTERIA UR QL AUTO: ABNORMAL /HPF
BASOPHILS # BLD AUTO: 0.1 THOUSANDS/ΜL (ref 0–0.1)
BASOPHILS NFR BLD AUTO: 1 % (ref 0–2)
BILIRUB SERPL-MCNC: 0.3 MG/DL (ref 0.2–1)
BILIRUB UR QL STRIP: NEGATIVE
BUN SERPL-MCNC: 24 MG/DL (ref 7–25)
CALCIUM SERPL-MCNC: 9.8 MG/DL (ref 8.6–10.5)
CHLORIDE SERPL-SCNC: 103 MMOL/L (ref 98–107)
CLARITY UR: CLEAR
CO2 SERPL-SCNC: 27 MMOL/L (ref 21–31)
COLOR UR: YELLOW
CREAT SERPL-MCNC: 1.6 MG/DL (ref 0.6–1.2)
EOSINOPHIL # BLD AUTO: 0.7 THOUSAND/ΜL (ref 0–0.61)
EOSINOPHIL NFR BLD AUTO: 9 % (ref 0–5)
ERYTHROCYTE [DISTWIDTH] IN BLOOD BY AUTOMATED COUNT: 12.8 % (ref 11.5–14.5)
EXT PREG TEST URINE: NEGATIVE
EXT. CONTROL ED NAV: NORMAL
GFR SERPL CREATININE-BSD FRML MDRD: 34 ML/MIN/1.73SQ M
GLUCOSE SERPL-MCNC: 83 MG/DL (ref 65–99)
GLUCOSE UR STRIP-MCNC: NEGATIVE MG/DL
HCT VFR BLD AUTO: 36 % (ref 42–47)
HGB BLD-MCNC: 12.2 G/DL (ref 12–16)
HGB UR QL STRIP.AUTO: ABNORMAL
HYALINE CASTS #/AREA URNS LPF: ABNORMAL /LPF
KETONES UR STRIP-MCNC: NEGATIVE MG/DL
LEUKOCYTE ESTERASE UR QL STRIP: ABNORMAL
LIPASE SERPL-CCNC: 14 U/L (ref 11–82)
LYMPHOCYTES # BLD AUTO: 2.2 THOUSANDS/ΜL (ref 0.6–4.47)
LYMPHOCYTES NFR BLD AUTO: 30 % (ref 21–51)
MCH RBC QN AUTO: 34.2 PG (ref 26–34)
MCHC RBC AUTO-ENTMCNC: 33.8 G/DL (ref 31–37)
MCV RBC AUTO: 101 FL (ref 81–99)
MONOCYTES # BLD AUTO: 0.5 THOUSAND/ΜL (ref 0.17–1.22)
MONOCYTES NFR BLD AUTO: 7 % (ref 2–12)
NEUTROPHILS # BLD AUTO: 3.9 THOUSANDS/ΜL (ref 1.4–6.5)
NEUTS SEG NFR BLD AUTO: 53 % (ref 42–75)
NITRITE UR QL STRIP: NEGATIVE
NON-SQ EPI CELLS URNS QL MICRO: ABNORMAL /HPF
P AXIS: 76 DEGREES
PH UR STRIP.AUTO: 6 [PH]
PLATELET # BLD AUTO: 324 THOUSANDS/UL (ref 149–390)
PMV BLD AUTO: 7.5 FL (ref 8.6–11.7)
POTASSIUM SERPL-SCNC: 3.9 MMOL/L (ref 3.5–5.5)
PR INTERVAL: 154 MS
PROT SERPL-MCNC: 6.9 G/DL (ref 6.4–8.9)
PROT UR STRIP-MCNC: NEGATIVE MG/DL
QRS AXIS: 71 DEGREES
QRSD INTERVAL: 76 MS
QT INTERVAL: 412 MS
QTC INTERVAL: 418 MS
RBC # BLD AUTO: 3.56 MILLION/UL (ref 3.9–5.2)
RBC #/AREA URNS AUTO: ABNORMAL /HPF
SODIUM SERPL-SCNC: 138 MMOL/L (ref 134–143)
SP GR UR STRIP.AUTO: 1.01 (ref 1–1.03)
T WAVE AXIS: 82 DEGREES
TROPONIN I SERPL-MCNC: <0.03 NG/ML
UROBILINOGEN UR QL STRIP.AUTO: 0.2 E.U./DL
VENTRICULAR RATE: 62 BPM
WBC # BLD AUTO: 7.4 THOUSAND/UL (ref 4.8–10.8)
WBC #/AREA URNS AUTO: ABNORMAL /HPF

## 2019-12-26 PROCEDURE — 99285 EMERGENCY DEPT VISIT HI MDM: CPT | Performed by: EMERGENCY MEDICINE

## 2019-12-26 PROCEDURE — 71046 X-RAY EXAM CHEST 2 VIEWS: CPT

## 2019-12-26 PROCEDURE — 85025 COMPLETE CBC W/AUTO DIFF WBC: CPT | Performed by: EMERGENCY MEDICINE

## 2019-12-26 PROCEDURE — 74176 CT ABD & PELVIS W/O CONTRAST: CPT

## 2019-12-26 PROCEDURE — 83690 ASSAY OF LIPASE: CPT | Performed by: EMERGENCY MEDICINE

## 2019-12-26 PROCEDURE — 99285 EMERGENCY DEPT VISIT HI MDM: CPT

## 2019-12-26 PROCEDURE — 81025 URINE PREGNANCY TEST: CPT | Performed by: EMERGENCY MEDICINE

## 2019-12-26 PROCEDURE — 93010 ELECTROCARDIOGRAM REPORT: CPT | Performed by: INTERNAL MEDICINE

## 2019-12-26 PROCEDURE — 81001 URINALYSIS AUTO W/SCOPE: CPT

## 2019-12-26 PROCEDURE — 80053 COMPREHEN METABOLIC PANEL: CPT | Performed by: EMERGENCY MEDICINE

## 2019-12-26 PROCEDURE — 84484 ASSAY OF TROPONIN QUANT: CPT | Performed by: EMERGENCY MEDICINE

## 2019-12-26 PROCEDURE — 93005 ELECTROCARDIOGRAM TRACING: CPT

## 2019-12-26 PROCEDURE — 36415 COLL VENOUS BLD VENIPUNCTURE: CPT | Performed by: EMERGENCY MEDICINE

## 2019-12-26 NOTE — TELEPHONE ENCOUNTER
----- Message from Elizabeth Berger MD sent at 12/24/2019  2:02 PM EST -----  Colonic polyp that was removed was adenoma repeat colonoscopy in 5 years

## 2019-12-26 NOTE — TELEPHONE ENCOUNTER
Pt called stating that she has left side rib/side pain since 12/25/19, the pain is unbearable  She states she will go to the ED for treatment

## 2019-12-26 NOTE — ED NOTES
Pt requesting at discharge to review labs  Labs reviewed and pt  Encouraged to sign up for [employer] [employer]   Access code provided     Luke Green RN  12/26/19 [id number]

## 2019-12-26 NOTE — ED PROVIDER NOTES
History  Chief Complaint   Patient presents with    Abdominal Pain     68-year-old female presents for evaluation of left upper quadrant abdominal pain that started 2 days ago  Patient states symptoms are worse with eating and palpation  Denies nausea, vomiting  Normal bowel movements  Denies vaginal discharge or bleeding  Denies chest pain or shortness of breath  Per chart review, patient had an EGD performed 9 days ago  Prior to Admission Medications   Prescriptions Last Dose Informant Patient Reported? Taking? Calcium Carbonate-Vitamin D (OSCAL 500/200 D-3 PO)   Yes No   Sig: Take by mouth 3 (three) times a day     Cholecalciferol (VITAMIN D3) 25 MCG (1000 UT) CHEW  Self Yes No   Sig: Chew 1 tablet 2 (two) times a day    albuterol (VENTOLIN HFA) 90 mcg/act inhaler   No No   Sig: Inhale 2 puffs every 6 (six) hours as needed for wheezing   amLODIPine (NORVASC) 5 mg tablet   No No   Sig: Take 1 tablet (5 mg total) by mouth daily   cyanocobalamin (VITAMIN B-12) 1,000 mcg tablet   Yes No   Sig: Take by mouth daily   famotidine (PEPCID) 20 mg tablet   No No   Sig: Take 1 tablet (20 mg total) by mouth 2 (two) times a day   ferrous sulfate 325 (65 Fe) mg tablet   Yes No   Sig: Take 325 mg by mouth daily with breakfast   metoprolol succinate (TOPROL XL) 25 mg 24 hr tablet   No No   Sig: Take 1 tablet (25 mg total) by mouth daily   mometasone-formoterol (DULERA) 100-5 MCG/ACT inhaler   No No   Sig: Inhale 2 puffs 2 (two) times a day Rinse mouth after use     pyridoxine (VITAMIN B6) 100 mg tablet   Yes No   Sig: Take 100 mg by mouth daily   sertraline (ZOLOFT) 25 mg tablet   Yes No   Sig: Take 25 mg by mouth daily    traMADol (ULTRAM) 50 mg tablet   No No   Sig: Take 1 tablet (50 mg total) by mouth every 6 (six) hours as needed for moderate pain      Facility-Administered Medications: None       Past Medical History:   Diagnosis Date    Arthritis     Essential hypertension 8/7/2019    ZACK (generalized anxiety disorder) 12/11/2018    Moderate persistent asthma without complication 26/63/2441    Pulmonary emphysema ([facility] [address] ) 10/23/2018    Stage 3 chronic kidney disease ([facility] ) 12/11/2019       Past Surgical History:   Procedure Laterality Date    COLONOSCOPY      DENTAL SURGERY      FACIAL COSMETIC SURGERY      HIP SURGERY Left 06/2019       Family History   Problem Relation Age of Onset    Aneurysm Mother     No Known Problems Father     No Known Problems Brother         eye issues     I have reviewed and agree with the history as documented  Social History     Tobacco Use    Smoking status: Never Smoker    Smokeless tobacco: Never Used   Substance Use Topics    Alcohol use: No    Drug use: No        Review of Systems   Constitutional: Negative for chills, diaphoresis and fever  HENT: Negative for congestion and rhinorrhea  Eyes: Negative for pain and visual disturbance  Respiratory: Negative for cough, shortness of breath and wheezing  Cardiovascular: Negative for chest pain and leg swelling  Gastrointestinal: Positive for abdominal pain  Negative for diarrhea, nausea and vomiting  Genitourinary: Negative for difficulty urinating, dysuria, frequency and urgency  Musculoskeletal: Negative for back pain and neck pain  Skin: Negative for color change and rash  Neurological: Negative for syncope, numbness and headaches  All other systems reviewed and are negative  Physical Exam  Physical Exam   Constitutional: She is oriented to person, place, and time  She appears well-developed and well-nourished  HENT:   Head: Normocephalic and atraumatic  Eyes: Conjunctivae and EOM are normal    Neck: Normal range of motion  Neck supple  Cardiovascular: Normal rate and regular rhythm  Pulmonary/Chest: Effort normal and breath sounds normal  No respiratory distress  She has no wheezes  She has no rales  Abdominal: Soft   Bowel sounds are normal  There is tenderness in the left upper quadrant  There is no guarding  Musculoskeletal: Normal range of motion  She exhibits no edema or tenderness  Neurological: She is alert and oriented to person, place, and time  No cranial nerve deficit  Skin: Skin is warm  No erythema  Psychiatric: She has a normal mood and affect  Her behavior is normal    Nursing note and vitals reviewed        Vital Signs  ED Triage Vitals [12/26/19 1308]   Temperature Pulse Respirations Blood Pressure SpO2   99 7 °F (37 6 °C) 74 18 (!) 189/91 98 %      Temp Source Heart Rate Source Patient Position - Orthostatic VS BP Location FiO2 (%)   Temporal Monitor Sitting Left arm --      Pain Score       9           Vitals:    12/26/19 1308 12/26/19 1432   BP: (!) 189/91 156/71   Pulse: 74 64   Patient Position - Orthostatic VS: Sitting Sitting         Visual Acuity      ED Medications  Medications - No data to display    Diagnostic Studies  Results Reviewed     Procedure Component Value Units Date/Time    Troponin I [[id number]]  (Normal) Collected:  12/26/19 1343    Lab Status:  Final result Specimen:  Blood from Arm, Right Updated:  12/26/19 1412     Troponin I <0 03 ng/mL     CMP [[id number]]  (Abnormal) Collected:  12/26/19 1343    Lab Status:  Final result Specimen:  Blood from Arm, Right Updated:  12/26/19 1409     Sodium 138 mmol/L      Potassium 3 9 mmol/L      Chloride 103 mmol/L      CO2 27 mmol/L      ANION GAP 8 mmol/L      BUN 24 mg/dL      Creatinine 1 60 mg/dL      Glucose 83 mg/dL      Calcium 9 8 mg/dL      AST 23 U/L      ALT 11 U/L      Alkaline Phosphatase 47 U/L      Total Protein 6 9 g/dL      Albumin 4 2 g/dL      Total Bilirubin 0 30 mg/dL      eGFR 34 ml/min/1 73sq m     Narrative:       [facility] guidelines for Chronic Kidney Disease (CKD):     Stage 1 with normal or high GFR (GFR > 90 mL/min/1 73 square meters)    Stage 2 Mild CKD (GFR = 60-89 mL/min/1 73 square meters)    Stage 3A Moderate CKD (GFR = 45-59 mL/min/1 73 square meters)    Stage 3B Moderate CKD (GFR = 30-44 mL/min/1 73 square meters)    Stage 4 Severe CKD (GFR = 15-29 mL/min/1 73 square meters)    Stage 5 End Stage CKD (GFR <15 mL/min/1 73 square meters)  Note: GFR calculation is accurate only with a steady state creatinine    Lipase [[id number]]  (Normal) Collected:  12/26/19 1343    Lab Status:  Final result Specimen:  Blood from Arm, Right Updated:  12/26/19 1409     Lipase 14 u/L     CBC and differential [[id number]]  (Abnormal) Collected:  12/26/19 1343    Lab Status:  Final result Specimen:  Blood from Arm, Right Updated:  12/26/19 1355     WBC 7 40 Thousand/uL      RBC 3 56 Million/uL      Hemoglobin 12 2 g/dL      Hematocrit 36 0 %       fL      MCH 34 2 pg      MCHC 33 8 g/dL      RDW 12 8 %      MPV 7 5 fL      Platelets 833 Thousands/uL      Neutrophils Relative 53 %      Lymphocytes Relative 30 %      Monocytes Relative 7 %      Eosinophils Relative 9 %      Basophils Relative 1 %      Neutrophils Absolute 3 90 Thousands/µL      Lymphocytes Absolute 2 20 Thousands/µL      Monocytes Absolute 0 50 Thousand/µL      Eosinophils Absolute 0 70 Thousand/µL      Basophils Absolute 0 10 Thousands/µL     POCT pregnancy, urine [[id number]]  (Normal) Resulted:  12/26/19 1354    Lab Status:  Final result Updated:  12/26/19 1355     EXT PREG TEST UR (Ref: Negative) negative     Control valid    Urine Microscopic [[id number]]  (Abnormal) Collected:  12/26/19 1323    Lab Status:  Final result Specimen:  Urine, Clean Catch Updated:  12/26/19 1340     RBC, UA 0-1 /hpf      WBC, UA 1-2 /hpf      Epithelial Cells Occasional /hpf      Bacteria, UA Moderate /hpf      Hyaline Casts, UA 0-1 /lpf      AMORPH URATES Moderate /hpf     UA w Reflex to Microscopic w Reflex to Culture [[id number]]  (Abnormal) Collected:  12/26/19 1323    Lab Status:  Final result Specimen:  Urine, Clean Catch Updated:  12/26/19 1336     Color, UA Yellow     Clarity, UA Clear     Specific [place], UA 1 010     pH, UA 6 0     Leukocytes, UA 1+     Nitrite, UA Negative     Protein, UA Negative mg/dl      Glucose, UA Negative mg/dl      Ketones, UA Negative mg/dl      Urobilinogen, UA 0 2 E U /dl      Bilirubin, UA Negative     Blood, UA Trace-Intact                 XR chest 2 views   ED Interpretation by Myles Golden DO (12/26 1433)   No acute pulm disease      Final Result by Gilmar Rico MD (12/26 1549)      No acute cardiopulmonary disease  Workstation performed: [id number]         CT abdomen pelvis wo contrast   Final Result by Patric Harris MD (12/26 1500)      No acute abdominopelvic findings  Workstation performed: [id number]                    Procedures  Procedures         ED Course  ED Course as of Dec 26 1641   Thu Dec 26, 2019   1417 Procedure Note: EKG  Date/Time: 12/26/19 2:17 PM   Performed by: Francisco J Loera by: Salinas Delgado  ECG interpreted by me, the ED Provider: yes   The EKG demonstrates:  Rate 62  Rhythm NSR  QTc 418  No ST elevations/depressions        1540 Patient updated with labs and imaging results  Discussed creatinine of 1 6 compared to 1 53 2 weeks ago  Advised to increase fluid intake and follow up with PCP and GI                                    MDM  Number of Diagnoses or Management Options  Diagnosis management comments: 12-year-old female presenting with abdominal pain  Will obtain labs, EKG, chest x-ray and CT abdomen pelvis  Symptom control as needed  Disposition  Final diagnoses:   Abdominal pain     Time reflects when diagnosis was documented in both MDM as applicable and the Disposition within this note     Time User Action Codes Description Comment    12/26/2019  3:08 PM Salinas Delgado Add [R10 9] Abdominal pain       ED Disposition     ED Disposition Condition Date/Time Comment    Discharge Stable Thu Dec 26, 2019  3:08 PM Mc Rico discharge to home/self care              Follow-up Information     Follow up With Specialties Details Why Agustín 47-7, DO Internal Medicine In 3 days  [address] [address]  Emergency Department Emergency Medicine  If symptoms worsen [address] [facility] [id number] [phone]          Discharge Medication List as of 12/26/2019  3:09 PM      CONTINUE these medications which have NOT CHANGED    Details   albuterol (VENTOLIN HFA) 90 mcg/act inhaler Inhale 2 puffs every 6 (six) hours as needed for wheezing, Starting Fri 9/13/2019, Normal      amLODIPine (NORVASC) 5 mg tablet Take 1 tablet (5 mg total) by mouth daily, Starting Fri 11/1/2019, Normal      Calcium Carbonate-Vitamin D (OSCAL 500/200 D-3 PO) Take by mouth 3 (three) times a day  , Historical Med      Cholecalciferol (VITAMIN D3) 25 MCG (1000 UT) CHEW Chew 1 tablet 2 (two) times a day , Historical Med      cyanocobalamin (VITAMIN B-12) 1,000 mcg tablet Take by mouth daily, Historical Med      famotidine (PEPCID) 20 mg tablet Take 1 tablet (20 mg total) by mouth 2 (two) times a day, Starting Thu 10/24/2019, Normal      ferrous sulfate 325 (65 Fe) mg tablet Take 325 mg by mouth daily with breakfast, Historical Med      metoprolol succinate (TOPROL XL) 25 mg 24 hr tablet Take 1 tablet (25 mg total) by mouth daily, Starting Tue 11/19/2019, Normal      mometasone-formoterol (DULERA) 100-5 MCG/ACT inhaler Inhale 2 puffs 2 (two) times a day Rinse mouth after use , Starting Tue 10/1/2019, Normal      pyridoxine (VITAMIN B6) 100 mg tablet Take 100 mg by mouth daily, Historical Med      sertraline (ZOLOFT) 25 mg tablet Take 25 mg by mouth daily , Starting Fri 11/15/2019, Historical Med      traMADol (ULTRAM) 50 mg tablet Take 1 tablet (50 mg total) by mouth every 6 (six) hours as needed for moderate pain, Starting Fri 11/1/2019, Normal           No discharge procedures on file      ED Provider  Electronically Signed by Abby Steele, DO  12/26/19 9556

## 2019-12-27 ENCOUNTER — OFFICE VISIT (OUTPATIENT)
Dept: PHYSICAL THERAPY | Facility: CLINIC | Age: 64
End: 2019-12-27
Payer: COMMERCIAL

## 2019-12-27 DIAGNOSIS — M16.12 PRIMARY OSTEOARTHRITIS OF LEFT HIP: Primary | ICD-10-CM

## 2019-12-27 PROCEDURE — 97110 THERAPEUTIC EXERCISES: CPT

## 2019-12-27 PROCEDURE — 97140 MANUAL THERAPY 1/> REGIONS: CPT

## 2019-12-27 NOTE — PROGRESS NOTES
Daily Note     Today's date: 2019  Patient name: Bhavesh Lindsey  : 1955  MRN: [id number]  Referring provider: Deepthi Duncan DO  Dx:   Encounter Diagnosis     ICD-10-CM    1  Primary osteoarthritis of left hip M16 12                   Subjective: Pt  States her L hip/LB is still persistent with pain  Rates the level to = "8"/10 @ this present time  Objective: See treatment diary below      Assessment: Tolerated treatment Fairly Well overall  Patient noted with improved B LE flexibility, as evidenced when applying manual stretch  Plan: Continue per plan of care             Precautions: symptom magnification,chronic pain syndrome, anxiety    Re-eval Date:19          Date 19   Visit Count 11 12 13 14 15   FOTO    NV **completed   Pain In 6/10 7/10 hip and back 6/10 hip  8/10 LB  6/10 hip  8/10 LB   Pain Out /10  same  /10 hip  7/10 LB   Next MD  Alden       Dr Ayala           Manual   19   Ham stretch B 4x30"self declined declined  B 4x30"  *Reviewed for self    Piriformis stretch        ITB stretch B 3x30" declined declined  B 4x30"   Gastroc/soleus L 5x/30" declined decclined  L 5x/30"  *Reviewed for self               Date 19 19   [address] vs  Nustep NS L2  10' [facility]  L2 12' NS L1 12' NS  L!  11' NS L1 12'   Hip flexor stretch   B 3x/30" declined   B 3x/30"   Heel slides HEP       SAQs HEP       Hip add squeezes  HEP       hooklying abd w/TB    HEP       Clamshells    See below       90/90 SL abd         SLRs     HEP       Bridges   See below       TR/HR          [employer]  Side-side  Fwd/back          elliptical          Leg ext mach   22# 40x 22# 3x20 22# 4x20  22# 4x20   Leg  curl mach   11# 40x 11#  3x20 11# 4x20  11# 4x20   Resisted amb in [place]  4 dir          Obstacle course        Leg press   70# 60 70# 60 70# 60x  70# 60x  *calf raise 30# 30x   SKTC  TC B 10x/5"  10x/5" B 10x5"  10x5" B 10x5"  10x5"  *will perform at home, as per pt's discretion   LTR 10x5" 12x5" 12x5"  15x5"   Bridge 20x/5" 20x5" 20x5"  25x5"   Clamshells [place] on R  40x SL on R  40x SL  on R  40x  *will perform at home, as per pt's discretion                     Modalities  12/9 12/11 12/16  12 27 19   HP to L thigh/hip Semi-reclined to L hip and LB   10 min 10 min 10 min to low back  10 min to low back  **extra toweling applied

## 2019-12-31 ENCOUNTER — OFFICE VISIT (OUTPATIENT)
Dept: PHYSICAL THERAPY | Facility: CLINIC | Age: 64
End: 2019-12-31
Payer: COMMERCIAL

## 2019-12-31 DIAGNOSIS — M16.12 PRIMARY OSTEOARTHRITIS OF LEFT HIP: Primary | ICD-10-CM

## 2019-12-31 PROCEDURE — 97110 THERAPEUTIC EXERCISES: CPT

## 2019-12-31 PROCEDURE — 97140 MANUAL THERAPY 1/> REGIONS: CPT

## 2019-12-31 NOTE — PROGRESS NOTES
Daily Note     Today's date: 2019  Patient name: Arthur Emmanuel  : 1955  MRN: [id number]  Referring provider: Golda Soulier, DO  Dx:   Encounter Diagnosis     ICD-10-CM    1  Primary osteoarthritis of left hip M16 12                   Subjective:  Pain level @ low/mid back and L hip regions = "7"/10 today, as per pt  Feedback  Objective: See treatment diary below      Assessment: Tolerated treatment Fairly well with performnce of ther exer, and well with tolerance to manual therapy  Patient exhibited good technique with therapeutic exercises  Plan: Progress treatment as tolerated  Con' t services 2x/week as per POC/Goals            Precautions: symptom magnification,chronic pain syndrome, anxiety    Re-eval Date:19          Date 19 19   Visit Count 16 12 13 14 15   FOTO    NV **completed   Pain In 7/10 7/10 hip and back 6/10 hip  8/10 LB  6/10 hip  8/10 LB   Pain Out 5/10  same  6/10 hip  7/10 LB   Next MD    Dr Rebekah Bangura     Dr Ayala           Dr Ayala           Manual   12 27 19   Ham stretch B 4x30"self declined declined  B 4x30"  *Reviewed for self    Piriformis stretch        ITB stretch B 4x30" declined declined  B 4x30"   Gastroc/soleus L 5x/30" declined decclined  L 5x/30"  *Reviewed for self               Date 19 27 19   [facility] vs  Nustep NS L2  12' [facility]  L2 12' NS L1 12' NS  L!  11' NS L1 12'   Hip flexor stretch   B 4x/30" declined   B 3x/30"   Heel slides HEP       SAQs HEP       Hip add squeezes  HEP       hooklying abd w/TB    HEP       Clamshells    See below       90/90 SL abd         SLRs     HEP       Bridges   See below       TR/HR          [employer]  Side-side  Fwd/back          elliptical          Leg ext mach   22# 4x20 22# 3x20 22# 4x20  22# 4x20   Leg  curl mach   22# 30x 11#  3x20 11# 4x20  11# 4x20   Resisted amb in [place]  4 dir          Obstacle course Leg press   75# 50  calf raise 30# 40x 70# 60 70# 60x  70# 60x  *calf raise 30# 30x   SKTC  DKTC will perform at home, as per pt's discretion B 10x5"  10x5" B 10x5"  10x5"  *will perform at home, as per pt's discretion   LTR 15x5"   12x5" 12x5"  15x5"   Bridge 25x/5"   20x5" 20x5"  25x5"   Clamshells will perform at home, as per pt's discretion SL on R  40x SL  on R  40x  *will perform at home, as per pt's discretion                     Modalities  12 31 19 12/11 12/16  12 27 19   HP to L thigh/hip 10 min to low/mid back while semi-reclined  **extra toweling applied 10 min 10 min to low back  10 min to low back  **extra toweling applied

## 2020-01-02 ENCOUNTER — OFFICE VISIT (OUTPATIENT)
Dept: PHYSICAL THERAPY | Facility: CLINIC | Age: 65
End: 2020-01-02
Payer: COMMERCIAL

## 2020-01-02 DIAGNOSIS — M16.12 PRIMARY OSTEOARTHRITIS OF LEFT HIP: Primary | ICD-10-CM

## 2020-01-02 DIAGNOSIS — M70.62 TROCHANTERIC BURSITIS OF LEFT HIP: ICD-10-CM

## 2020-01-02 DIAGNOSIS — M54.50 CHRONIC BILATERAL LOW BACK PAIN WITHOUT SCIATICA: ICD-10-CM

## 2020-01-02 DIAGNOSIS — G89.29 CHRONIC BILATERAL LOW BACK PAIN WITHOUT SCIATICA: ICD-10-CM

## 2020-01-02 PROCEDURE — 97110 THERAPEUTIC EXERCISES: CPT | Performed by: PHYSICAL THERAPIST

## 2020-01-02 NOTE — PROGRESS NOTES
Daily Note     Today's date: 2020  Patient name: Priya Messer  : 1955  MRN: [id number]  Referring provider: Chico Garcia DO  Dx:   Encounter Diagnosis     ICD-10-CM    1  Primary osteoarthritis of left hip M16 12    2  Trochanteric bursitis of left hip M70 62    3  Chronic bilateral low back pain without sciatica M54 5     G89 29                   Subjective: Pt reporting that the pain is pretty high today  Objective: See treatment diary below      Assessment: Pt without increase in pain during session; treatment modified 2* to high pain to begin session  She is demonstrating continued gait/postural dysfunction with need for continued therapy to address  Plan: Continue per plan of care        Precautions: symptom magnification,chronic pain syndrome, anxiety    Re-eval Date:19          Date 19 19   Visit Count 16 1 13 14 15   FOTO    NV **completed   Pain In 7/10 8/10 LB  7/10 Hip  6/10 hip  8/10 LB  6/10 hip  8/10 LB   Pain Out 5/10  same  6/10 hip  7/10 LB   Next MD    Dr Katie Crawford     Dr Ayala           Dr Ayala           Manual   12 27 19   Ham stretch B 4x30"self MT NP today 2* time constraints  declined  B 4x30"  *Reviewed for self    Piriformis stretch        ITB stretch B 4x30"  declined  B 4x30"   Gastroc/soleus L 5x/30"  decclined  L 5x/30"  *Reviewed for self               Date 19 12 27 19   [facility] vs  Nustep NS L2  12' [employer] L2  15 minutes  NS L1 12' NS  L!  11' NS L1 12'   Hip flexor stretch   B 4x/30" declined   B 3x/30"   Heel slides HEP       SAQs HEP       Hip add squeezes  HEP       hooklying abd w/TB    HEP       Clamshells    See below       90/90 SL abd         SLRs     HEP       Bridges   See below       TR/HR          [place] [employer]  Side-side  Fwd/back          elliptical          Leg ext mach   22# 4x20 22# 3x20 22# 4x20  22# 4x20   Leg  curl mach   22# 30x 22#  3x20 11# 4x20  11# 4x20   Resisted amb in [place]  4 dir          Obstacle course        Leg press   75# 50  calf raise 30# 40x 80#  3 x 10  70# 60x  70# 60x  *calf raise 30# 30x   SKTC  DKTC will perform at home, as per pt's discretion DKTC  20" x 5  B 10x5"  10x5"  *will perform at home, as per pt's discretion   LTR 15x5"   20" x 5   12x5"  15x5"   Bridge 25x/5"   20 x 5" 20x5"  25x5"   Clamshells will perform at home, as per pt's discretion HEP SL  on R  40x  *will perform at home, as per pt's discretion                     Modalities  12 31 19 1/2 12/16  12 27 19   HP to L thigh/hip 10 min to low/mid back while semi-reclined  **extra toweling applied 10 minutes  10 min to low back  10 min to low back  **extra toweling applied

## 2020-01-06 ENCOUNTER — TELEPHONE (OUTPATIENT)
Dept: GASTROENTEROLOGY | Facility: CLINIC | Age: 65
End: 2020-01-06

## 2020-01-06 NOTE — TELEPHONE ENCOUNTER
Patient was called by Michael Ogden to schedule 4mth f/u, patient then was transferred to go over results, we went over results, patient understood had no f/u questions in regards to result  Patient states she received a call from [facility] , and was advised to set up appt for ERCP    Please help patient schedule appt

## 2020-01-07 ENCOUNTER — OFFICE VISIT (OUTPATIENT)
Dept: PHYSICAL THERAPY | Facility: CLINIC | Age: 65
End: 2020-01-07
Payer: COMMERCIAL

## 2020-01-07 DIAGNOSIS — M16.12 PRIMARY OSTEOARTHRITIS OF LEFT HIP: Primary | ICD-10-CM

## 2020-01-07 PROCEDURE — 97110 THERAPEUTIC EXERCISES: CPT

## 2020-01-07 PROCEDURE — 97140 MANUAL THERAPY 1/> REGIONS: CPT

## 2020-01-07 NOTE — PROGRESS NOTES
Daily Note     Today's date: 2020  Patient name: Virgilio Jama  : 1955  MRN: [id number]  Referring provider: Willim Gitelman, DO  Dx:   Encounter Diagnosis     ICD-10-CM    1  Primary osteoarthritis of left hip M16 12                   Subjective: Pt  States she isn't too bad today  Says she feels the difference is the BP meds she is now re-taking  Objective: See treatment diary below      Assessment: Tolerated treatment Fairly Well overall with performance of ther exer, and tolerance to manual stretch  Good results with MHP applic  Ruchi Rivas Patient exhibited good technique with therapeutic exercises  Plan: Continue per plan of care             Precautions: symptom magnification,chronic pain syndrome, anxiety    Re-eval Date:19          Date 19 12 1 19   Visit Count 16 1 2 14 15   FOTO    NV **completed   Pain In 7/10 8/10 LB  7/10 Hip  6/10 hip  6/10 LB  6/10 hip  8/10 LB   Pain Out 5/10  "same"  6/10 hip  7/10 LB   Next MD    Dr  [place]akes     Dr Ayala        Dr Laine Castillo     Morene Em  Jaclyn Em     Dr Ayala           Manual   1 7 20  12 27 19   Ham stretch B 4x30"self MT NP today 2* time constraints  B 4x30"  B 4x30"  *Reviewed for self    Piriformis stretch   B 3x30"     ITB stretch B 4x30"  B 5x30"  B 4x30"   Gastroc/soleus L 5x/30"  B 5x30"  L 5x/30"  *Reviewed for self               Date /2 1 7 20  12 27 19   [address] [facility] vs  Nustep NS L2  12' [employer] L2  15 minutes  NS L2 15' NS  L!  11' NS L1 12'   Hip flexor stretch   B 4x/30" declined B 4x/30"  B 3x/30"   Heel slides HEP  HEP     SAQs HEP  HEP     Hip add squeezes  HEP  HEP     hooklying abd w/TB    HEP  HEP     Clamshells    See below  See below     90/90 SL abd         SLRs     HEP  HEP     Bridges   See below  See below     TR/HR          [employer]  Side-side  Fwd/back          elliptical          Leg ext mach   22# 4x20 22# 3x20 33# 1x20  22# 4x20 Leg  curl mach   22# 30x 22#  3x20 11# 3x20  11# 4x20   Resisted amb in [place]  4 dir          Obstacle course        Leg press   75# 50  calf raise 30# 40x 80#  3 x 10  80# 3x10  calf raise 40# 30x  70# 60x  *calf raise 30# 30x   SKTC  DKTC will perform at home, as per pt's discretion DKTC  20" x 5  will perform at home, as per pt's discretion  *will perform at home, as per pt's discretion   LTR 15x5"   20" x 5   20x5"  15x5"   Bridge 25x/5"   20 x 5" 20x5"  25x5"   Clamshells will perform at home, as per pt's discretion HEP HEP  *will perform at home, as per pt's discretion                     Modalities  12 31 19 1/2 1 7 20  12 27 19   HP to L thigh/hip 10 min to low/mid back while semi-reclined  **extra toweling applied 10 minutes  10 min to low/mid back while semi-reclined  **extra toweling applied  10 min to low back  **extra toweling applied

## 2020-01-09 ENCOUNTER — OFFICE VISIT (OUTPATIENT)
Dept: PHYSICAL THERAPY | Facility: CLINIC | Age: 65
End: 2020-01-09
Payer: COMMERCIAL

## 2020-01-09 DIAGNOSIS — M70.62 TROCHANTERIC BURSITIS OF LEFT HIP: ICD-10-CM

## 2020-01-09 DIAGNOSIS — M54.50 CHRONIC BILATERAL LOW BACK PAIN WITHOUT SCIATICA: ICD-10-CM

## 2020-01-09 DIAGNOSIS — M16.12 PRIMARY OSTEOARTHRITIS OF LEFT HIP: Primary | ICD-10-CM

## 2020-01-09 DIAGNOSIS — G89.29 CHRONIC BILATERAL LOW BACK PAIN WITHOUT SCIATICA: ICD-10-CM

## 2020-01-09 DIAGNOSIS — M16.11 PRIMARY OSTEOARTHRITIS OF RIGHT HIP: ICD-10-CM

## 2020-01-09 PROCEDURE — 97110 THERAPEUTIC EXERCISES: CPT

## 2020-01-09 NOTE — PROGRESS NOTES
Daily Note     Today's date: 2020  Patient name: Ana Reyna  : 1955  MRN: [id number]  Referring provider: Alvarez Jorge DO  Dx:   Encounter Diagnosis     ICD-10-CM    1  Primary osteoarthritis of left hip M16 12    2  Trochanteric bursitis of left hip M70 62    3  Chronic bilateral low back pain without sciatica M54 5     G89 29    4  Primary osteoarthritis of right hip M16 11                   Subjective: Pt has no new complaints  Pt usually replies that she is not doing well, when asked  Objective: See treatment diary below      Assessment: Tolerated treatment well  Patient demonstrated fatigue post treatment, exhibited good technique with therapeutic exercises and would benefit from continued PT  Despite reports of not doing well, pt having no diff w/amb or performance of ex  Plan: Continue per plan of care        Precautions: symptom magnification,chronic pain syndrome, anxiety    Re-eval Date:19          Date /2 1 7 20     Visit Count 16 17 18 19    FOTO        Pain In 7/10 8/10 LB  7/10 Hip  6/10 hip  6/10 LB 7/10 hip  7/10 back    Pain Out 5/10  "same"     Next MD  8   Dr Parish Barroso 17 Dr Jamie Patten 8   Dr Mann Diggs     17 Dr Ayala Sandor 17 Dr Ayala             Manual  2 1 7 20     Ham stretch B 4x30"self MT NP today 2* time constraints  B 4x30" B 4x30" self    Piriformis stretch   B 3x30" B 3x30"    ITB stretch B 4x30"  B 5x30"     Gastroc/soleus L 5x/30"  B 5x30" Incline   5x30"                Date 2 1 7 20     [facility] vs  Nustep NS L2  12' NuStep L2  15 minutes  NS L2 15' NS  L1  15   W/HP to back    Hip flexor stretch   B 4x/30" declined B 4x/30" B 3x30"    Heel slides HEP  HEP     SAQs HEP  HEP     Hip add squeezes  HEP  HEP     hooklying abd w/TB    HEP  HEP     Clamshells    See below  See below     90/90 SL abd         SLRs     HEP  HEP     Bridges   See below  See below     TR/HR          [place] board  Side-side  Fwd/back          elliptical          Leg ext mach   22# 4x20 22# 3x20 33# 1x20 33#  30    Leg  curl mach   22# 30x 22#  3x20 11# 3x20 11#  3x20    Resisted amb in [place]  4 dir          Obstacle course        Leg press   75# 50  calf raise 30# 40x 80#  3 x 10  80# 3x10  calf raise 40# 30x 80#  30  Calf raises  40# 30x    SKTC  DKTC will perform at home, as per pt's discretion DKTC  20" x 5  will perform at home, as per pt's discretion     LTR 15x5"   20" x 5   20x5" 25x5"    Bridge 25x/5"   20 x 5" 20x5" 25x5"    Clamshells will perform at home, as per pt's discretion HEP HEP                       Modalities  12 31 19 1/2 1 7 20     HP to L thigh/hip 10 min to low/mid back while semi-reclined  **extra toweling applied 10 minutes  10 min to low/mid back while semi-reclined  **extra toweling applied

## 2020-01-14 ENCOUNTER — APPOINTMENT (OUTPATIENT)
Dept: LAB | Facility: CLINIC | Age: 65
End: 2020-01-14
Payer: COMMERCIAL

## 2020-01-14 ENCOUNTER — OFFICE VISIT (OUTPATIENT)
Dept: PHYSICAL THERAPY | Facility: CLINIC | Age: 65
End: 2020-01-14
Payer: COMMERCIAL

## 2020-01-14 DIAGNOSIS — M16.12 PRIMARY OSTEOARTHRITIS OF LEFT HIP: Primary | ICD-10-CM

## 2020-01-14 DIAGNOSIS — N18.30 STAGE 3 CHRONIC KIDNEY DISEASE (HCC): ICD-10-CM

## 2020-01-14 LAB
ANION GAP SERPL CALCULATED.3IONS-SCNC: 4 MMOL/L (ref 4–13)
BUN SERPL-MCNC: 21 MG/DL (ref 5–25)
CALCIUM SERPL-MCNC: 8.9 MG/DL (ref 8.3–10.1)
CHLORIDE SERPL-SCNC: 111 MMOL/L (ref 100–108)
CO2 SERPL-SCNC: 27 MMOL/L (ref 21–32)
CREAT SERPL-MCNC: 1.2 MG/DL (ref 0.6–1.3)
GFR SERPL CREATININE-BSD FRML MDRD: 48 ML/MIN/1.73SQ M
GLUCOSE SERPL-MCNC: 108 MG/DL (ref 65–140)
POTASSIUM SERPL-SCNC: 4.6 MMOL/L (ref 3.5–5.3)
SODIUM SERPL-SCNC: 142 MMOL/L (ref 136–145)

## 2020-01-14 PROCEDURE — 97140 MANUAL THERAPY 1/> REGIONS: CPT

## 2020-01-14 PROCEDURE — 36415 COLL VENOUS BLD VENIPUNCTURE: CPT

## 2020-01-14 PROCEDURE — 80048 BASIC METABOLIC PNL TOTAL CA: CPT

## 2020-01-14 PROCEDURE — 97110 THERAPEUTIC EXERCISES: CPT

## 2020-01-14 NOTE — PROGRESS NOTES
Daily Note     Today's date: 2020  Patient name: Iris Cruz  : 1955  MRN: [id number]  Referring provider: Sandi Vaz DO  Dx:   Encounter Diagnosis     ICD-10-CM    1  Primary osteoarthritis of left hip M16 12                   Subjective:  Pt  voices concerns re: her muscle strenght/girth musculature @ L hip and B LE's  States she feels her Leg muscles should be more developed with all the exercises she's been doing at home, as well as here at therapy  Pt  assured that she has improved to date with strength @ L hip and B LE's,  as evidenced by her ability to perform the exercises she is currently doing here at therapy, vs her abilities at initial eval       Objective: See treatment diary below      Assessment: Tolerated treatment Well overall with performance of ther exer and tolerance to manual therapy    Patient exhibited good technique with therapeutic exercises      Plan: Continue per plan of care             Precautions: symptom magnification,chronic pain syndrome, anxiety    Re-eval Date:19          Date  19  1 7 20  1 14 20   Visit Count 16 17 18 19 20   FOTO        Pain In 7/10 8/10 LB  7/10 Hip  6/10 hip  6/10 LB 7/10 hip  7/10 back 5/10 hip  5/10 back   Pain Out 5/10  "same"  "no change"   Next MD    Dr Viktoria Nixon 17  Jamie        Dr Concepción Griggs      Jamie   Jamie   Dr Ayala           Manual   19  1 7 20  1 14 20   Ham stretch B 4x30"self MT NP today 2* time constraints  B 4x30" B 4x30" self B 5x30"    Piriformis stretch   B 3x30" B 3x30" B 4x30"   ITB stretch B 4x30"  B 5x30"  B 4x30"   Gastroc/soleus L 5x/30"  B 5x30" Incline   5x30" B 5x30"               Date  19 /2 1 7 20  1 14 20   [address] [facility] vs  Nustep NS L2  12' NuStep L2  15 minutes  NS L2 15' NS  L1  15   W/HP to back NS L2 15'   Hip flexor stretch   B 4x/30" declined B 4x/30" B 3x30" B 3x30"   Heel slides HEP  HEP  HEP   SAQs HEP  HEP  HEP   Hip add squeezes  HEP  HEP  HEP   hooklying abd w/TB    HEP  HEP  HEP   Clamshells    See below  See below     90/90 SL abd         SLRs     HEP  HEP  HEP   Bridges   See below  See below     TR/HR          Wobble board  Side-side  Fwd/back          elliptical          Leg ext mach   22# 4x20 22# 3x20 33# 1x20 33#  30 33#  30   Leg  curl mach   22# 30x 22#  3x20 11# 3x20 11#  3x20 22#  1x20   Resisted amb in [place]  4 dir          Obstacle course        Leg press   75# 50  calf raise 30# 40x 80#  3 x 10  80# 3x10  calf raise 40# 30x 80#  30  Calf raises  40# 30x 80#  40  Calf raises  40# 40x   SKTC  DKTC will perform at home, as per pt's discretion DKTC  20" x 5  will perform at home, as per pt's discretion  will perform at home, as per pt's discretion   LTR 15x5"   20" x 5   20x5" 25x5" HEP   Bridge 25x/5"   20 x 5" 20x5" 25x5" HEP   Clamshells will perform at home, as per pt's discretion HEP HEP  HEP                     Modalities  12 31 19 1/2 1 7 20  1 14 20   HP to L thigh/hip 10 min to low/mid back while semi-reclined  **extra toweling applied 10 minutes  10 min to low/mid back while semi-reclined  **extra toweling applied  n/a

## 2020-01-15 ENCOUNTER — TELEPHONE (OUTPATIENT)
Dept: NEPHROLOGY | Facility: CLINIC | Age: 65
End: 2020-01-15

## 2020-01-15 NOTE — TELEPHONE ENCOUNTER
Called and made the pt aware of lab results and scheduled her f/u for April- any labs prior to that appt?

## 2020-01-15 NOTE — TELEPHONE ENCOUNTER
----- Message from Rajesh Sinha MD sent at 1/15/2020  7:53 AM EST -----  Please let the patient know that most recent lab work in terms of renal parameters are stable  Will discuss further at the upcoming visit, let me know if they have any questions or concerns      Thanks

## 2020-01-16 ENCOUNTER — EVALUATION (OUTPATIENT)
Dept: PHYSICAL THERAPY | Facility: CLINIC | Age: 65
End: 2020-01-16
Payer: COMMERCIAL

## 2020-01-16 DIAGNOSIS — G89.29 CHRONIC BILATERAL LOW BACK PAIN WITHOUT SCIATICA: ICD-10-CM

## 2020-01-16 DIAGNOSIS — M70.62 TROCHANTERIC BURSITIS OF LEFT HIP: ICD-10-CM

## 2020-01-16 DIAGNOSIS — M16.12 PRIMARY OSTEOARTHRITIS OF LEFT HIP: Primary | ICD-10-CM

## 2020-01-16 DIAGNOSIS — M54.50 CHRONIC BILATERAL LOW BACK PAIN WITHOUT SCIATICA: ICD-10-CM

## 2020-01-16 DIAGNOSIS — M16.11 PRIMARY OSTEOARTHRITIS OF RIGHT HIP: ICD-10-CM

## 2020-01-16 PROCEDURE — 97110 THERAPEUTIC EXERCISES: CPT

## 2020-01-16 NOTE — PROGRESS NOTES
Daily Note     Today's date: 2020  Patient name: Virgilio Jama  : 1955  MRN: [id number]  Referring provider: Willim Gitelman, DO  Dx:   Encounter Diagnosis     ICD-10-CM    1  Primary osteoarthritis of left hip M16 12    2  Trochanteric bursitis of left hip M70 62    3  Chronic bilateral low back pain without sciatica M54 5     G89 29    4  Primary osteoarthritis of right hip M16 11                   Subjective: Pt voicing concerns about not being able to maintain muscle mass  Pt states she was able to walk 3 miles on the treadmill , states it took a lomg time with small breaks  Objective: See treatment diary below      Assessment: Tolerated treatment well  Patient exhibited good technique with therapeutic exercises and would benefit from continued PT  Discussed possible DC from PT in the upcoming future  which the  pt is very resistant to  Pt appears to be reaching  a plateau in progress in pain and function  Plan: Continue per plan of care        Precautions: symptom magnification,chronic pain syndrome, anxiety    Re-eval Date:19          Date        Visit Count 21       FOTO        Pain In 7/10 hip  8/10 back       Pain Out 5/10       Next MD  4  Alden    Feb 5  Scarpino                   Manual         Ham stretch B 4x30"self       Piriformis stretch        ITB stretch B 4x30"       Gastroc/soleus L 5x/30"                   Date        [address] [facility] vs  Nustep NS L2  12'       Hip flexor stretch   B 4x/30"       Heel slides HEP       SAQs HEP       Hip add squeezes  HEP       hooklying abd w/TB    HEP       Clamshells    See below       90/90 SL abd         SLRs     HEP       Bridges   See below       TR/HR          [employer]  Side-side  Fwd/back          elliptical          Leg ext mach   33# [id number]       Leg  curl mach   22# 4x20 reps       Resisted amb in [place]  4 dir          Obstacle course        Leg press   80 # 50  calf raise   40#  x40 Kerri Walker will perform at home, as per pt's discretion       Jazmine Almanza will perform at home, as per pt's discretion                         Modalities  1/16/20       HP to L thigh/hip 10 min to low/mid back while semi-reclined  **extra toweling applied

## 2020-01-20 ENCOUNTER — OFFICE VISIT (OUTPATIENT)
Dept: PHYSICAL THERAPY | Facility: CLINIC | Age: 65
End: 2020-01-20
Payer: COMMERCIAL

## 2020-01-20 ENCOUNTER — OFFICE VISIT (OUTPATIENT)
Dept: INTERNAL MEDICINE CLINIC | Facility: CLINIC | Age: 65
End: 2020-01-20
Payer: COMMERCIAL

## 2020-01-20 VITALS
HEIGHT: 63 IN | OXYGEN SATURATION: 98 % | DIASTOLIC BLOOD PRESSURE: 78 MMHG | TEMPERATURE: 98.4 F | SYSTOLIC BLOOD PRESSURE: 138 MMHG | WEIGHT: 97 LBS | BODY MASS INDEX: 17.19 KG/M2 | RESPIRATION RATE: 16 BRPM | HEART RATE: 68 BPM

## 2020-01-20 DIAGNOSIS — I10 ESSENTIAL HYPERTENSION: Primary | ICD-10-CM

## 2020-01-20 DIAGNOSIS — R63.6 UNDERWEIGHT: ICD-10-CM

## 2020-01-20 DIAGNOSIS — K83.1 STRICTURE OF BILE DUCT: ICD-10-CM

## 2020-01-20 DIAGNOSIS — M16.12 PRIMARY OSTEOARTHRITIS OF LEFT HIP: Primary | ICD-10-CM

## 2020-01-20 DIAGNOSIS — M54.50 CHRONIC BILATERAL LOW BACK PAIN WITHOUT SCIATICA: ICD-10-CM

## 2020-01-20 DIAGNOSIS — M16.11 PRIMARY OSTEOARTHRITIS OF RIGHT HIP: ICD-10-CM

## 2020-01-20 DIAGNOSIS — R10.84 GENERALIZED ABDOMINAL PAIN: ICD-10-CM

## 2020-01-20 DIAGNOSIS — M70.62 TROCHANTERIC BURSITIS OF LEFT HIP: ICD-10-CM

## 2020-01-20 DIAGNOSIS — R63.4 WEIGHT LOSS: ICD-10-CM

## 2020-01-20 DIAGNOSIS — G89.29 CHRONIC BILATERAL LOW BACK PAIN WITHOUT SCIATICA: ICD-10-CM

## 2020-01-20 DIAGNOSIS — F41.1 GAD (GENERALIZED ANXIETY DISORDER): ICD-10-CM

## 2020-01-20 DIAGNOSIS — M79.652 LEFT THIGH PAIN: ICD-10-CM

## 2020-01-20 PROCEDURE — 99213 OFFICE O/P EST LOW 20 MIN: CPT | Performed by: INTERNAL MEDICINE

## 2020-01-20 PROCEDURE — 3078F DIAST BP <80 MM HG: CPT | Performed by: INTERNAL MEDICINE

## 2020-01-20 PROCEDURE — 3008F BODY MASS INDEX DOCD: CPT | Performed by: INTERNAL MEDICINE

## 2020-01-20 PROCEDURE — 3075F SYST BP GE 130 - 139MM HG: CPT | Performed by: INTERNAL MEDICINE

## 2020-01-20 PROCEDURE — 1036F TOBACCO NON-USER: CPT | Performed by: INTERNAL MEDICINE

## 2020-01-20 PROCEDURE — 97110 THERAPEUTIC EXERCISES: CPT

## 2020-01-20 NOTE — PATIENT INSTRUCTIONS
Weight Management   AMBULATORY CARE:   Why it is important to manage your weight:  Being overweight increases your risk of health conditions such as heart disease, high blood pressure, type 2 diabetes, and certain types of cancer  It can also increase your risk for osteoarthritis, sleep apnea, and other respiratory problems  Aim for a slow, steady weight loss  Even a small amount of weight loss can lower your risk of health problems  How to lose weight safely:  A safe and healthy way to lose weight is to eat fewer calories and get regular exercise  You can lose up about 1 pound a week by decreasing the number of calories you eat by 500 calories each day  You can decrease calories by eating smaller portion sizes or by cutting out high-calorie foods  Read labels to find out how many calories are in the foods you eat  You can also burn calories with exercise such as walking, swimming, or biking  You will be more likely to keep weight off if you make these changes part of your lifestyle  Healthy meal plan for weight management:  A healthy meal plan includes a variety of foods, contains fewer calories, and helps you stay healthy  A healthy meal plan includes the following:  · Eat whole-grain foods more often  A healthy meal plan should contain fiber  Fiber is the part of grains, fruits, and vegetables that is not broken down by your body  Whole-grain foods are healthy and provide extra fiber in your diet  Some examples of whole-grain foods are whole-wheat breads and pastas, oatmeal, brown rice, and bulgur  · Eat a variety of vegetables every day  Include dark, leafy greens such as spinach, kale, ashley greens, and mustard greens  Eat yellow and orange vegetables such as carrots, sweet potatoes, and winter squash  · Eat a variety of fruits every day  Choose fresh or canned fruit (canned in its own juice or light syrup) instead of juice  Fruit juice has very little or no fiber  · Eat low-fat dairy foods  Drink fat-free (skim) milk or 1% milk  Eat fat-free yogurt and low-fat cottage cheese  Try low-fat cheeses such as mozzarella and other reduced-fat cheeses  · Choose meat and other protein foods that are low in fat  Choose beans or other legumes such as split peas or lentils  Choose fish, skinless poultry (chicken or turkey), or lean cuts of red meat (beef or pork)  Before you cook meat or poultry, cut off any visible fat  · Use less fat and oil  Try baking foods instead of frying them  Add less fat, such as margarine, sour cream, regular salad dressing and mayonnaise to foods  Eat fewer high-fat foods  Some examples of high-fat foods include french fries, doughnuts, ice cream, and cakes  · Eat fewer sweets  Limit foods and drinks that are high in sugar  This includes candy, cookies, regular soda, and sweetened drinks  Ways to decrease calories:   · Eat smaller portions  ¨ Use a small plate with smaller servings  ¨ Do not eat second helpings  ¨ When you eat at a restaurant, ask for a box and place half of your meal in the box before you eat  ¨ Share an entrée with someone else  · Replace high-calorie snacks with healthy, low-calorie snacks  ¨ Choose fresh fruit, vegetables, fat-free rice cakes, or air-popped popcorn instead of potato chips, nuts, or chocolate  ¨ Choose water or calorie-free drinks instead of soda or sweetened drinks  · Eat regular meals  Skipping meals can lead to overeating later in the day  Eat a healthy snack in place of a meal if you do not have time to eat a regular meal      · Do not shop for groceries when you are hungry  You may be more likely to make unhealthy food choices  Take a grocery list of healthy foods and shop after you have eaten  Exercise:  Exercise at least 30 minutes per day on most days of the week  Some examples of exercise include walking, biking, dancing, and swimming   You can also fit in more physical activity by taking the stairs instead of the elevator or parking farther away from stores  Ask your healthcare provider about the best exercise plan for you  Other things to consider as you try to lose weight:   · Be aware of situations that may give you the urge to overeat, such as eating while watching television  Find ways to avoid these situations  For example, read a book, go for a walk, or do crafts  · Meet with a weight loss support group or friends who are also trying to lose weight  This may help you stay motivated to continue working on your weight loss goals  © 2017 [address] [facility] Information is for End User's use only and may not be sold, redistributed or otherwise used for commercial purposes  All illustrations and images included in CareNotes® are the copyrighted property of [employer] , [employer]  or [employer]  The above information is an  only  It is not intended as medical advice for individual conditions or treatments  Talk to your doctor, nurse or pharmacist before following any medical regimen to see if it is safe and effective for you  Low Fat Diet   AMBULATORY CARE:   A low-fat diet  is an eating plan that is low in total fat, unhealthy fat, and cholesterol  You may need to follow a low-fat diet if you have trouble digesting or absorbing fat  You may also need to follow this diet if you have high cholesterol  You can also lower your cholesterol by increasing the amount of fiber in your diet  Soluble fiber is a type of fiber that helps to decrease cholesterol levels  Different types of fat in food:   · Limit unhealthy fats  A diet that is high in cholesterol, saturated fat, and trans fat may cause unhealthy cholesterol levels  Unhealthy cholesterol levels increase your risk of heart disease  ¨ Cholesterol:  Limit intake of cholesterol to less than 200 mg per day  Cholesterol is found in meat, eggs, and dairy      ¨ Saturated fat:  Limit saturated fat to less than 7% of your total daily calories  Ask your dietitian how many calories you need each day  Saturated fat is found in butter, cheese, ice cream, whole milk, and palm oil  Saturated fat is also found in meat, such as beef, pork, chicken skin, and processed meats  Processed meats include sausage, hot dogs, and bologna  ¨ Trans fat:  Avoid trans fat as much as possible  Trans fat is used in fried and baked foods  Foods that say trans fat free on the label may still have up to 0 5 grams of trans fat per serving  · Include healthy fats  Replace foods that are high in saturated and trans fat with foods high in healthy fats  This may help to decrease high cholesterol levels  ¨ Monounsaturated fats: These are found in avocados, nuts, and vegetable oils, such as olive, canola, and sunflower oil  ¨ Polyunsaturated fats: These can be found in vegetable oils, such as soybean or corn oil  Omega-3 fats can help to decrease the risk of heart disease  Omega-3 fats are found in fish, such as salmon, herring, trout, and tuna  Omega-3 fats can also be found in plant foods, such as walnuts, flaxseed, soybeans, and canola oil    Foods to limit or avoid:   · Grains:      ¨ Snacks that are made with partially hydrogenated oils, such as chips, regular crackers, and butter-flavored popcorn    ¨ High-fat baked goods, such as biscuits, croissants, doughnuts, pies, cookies, and pastries    · Dairy:      ¨ Whole milk, 2% milk, and yogurt and ice cream made with whole milk    ¨ Half and half creamer, heavy cream, and whipping cream    ¨ Cheese, cream cheese, and sour cream    · Meats and proteins:      ¨ High-fat cuts of meat (T-bone steak, regular hamburger, and ribs)    ¨ Fried meat, poultry (turkey and chicken), and fish    ¨ Poultry (chicken and turkey) with skin    ¨ Cold cuts (salami or bologna), hot dogs, wilson, and sausage    ¨ Whole eggs and egg yolks    · Vegetables and fruits with added fat:      ¨ Fried vegetables or vegetables in butter or high-fat sauces, such as cream or cheese sauces    ¨ Fried fruit or fruit served with butter or cream    · Fats:      ¨ Butter, stick margarine, and shortening    ¨ Coconut, palm oil, and palm kernel oil  Foods to include:   · Grains:      ¨ Whole-grain breads, cereals, pasta, and brown rice    ¨ Low-fat crackers and pretzels    · Vegetables and fruits:      ¨ Fresh, frozen, or canned vegetables (no salt or low-sodium)    ¨ Fresh, frozen, dried, or canned fruit (canned in light syrup or fruit juice)    ¨ Avocado    · Low-fat dairy products:      ¨ Nonfat (skim) or 1% milk    ¨ Nonfat or low-fat cheese, yogurt, and cottage cheese    · Meats and proteins:      ¨ Chicken or turkey with no skin    ¨ Baked or broiled fish    ¨ Lean beef and pork (loin, round, extra lean hamburger)    ¨ Beans and peas, unsalted nuts, soy products    ¨ Egg whites and substitutes    ¨ Seeds and nuts    · Fats:      ¨ Unsaturated oil, such as canola, olive, peanut, soybean, or sunflower oil    ¨ Soft or liquid margarine and vegetable oil spread    ¨ Low-fat salad dressing  Other ways to decrease fat:   · Read food labels before you buy foods  Choose foods that have less than 30% of calories from fat  Choose low-fat or fat-free dairy products  Remember that fat free does not mean calorie free  These foods still contain calories, and too many calories can lead to weight gain  · Trim fat from meat and avoid fried food  Trim all visible fat from meat before you cook it  Remove the skin from poultry  Do not rueda meat, fish, or poultry  Bake, roast, boil, or broil these foods instead  Avoid fried foods  Eat a baked potato instead of [place] fries  Steam vegetables instead of sautéing them in butter  · Add less fat to foods  Use imitation wilson bits on salads and baked potatoes instead of regular wilson bits  Use fat-free or low-fat salad dressings instead of regular dressings   Use low-fat or nonfat butter-flavored topping instead of regular butter or margarine on popcorn and other foods  Ways to decrease fat in recipes:  Replace high-fat ingredients with low-fat or nonfat ones  This may cause baked goods to be drier than usual  You may need to use nonfat cooking spray on pans to prevent food from sticking  You also may need to change the amount of other ingredients, such as water, in the recipe  Try the following:  · Use low-fat or light margarine instead of regular margarine or shortening  · Use lean ground turkey breast or chicken, or lean ground beef (less than 5% fat) instead of hamburger  · Add 1 teaspoon of canola oil to 8 ounces of skim milk instead of using cream or half and half  · Use grated zucchini, carrots, or apples in breads instead of coconut  · Use blenderized, low-fat cottage cheese, plain tofu, or low-fat ricotta cheese instead of cream cheese  · Use 1 egg white and 1 teaspoon of canola oil, or use ¼ cup (2 ounces) of fat-free egg substitute instead of a whole egg  · Replace half of the oil that is called for in a recipe with applesauce when you bake  Use 3 tablespoons of cocoa powder and 1 tablespoon of canola oil instead of a square of baking chocolate  How to increase fiber:  Eat enough high-fiber foods to get 20 to 30 grams of fiber every day  Slowly increase your fiber intake to avoid stomach cramps, gas, and other problems  · Eat 3 ounces of whole-grain foods each day  An ounce is about 1 slice of bread  Eat whole-grain breads, such as whole-wheat bread  Whole wheat, whole-wheat flour, or other whole grains should be listed as the first ingredient on the food label  Replace white flour with whole-grain flour or use half of each in recipes  Whole-grain flour is heavier than white flour, so you may have to add more yeast or baking powder  · Eat a high-fiber cereal for breakfast   Oatmeal is a good source of soluble fiber  Look for cereals that have bran or fiber in the name   Choose whole-grain products, such as brown rice, barley, and whole-wheat pasta  · Eat more beans, peas, and lentils  For example, add beans to soups or salads  Eat at least 5 cups of fruits and vegetables each day  Eat fruits and vegetables with the peel because the peel is high in fiber  © 2017 [address] [employer]  Information is for End User's use only and may not be sold, redistributed or otherwise used for commercial purposes  All illustrations and images included in CareNotes® are the copyrighted property of [employer]  or [employer]  The above information is an  only  It is not intended as medical advice for individual conditions or treatments  Talk to your doctor, nurse or pharmacist before following any medical regimen to see if it is safe and effective for you  Heart Healthy Diet   AMBULATORY CARE:   A heart healthy diet  is an eating plan low in total fat, unhealthy fats, and sodium (salt)  A heart healthy diet helps decrease your risk for heart disease and stroke  Limit the amount of fat you eat to 25% to 35% of your total daily calories  Limit sodium to less than 2,300 mg each day  Healthy fats:  Healthy fats can help improve cholesterol levels  The risk for heart disease is decreased when cholesterol levels are normal  Choose healthy fats, such as the following:  · Unsaturated fat  is found in foods such as soybean, canola, olive, corn, and safflower oils  It is also found in soft tub margarine that is made with liquid vegetable oil  · Omega-3 fat  is found in certain fish, such as salmon, tuna, and trout, and in walnuts and flaxseed  Unhealthy fats:  Unhealthy fats can cause unhealthy cholesterol levels in your blood and increase your risk of heart disease  Limit unhealthy fats, such as the following:  · Cholesterol  is found in animal foods, such as eggs and lobster, and in dairy products made from whole milk   Limit cholesterol to less than 300 milligrams (mg) each day  You may need to limit cholesterol to 200 mg each day if you have heart disease  · Saturated fat  is found in meats, such as wilson and hamburger  It is also found in chicken or turkey skin, whole milk, and butter  Limit saturated fat to less than 7% of your total daily calories  Limit saturated fat to less than 6% if you have heart disease or are at increased risk for it  · Trans fat  is found in packaged foods, such as potato chips and cookies  It is also in hard margarine, some fried foods, and shortening  Avoid trans fats as much as possible    Heart healthy foods and drinks to include:  Ask your dietitian or healthcare provider how many servings to have from each of the following food groups:  · Grains:      ¨ Whole-wheat breads, cereals, and pastas, and brown rice    ¨ Low-fat, low-sodium crackers and chips    · Vegetables:      ¨ Broccoli, green beans, green peas, and spinach    ¨ Collards, kale, and lima beans    ¨ Carrots, sweet potatoes, tomatoes, and peppers    ¨ Canned vegetables with no salt added    · Fruits:      ¨ Bananas, peaches, pears, and pineapple    ¨ Grapes, raisins, and dates    ¨ Oranges, tangerines, grapefruit, orange juice, and grapefruit juice    ¨ Apricots, mangoes, melons, and papaya    ¨ Raspberries and strawberries    ¨ Canned fruit with no added sugar    · Low-fat dairy products:      ¨ Nonfat (skim) milk, 1% milk, and low-fat almond, cashew, or soy milks fortified with calcium    ¨ Low-fat cheese, regular or frozen yogurt, and cottage cheese    · Meats and proteins , such as lean cuts of beef and pork (loin, leg, round), skinless chicken and turkey, legumes, soy products, egg whites, and nuts  Foods and drinks to limit or avoid:  Ask your dietitian or healthcare provider about these and other foods that are high in unhealthy fat, sodium, and sugar:  · Snack or packaged foods , such as frozen dinners, cookies, macaroni and cheese, and cereals with more than 300 mg of sodium per serving    · Canned or dry mixes  for cakes, soups, sauces, or gravies    · Vegetables with added sodium , such as instant potatoes, vegetables with added sauces, or regular canned vegetables    · Other foods high in sodium , such as ketchup, barbecue sauce, salad dressing, pickles, olives, soy sauce, and miso    · High-fat dairy foods  such as whole or 2% milk, cream cheese, or sour cream, and cheeses     · High-fat protein foods  such as high-fat cuts of beef (T-bone steaks, ribs), chicken or turkey with skin, and organ meats, such as liver    · Cured or smoked meats , such as hot dogs, wilson, and sausage    · Unhealthy fats and oils , such as butter, stick margarine, shortening, and cooking oils such as coconut or palm oil    · Food and drinks high in sugar , such as soft drinks (soda), sports drinks, sweetened tea, candy, cake, cookies, pies, and doughnuts  Other diet guidelines to follow:   · Eat more foods containing omega-3 fats  Eat fish high in omega-3 fats at least 2 times a week  · Limit alcohol  Too much alcohol can damage your heart and raise your blood pressure  Women should limit alcohol to 1 drink a day  Men should limit alcohol to 2 drinks a day  A drink of alcohol is 12 ounces of beer, 5 ounces of wine, or 1½ ounces of liquor  · Choose low-sodium foods  High-sodium foods can lead to high blood pressure  Add little or no salt to food you prepare  Use herbs and spices in place of salt  · Eat more fiber  to help lower cholesterol levels  Eat at least 5 servings of fruits and vegetables each day  Eat 3 ounces of whole-grain foods each day  Legumes (beans) are also a good source of fiber  Lifestyle guidelines:   · Do not smoke  Nicotine and other chemicals in cigarettes and cigars can cause lung and heart damage  Ask your healthcare provider for information if you currently smoke and need help to quit  E-cigarettes or smokeless tobacco still contain nicotine  Talk to your healthcare provider before you use these products  · Exercise regularly  to help you maintain a healthy weight and improve your blood pressure and cholesterol levels  Ask your healthcare provider about the best exercise plan for you  Do not start an exercise program without asking your healthcare provider  Follow up with your healthcare provider as directed:  Write down your questions so you remember to ask them during your visits  © 2017 [address] [employer]  Information is for End User's use only and may not be sold, redistributed or otherwise used for commercial purposes  All illustrations and images included in CareNotes® are the copyrighted property of [employer]  or [employer]  The above information is an  only  It is not intended as medical advice for individual conditions or treatments  Talk to your doctor, nurse or pharmacist before following any medical regimen to see if it is safe and effective for you

## 2020-01-20 NOTE — PROGRESS NOTES
BMI Counseling: Body mass index is 17 18 kg/m²  The BMI is below normal  Patient advised to gain weight  Assessment/Plan:  Problem List Items Addressed This Visit        Cardiovascular and Mediastinum    Essential hypertension - Primary       Other    ZACK (generalized anxiety disorder)    Weight loss    Generalized abdominal pain      Other Visit Diagnoses     Stricture of bile duct        Left thigh pain        Underweight               Diagnoses and all orders for this visit:    Essential hypertension    Generalized abdominal pain    ZACK (generalized anxiety disorder)    Weight loss    Stricture of bile duct    Left thigh pain    Underweight        No problem-specific Assessment & Plan notes found for this encounter  A/P: Overall doing a little better  Continue current meds and finish up with PT  Highly recommend getting the ERCP done soon  Discussed the ZACK and pt unwilling to try other meds or increase her dose as she has tried all of them per her report  Encouraged to continue to gain wt  RTC two months for routine and f/u  Subjective:      Patient ID: Alejandro Esteves is a 59 y o  female  WF RTC for f/u several issues  First, wt loss of uncertain etiology  Wt is stable and w/u continues without significant dx found  Next, chronic abdominal pain  Had an EGD and colonscopy and no findings  Pain is getting a little better  THired, CBD stricture  No change and pt and GI are trying to get an ERCP scheduled  Next, ongoing left hip pain after sx  Attending PT and pain is starting to improve  Finally, ZACK  Doing poorly and seeing behavioral medicine  On low dose zoloft, but reports being unable to tolerate any other meds or higher doses  No suicidal thoughts  No new c/o's         The following portions of the patient's history were reviewed and updated as appropriate:   She has a past medical history of Arthritis, Essential hypertension (8/7/2019), ZACK (generalized anxiety disorder) (12/11/2018), Moderate persistent asthma without complication (25/75/0467), Pulmonary emphysema ([facility] ) (10/23/2018), and Stage 3 chronic kidney disease ([facility] ) (12/11/2019)  ,  does not have any pertinent problems on file  ,   has a past surgical history that includes Facial cosmetic surgery; Colonoscopy; Dental surgery; and Hip surgery (Left, 06/2019)  ,  family history includes Aneurysm in her mother; No Known Problems in her brother and father  ,   reports that she has never smoked  She has never used smokeless tobacco  She reports that she does not drink alcohol or use drugs  ,  is allergic to nsaids; lisinopril; vancomycin; celebrex [celecoxib]; fosamax [alendronate]; ibuprofen; and influenza vaccines     Current Outpatient Medications   Medication Sig Dispense Refill    albuterol (VENTOLIN HFA) 90 mcg/act inhaler Inhale 2 puffs every 6 (six) hours as needed for wheezing 18 g 5    amLODIPine (NORVASC) 5 mg tablet Take 1 tablet (5 mg total) by mouth daily 90 tablet 0    Calcium Carbonate-Vitamin D (OSCAL 500/200 D-3 PO) Take by mouth 3 (three) times a day        Cholecalciferol (VITAMIN D3) 25 MCG (1000 UT) CHEW Chew 1 tablet 2 (two) times a day       cyanocobalamin (VITAMIN B-12) 1,000 mcg tablet Take by mouth daily      famotidine (PEPCID) 20 mg tablet Take 1 tablet (20 mg total) by mouth 2 (two) times a day 180 tablet 0    ferrous sulfate 325 (65 Fe) mg tablet Take 325 mg by mouth daily with breakfast      metoprolol succinate (TOPROL XL) 25 mg 24 hr tablet Take 1 tablet (25 mg total) by mouth daily 90 tablet 1    mometasone-formoterol (DULERA) 100-5 MCG/ACT inhaler Inhale 2 puffs 2 (two) times a day Rinse mouth after use   1 Inhaler 5    pyridoxine (VITAMIN B6) 100 mg tablet Take 100 mg by mouth daily      sertraline (ZOLOFT) 25 mg tablet Take 25 mg by mouth daily   1    traMADol (ULTRAM) 50 mg tablet Take 1 tablet (50 mg total) by mouth every 6 (six) hours as needed for moderate pain 30 tablet 0     No current facility-administered medications for this visit  Review of Systems   Constitutional: Negative for activity change, chills, diaphoresis, fatigue and fever  HENT: Negative  Eyes: Negative for visual disturbance  Respiratory: Negative for cough, chest tightness, shortness of breath and wheezing  Cardiovascular: Negative for chest pain, palpitations and leg swelling  Gastrointestinal: Positive for abdominal pain  Negative for abdominal distention, anal bleeding, blood in stool, constipation, diarrhea, nausea, rectal pain and vomiting  Endocrine: Negative for cold intolerance and heat intolerance  Genitourinary: Negative for difficulty urinating, dysuria and frequency  Musculoskeletal: Positive for arthralgias and gait problem  Negative for myalgias  Neurological: Negative for dizziness, seizures, syncope, weakness, light-headedness and headaches  Psychiatric/Behavioral: Negative for confusion, dysphoric mood, sleep disturbance and suicidal ideas  The patient is nervous/anxious  PHQ-9 Depression Screening    PHQ-9:    Frequency of the following problems over the past two weeks:             Objective:  Vitals:    01/20/20 1536   BP: 138/78   Pulse: 68   Resp: 16   Temp: 98 4 °F (36 9 °C)   TempSrc: Tympanic   SpO2: 98%   Weight: 44 kg (97 lb)   Height: 5' 3" (1 6 m)     Body mass index is 17 18 kg/m²  Physical Exam   Constitutional: She is oriented to person, place, and time  She appears well-developed and well-nourished  No distress  HENT:   Head: Normocephalic and atraumatic  Mouth/Throat: Oropharynx is clear and moist    Eyes: Pupils are equal, round, and reactive to light  Conjunctivae and EOM are normal    Neck: Neck supple  No JVD present  Cardiovascular: Normal rate, regular rhythm and normal heart sounds  Pulmonary/Chest: Effort normal and breath sounds normal  No respiratory distress  She has no wheezes  She has no rales  Abdominal: Soft   Bowel sounds are normal  She exhibits no distension  There is no tenderness  Musculoskeletal: She exhibits no edema  Neurological: She is alert and oriented to person, place, and time  Psychiatric: Her behavior is normal  Judgment and thought content normal    Very anxious  Nursing note and vitals reviewed  BMI Counseling: Body mass index is 17 18 kg/m²  The BMI is below normal  Patient was advised to gain weight

## 2020-01-20 NOTE — PROGRESS NOTES
Daily Note     Today's date: 2020  Patient name: Suzy Simmonds  : 1955  MRN: [id number]  Referring provider: Brenda Man DO  Dx:   Encounter Diagnosis     ICD-10-CM    1  Primary osteoarthritis of left hip M16 12    2  Trochanteric bursitis of left hip M70 62    3  Chronic bilateral low back pain without sciatica M54 5     G89 29    4  Primary osteoarthritis of right hip M16 11                   Subjective: Pt has a cold  Reports no signif change in status  Objective: See treatment diary below      Assessment: Tolerated treatment well  Patient exhibited good technique with therapeutic exercises and would benefit from continued PT  Plan: Continue per plan of care        Precautions: symptom magnification,chronic pain syndrome, anxiety    Re-eval Date:19          Date       Visit Count 21 22      FOTO        Pain In 7/10 hip  8/10 back 7/10 hip  7/10 back      Pain Out 5/10       Next MD  4  [place]    Feb 5  Scarpino                   Manual        Ham stretch B 4x30"self B 4x30"      Piriformis stretch        ITB stretch B 4x30" Cont NV      Gastroc/soleus L 5x/30" Cont NV                  Date       [address] [facility] vs  Nustep NS L2  12' NS L2 12'      Hip flexor stretch   B 4x/30" B 4x30"      Heel slides HEP       SAQs HEP       Hip add squeezes  HEP       hooklying abd w/TB    HEP                  90/90 SL abd         SLRs     HEP       Bridges   See below       TR/HR          [employer]  Side-side  Fwd/back          elliptical          Leg ext mach   33# [id number] 33# 4x20"      Leg  curl mach   22# 4x20 reps 11# 4x20 reps      Resisted amb in [place]  4 dir          Obstacle course        Leg press   80 # 50  calf raise   40#  x40 80#  3x20  Calf raise  40#       SKTC  DKTC will perform at home, as per pt's discretion 7x10" ea      LTR HEP   12x5"      Bridge HEP         Clams[employer]s will perform at home, as per pt's discretion Modalities  1/16/20       HP to L thigh/hip 10 min to low/mid back while semi-reclined  **extra toweling applied

## 2020-01-23 ENCOUNTER — OFFICE VISIT (OUTPATIENT)
Dept: PHYSICAL THERAPY | Facility: CLINIC | Age: 65
End: 2020-01-23
Payer: COMMERCIAL

## 2020-01-23 DIAGNOSIS — M16.11 PRIMARY OSTEOARTHRITIS OF RIGHT HIP: ICD-10-CM

## 2020-01-23 DIAGNOSIS — M16.12 PRIMARY OSTEOARTHRITIS OF LEFT HIP: Primary | ICD-10-CM

## 2020-01-23 DIAGNOSIS — G89.29 CHRONIC BILATERAL LOW BACK PAIN WITHOUT SCIATICA: ICD-10-CM

## 2020-01-23 DIAGNOSIS — M54.50 CHRONIC BILATERAL LOW BACK PAIN WITHOUT SCIATICA: ICD-10-CM

## 2020-01-23 DIAGNOSIS — M70.62 TROCHANTERIC BURSITIS OF LEFT HIP: ICD-10-CM

## 2020-01-23 PROCEDURE — 97110 THERAPEUTIC EXERCISES: CPT

## 2020-01-23 NOTE — PROGRESS NOTES
Daily Note     Today's date: 2020  Patient name: Suzy Simmonds  : 1955  MRN: [id number]  Referring provider: Brenda Man DO  Dx:   Encounter Diagnosis     ICD-10-CM    1  Primary osteoarthritis of left hip M16 12    2  Trochanteric bursitis of left hip M70 62    3  Chronic bilateral low back pain without sciatica M54 5     G89 29    4  Primary osteoarthritis of right hip M16 11                   Subjective: Pt states she saw   Jamie today and he ordered a bone scan and MRI of the L hip  Will meet w/him to discuss results on the        Objective: See treatment diary below      Assessment: Tolerated treatment well  Patient exhibited good technique with therapeutic exercises and would benefit from continued PT  Pt performing majority of ex indep  Possible DC in near future  Plan: Continue per plan of care        Precautions: symptom magnification,chronic pain syndrome, anxiety    Re-eval Date:19          Date      Visit Count 21 22 23     FOTO        Pain In 7/10 hip  8/10 back 7/10 hip  7/10 back 5 hip  6 back     Pain Out 5/10       Next MD  4  [place]    Feb 5  Scarpino                   Manual       Ham stretch B 4x30"self B 4x30" B 4x30"     Piriformis stretch        ITB stretch B 4x30" Cont NV NV     Gastroc/soleus L 5x/30" Cont NV NV                 Date      [address] [facility] vs  Nustep NS L2  12' NS L2 12' NS  L2  12'     Hip flexor stretch   B 4x/30" B 4x30" B 4x30"     Heel slides HEP       SAQs HEP       Hip add squeezes  HEP       hooklying abd w/TB    HEP                  90/90 SL abd         SLRs     HEP       Bridges   See below       TR/HR          [employer]  Side-side  Fwd/back          elliptical          Leg ext mach   33# [id number] 33# 4x20" 33#  4x20     Leg  curl mach   22# 4x20 reps 11# 4x20 reps 16#  4x20     Resisted amb in [place]  4 dir          Obstacle course        Leg press   80 # 50  calf raise   40# x40 80#  3x20  Calf raise  40#  50 85#  3x20  Calf raises  40#  3x20     SKTC  DKTC will perform at home, as per pt's discretion 7x10" ea 7x10" ea     LTR HEP   12x5" 12x5"     Bridge HEP         Cl[employer] will perform at home, as per pt's discretion                         Modalities  1/16/20       HP to L thigh/hip 10 min to low/mid back while semi-reclined  **extra toweling applied

## 2020-01-27 ENCOUNTER — APPOINTMENT (OUTPATIENT)
Dept: PHYSICAL THERAPY | Facility: CLINIC | Age: 65
End: 2020-01-27
Payer: COMMERCIAL

## 2020-01-28 ENCOUNTER — OFFICE VISIT (OUTPATIENT)
Dept: PHYSICAL THERAPY | Facility: CLINIC | Age: 65
End: 2020-01-28
Payer: COMMERCIAL

## 2020-01-28 DIAGNOSIS — I10 ESSENTIAL HYPERTENSION: ICD-10-CM

## 2020-01-28 DIAGNOSIS — M16.12 PRIMARY OSTEOARTHRITIS OF LEFT HIP: Primary | ICD-10-CM

## 2020-01-28 DIAGNOSIS — M54.50 CHRONIC BILATERAL LOW BACK PAIN WITHOUT SCIATICA: ICD-10-CM

## 2020-01-28 DIAGNOSIS — G89.29 CHRONIC BILATERAL LOW BACK PAIN WITHOUT SCIATICA: ICD-10-CM

## 2020-01-28 DIAGNOSIS — M70.62 TROCHANTERIC BURSITIS OF LEFT HIP: ICD-10-CM

## 2020-01-28 PROCEDURE — 97110 THERAPEUTIC EXERCISES: CPT

## 2020-01-28 RX ORDER — AMLODIPINE BESYLATE 5 MG/1
5 TABLET ORAL DAILY
Qty: 90 TABLET | Refills: 3 | Status: SHIPPED | OUTPATIENT
Start: 2020-01-28 | End: 2020-04-29 | Stop reason: SDUPTHER

## 2020-01-28 NOTE — PROGRESS NOTES
Daily Note     Today's date: 2020  Patient name: Pilar Yin  : 1955  MRN: [id number]  Referring provider: Francisca Fountain DO  Dx:   Encounter Diagnosis     ICD-10-CM    1  Primary osteoarthritis of left hip M16 12    2  Trochanteric bursitis of left hip M70 62    3  Chronic bilateral low back pain without sciatica M54 5     G89 29                   Subjective: Pt states she is feeling pretty good today  Objective: See treatment diary below      Assessment: Tolerated treatment well  Patient exhibited good technique with therapeutic exercises and would benefit from continued PT  Introduced prone hip ext and QP alt UE/LE lift with no adverse effects  Pt gets relief from HP to back at end of tx  Plan: Continue per plan of care        Precautions: symptom magnification,chronic pain syndrome, anxiety    Re-eval Date:19          Date     Visit Count 21 22 23 24    FOTO        Pain In 7/10 hip  8/10 back 7/10 hip  7/10 back 5 hip  6 back 5 hip   6 back    Pain Out /10       Next MD  4  [place]    Feb 5  Scarpino                   Manual      Ham stretch B 4x30"self B 4x30" B 4x30" 4x30"    Piriformis stretch        ITB stretch B 4x30" Cont NV NV     Gastroc/soleus L 5x/30" Cont NV NV                 Date     [address] [facility] vs  Nustep NS L2  12' NS L2 12' NS  L2  12' NS  12'L1    Hip flexor stretch   B 4x/30" B 4x30" B 4x30"     Heel slides HEP       SAQs HEP       Hip add squeezes  HEP       hooklying abd w/TB    HEP                  90/90 SL abd         SLRs     HEP       Bridges   See below       TR/HR          [employer]  Side-side  Fwd/back          elliptical          Leg ext mach   33# [id number] 33# 4x20 33#  4x20 33#  4x20    Leg  curl mach   22# 4x20 reps 11# 4x20 reps 16#  4x20 16#  4x20    Resisted amb in [place]  4 dir          Obstacle course        Leg press   80 # 50  calf raise   40#  x40 80#  3x20  Calf raise  40# 50 85#  3x20  Calf raises  40#  3x20 85# 3x20  Calf raises  40#  3x20    SKTC  DKTC will perform at home, as per pt's discretion 7x10" ea 7x10" ea     LTR HEP   12x5" 12x5" 12x5"    Katelin Damonkobi will perform at home, as per pt's discretion       Prone hip ext     2 x 10/3"    QP alt UE/LE lift    10x3"                                      Modalities  1/16/20 1/28    HP to L thigh/hip 10 min to low/mid back while semi-reclined  **extra toweling applied   10 min to low/mid back while semi-reclined  **extra toweling applied

## 2020-01-30 ENCOUNTER — APPOINTMENT (OUTPATIENT)
Dept: PHYSICAL THERAPY | Facility: CLINIC | Age: 65
End: 2020-01-30
Payer: COMMERCIAL

## 2020-02-05 NOTE — TELEPHONE ENCOUNTER
Offered to schedule patient with Dr Kramer on 2/14/20  States she has other appts & will not reschedule  Patient aware I will call her back once I get approval from [address]

## 2020-02-14 ENCOUNTER — TRANSCRIBE ORDERS (OUTPATIENT)
Dept: LAB | Facility: CLINIC | Age: 65
End: 2020-02-14

## 2020-02-14 ENCOUNTER — APPOINTMENT (OUTPATIENT)
Dept: LAB | Facility: CLINIC | Age: 65
End: 2020-02-14
Payer: COMMERCIAL

## 2020-02-14 DIAGNOSIS — N18.30 CHRONIC KIDNEY DISEASE, STAGE 3 (MODERATE): Primary | ICD-10-CM

## 2020-02-14 DIAGNOSIS — N18.30 CHRONIC KIDNEY DISEASE, STAGE 3 (MODERATE): ICD-10-CM

## 2020-02-14 LAB
ALBUMIN SERPL BCP-MCNC: 3.6 G/DL (ref 3.5–5)
ALP SERPL-CCNC: 52 U/L (ref 46–116)
ALT SERPL W P-5'-P-CCNC: 20 U/L (ref 12–78)
ANION GAP SERPL CALCULATED.3IONS-SCNC: 5 MMOL/L (ref 4–13)
AST SERPL W P-5'-P-CCNC: 18 U/L (ref 5–45)
BACTERIA UR QL AUTO: ABNORMAL /HPF
BILIRUB SERPL-MCNC: 0.29 MG/DL (ref 0.2–1)
BILIRUB UR QL STRIP: NEGATIVE
BUN SERPL-MCNC: 22 MG/DL (ref 5–25)
CALCIUM SERPL-MCNC: 9.1 MG/DL (ref 8.3–10.1)
CHLORIDE SERPL-SCNC: 106 MMOL/L (ref 100–108)
CLARITY UR: CLEAR
CO2 SERPL-SCNC: 29 MMOL/L (ref 21–32)
COLOR UR: YELLOW
CREAT SERPL-MCNC: 1.34 MG/DL (ref 0.6–1.3)
CREAT UR-MCNC: 124 MG/DL
GFR SERPL CREATININE-BSD FRML MDRD: 42 ML/MIN/1.73SQ M
GLUCOSE P FAST SERPL-MCNC: 76 MG/DL (ref 65–99)
GLUCOSE UR STRIP-MCNC: NEGATIVE MG/DL
HCT VFR BLD AUTO: 38.7 % (ref 34.8–46.1)
HGB BLD-MCNC: 12.6 G/DL (ref 11.5–15.4)
HGB UR QL STRIP.AUTO: ABNORMAL
HYALINE CASTS #/AREA URNS LPF: ABNORMAL /LPF
KETONES UR STRIP-MCNC: NEGATIVE MG/DL
LEUKOCYTE ESTERASE UR QL STRIP: NEGATIVE
NITRITE UR QL STRIP: NEGATIVE
NON-SQ EPI CELLS URNS QL MICRO: ABNORMAL /HPF
PH UR STRIP.AUTO: 6 [PH]
PHOSPHATE SERPL-MCNC: 3.6 MG/DL (ref 2.3–4.1)
POTASSIUM SERPL-SCNC: 3.9 MMOL/L (ref 3.5–5.3)
PROT SERPL-MCNC: 6.5 G/DL (ref 6.4–8.2)
PROT UR STRIP-MCNC: ABNORMAL MG/DL
PROT UR-MCNC: 29 MG/DL
PROT/CREAT UR: 0.23 MG/G{CREAT} (ref 0–0.1)
RBC #/AREA URNS AUTO: ABNORMAL /HPF
SODIUM SERPL-SCNC: 140 MMOL/L (ref 136–145)
SP GR UR STRIP.AUTO: 1.02 (ref 1–1.03)
UROBILINOGEN UR QL STRIP.AUTO: 0.2 E.U./DL
WBC #/AREA URNS AUTO: ABNORMAL /HPF

## 2020-02-14 PROCEDURE — 84100 ASSAY OF PHOSPHORUS: CPT

## 2020-02-14 PROCEDURE — 85014 HEMATOCRIT: CPT

## 2020-02-14 PROCEDURE — 84156 ASSAY OF PROTEIN URINE: CPT | Performed by: INTERNAL MEDICINE

## 2020-02-14 PROCEDURE — 85018 HEMOGLOBIN: CPT

## 2020-02-14 PROCEDURE — 82570 ASSAY OF URINE CREATININE: CPT | Performed by: INTERNAL MEDICINE

## 2020-02-14 PROCEDURE — 80053 COMPREHEN METABOLIC PANEL: CPT

## 2020-02-14 PROCEDURE — 81001 URINALYSIS AUTO W/SCOPE: CPT | Performed by: INTERNAL MEDICINE

## 2020-02-14 PROCEDURE — 36415 COLL VENOUS BLD VENIPUNCTURE: CPT

## 2020-02-19 ENCOUNTER — TELEPHONE (OUTPATIENT)
Dept: OTHER | Facility: OTHER | Age: 65
End: 2020-02-19

## 2020-02-19 RX ORDER — SODIUM CHLORIDE, SODIUM LACTATE, POTASSIUM CHLORIDE, CALCIUM CHLORIDE 600; 310; 30; 20 MG/100ML; MG/100ML; MG/100ML; MG/100ML
125 INJECTION, SOLUTION INTRAVENOUS CONTINUOUS
Status: CANCELLED | OUTPATIENT
Start: 2020-02-19

## 2020-02-20 ENCOUNTER — HOSPITAL ENCOUNTER (OUTPATIENT)
Dept: GASTROENTEROLOGY | Facility: HOSPITAL | Age: 65
Setting detail: OUTPATIENT SURGERY
Discharge: HOME | End: 2020-02-20

## 2020-02-20 ENCOUNTER — HOSPITAL ENCOUNTER (OUTPATIENT)
Dept: RADIOLOGY | Facility: HOSPITAL | Age: 65
Setting detail: OUTPATIENT SURGERY
Discharge: HOME | End: 2020-02-20

## 2020-02-20 DIAGNOSIS — K83.8 COMMON BILE DUCT DILATION: ICD-10-CM

## 2020-02-20 NOTE — TELEPHONE ENCOUNTER
Patient left a message on my vm  Stating she thought Dr Zapien Felt was doing her procedure  When scheduled pt was made aware Dr Kramer was doing her procedure  Instructions with his card were mailed  I called pt back lvm to contact the office

## 2020-03-16 ENCOUNTER — TELEPHONE (OUTPATIENT)
Dept: NEPHROLOGY | Facility: CLINIC | Age: 65
End: 2020-03-16

## 2020-03-16 NOTE — TELEPHONE ENCOUNTER
Called and spoke to the patient with regards to her concerns for Baclofen advised her as per her current GFR and renal parameters resulting Baclofen is okay for her to take however since she is having issues with the medication she should contact the prescriber and her PCP with regards to side effects and possibly stop taking that medication if possible  Review documentation the chart appears that patient is going back and forth between contacting us as well as about the kidney and being seen by them also

## 2020-03-20 ENCOUNTER — TELEPHONE (OUTPATIENT)
Dept: INTERNAL MEDICINE CLINIC | Facility: CLINIC | Age: 65
End: 2020-03-20

## 2020-03-20 DIAGNOSIS — Z01.20 VISIT FOR DENTAL EXAMINATION: Primary | ICD-10-CM

## 2020-03-20 RX ORDER — AMOXICILLIN 500 MG/1
2000 CAPSULE ORAL AS NEEDED
COMMUNITY
Start: 2020-03-08 | End: 2020-03-20 | Stop reason: SDUPTHER

## 2020-03-20 RX ORDER — AMOXICILLIN 500 MG/1
2000 CAPSULE ORAL AS NEEDED
Qty: 4 CAPSULE | Refills: 0 | Status: SHIPPED | OUTPATIENT
Start: 2020-03-20 | End: 2020-03-21

## 2020-03-20 NOTE — TELEPHONE ENCOUNTER
Pt has a dentist appt on 3/23/20 and she needs amoxicillin called to [address] [place] [address] on [facility]

## 2020-03-23 NOTE — PROGRESS NOTES
PT Discharge    Today's date: 3/22/2020  Patient name: Marni Pate  : 1955  MRN: [id number]  Referring provider: Rubén Peña DO  Dx:   Encounter Diagnosis     ICD-10-CM    1  Primary osteoarthritis of left hip M16 12    2  Trochanteric bursitis of left hip M70 62    3  Chronic bilateral low back pain without sciatica M54 5     G89 29        Start Time: 1500  Stop Time: 1600  Total time in clinic (min): 60 minutes    Assessment  Assessment details: Marni Pate is a 59 y o  female presenting to outpatient physical therapy with diagnosis of Primary osteoarthritis of right hip and   Trochanteric bursitis of left hip  History of total hip replacement, left 19  Patient's current impairments include L hip and thigh pain, impaired soft tissue mobility, reduced L hip range of motion, reduced L hip  strength, reduced postural awareness, and reduced activity tolerance  Patient's present functional limitations include difficulty with ADLs with increased need for assistance, reliance on medication and/or modalities for pain relief, poor tolerance for functional mobility and activity, and difficulty completing [place]  responsibilities  Patient to benefit from skilled outpatient physical therapy 2x/week for 6-8 weeks in order to reduce pain, maximize pain free range of motion, increase strength and stability, and improve functional mobility/functional activity in order to maximize return to prior level of function with reduced limitations  Thank you for your referral  11/15/19 Re-eval for LBP  Pt has long hx of B LBP w/o radic sx  Pt reports diff standing,walking,cleaning,bending and performing self care due to same  Has functional limitations as listed above  Plan to add back stretching and strengthening ex to current program and cont with current goals  19: Pt seen for re-eval today  She has had 14 OPPT visits to date  She reports an overall 20% improvement in L hip and notes no change in back symptoms   Exam today revealed improved AROM and strength L hip  Pt cont to report high pain levels with no outward expressions of pain  Smiling and conversing pleasantly t/o tx   3/22/20  UPDATE: Pt did not return for further tx after visit on 1/28/20  Pt was to have meeting with her ortho surgeon to discuss test results  Pt self DC'd at this point to pursue answers as to why she has continued pain  Pt had course of outpt PT for her THR prior to OPPT at this facility  Pt appeared to be functioning at max potential and was ready for DC to gym program although pt was reluctant to pursue this avenue after several discussions with therapist         Impairments: abnormal or restricted ROM, activity intolerance, impaired physical strength, pain with function and poor posture   Barriers to therapy: Perceived pain  to current ex  program   Understanding of Dx/Px/POC: good   Prognosis: fair    Goals  STGs to be achieved in 4 weeks:  1  Pt to demonstrate reduced subjective pain rating "at worst" by at least 2-3 points from Initial Eval in order to allow for reduced pain with ADLs and improved functional activity tolerance  CONT'D  2  Pt to demonstrate increased AROM of L hip  by at least 5-10 degrees in order to allow for greater ease and independence with ADLs and functional mobility  MET  3  Pt to demonstrate full PROM of L hip in order to maximize joint mobility and function and allow for progression of exercise program and achievement of goals  MET  4  Pt to demonstrate increased MMT of L hip  by at least 1/2-1 grade in order to improve safety and stability with ADLs and functional mobility  MET    LTGs to be achieved in 6-8 weeks:  1  Pt will be I with HEP in order to continue to improve quality of life and independence and reduce risk for re-injury  MET  2  Pt to demonstrate return to painfree amb  without limitations or restrictions  Cont'd  3   Pt to demonstrate improved function as noted by achieving or exceeding predicted score on FOTO outcomes assessment tool  CONT'D          Subjective Evaluation    History of Present Illness  Date of surgery: 2019  Mechanism of injury: surgery  Mechanism of injury: Pt underwent L THR 19 at [facility] [address]  Pt had anterior approach L THR  Pt is having a CTscan of the L hip tomorrow due to ongoing pain  Pt has been attending PT at [facility] since the surgery  Pt states she has had complications after surgery including a swollen face and scratched cornea, kidney ds and R leg injury from the OR table  States RLE was swollen and ecchymotic  Pt is having pain in L groin wrapping around to posterior L hip and down to knee  Dxed with troch bursitis  Pt states doctor wanted to inject the hip but pt refused stating it is too tender  11/15/19 LBP eval;: Pt states she has near constant pain in her low back and she feels she has to stretch it  Sleeping is difficult as is bending  Pain w/rotation, tieing shoes, lifting objects, showering, cleaning, vacuuming  19 UPDATE: Pt states she feels her L hip strength is improving but nerve pain and reduced sensation L thigh persist States she notes no change in her back  overall 20% improvement in hip  Recurrent probem    Quality of life: poor    Pain  Current pain ratin (back 9, hip 7)  At best pain ratin  At worst pain ratin  Location: B LBP L>R  Quality: dull ache, throbbing and sharp  Relieving factors: heat, medications and change in position (stretching)  Aggravating factors: standing, walking, lifting, stair climbing and sitting (bending)  Progression: improved    Social Support  Steps to enter house: yes (6)  Stairs in house: no   Lives in: [facility]  Lives with: alone    Employment status: not working  Hand dominance: right      Diagnostic Tests  Abnormal x-ray: back, L hip   Abnormal CT scan: tomorrow    Treatments  Previous treatment: medication and chiropractic  Current treatment: massage, medication and physical therapy  Patient Goals  Patient goals for therapy: decreased pain, increased strength, independence with ADLs/IADLs and increased motion  Patient goal: painfree mobility         Objective     Concurrent Complaints  Positive for disturbed sleep, kidney problem and stomach problem  Postural Observations  Seated posture: good  Correction of posture: makes symptoms better    Additional Postural Observation Details  Decreased thoracic kyphosis and decreased lumbar curve    Observations   Left Hip  Positive for incision (well healed anterior L hip i)  Additional Observation Details  Leg length discrep L longer by approx 1/4 inch in supine, no apparent leg length discrep at this time    Palpation   Left   Tenderness of the erector spinae, iliacus, iliopsoas, lumbar interspinals, lumbar paraspinals and quadratus lumborum  Right   Tenderness of the erector spinae  Tenderness     Left Hip   Tenderness in the greater trochanter  Additional Tenderness Details  Extremely sensitive to touch L gr troch and thigh  (symptom magnification)    Lumbar Screen  Lumbar range of motion within normal limits      Neurological Testing     Sensation     Lumbar   Left   Diminished: light touch    Right   Intact: light touch    Comments   Left light touch: L thigh, lat thigh, glut    Hip   Left Hip   Diminished: light touch    Reflexes   Left   Patellar (L4): normal (2+)    Right   Patellar (L4): normal (2+)    Active Range of Motion     Lumbar   Flexion:  WFL  Extension:  Restriction level: moderate  Left lateral flexion:  WFL Restriction level: minimal  Right lateral flexion:  WFL Restriction level: minimal  Left rotation:  WFL Restriction level: minimal  Right rotation:  WFL Restriction level: minimal  Left Hip   Flexion: 125 degrees   Abduction: 38 degrees     Right Hip   Flexion: 135 degrees   Abduction: 45 degrees   Mechanical Assessment    Cervical      Thoracic      Lumbar    Standing flexion: repeated movements   Pain location:no change  Pain intensity: worse  Pain level: increased  Standing extension: repeated movements  Pain location: no change  Pain intensity: worse  Pain level: increased    Strength/Myotome Testing     Left Hip   Planes of Motion   Flexion: 4+  Extension: 4  Abduction: 4+  Adduction: 5    Right Hip   Planes of Motion   Flexion: 5  Extension: 4+  Abduction: 5  Adduction: 5    Left Knee   Flexion: 5  Extension: 5    Right Knee   Flexion: 5  Extension: 5    Additional Strength Details  Pt puts forth questionable effort for MMT  Tests     Lumbar     Left   Negative slump test      Right   Negative slump test      Left Pelvic Girdle/Sacrum   Negative: active SLR test      Right Pelvic Girdle/Sacrum   Negative: active SLR test      Left Hip   Negative long sit  Right Hip   Negative long sit  Ambulation   Weight-Bearing Status   Weight-Bearing Status (Left): full weight bearing   Weight-Bearing Status (Right): full weight-bearing    Assistive device used: none (pt states she may need to go back on crutches )    Ambulation: Level Surfaces   Ambulation without assistive device: independent    Ambulation: Stairs   Ascend stairs: independent  Pattern: non-reciprocal  Railings: one rail  Descend stairs: independent  Pattern: non-reciprocal  Railings: one rail    Observational Gait   Gait: antalgic   Right step length within functional limits  Increased right stance time  Decreased walking speed, stride length, left stance time, left swing time, right swing time and left step length  Left foot contact pattern: foot flat  Right foot contact pattern: heel to toe    General Comments:    Lower quarter screen   Knees: unremarkable  Foot/ankle: unremarkable    Hip Comments   Recent L anterior approach hip repl   6/14/19 12/19: Pt states her stomach is still upset from colonoscopy yest and would only like to go on Nustep         Precautions: symptom magnification,chronic pain syndrome, anxiety

## 2020-04-01 ENCOUNTER — TELEPHONE (OUTPATIENT)
Dept: NEPHROLOGY | Facility: CLINIC | Age: 65
End: 2020-04-01

## 2020-04-06 ENCOUNTER — TELEMEDICINE (OUTPATIENT)
Dept: INTERNAL MEDICINE CLINIC | Facility: CLINIC | Age: 65
End: 2020-04-06
Payer: COMMERCIAL

## 2020-04-06 VITALS
HEART RATE: 62 BPM | WEIGHT: 92 LBS | DIASTOLIC BLOOD PRESSURE: 70 MMHG | OXYGEN SATURATION: 99 % | BODY MASS INDEX: 16.3 KG/M2 | SYSTOLIC BLOOD PRESSURE: 120 MMHG

## 2020-04-06 DIAGNOSIS — I10 ESSENTIAL HYPERTENSION: Primary | ICD-10-CM

## 2020-04-06 DIAGNOSIS — F43.23 ADJUSTMENT DISORDER WITH MIXED ANXIETY AND DEPRESSED MOOD: ICD-10-CM

## 2020-04-06 DIAGNOSIS — Z11.4 SCREENING FOR HIV (HUMAN IMMUNODEFICIENCY VIRUS): ICD-10-CM

## 2020-04-06 DIAGNOSIS — E28.39 MENOPAUSE OVARIAN FAILURE: ICD-10-CM

## 2020-04-06 DIAGNOSIS — R26.9 GAIT ABNORMALITY: ICD-10-CM

## 2020-04-06 DIAGNOSIS — E78.9 BORDERLINE HIGH SERUM CHOLESTEROL: ICD-10-CM

## 2020-04-06 DIAGNOSIS — N18.30 CHRONIC RENAL INSUFFICIENCY, STAGE III (MODERATE) (HCC): ICD-10-CM

## 2020-04-06 DIAGNOSIS — F41.1 GAD (GENERALIZED ANXIETY DISORDER): ICD-10-CM

## 2020-04-06 DIAGNOSIS — G89.4 CHRONIC PAIN SYNDROME: ICD-10-CM

## 2020-04-06 DIAGNOSIS — R63.4 WEIGHT LOSS: ICD-10-CM

## 2020-04-06 DIAGNOSIS — M85.89 OSTEOPENIA OF MULTIPLE SITES: ICD-10-CM

## 2020-04-06 DIAGNOSIS — J45.40 MODERATE PERSISTENT ASTHMA WITHOUT COMPLICATION: ICD-10-CM

## 2020-04-06 DIAGNOSIS — M25.552 LEFT HIP PAIN: ICD-10-CM

## 2020-04-06 PROBLEM — R11.0 NAUSEA: Status: RESOLVED | Noted: 2019-11-20 | Resolved: 2020-04-06

## 2020-04-06 PROBLEM — R10.84 GENERALIZED ABDOMINAL PAIN: Status: RESOLVED | Noted: 2019-11-20 | Resolved: 2020-04-06

## 2020-04-06 PROCEDURE — G2012 BRIEF CHECK IN BY MD/QHP: HCPCS | Performed by: INTERNAL MEDICINE

## 2020-04-06 RX ORDER — BACLOFEN 10 MG/1
10 TABLET ORAL 3 TIMES DAILY
COMMUNITY
Start: 2020-03-13 | End: 2020-10-09 | Stop reason: ALTCHOICE

## 2020-04-07 ENCOUNTER — APPOINTMENT (OUTPATIENT)
Dept: LAB | Facility: CLINIC | Age: 65
End: 2020-04-07
Payer: COMMERCIAL

## 2020-04-07 ENCOUNTER — TRANSCRIBE ORDERS (OUTPATIENT)
Dept: LAB | Facility: CLINIC | Age: 65
End: 2020-04-07

## 2020-04-07 DIAGNOSIS — N18.30 CHRONIC KIDNEY DISEASE, STAGE III (MODERATE) (HCC): ICD-10-CM

## 2020-04-07 DIAGNOSIS — N18.30 CHRONIC KIDNEY DISEASE, STAGE III (MODERATE) (HCC): Primary | ICD-10-CM

## 2020-04-07 LAB
ANION GAP SERPL CALCULATED.3IONS-SCNC: 5 MMOL/L (ref 4–13)
BUN SERPL-MCNC: 28 MG/DL (ref 5–25)
CALCIUM SERPL-MCNC: 9.1 MG/DL (ref 8.3–10.1)
CHLORIDE SERPL-SCNC: 109 MMOL/L (ref 100–108)
CO2 SERPL-SCNC: 29 MMOL/L (ref 21–32)
CREAT SERPL-MCNC: 1.02 MG/DL (ref 0.6–1.3)
GFR SERPL CREATININE-BSD FRML MDRD: 58 ML/MIN/1.73SQ M
GLUCOSE SERPL-MCNC: 70 MG/DL (ref 65–140)
POTASSIUM SERPL-SCNC: 3.8 MMOL/L (ref 3.5–5.3)
SODIUM SERPL-SCNC: 143 MMOL/L (ref 136–145)

## 2020-04-07 PROCEDURE — 36415 COLL VENOUS BLD VENIPUNCTURE: CPT

## 2020-04-07 PROCEDURE — 80048 BASIC METABOLIC PNL TOTAL CA: CPT

## 2020-04-21 DIAGNOSIS — I10 ESSENTIAL HYPERTENSION: ICD-10-CM

## 2020-04-21 RX ORDER — METOPROLOL SUCCINATE 25 MG/1
25 TABLET, EXTENDED RELEASE ORAL DAILY
Qty: 90 TABLET | Refills: 1 | Status: SHIPPED | OUTPATIENT
Start: 2020-04-21 | End: 2020-04-29

## 2020-04-28 ENCOUNTER — APPOINTMENT (EMERGENCY)
Dept: RADIOLOGY | Facility: HOSPITAL | Age: 65
End: 2020-04-28
Payer: COMMERCIAL

## 2020-04-28 ENCOUNTER — HOSPITAL ENCOUNTER (EMERGENCY)
Facility: HOSPITAL | Age: 65
Discharge: HOME | End: 2020-04-28
Attending: EMERGENCY MEDICINE | Admitting: EMERGENCY MEDICINE
Payer: COMMERCIAL

## 2020-04-28 VITALS
WEIGHT: 95 LBS | DIASTOLIC BLOOD PRESSURE: 74 MMHG | TEMPERATURE: 99.4 F | SYSTOLIC BLOOD PRESSURE: 171 MMHG | OXYGEN SATURATION: 99 % | HEART RATE: 57 BPM | BODY MASS INDEX: 16.83 KG/M2 | RESPIRATION RATE: 18 BRPM

## 2020-04-28 DIAGNOSIS — J30.89 ENVIRONMENTAL AND SEASONAL ALLERGIES: ICD-10-CM

## 2020-04-28 DIAGNOSIS — J45.909 ASTHMA, UNSPECIFIED ASTHMA SEVERITY, UNSPECIFIED WHETHER COMPLICATED, UNSPECIFIED WHETHER PERSISTENT: ICD-10-CM

## 2020-04-28 DIAGNOSIS — J43.9 PULMONARY EMPHYSEMA, UNSPECIFIED EMPHYSEMA TYPE (HCC): ICD-10-CM

## 2020-04-28 DIAGNOSIS — R60.9 PERIPHERAL EDEMA: Primary | ICD-10-CM

## 2020-04-28 LAB
ALBUMIN SERPL BCP-MCNC: 4 G/DL (ref 3.5–5.7)
ALP SERPL-CCNC: 41 U/L (ref 55–165)
ALT SERPL W P-5'-P-CCNC: 14 U/L (ref 7–52)
ANION GAP SERPL CALCULATED.3IONS-SCNC: 6 MMOL/L (ref 4–13)
AST SERPL W P-5'-P-CCNC: 20 U/L (ref 13–39)
BACTERIA UR QL AUTO: ABNORMAL /HPF
BASOPHILS # BLD AUTO: 0.1 THOUSANDS/ΜL (ref 0–0.1)
BASOPHILS NFR BLD AUTO: 1 % (ref 0–2)
BILIRUB SERPL-MCNC: 0.3 MG/DL (ref 0.2–1)
BILIRUB UR QL STRIP: NEGATIVE
BNP SERPL-MCNC: 123 PG/ML (ref 1–100)
BUN SERPL-MCNC: 22 MG/DL (ref 7–25)
CALCIUM SERPL-MCNC: 9.8 MG/DL (ref 8.6–10.5)
CHLORIDE SERPL-SCNC: 102 MMOL/L (ref 98–107)
CLARITY UR: CLEAR
CO2 SERPL-SCNC: 33 MMOL/L (ref 21–31)
COLOR UR: YELLOW
CREAT SERPL-MCNC: 1.26 MG/DL (ref 0.6–1.2)
D DIMER PPP FEU-MCNC: 0.35 UG/ML FEU
EOSINOPHIL # BLD AUTO: 0.7 THOUSAND/ΜL (ref 0–0.61)
EOSINOPHIL NFR BLD AUTO: 9 % (ref 0–5)
ERYTHROCYTE [DISTWIDTH] IN BLOOD BY AUTOMATED COUNT: 13.1 % (ref 11.5–14.5)
GFR SERPL CREATININE-BSD FRML MDRD: 45 ML/MIN/1.73SQ M
GLUCOSE SERPL-MCNC: 95 MG/DL (ref 65–99)
GLUCOSE UR STRIP-MCNC: NEGATIVE MG/DL
HCT VFR BLD AUTO: 38.6 % (ref 42–47)
HGB BLD-MCNC: 13.1 G/DL (ref 12–16)
HGB UR QL STRIP.AUTO: ABNORMAL
HOLD SPECIMEN: NORMAL
KETONES UR STRIP-MCNC: NEGATIVE MG/DL
LEUKOCYTE ESTERASE UR QL STRIP: NEGATIVE
LYMPHOCYTES # BLD AUTO: 2 THOUSANDS/ΜL (ref 0.6–4.47)
LYMPHOCYTES NFR BLD AUTO: 24 % (ref 21–51)
MCH RBC QN AUTO: 34.1 PG (ref 26–34)
MCHC RBC AUTO-ENTMCNC: 33.9 G/DL (ref 31–37)
MCV RBC AUTO: 101 FL (ref 81–99)
MONOCYTES # BLD AUTO: 0.9 THOUSAND/ΜL (ref 0.17–1.22)
MONOCYTES NFR BLD AUTO: 10 % (ref 2–12)
NEUTROPHILS # BLD AUTO: 4.7 THOUSANDS/ΜL (ref 1.4–6.5)
NEUTS SEG NFR BLD AUTO: 56 % (ref 42–75)
NITRITE UR QL STRIP: NEGATIVE
NON-SQ EPI CELLS URNS QL MICRO: ABNORMAL /HPF
PH UR STRIP.AUTO: 7.5 [PH]
PLATELET # BLD AUTO: 285 THOUSANDS/UL (ref 149–390)
PMV BLD AUTO: 7.5 FL (ref 8.6–11.7)
POTASSIUM SERPL-SCNC: 3.7 MMOL/L (ref 3.5–5.5)
PROT SERPL-MCNC: 6.3 G/DL (ref 6.4–8.9)
PROT UR STRIP-MCNC: NEGATIVE MG/DL
RBC # BLD AUTO: 3.84 MILLION/UL (ref 3.9–5.2)
RBC #/AREA URNS AUTO: ABNORMAL /HPF
SODIUM SERPL-SCNC: 141 MMOL/L (ref 134–143)
SP GR UR STRIP.AUTO: 1.01 (ref 1–1.03)
TROPONIN I SERPL-MCNC: <0.03 NG/ML
UROBILINOGEN UR QL STRIP.AUTO: 0.2 E.U./DL
WBC # BLD AUTO: 8.3 THOUSAND/UL (ref 4.8–10.8)
WBC #/AREA URNS AUTO: ABNORMAL /HPF

## 2020-04-28 PROCEDURE — 85025 COMPLETE CBC W/AUTO DIFF WBC: CPT | Performed by: PHYSICIAN ASSISTANT

## 2020-04-28 PROCEDURE — 80053 COMPREHEN METABOLIC PANEL: CPT | Performed by: PHYSICIAN ASSISTANT

## 2020-04-28 PROCEDURE — 99284 EMERGENCY DEPT VISIT MOD MDM: CPT

## 2020-04-28 PROCEDURE — 71046 X-RAY EXAM CHEST 2 VIEWS: CPT

## 2020-04-28 PROCEDURE — 99285 EMERGENCY DEPT VISIT HI MDM: CPT | Performed by: PHYSICIAN ASSISTANT

## 2020-04-28 PROCEDURE — 36415 COLL VENOUS BLD VENIPUNCTURE: CPT | Performed by: PHYSICIAN ASSISTANT

## 2020-04-28 PROCEDURE — 83880 ASSAY OF NATRIURETIC PEPTIDE: CPT | Performed by: PHYSICIAN ASSISTANT

## 2020-04-28 PROCEDURE — 81003 URINALYSIS AUTO W/O SCOPE: CPT | Performed by: PHYSICIAN ASSISTANT

## 2020-04-28 PROCEDURE — 84484 ASSAY OF TROPONIN QUANT: CPT | Performed by: PHYSICIAN ASSISTANT

## 2020-04-28 PROCEDURE — NC001 PR NO CHARGE: Performed by: EMERGENCY MEDICINE

## 2020-04-28 PROCEDURE — 85379 FIBRIN DEGRADATION QUANT: CPT | Performed by: PHYSICIAN ASSISTANT

## 2020-04-28 PROCEDURE — 93005 ELECTROCARDIOGRAM TRACING: CPT

## 2020-04-28 PROCEDURE — 81001 URINALYSIS AUTO W/SCOPE: CPT | Performed by: PHYSICIAN ASSISTANT

## 2020-04-29 ENCOUNTER — OFFICE VISIT (OUTPATIENT)
Dept: INTERNAL MEDICINE CLINIC | Facility: CLINIC | Age: 65
End: 2020-04-29
Payer: COMMERCIAL

## 2020-04-29 ENCOUNTER — APPOINTMENT (OUTPATIENT)
Dept: LAB | Facility: CLINIC | Age: 65
End: 2020-04-29
Payer: COMMERCIAL

## 2020-04-29 VITALS
SYSTOLIC BLOOD PRESSURE: 162 MMHG | TEMPERATURE: 97.5 F | WEIGHT: 94.3 LBS | BODY MASS INDEX: 16.71 KG/M2 | HEIGHT: 63 IN | DIASTOLIC BLOOD PRESSURE: 84 MMHG

## 2020-04-29 DIAGNOSIS — I10 ESSENTIAL HYPERTENSION: ICD-10-CM

## 2020-04-29 DIAGNOSIS — R60.0 LOCALIZED EDEMA: Primary | ICD-10-CM

## 2020-04-29 DIAGNOSIS — M25.552 LEFT HIP PAIN: ICD-10-CM

## 2020-04-29 DIAGNOSIS — D75.89 MACROCYTOSIS: ICD-10-CM

## 2020-04-29 DIAGNOSIS — R63.4 WEIGHT LOSS: ICD-10-CM

## 2020-04-29 DIAGNOSIS — F41.1 GAD (GENERALIZED ANXIETY DISORDER): ICD-10-CM

## 2020-04-29 LAB
ANION GAP SERPL CALCULATED.3IONS-SCNC: 5 MMOL/L (ref 4–13)
ATRIAL RATE: 56 BPM
BUN SERPL-MCNC: 24 MG/DL (ref 5–25)
CALCIUM SERPL-MCNC: 9.2 MG/DL (ref 8.3–10.1)
CHLORIDE SERPL-SCNC: 106 MMOL/L (ref 100–108)
CO2 SERPL-SCNC: 32 MMOL/L (ref 21–32)
CREAT SERPL-MCNC: 1.16 MG/DL (ref 0.6–1.3)
FOLATE SERPL-MCNC: 5.5 NG/ML (ref 3.1–17.5)
GFR SERPL CREATININE-BSD FRML MDRD: 50 ML/MIN/1.73SQ M
GLUCOSE SERPL-MCNC: 79 MG/DL (ref 65–140)
P AXIS: 65 DEGREES
POTASSIUM SERPL-SCNC: 3.8 MMOL/L (ref 3.5–5.3)
PR INTERVAL: 146 MS
QRS AXIS: 71 DEGREES
QRSD INTERVAL: 84 MS
QT INTERVAL: 428 MS
QTC INTERVAL: 413 MS
SODIUM SERPL-SCNC: 143 MMOL/L (ref 136–145)
T WAVE AXIS: 76 DEGREES
VENTRICULAR RATE: 56 BPM
VIT B12 SERPL-MCNC: 2548 PG/ML (ref 100–900)

## 2020-04-29 PROCEDURE — 93010 ELECTROCARDIOGRAM REPORT: CPT | Performed by: INTERNAL MEDICINE

## 2020-04-29 PROCEDURE — 3008F BODY MASS INDEX DOCD: CPT | Performed by: INTERNAL MEDICINE

## 2020-04-29 PROCEDURE — 1036F TOBACCO NON-USER: CPT | Performed by: INTERNAL MEDICINE

## 2020-04-29 PROCEDURE — 3077F SYST BP >= 140 MM HG: CPT | Performed by: INTERNAL MEDICINE

## 2020-04-29 PROCEDURE — 82607 VITAMIN B-12: CPT

## 2020-04-29 PROCEDURE — 3079F DIAST BP 80-89 MM HG: CPT | Performed by: INTERNAL MEDICINE

## 2020-04-29 PROCEDURE — 80048 BASIC METABOLIC PNL TOTAL CA: CPT

## 2020-04-29 PROCEDURE — 99214 OFFICE O/P EST MOD 30 MIN: CPT | Performed by: INTERNAL MEDICINE

## 2020-04-29 PROCEDURE — 36415 COLL VENOUS BLD VENIPUNCTURE: CPT

## 2020-04-29 PROCEDURE — 82746 ASSAY OF FOLIC ACID SERUM: CPT

## 2020-04-29 RX ORDER — AMLODIPINE BESYLATE 5 MG/1
5 TABLET ORAL DAILY
Qty: 90 TABLET | Refills: 3 | Status: SHIPPED | OUTPATIENT
Start: 2020-04-29 | End: 2020-05-13

## 2020-04-29 RX ORDER — FUROSEMIDE 20 MG/1
20 TABLET ORAL DAILY
Qty: 15 TABLET | Refills: 0 | Status: SHIPPED | OUTPATIENT
Start: 2020-04-29 | End: 2020-06-09

## 2020-04-29 RX ORDER — CARVEDILOL 6.25 MG/1
TABLET ORAL
Qty: 180 TABLET | Refills: 1 | Status: SHIPPED | OUTPATIENT
Start: 2020-04-29 | End: 2020-05-13

## 2020-05-01 ENCOUNTER — TRANSCRIBE ORDERS (OUTPATIENT)
Dept: ADMINISTRATIVE | Facility: HOSPITAL | Age: 65
End: 2020-05-01

## 2020-05-01 DIAGNOSIS — R20.8 BURNING SENSATION: Primary | ICD-10-CM

## 2020-05-06 ENCOUNTER — HOSPITAL ENCOUNTER (OUTPATIENT)
Dept: NON INVASIVE DIAGNOSTICS | Facility: HOSPITAL | Age: 65
Discharge: HOME | End: 2020-05-06
Payer: COMMERCIAL

## 2020-05-06 DIAGNOSIS — I10 ESSENTIAL HYPERTENSION: ICD-10-CM

## 2020-05-06 DIAGNOSIS — R60.0 LOCALIZED EDEMA: ICD-10-CM

## 2020-05-06 PROCEDURE — 93306 TTE W/DOPPLER COMPLETE: CPT | Performed by: INTERNAL MEDICINE

## 2020-05-06 PROCEDURE — 93306 TTE W/DOPPLER COMPLETE: CPT

## 2020-05-08 ENCOUNTER — TELEPHONE (OUTPATIENT)
Dept: OBGYN CLINIC | Facility: HOSPITAL | Age: 65
End: 2020-05-08

## 2020-05-11 ENCOUNTER — APPOINTMENT (OUTPATIENT)
Dept: LAB | Facility: CLINIC | Age: 65
End: 2020-05-11
Payer: COMMERCIAL

## 2020-05-11 ENCOUNTER — TRANSCRIBE ORDERS (OUTPATIENT)
Dept: ADMINISTRATIVE | Facility: HOSPITAL | Age: 65
End: 2020-05-11

## 2020-05-11 ENCOUNTER — TELEPHONE (OUTPATIENT)
Dept: OBGYN CLINIC | Facility: CLINIC | Age: 65
End: 2020-05-11

## 2020-05-11 DIAGNOSIS — N18.30 CHRONIC KIDNEY DISEASE, STAGE III (MODERATE) (HCC): ICD-10-CM

## 2020-05-11 DIAGNOSIS — N18.30 CHRONIC KIDNEY DISEASE, STAGE III (MODERATE) (HCC): Primary | ICD-10-CM

## 2020-05-11 LAB
ALBUMIN SERPL BCP-MCNC: 3.4 G/DL (ref 3.5–5)
ANION GAP SERPL CALCULATED.3IONS-SCNC: 5 MMOL/L (ref 4–13)
BACTERIA UR QL AUTO: ABNORMAL /HPF
BILIRUB UR QL STRIP: NEGATIVE
BUN SERPL-MCNC: 26 MG/DL (ref 5–25)
CALCIUM SERPL-MCNC: 9.1 MG/DL (ref 8.3–10.1)
CHLORIDE SERPL-SCNC: 106 MMOL/L (ref 100–108)
CLARITY UR: CLEAR
CO2 SERPL-SCNC: 28 MMOL/L (ref 21–32)
COLOR UR: YELLOW
CREAT SERPL-MCNC: 1.17 MG/DL (ref 0.6–1.3)
CREAT UR-MCNC: 50 MG/DL
GFR SERPL CREATININE-BSD FRML MDRD: 49 ML/MIN/1.73SQ M
GLUCOSE SERPL-MCNC: 71 MG/DL (ref 65–140)
GLUCOSE UR STRIP-MCNC: NEGATIVE MG/DL
HCT VFR BLD AUTO: 35 % (ref 34.8–46.1)
HGB BLD-MCNC: 11.4 G/DL (ref 11.5–15.4)
HGB UR QL STRIP.AUTO: ABNORMAL
HYALINE CASTS #/AREA URNS LPF: ABNORMAL /LPF
KETONES UR STRIP-MCNC: NEGATIVE MG/DL
LEUKOCYTE ESTERASE UR QL STRIP: ABNORMAL
MAGNESIUM SERPL-MCNC: 2.3 MG/DL (ref 1.6–2.6)
NITRITE UR QL STRIP: NEGATIVE
NON-SQ EPI CELLS URNS QL MICRO: ABNORMAL /HPF
PH UR STRIP.AUTO: 6 [PH]
PHOSPHATE SERPL-MCNC: 3.5 MG/DL (ref 2.3–4.1)
POTASSIUM SERPL-SCNC: 3.9 MMOL/L (ref 3.5–5.3)
PROT UR STRIP-MCNC: NEGATIVE MG/DL
PROT UR-MCNC: 12 MG/DL
PROT/CREAT UR: 0.24 MG/G{CREAT} (ref 0–0.1)
PTH-INTACT SERPL-MCNC: 55.5 PG/ML (ref 18.4–80.1)
RBC #/AREA URNS AUTO: ABNORMAL /HPF
SODIUM SERPL-SCNC: 139 MMOL/L (ref 136–145)
SP GR UR STRIP.AUTO: 1.01 (ref 1–1.03)
URATE SERPL-MCNC: 3.5 MG/DL (ref 2–6.8)
UROBILINOGEN UR QL STRIP.AUTO: 0.2 E.U./DL
WBC #/AREA URNS AUTO: ABNORMAL /HPF

## 2020-05-11 PROCEDURE — 85018 HEMOGLOBIN: CPT

## 2020-05-11 PROCEDURE — 82570 ASSAY OF URINE CREATININE: CPT | Performed by: INTERNAL MEDICINE

## 2020-05-11 PROCEDURE — 81001 URINALYSIS AUTO W/SCOPE: CPT | Performed by: INTERNAL MEDICINE

## 2020-05-11 PROCEDURE — 83970 ASSAY OF PARATHORMONE: CPT

## 2020-05-11 PROCEDURE — 84550 ASSAY OF BLOOD/URIC ACID: CPT

## 2020-05-11 PROCEDURE — 85014 HEMATOCRIT: CPT

## 2020-05-11 PROCEDURE — 80069 RENAL FUNCTION PANEL: CPT

## 2020-05-11 PROCEDURE — 36415 COLL VENOUS BLD VENIPUNCTURE: CPT

## 2020-05-11 PROCEDURE — 84156 ASSAY OF PROTEIN URINE: CPT | Performed by: INTERNAL MEDICINE

## 2020-05-11 PROCEDURE — 83735 ASSAY OF MAGNESIUM: CPT

## 2020-05-13 ENCOUNTER — OFFICE VISIT (OUTPATIENT)
Dept: INTERNAL MEDICINE CLINIC | Facility: CLINIC | Age: 65
End: 2020-05-13
Payer: COMMERCIAL

## 2020-05-13 VITALS
WEIGHT: 99.8 LBS | DIASTOLIC BLOOD PRESSURE: 78 MMHG | SYSTOLIC BLOOD PRESSURE: 142 MMHG | HEIGHT: 63 IN | HEART RATE: 56 BPM | OXYGEN SATURATION: 97 % | BODY MASS INDEX: 17.68 KG/M2 | TEMPERATURE: 97.6 F

## 2020-05-13 DIAGNOSIS — F41.1 GAD (GENERALIZED ANXIETY DISORDER): ICD-10-CM

## 2020-05-13 DIAGNOSIS — I07.1 TRACE TRICUSPID VALVE REGURGITATION: ICD-10-CM

## 2020-05-13 DIAGNOSIS — I10 ESSENTIAL HYPERTENSION: ICD-10-CM

## 2020-05-13 DIAGNOSIS — M25.552 LEFT HIP PAIN: ICD-10-CM

## 2020-05-13 DIAGNOSIS — R63.4 WEIGHT LOSS: ICD-10-CM

## 2020-05-13 DIAGNOSIS — I35.1 MODERATE AORTIC REGURGITATION: ICD-10-CM

## 2020-05-13 DIAGNOSIS — R60.0 LOCALIZED EDEMA: Primary | ICD-10-CM

## 2020-05-13 DIAGNOSIS — I34.0 TRACE MITRAL VALVE REGURGITATION: ICD-10-CM

## 2020-05-13 PROCEDURE — 3078F DIAST BP <80 MM HG: CPT | Performed by: INTERNAL MEDICINE

## 2020-05-13 PROCEDURE — 99214 OFFICE O/P EST MOD 30 MIN: CPT | Performed by: INTERNAL MEDICINE

## 2020-05-13 PROCEDURE — 1036F TOBACCO NON-USER: CPT | Performed by: INTERNAL MEDICINE

## 2020-05-13 PROCEDURE — 3008F BODY MASS INDEX DOCD: CPT | Performed by: INTERNAL MEDICINE

## 2020-05-13 PROCEDURE — 3077F SYST BP >= 140 MM HG: CPT | Performed by: INTERNAL MEDICINE

## 2020-05-13 RX ORDER — METOPROLOL SUCCINATE 50 MG/1
50 TABLET, EXTENDED RELEASE ORAL DAILY
Qty: 90 TABLET | Refills: 3 | Status: SHIPPED | OUTPATIENT
Start: 2020-05-13 | End: 2020-06-09

## 2020-05-18 ENCOUNTER — TELEPHONE (OUTPATIENT)
Dept: NEPHROLOGY | Facility: CLINIC | Age: 65
End: 2020-05-18

## 2020-05-22 ENCOUNTER — TELEMEDICINE (OUTPATIENT)
Dept: GASTROENTEROLOGY | Facility: MEDICAL CENTER | Age: 65
End: 2020-05-22
Payer: COMMERCIAL

## 2020-05-22 ENCOUNTER — TELEPHONE (OUTPATIENT)
Dept: GASTROENTEROLOGY | Facility: MEDICAL CENTER | Age: 65
End: 2020-05-22

## 2020-05-22 DIAGNOSIS — R63.4 WEIGHT LOSS: ICD-10-CM

## 2020-05-22 DIAGNOSIS — K57.90 DIVERTICULOSIS: ICD-10-CM

## 2020-05-22 DIAGNOSIS — K63.5 POLYP OF COLON, UNSPECIFIED PART OF COLON, UNSPECIFIED TYPE: ICD-10-CM

## 2020-05-22 DIAGNOSIS — K83.1 BILE DUCT STRICTURE: Primary | ICD-10-CM

## 2020-05-22 PROCEDURE — G2012 BRIEF CHECK IN BY MD/QHP: HCPCS | Performed by: INTERNAL MEDICINE

## 2020-05-26 DIAGNOSIS — I10 ESSENTIAL HYPERTENSION: Primary | ICD-10-CM

## 2020-05-26 RX ORDER — LISINOPRIL 10 MG/1
10 TABLET ORAL DAILY
Qty: 90 TABLET | Refills: 1 | Status: SHIPPED | OUTPATIENT
Start: 2020-05-26 | End: 2020-06-09

## 2020-05-26 RX ORDER — LISINOPRIL 10 MG/1
10 TABLET ORAL DAILY
COMMUNITY
End: 2020-05-26 | Stop reason: SDUPTHER

## 2020-06-02 ENCOUNTER — CONSULT (OUTPATIENT)
Dept: CARDIOLOGY CLINIC | Facility: CLINIC | Age: 65
End: 2020-06-02
Payer: COMMERCIAL

## 2020-06-02 VITALS
HEIGHT: 62 IN | HEART RATE: 86 BPM | WEIGHT: 93 LBS | BODY MASS INDEX: 17.11 KG/M2 | DIASTOLIC BLOOD PRESSURE: 80 MMHG | SYSTOLIC BLOOD PRESSURE: 182 MMHG

## 2020-06-02 DIAGNOSIS — R60.0 LOCALIZED EDEMA: ICD-10-CM

## 2020-06-02 DIAGNOSIS — N18.30 STAGE 3 CHRONIC KIDNEY DISEASE (HCC): Primary | ICD-10-CM

## 2020-06-02 DIAGNOSIS — I07.1 TRACE TRICUSPID VALVE REGURGITATION: ICD-10-CM

## 2020-06-02 DIAGNOSIS — I34.0 TRACE MITRAL VALVE REGURGITATION: ICD-10-CM

## 2020-06-02 DIAGNOSIS — I35.1 MODERATE AORTIC REGURGITATION: ICD-10-CM

## 2020-06-02 DIAGNOSIS — I10 ESSENTIAL HYPERTENSION: ICD-10-CM

## 2020-06-02 PROCEDURE — 99243 OFF/OP CNSLTJ NEW/EST LOW 30: CPT | Performed by: INTERNAL MEDICINE

## 2020-06-02 RX ORDER — CHLORTHALIDONE 25 MG/1
12.5 TABLET ORAL DAILY
COMMUNITY
End: 2020-10-09 | Stop reason: ALTCHOICE

## 2020-06-08 ENCOUNTER — TRANSCRIBE ORDERS (OUTPATIENT)
Dept: LAB | Facility: CLINIC | Age: 65
End: 2020-06-08

## 2020-06-08 ENCOUNTER — APPOINTMENT (OUTPATIENT)
Dept: LAB | Facility: CLINIC | Age: 65
End: 2020-06-08
Payer: COMMERCIAL

## 2020-06-08 DIAGNOSIS — E78.9 BORDERLINE HIGH SERUM CHOLESTEROL: ICD-10-CM

## 2020-06-08 DIAGNOSIS — N18.30 CHRONIC KIDNEY DISEASE, STAGE 3 (HCC): Primary | ICD-10-CM

## 2020-06-08 DIAGNOSIS — F41.1 GAD (GENERALIZED ANXIETY DISORDER): ICD-10-CM

## 2020-06-08 DIAGNOSIS — N18.30 CHRONIC KIDNEY DISEASE, STAGE 3 (HCC): ICD-10-CM

## 2020-06-08 DIAGNOSIS — M85.89 OSTEOPENIA OF MULTIPLE SITES: ICD-10-CM

## 2020-06-08 DIAGNOSIS — I10 ESSENTIAL HYPERTENSION: ICD-10-CM

## 2020-06-08 DIAGNOSIS — N18.30 CHRONIC RENAL INSUFFICIENCY, STAGE III (MODERATE) (HCC): ICD-10-CM

## 2020-06-08 DIAGNOSIS — Z11.4 SCREENING FOR HIV (HUMAN IMMUNODEFICIENCY VIRUS): ICD-10-CM

## 2020-06-08 LAB
ALBUMIN SERPL BCP-MCNC: 3.5 G/DL (ref 3.5–5)
ALP SERPL-CCNC: 61 U/L (ref 46–116)
ALT SERPL W P-5'-P-CCNC: 21 U/L (ref 12–78)
ANION GAP SERPL CALCULATED.3IONS-SCNC: 4 MMOL/L (ref 4–13)
AST SERPL W P-5'-P-CCNC: 20 U/L (ref 5–45)
BILIRUB SERPL-MCNC: 0.14 MG/DL (ref 0.2–1)
BUN SERPL-MCNC: 41 MG/DL (ref 5–25)
CALCIUM SERPL-MCNC: 9.2 MG/DL (ref 8.3–10.1)
CHLORIDE SERPL-SCNC: 104 MMOL/L (ref 100–108)
CHOLEST SERPL-MCNC: 184 MG/DL (ref 50–200)
CO2 SERPL-SCNC: 32 MMOL/L (ref 21–32)
CREAT SERPL-MCNC: 1.27 MG/DL (ref 0.6–1.3)
GFR SERPL CREATININE-BSD FRML MDRD: 45 ML/MIN/1.73SQ M
GLUCOSE P FAST SERPL-MCNC: 87 MG/DL (ref 65–99)
HDLC SERPL-MCNC: 71 MG/DL
LDLC SERPL CALC-MCNC: 100 MG/DL (ref 0–100)
POTASSIUM SERPL-SCNC: 3.9 MMOL/L (ref 3.5–5.3)
PROT SERPL-MCNC: 6.6 G/DL (ref 6.4–8.2)
SODIUM SERPL-SCNC: 140 MMOL/L (ref 136–145)
TRIGL SERPL-MCNC: 67 MG/DL
TSH SERPL DL<=0.05 MIU/L-ACNC: 1.32 UIU/ML (ref 0.36–3.74)

## 2020-06-08 PROCEDURE — 80061 LIPID PANEL: CPT

## 2020-06-08 PROCEDURE — 36415 COLL VENOUS BLD VENIPUNCTURE: CPT

## 2020-06-08 PROCEDURE — 84443 ASSAY THYROID STIM HORMONE: CPT

## 2020-06-08 PROCEDURE — 87389 HIV-1 AG W/HIV-1&-2 AB AG IA: CPT

## 2020-06-08 PROCEDURE — 80053 COMPREHEN METABOLIC PANEL: CPT

## 2020-06-09 ENCOUNTER — OFFICE VISIT (OUTPATIENT)
Dept: INTERNAL MEDICINE CLINIC | Facility: CLINIC | Age: 65
End: 2020-06-09
Payer: COMMERCIAL

## 2020-06-09 VITALS
WEIGHT: 96.6 LBS | DIASTOLIC BLOOD PRESSURE: 80 MMHG | BODY MASS INDEX: 17.78 KG/M2 | HEART RATE: 59 BPM | SYSTOLIC BLOOD PRESSURE: 158 MMHG | OXYGEN SATURATION: 98 % | RESPIRATION RATE: 18 BRPM | HEIGHT: 62 IN | TEMPERATURE: 97.5 F

## 2020-06-09 DIAGNOSIS — R60.0 LOCALIZED EDEMA: ICD-10-CM

## 2020-06-09 DIAGNOSIS — M25.552 LEFT HIP PAIN: ICD-10-CM

## 2020-06-09 DIAGNOSIS — R63.4 WEIGHT LOSS: ICD-10-CM

## 2020-06-09 DIAGNOSIS — I10 ESSENTIAL HYPERTENSION: Primary | ICD-10-CM

## 2020-06-09 DIAGNOSIS — F41.1 GAD (GENERALIZED ANXIETY DISORDER): ICD-10-CM

## 2020-06-09 DIAGNOSIS — N18.30 STAGE 3 CHRONIC KIDNEY DISEASE (HCC): ICD-10-CM

## 2020-06-09 LAB — HIV 1+2 AB+HIV1 P24 AG SERPL QL IA: NORMAL

## 2020-06-09 PROCEDURE — 1036F TOBACCO NON-USER: CPT | Performed by: INTERNAL MEDICINE

## 2020-06-09 PROCEDURE — 3008F BODY MASS INDEX DOCD: CPT | Performed by: INTERNAL MEDICINE

## 2020-06-09 PROCEDURE — 99214 OFFICE O/P EST MOD 30 MIN: CPT | Performed by: INTERNAL MEDICINE

## 2020-06-09 PROCEDURE — 3077F SYST BP >= 140 MM HG: CPT | Performed by: INTERNAL MEDICINE

## 2020-06-09 PROCEDURE — 3079F DIAST BP 80-89 MM HG: CPT | Performed by: INTERNAL MEDICINE

## 2020-06-10 ENCOUNTER — OFFICE VISIT (OUTPATIENT)
Dept: OBGYN CLINIC | Facility: MEDICAL CENTER | Age: 65
End: 2020-06-10
Payer: COMMERCIAL

## 2020-06-10 VITALS
HEIGHT: 62 IN | DIASTOLIC BLOOD PRESSURE: 68 MMHG | BODY MASS INDEX: 18.03 KG/M2 | WEIGHT: 98 LBS | SYSTOLIC BLOOD PRESSURE: 130 MMHG

## 2020-06-10 DIAGNOSIS — N95.1 MENOPAUSAL SYMPTOMS: Primary | ICD-10-CM

## 2020-06-10 PROCEDURE — 1036F TOBACCO NON-USER: CPT | Performed by: OBSTETRICS & GYNECOLOGY

## 2020-06-10 PROCEDURE — 3075F SYST BP GE 130 - 139MM HG: CPT | Performed by: OBSTETRICS & GYNECOLOGY

## 2020-06-10 PROCEDURE — 3078F DIAST BP <80 MM HG: CPT | Performed by: OBSTETRICS & GYNECOLOGY

## 2020-06-10 PROCEDURE — 99203 OFFICE O/P NEW LOW 30 MIN: CPT | Performed by: OBSTETRICS & GYNECOLOGY

## 2020-06-10 PROCEDURE — 3008F BODY MASS INDEX DOCD: CPT | Performed by: OBSTETRICS & GYNECOLOGY

## 2020-06-17 ENCOUNTER — HOSPITAL ENCOUNTER (OUTPATIENT)
Dept: NEUROLOGY | Facility: CLINIC | Age: 65
Discharge: HOME | End: 2020-06-17
Payer: COMMERCIAL

## 2020-06-17 ENCOUNTER — TELEPHONE (OUTPATIENT)
Dept: GASTROENTEROLOGY | Facility: CLINIC | Age: 65
End: 2020-06-17

## 2020-06-17 DIAGNOSIS — R20.8 BURNING SENSATION: ICD-10-CM

## 2020-06-17 PROCEDURE — 95909 NRV CNDJ TST 5-6 STUDIES: CPT | Performed by: PHYSICAL MEDICINE & REHABILITATION

## 2020-06-17 PROCEDURE — 95886 MUSC TEST DONE W/N TEST COMP: CPT | Performed by: PHYSICAL MEDICINE & REHABILITATION

## 2020-06-22 ENCOUNTER — HOSPITAL ENCOUNTER (OUTPATIENT)
Dept: BONE DENSITY | Facility: HOSPITAL | Age: 65
Discharge: HOME | End: 2020-06-22
Payer: COMMERCIAL

## 2020-06-22 DIAGNOSIS — M85.89 OSTEOPENIA OF MULTIPLE SITES: ICD-10-CM

## 2020-06-22 PROCEDURE — 77080 DXA BONE DENSITY AXIAL: CPT

## 2020-06-25 ENCOUNTER — HOSPITAL ENCOUNTER (OUTPATIENT)
Dept: NON INVASIVE DIAGNOSTICS | Facility: HOSPITAL | Age: 65
Discharge: HOME | End: 2020-06-25
Payer: COMMERCIAL

## 2020-06-25 DIAGNOSIS — I10 ESSENTIAL HYPERTENSION: ICD-10-CM

## 2020-06-25 PROCEDURE — 93975 VASCULAR STUDY: CPT

## 2020-06-25 PROCEDURE — 93975 VASCULAR STUDY: CPT | Performed by: SURGERY

## 2020-06-26 ENCOUNTER — TELEPHONE (OUTPATIENT)
Dept: INTERNAL MEDICINE CLINIC | Facility: CLINIC | Age: 65
End: 2020-06-26

## 2020-07-01 ENCOUNTER — OFFICE VISIT (OUTPATIENT)
Dept: CARDIOLOGY CLINIC | Facility: CLINIC | Age: 65
End: 2020-07-01
Payer: COMMERCIAL

## 2020-07-01 VITALS
BODY MASS INDEX: 17.66 KG/M2 | WEIGHT: 96 LBS | DIASTOLIC BLOOD PRESSURE: 80 MMHG | SYSTOLIC BLOOD PRESSURE: 140 MMHG | HEART RATE: 60 BPM | HEIGHT: 62 IN

## 2020-07-01 DIAGNOSIS — F41.1 GAD (GENERALIZED ANXIETY DISORDER): ICD-10-CM

## 2020-07-01 DIAGNOSIS — I10 ESSENTIAL HYPERTENSION: ICD-10-CM

## 2020-07-01 DIAGNOSIS — I35.1 MODERATE AORTIC REGURGITATION: Primary | ICD-10-CM

## 2020-07-01 PROCEDURE — 3077F SYST BP >= 140 MM HG: CPT | Performed by: INTERNAL MEDICINE

## 2020-07-01 PROCEDURE — 99214 OFFICE O/P EST MOD 30 MIN: CPT | Performed by: INTERNAL MEDICINE

## 2020-07-01 PROCEDURE — 3008F BODY MASS INDEX DOCD: CPT | Performed by: INTERNAL MEDICINE

## 2020-07-01 PROCEDURE — 3079F DIAST BP 80-89 MM HG: CPT | Performed by: INTERNAL MEDICINE

## 2020-07-01 PROCEDURE — 1036F TOBACCO NON-USER: CPT | Performed by: INTERNAL MEDICINE

## 2020-07-01 NOTE — PROGRESS NOTES
Patient ID: Pepe Linares is a 59 y o  female  Plan: Moderate aortic regurgitation  This is by echo description  However pulse pressure is not wide  Will reassess by echo and exam in 2 years  Essential hypertension  Blood pressure actually improved today  She continues to see Nephrology at [facility] THE [facility]  ZACK (generalized anxiety disorder)  Certainly a component of her measured high blood pressure but this is improved today as well  Follow up Plan:  Two year EKG, echo, and follow-up visit  HPI:  Patient is seen in follow-up regarding the above issues  Since the last visit she has felt okay  She is not crazy about the diuretic but will speak to her nephrologist in the near term  No chest pain or chest pressure  Blood pressure has been somewhat variable  Most recent or relevant cardiac/vascular testing:    Echo 05/06/2020:  Normal LV function  Moderate aortic insufficiency  Past Surgical History:   Procedure Laterality Date    COLONOSCOPY      DENTAL SURGERY      FACIAL COSMETIC SURGERY      HIP SURGERY Left 06/2019     CMP:   Lab Results   Component Value Date    K 3 9 06/08/2020     06/08/2020    CO2 32 06/08/2020    BUN 41 (H) 06/08/2020    CREATININE 1 27 06/08/2020    EGFR 45 06/08/2020       Lipid Profile:   Lab Results   Component Value Date    TRIG 67 06/08/2020    HDL 71 06/08/2020         Review of Systems   10  point ROS  was otherwise non pertinent or negative except as per HPI or as below  Gait: Normal         Objective:     /80   Pulse 60   Ht 5' 2" (1 575 m)   Wt 43 5 kg (96 lb)   LMP  (LMP Unknown)   BMI 17 56 kg/m²     PHYSICAL EXAM:    General:  Normal appearance in no distress  Eyes:  Anicteric  Oral mucosa:  Moist   Neck:  No JVD  Carotid upstrokes are brisk without bruits  No masses  Chest:  Clear to auscultation and percussion  Cardiac:  Normal PMI    Normal S1 and S2   Grade 1-2 D [employer] though early diastolic murmur loudest at the mid left sternal border  Abdomen:  Soft and nontender  No palpable organomegaly or aortic enlargement  Extremities:  No peripheral edema  Musculoskeletal:  Symmetric  Vascular:  Femoral pulses are brisk without bruits  Popliteal pulses are intact bilaterally  Pedal pulses are intact  Neuro:  Grossly symmetric  Psych:  Alert and oriented x3          Current Outpatient Medications:     albuterol (VENTOLIN HFA) 90 mcg/act inhaler, Inhale 2 puffs every 6 (six) hours as needed for wheezing, Disp: 18 g, Rfl: 5    baclofen 10 mg tablet, Take 10 mg by mouth 3 (three) times a day , Disp: , Rfl:     Calcium Carbonate-Vitamin D (OSCAL 500/200 D-3 PO), Take by mouth 3 (three) times a day  , Disp: , Rfl:     chlorthalidone 25 mg tablet, Take 25 mg by mouth daily, Disp: , Rfl:     Cholecalciferol (VITAMIN D3) 25 MCG (1000 UT) CHEW, Chew 1 tablet 2 (two) times a day , Disp: , Rfl:     cyanocobalamin (VITAMIN B-12) 1,000 mcg tablet, Take by mouth daily, Disp: , Rfl:     famotidine (PEPCID) 20 mg tablet, Take 1 tablet (20 mg total) by mouth 2 (two) times a day, Disp: 180 tablet, Rfl: 0    ferrous sulfate 325 (65 Fe) mg tablet, Take 325 mg by mouth daily with breakfast, Disp: , Rfl:     metoprolol tartrate (LOPRESSOR) 25 mg tablet, Take 1 tablet (25 mg total) by mouth every 12 (twelve) hours, Disp: 60 tablet, Rfl: 5    mometasone-formoterol (DULERA) 100-5 MCG/ACT inhaler, Inhale 2 puffs 2 (two) times a day Rinse mouth after use , Disp: 1 Inhaler, Rfl: 5    pyridoxine (VITAMIN B6) 100 mg tablet, Take 100 mg by mouth daily, Disp: , Rfl:     sertraline (ZOLOFT) 25 mg tablet, Take 25 mg by mouth daily , Disp: , Rfl: 1    traMADol (ULTRAM) 50 mg tablet, Take 1 tablet (50 mg total) by mouth every 6 (six) hours as needed for moderate pain, Disp: 30 tablet, Rfl: 0  Allergies   Allergen Reactions    Nsaids GI Bleeding    Amlodipine Edema    Iv Contrast [Iodinated Diagnostic Agents] Other (See Comments)     CKD  Lisinopril Other (See Comments) and Cough     Kidney injury    Vancomycin Other (See Comments)     Kidney injury    Celebrex [Celecoxib] Swelling    Fosamax [Alendronate]      Bone pain    Ibuprofen Swelling    Influenza Vaccines      Past Medical History:   Diagnosis Date    Aortic regurgitation     Arthritis     Essential hypertension 8/7/2019    ZACK (generalized anxiety disorder) 12/11/2018    Moderate persistent asthma without complication 53/84/2699    Pulmonary emphysema ([facility] [address] ) 10/23/2018    Stage 3 chronic kidney disease ([facility] [address] ) 12/11/2019           Social History     Tobacco Use   Smoking Status Never Smoker   Smokeless Tobacco Never Used

## 2020-07-01 NOTE — ASSESSMENT & PLAN NOTE
This is by echo description  However pulse pressure is not wide  Will reassess by echo and exam in 2 years

## 2020-07-01 NOTE — ASSESSMENT & PLAN NOTE
Blood pressure actually improved today  She continues to see Nephrology at [facility] THE [facility]

## 2020-07-02 ENCOUNTER — TELEPHONE (OUTPATIENT)
Dept: NEPHROLOGY | Facility: CLINIC | Age: 65
End: 2020-07-02

## 2020-07-08 DIAGNOSIS — J45.909 ASTHMA, UNSPECIFIED ASTHMA SEVERITY, UNSPECIFIED WHETHER COMPLICATED, UNSPECIFIED WHETHER PERSISTENT: ICD-10-CM

## 2020-07-08 RX ORDER — ALBUTEROL SULFATE 90 UG/1
2 AEROSOL, METERED RESPIRATORY (INHALATION) EVERY 6 HOURS PRN
Qty: 18 G | Refills: 5 | Status: SHIPPED | OUTPATIENT
Start: 2020-07-08 | End: 2020-10-08 | Stop reason: SDUPTHER

## 2020-07-29 ENCOUNTER — TRANSCRIBE ORDERS (OUTPATIENT)
Dept: LAB | Facility: CLINIC | Age: 65
End: 2020-07-29

## 2020-07-29 ENCOUNTER — APPOINTMENT (OUTPATIENT)
Dept: LAB | Facility: CLINIC | Age: 65
End: 2020-07-29
Payer: COMMERCIAL

## 2020-07-29 DIAGNOSIS — I10 ESSENTIAL HYPERTENSION: ICD-10-CM

## 2020-07-29 DIAGNOSIS — N18.30 CHRONIC KIDNEY DISEASE, STAGE 3 (HCC): Primary | ICD-10-CM

## 2020-07-29 DIAGNOSIS — N18.30 CHRONIC KIDNEY DISEASE, STAGE 3 (HCC): ICD-10-CM

## 2020-07-29 LAB
ALBUMIN SERPL BCP-MCNC: 3.3 G/DL (ref 3.5–5)
ANION GAP SERPL CALCULATED.3IONS-SCNC: 3 MMOL/L (ref 4–13)
BACTERIA UR QL AUTO: ABNORMAL /HPF
BILIRUB UR QL STRIP: NEGATIVE
BUN SERPL-MCNC: 28 MG/DL (ref 5–25)
CALCIUM SERPL-MCNC: 9 MG/DL (ref 8.3–10.1)
CHLORIDE SERPL-SCNC: 103 MMOL/L (ref 100–108)
CLARITY UR: CLEAR
CO2 SERPL-SCNC: 33 MMOL/L (ref 21–32)
COLOR UR: YELLOW
CREAT SERPL-MCNC: 1.21 MG/DL (ref 0.6–1.3)
CREAT UR-MCNC: 22.4 MG/DL
GFR SERPL CREATININE-BSD FRML MDRD: 47 ML/MIN/1.73SQ M
GLUCOSE SERPL-MCNC: 97 MG/DL (ref 65–140)
GLUCOSE UR STRIP-MCNC: NEGATIVE MG/DL
HCT VFR BLD AUTO: 39.3 % (ref 34.8–46.1)
HGB BLD-MCNC: 13.1 G/DL (ref 11.5–15.4)
HGB UR QL STRIP.AUTO: ABNORMAL
HYALINE CASTS #/AREA URNS LPF: ABNORMAL /LPF
KETONES UR STRIP-MCNC: NEGATIVE MG/DL
LEUKOCYTE ESTERASE UR QL STRIP: NEGATIVE
MAGNESIUM SERPL-MCNC: 2.3 MG/DL (ref 1.6–2.6)
NITRITE UR QL STRIP: NEGATIVE
NON-SQ EPI CELLS URNS QL MICRO: ABNORMAL /HPF
PH UR STRIP.AUTO: 7 [PH]
PHOSPHATE SERPL-MCNC: 4 MG/DL (ref 2.3–4.1)
POTASSIUM SERPL-SCNC: 3.7 MMOL/L (ref 3.5–5.3)
PROT UR STRIP-MCNC: NEGATIVE MG/DL
PROT UR-MCNC: <6 MG/DL
PROT/CREAT UR: <0.27 MG/G{CREAT} (ref 0–0.1)
RBC #/AREA URNS AUTO: ABNORMAL /HPF
SODIUM SERPL-SCNC: 139 MMOL/L (ref 136–145)
SP GR UR STRIP.AUTO: 1.01 (ref 1–1.03)
UROBILINOGEN UR QL STRIP.AUTO: 0.2 E.U./DL
WBC #/AREA URNS AUTO: ABNORMAL /HPF

## 2020-07-29 PROCEDURE — 85014 HEMATOCRIT: CPT

## 2020-07-29 PROCEDURE — 36415 COLL VENOUS BLD VENIPUNCTURE: CPT

## 2020-07-29 PROCEDURE — 82570 ASSAY OF URINE CREATININE: CPT | Performed by: INTERNAL MEDICINE

## 2020-07-29 PROCEDURE — 84156 ASSAY OF PROTEIN URINE: CPT | Performed by: INTERNAL MEDICINE

## 2020-07-29 PROCEDURE — 83735 ASSAY OF MAGNESIUM: CPT

## 2020-07-29 PROCEDURE — 85018 HEMOGLOBIN: CPT

## 2020-07-29 PROCEDURE — 80069 RENAL FUNCTION PANEL: CPT

## 2020-07-29 PROCEDURE — 81001 URINALYSIS AUTO W/SCOPE: CPT | Performed by: INTERNAL MEDICINE

## 2020-08-18 ENCOUNTER — OFFICE VISIT (OUTPATIENT)
Dept: INTERNAL MEDICINE CLINIC | Facility: CLINIC | Age: 65
End: 2020-08-18
Payer: COMMERCIAL

## 2020-08-18 VITALS
RESPIRATION RATE: 16 BRPM | DIASTOLIC BLOOD PRESSURE: 78 MMHG | BODY MASS INDEX: 17.48 KG/M2 | HEART RATE: 68 BPM | TEMPERATURE: 97.7 F | SYSTOLIC BLOOD PRESSURE: 146 MMHG | HEIGHT: 62 IN | WEIGHT: 95 LBS

## 2020-08-18 DIAGNOSIS — J30.2 SEASONAL ALLERGIES: ICD-10-CM

## 2020-08-18 DIAGNOSIS — J01.10 ACUTE NON-RECURRENT FRONTAL SINUSITIS: Primary | ICD-10-CM

## 2020-08-18 DIAGNOSIS — H69.83 DYSFUNCTION OF BOTH EUSTACHIAN TUBES: ICD-10-CM

## 2020-08-18 PROCEDURE — 3008F BODY MASS INDEX DOCD: CPT | Performed by: INTERNAL MEDICINE

## 2020-08-18 PROCEDURE — 3078F DIAST BP <80 MM HG: CPT | Performed by: INTERNAL MEDICINE

## 2020-08-18 PROCEDURE — 99213 OFFICE O/P EST LOW 20 MIN: CPT | Performed by: INTERNAL MEDICINE

## 2020-08-18 PROCEDURE — 1036F TOBACCO NON-USER: CPT | Performed by: INTERNAL MEDICINE

## 2020-08-18 PROCEDURE — 3077F SYST BP >= 140 MM HG: CPT | Performed by: INTERNAL MEDICINE

## 2020-08-18 RX ORDER — AMOXICILLIN AND CLAVULANATE POTASSIUM 875; 125 MG/1; MG/1
1 TABLET, FILM COATED ORAL EVERY 12 HOURS SCHEDULED
Qty: 20 TABLET | Refills: 0 | Status: SHIPPED | OUTPATIENT
Start: 2020-08-18 | End: 2020-08-24 | Stop reason: ALTCHOICE

## 2020-08-18 RX ORDER — LOSARTAN POTASSIUM 25 MG/1
25 TABLET ORAL
COMMUNITY
Start: 2020-08-03 | End: 2020-08-24 | Stop reason: ALTCHOICE

## 2020-08-18 RX ORDER — GUAIFENESIN 600 MG
600 TABLET, EXTENDED RELEASE 12 HR ORAL EVERY 12 HOURS SCHEDULED
Qty: 20 TABLET | Refills: 0 | Status: SHIPPED | OUTPATIENT
Start: 2020-08-18 | End: 2020-10-09 | Stop reason: ALTCHOICE

## 2020-08-18 RX ORDER — FLUTICASONE PROPIONATE 50 MCG
1 SPRAY, SUSPENSION (ML) NASAL DAILY
Qty: 16 G | Refills: 0 | Status: SHIPPED | OUTPATIENT
Start: 2020-08-18 | End: 2021-09-30

## 2020-08-18 NOTE — PROGRESS NOTES
Assessment/Plan:  Problem List Items Addressed This Visit     None      Visit Diagnoses     Acute non-recurrent frontal sinusitis    -  Primary    Relevant Medications    amoxicillin-clavulanate (AUGMENTIN) 875-125 mg per tablet    guaiFENesin (Mucinex) 600 mg 12 hr tablet    fluticasone (FLONASE) 50 mcg/act nasal spray    loratadine-pseudoephedrine (CLARITIN-D 12-HOUR) 5-120 mg per tablet    Other Relevant Orders    Ambulatory Referral to Otolaryngology    Dysfunction of both eustachian tubes        Relevant Medications    amoxicillin-clavulanate (AUGMENTIN) 875-125 mg per tablet    guaiFENesin (Mucinex) 600 mg 12 hr tablet    fluticasone (FLONASE) 50 mcg/act nasal spray    loratadine-pseudoephedrine (CLARITIN-D 12-HOUR) 5-120 mg per tablet    Other Relevant Orders    Ambulatory Referral to Otolaryngology    Seasonal allergies        Relevant Medications    amoxicillin-clavulanate (AUGMENTIN) 875-125 mg per tablet    guaiFENesin (Mucinex) 600 mg 12 hr tablet    fluticasone (FLONASE) 50 mcg/act nasal spray    loratadine-pseudoephedrine (CLARITIN-D 12-HOUR) 5-120 mg per tablet    Other Relevant Orders    Ambulatory Referral to Otolaryngology           Diagnoses and all orders for this visit:    Acute non-recurrent frontal sinusitis  -     amoxicillin-clavulanate (AUGMENTIN) 875-125 mg per tablet; Take 1 tablet by mouth every 12 (twelve) hours for 10 days  -     guaiFENesin (Mucinex) 600 mg 12 hr tablet; Take 1 tablet (600 mg total) by mouth every 12 (twelve) hours  -     fluticasone (FLONASE) 50 mcg/act nasal spray; 1 spray into each nostril daily  -     Ambulatory Referral to Otolaryngology; Future  -     loratadine-pseudoephedrine (CLARITIN-D 12-HOUR) 5-120 mg per tablet; Take 1 tablet by mouth 2 (two) times a day    Dysfunction of both eustachian tubes  -     amoxicillin-clavulanate (AUGMENTIN) 875-125 mg per tablet;  Take 1 tablet by mouth every 12 (twelve) hours for 10 days  -     guaiFENesin (Mucinex) 600 mg 12 hr tablet; Take 1 tablet (600 mg total) by mouth every 12 (twelve) hours  -     fluticasone (FLONASE) 50 mcg/act nasal spray; 1 spray into each nostril daily  -     Ambulatory Referral to Otolaryngology; Future  -     loratadine-pseudoephedrine (CLARITIN-D 12-HOUR) 5-120 mg per tablet; Take 1 tablet by mouth 2 (two) times a day    Seasonal allergies  -     amoxicillin-clavulanate (AUGMENTIN) 875-125 mg per tablet; Take 1 tablet by mouth every 12 (twelve) hours for 10 days  -     guaiFENesin (Mucinex) 600 mg 12 hr tablet; Take 1 tablet (600 mg total) by mouth every 12 (twelve) hours  -     fluticasone (FLONASE) 50 mcg/act nasal spray; 1 spray into each nostril daily  -     Ambulatory Referral to Otolaryngology; Future  -     loratadine-pseudoephedrine (CLARITIN-D 12-HOUR) 5-120 mg per tablet; Take 1 tablet by mouth 2 (two) times a day    Other orders  -     losartan (COZAAR) 25 mg tablet; Take 25 mg by mouth        No problem-specific Assessment & Plan notes found for this encounter  A/P: Rest and increase po fluids  Warm salt water gargle  Will start abx, INS, mucinex, and claritin-d  Pt insists she needs stronger treatment  Don't feel she needs oral steroids just yet w/o trying other meds first  Wants to see ENT  RTC as scheduled  Subjective:      Patient ID: Juan David Min is a 59 y o  female  Non-smoking WF presents with a several day h/o AU discomfort and popping  No travel  No swimming  NO recent illnesses  No fever or chills  Some PND and headaches  No sore throat  No cough or chest congestion  The following portions of the patient's history were reviewed and updated as appropriate:   She has a past medical history of Aortic regurgitation, Arthritis, Essential hypertension (8/7/2019), ZACK (generalized anxiety disorder) (12/11/2018), Moderate persistent asthma without complication (33/18/3163), Pulmonary emphysema ([facility] [address] ) (10/23/2018), and Stage 3 chronic kidney disease ([facility] [address] ) (12/11/2019)  ,  does not have any pertinent problems on file  ,   has a past surgical history that includes Facial cosmetic surgery; Colonoscopy; Dental surgery; and Hip surgery (Left, 06/2019)  ,  family history includes Aneurysm in her mother; No Known Problems in her brother and father  ,   reports that she has never smoked  She has never used smokeless tobacco  She reports that she does not drink alcohol or use drugs  ,  is allergic to nsaids; amlodipine; iv contrast [iodinated diagnostic agents]; lisinopril; vancomycin; celebrex [celecoxib]; fosamax [alendronate]; ibuprofen; and influenza vaccines     Current Outpatient Medications   Medication Sig Dispense Refill    albuterol (Ventolin HFA) 90 mcg/act inhaler Inhale 2 puffs every 6 (six) hours as needed for wheezing 18 g 5    baclofen 10 mg tablet Take 10 mg by mouth 3 (three) times a day       Calcium Carbonate-Vitamin D (OSCAL 500/200 D-3 PO) Take by mouth 3 (three) times a day        chlorthalidone 25 mg tablet Take 12 5 mg by mouth daily       Cholecalciferol (VITAMIN D3) 25 MCG (1000 UT) CHEW Chew 1 tablet 2 (two) times a day       cyanocobalamin (VITAMIN B-12) 1,000 mcg tablet Take by mouth daily      famotidine (PEPCID) 20 mg tablet Take 1 tablet (20 mg total) by mouth 2 (two) times a day 180 tablet 0    ferrous sulfate 325 (65 Fe) mg tablet Take 325 mg by mouth daily with breakfast      losartan (COZAAR) 25 mg tablet Take 25 mg by mouth      metoprolol tartrate (LOPRESSOR) 25 mg tablet Take 1 tablet (25 mg total) by mouth every 12 (twelve) hours 60 tablet 5    mometasone-formoterol (DULERA) 100-5 MCG/ACT inhaler Inhale 2 puffs 2 (two) times a day Rinse mouth after use   1 Inhaler 5    pyridoxine (VITAMIN B6) 100 mg tablet Take 100 mg by mouth daily      sertraline (ZOLOFT) 25 mg tablet Take 25 mg by mouth daily   1    traMADol (ULTRAM) 50 mg tablet Take 1 tablet (50 mg total) by mouth every 6 (six) hours as needed for moderate pain 30 tablet 0    amoxicillin-clavulanate (AUGMENTIN) 875-125 mg per tablet Take 1 tablet by mouth every 12 (twelve) hours for 10 days 20 tablet 0    fluticasone (FLONASE) 50 mcg/act nasal spray 1 spray into each nostril daily 16 g 0    guaiFENesin (Mucinex) 600 mg 12 hr tablet Take 1 tablet (600 mg total) by mouth every 12 (twelve) hours 20 tablet 0    loratadine-pseudoephedrine (CLARITIN-D 12-HOUR) 5-120 mg per tablet Take 1 tablet by mouth 2 (two) times a day 60 tablet 0     No current facility-administered medications for this visit  Review of Systems   Constitutional: Negative for activity change, chills, diaphoresis, fatigue and fever  HENT: Positive for congestion, ear pain, postnasal drip and sinus pressure  Negative for ear discharge, facial swelling, rhinorrhea, sinus pain, sore throat and trouble swallowing  Respiratory: Negative for cough, chest tightness, shortness of breath and wheezing  Cardiovascular: Negative for chest pain, palpitations and leg swelling  Gastrointestinal: Negative for abdominal pain, constipation, diarrhea, nausea and vomiting  Genitourinary: Negative for difficulty urinating, dysuria and frequency  Musculoskeletal: Negative for arthralgias, gait problem and myalgias  Neurological: Negative for light-headedness and headaches  Psychiatric/Behavioral: Negative for confusion  The patient is not nervous/anxious  PHQ-9 Depression Screening    PHQ-9:    Frequency of the following problems over the past two weeks:             Objective:  Vitals:    08/18/20 0736   BP: 146/78   Pulse: 68   Resp: 16   Temp: 97 7 °F (36 5 °C)   TempSrc: Tympanic   Weight: 43 1 kg (95 lb)   Height: 5' 2" (1 575 m)     Body mass index is 17 38 kg/m²  Physical Exam  Vitals signs and nursing note reviewed  Constitutional:       General: She is not in acute distress  Appearance: Normal appearance  She is normal weight  HENT:      Head: Normocephalic and atraumatic        Comments: Sinus tenderness noted  Turbinates red and inflamed  Cobblestoning noted  AU EAC patent with TM intact but bulging with air fluid level  Right Ear: Ear canal and external ear normal  There is no impacted cerumen  Left Ear: Ear canal and external ear normal  There is no impacted cerumen  Nose: No congestion  Mouth/Throat:      Mouth: Mucous membranes are moist       Pharynx: No oropharyngeal exudate  Eyes:      Extraocular Movements: Extraocular movements intact  Conjunctiva/sclera: Conjunctivae normal       Pupils: Pupils are equal, round, and reactive to light  Neck:      Musculoskeletal: Neck supple  Cardiovascular:      Rate and Rhythm: Normal rate and regular rhythm  Heart sounds: Normal heart sounds  Pulmonary:      Effort: Pulmonary effort is normal  No respiratory distress  Breath sounds: Normal breath sounds  No wheezing or rales  Lymphadenopathy:      Cervical: No cervical adenopathy  Neurological:      General: No focal deficit present  Mental Status: She is alert and oriented to person, place, and time  Mental status is at baseline  Psychiatric:         Mood and Affect: Mood normal          Behavior: Behavior normal          Thought Content:  Thought content normal          Judgment: Judgment normal

## 2020-09-16 ENCOUNTER — TRANSCRIBE ORDERS (OUTPATIENT)
Dept: LAB | Facility: CLINIC | Age: 65
End: 2020-09-16

## 2020-09-16 ENCOUNTER — APPOINTMENT (OUTPATIENT)
Dept: LAB | Facility: CLINIC | Age: 65
End: 2020-09-16
Payer: COMMERCIAL

## 2020-09-16 DIAGNOSIS — R10.84 GENERALIZED ABDOMINAL PAIN: ICD-10-CM

## 2020-09-16 DIAGNOSIS — R11.0 NAUSEA: ICD-10-CM

## 2020-09-16 DIAGNOSIS — R63.4 WEIGHT LOSS: ICD-10-CM

## 2020-09-16 DIAGNOSIS — G89.29 CHRONIC NONINTRACTABLE HEADACHE, UNSPECIFIED HEADACHE TYPE: ICD-10-CM

## 2020-09-16 DIAGNOSIS — I10 ESSENTIAL HYPERTENSION: ICD-10-CM

## 2020-09-16 DIAGNOSIS — N18.30 CHRONIC KIDNEY DISEASE, STAGE 3 (HCC): ICD-10-CM

## 2020-09-16 DIAGNOSIS — M79.652 LEFT THIGH PAIN: ICD-10-CM

## 2020-09-16 DIAGNOSIS — R51.9 CHRONIC NONINTRACTABLE HEADACHE, UNSPECIFIED HEADACHE TYPE: ICD-10-CM

## 2020-09-16 DIAGNOSIS — N18.30 CHRONIC KIDNEY DISEASE, STAGE 3 (HCC): Primary | ICD-10-CM

## 2020-09-16 LAB
ANION GAP SERPL CALCULATED.3IONS-SCNC: 6 MMOL/L (ref 4–13)
BUN SERPL-MCNC: 20 MG/DL (ref 5–25)
CALCIUM SERPL-MCNC: 9.3 MG/DL (ref 8.3–10.1)
CHLORIDE SERPL-SCNC: 102 MMOL/L (ref 100–108)
CO2 SERPL-SCNC: 30 MMOL/L (ref 21–32)
CREAT SERPL-MCNC: 1.68 MG/DL (ref 0.6–1.3)
GFR SERPL CREATININE-BSD FRML MDRD: 32 ML/MIN/1.73SQ M
GLUCOSE SERPL-MCNC: 87 MG/DL (ref 65–140)
POTASSIUM SERPL-SCNC: 3.5 MMOL/L (ref 3.5–5.3)
SODIUM SERPL-SCNC: 138 MMOL/L (ref 136–145)

## 2020-09-16 PROCEDURE — 36415 COLL VENOUS BLD VENIPUNCTURE: CPT

## 2020-09-16 PROCEDURE — 80048 BASIC METABOLIC PNL TOTAL CA: CPT

## 2020-10-01 ENCOUNTER — TRANSCRIBE ORDERS (OUTPATIENT)
Dept: LAB | Facility: CLINIC | Age: 65
End: 2020-10-01

## 2020-10-01 ENCOUNTER — APPOINTMENT (OUTPATIENT)
Dept: LAB | Facility: CLINIC | Age: 65
End: 2020-10-01
Payer: MEDICARE

## 2020-10-01 DIAGNOSIS — N18.30 CHRONIC RENAL IMPAIRMENT, STAGE 3 (MODERATE), UNSPECIFIED WHETHER STAGE 3A OR 3B CKD (HCC): Primary | ICD-10-CM

## 2020-10-01 DIAGNOSIS — N18.30 CHRONIC RENAL IMPAIRMENT, STAGE 3 (MODERATE), UNSPECIFIED WHETHER STAGE 3A OR 3B CKD (HCC): ICD-10-CM

## 2020-10-01 LAB
ANION GAP SERPL CALCULATED.3IONS-SCNC: 5 MMOL/L (ref 4–13)
BUN SERPL-MCNC: 23 MG/DL (ref 5–25)
CALCIUM SERPL-MCNC: 9.3 MG/DL (ref 8.3–10.1)
CHLORIDE SERPL-SCNC: 105 MMOL/L (ref 100–108)
CO2 SERPL-SCNC: 31 MMOL/L (ref 21–32)
CREAT SERPL-MCNC: 1.25 MG/DL (ref 0.6–1.3)
GFR SERPL CREATININE-BSD FRML MDRD: 46 ML/MIN/1.73SQ M
GLUCOSE SERPL-MCNC: 54 MG/DL (ref 65–140)
POTASSIUM SERPL-SCNC: 3.4 MMOL/L (ref 3.5–5.3)
SODIUM SERPL-SCNC: 141 MMOL/L (ref 136–145)

## 2020-10-01 PROCEDURE — 80048 BASIC METABOLIC PNL TOTAL CA: CPT

## 2020-10-01 PROCEDURE — 36415 COLL VENOUS BLD VENIPUNCTURE: CPT

## 2020-10-02 ENCOUNTER — OFFICE VISIT (OUTPATIENT)
Dept: OBGYN CLINIC | Facility: CLINIC | Age: 65
End: 2020-10-02
Payer: MEDICARE

## 2020-10-02 VITALS
SYSTOLIC BLOOD PRESSURE: 152 MMHG | BODY MASS INDEX: 18.03 KG/M2 | TEMPERATURE: 97.6 F | WEIGHT: 98 LBS | HEART RATE: 56 BPM | DIASTOLIC BLOOD PRESSURE: 76 MMHG | HEIGHT: 62 IN

## 2020-10-02 DIAGNOSIS — M76.892 HAMSTRING TENDONITIS OF LEFT THIGH: Primary | ICD-10-CM

## 2020-10-02 PROCEDURE — 99214 OFFICE O/P EST MOD 30 MIN: CPT | Performed by: FAMILY MEDICINE

## 2020-10-05 DIAGNOSIS — J45.909 ASTHMA, UNSPECIFIED ASTHMA SEVERITY, UNSPECIFIED WHETHER COMPLICATED, UNSPECIFIED WHETHER PERSISTENT: Primary | ICD-10-CM

## 2020-10-08 DIAGNOSIS — J45.909 ASTHMA, UNSPECIFIED ASTHMA SEVERITY, UNSPECIFIED WHETHER COMPLICATED, UNSPECIFIED WHETHER PERSISTENT: ICD-10-CM

## 2020-10-08 RX ORDER — ALBUTEROL SULFATE 90 UG/1
2 AEROSOL, METERED RESPIRATORY (INHALATION) EVERY 6 HOURS PRN
Qty: 1 INHALER | Refills: 5 | Status: SHIPPED | OUTPATIENT
Start: 2020-10-08 | End: 2021-05-17 | Stop reason: SDUPTHER

## 2020-10-09 PROBLEM — M51.26 HERNIATED LUMBAR INTERVERTEBRAL DISC: Status: ACTIVE | Noted: 2020-09-08

## 2020-10-09 PROBLEM — M54.50 CHRONIC MIDLINE LOW BACK PAIN: Status: ACTIVE | Noted: 2020-09-08

## 2020-10-09 PROBLEM — G89.29 CHRONIC MIDLINE LOW BACK PAIN: Status: ACTIVE | Noted: 2020-09-08

## 2020-10-09 PROBLEM — M51.37 DDD (DEGENERATIVE DISC DISEASE), LUMBOSACRAL: Status: ACTIVE | Noted: 2020-09-08

## 2020-10-09 PROBLEM — M51.379 DDD (DEGENERATIVE DISC DISEASE), LUMBOSACRAL: Status: ACTIVE | Noted: 2020-09-08

## 2020-10-09 PROBLEM — M48.061 SPINAL STENOSIS OF LUMBAR REGION WITHOUT NEUROGENIC CLAUDICATION: Status: ACTIVE | Noted: 2020-09-08

## 2020-10-09 PROBLEM — M47.817 FACET ARTHRITIS OF LUMBOSACRAL REGION: Status: ACTIVE | Noted: 2020-09-08

## 2020-10-14 ENCOUNTER — OFFICE VISIT (OUTPATIENT)
Dept: INTERNAL MEDICINE CLINIC | Facility: CLINIC | Age: 65
End: 2020-10-14
Payer: MEDICARE

## 2020-10-14 VITALS
HEIGHT: 62 IN | TEMPERATURE: 98.5 F | BODY MASS INDEX: 18.11 KG/M2 | HEART RATE: 58 BPM | OXYGEN SATURATION: 98 % | DIASTOLIC BLOOD PRESSURE: 64 MMHG | RESPIRATION RATE: 18 BRPM | WEIGHT: 98.4 LBS | SYSTOLIC BLOOD PRESSURE: 138 MMHG

## 2020-10-14 DIAGNOSIS — G89.4 CHRONIC PAIN DISORDER: ICD-10-CM

## 2020-10-14 DIAGNOSIS — J44.9 ASTHMA-COPD OVERLAP SYNDROME (HCC): ICD-10-CM

## 2020-10-14 DIAGNOSIS — E78.9 BORDERLINE HIGH SERUM CHOLESTEROL: ICD-10-CM

## 2020-10-14 DIAGNOSIS — Z23 ENCOUNTER FOR VACCINATION: ICD-10-CM

## 2020-10-14 DIAGNOSIS — I10 ESSENTIAL HYPERTENSION: Primary | ICD-10-CM

## 2020-10-14 DIAGNOSIS — F41.1 GAD (GENERALIZED ANXIETY DISORDER): ICD-10-CM

## 2020-10-14 DIAGNOSIS — N18.30 CHRONIC RENAL IMPAIRMENT, STAGE 3 (MODERATE), UNSPECIFIED WHETHER STAGE 3A OR 3B CKD (HCC): ICD-10-CM

## 2020-10-14 DIAGNOSIS — M16.12 OSTEOARTHRITIS OF LEFT HIP, UNSPECIFIED OSTEOARTHRITIS TYPE: ICD-10-CM

## 2020-10-14 DIAGNOSIS — M85.89 OSTEOPENIA OF MULTIPLE SITES: ICD-10-CM

## 2020-10-14 PROBLEM — J43.9 PULMONARY EMPHYSEMA (HCC): Status: RESOLVED | Noted: 2018-10-23 | Resolved: 2020-10-14

## 2020-10-14 PROBLEM — J44.89 ASTHMA-COPD OVERLAP SYNDROME: Status: ACTIVE | Noted: 2020-10-14

## 2020-10-14 PROBLEM — J45.40 MODERATE PERSISTENT ASTHMA WITHOUT COMPLICATION: Status: RESOLVED | Noted: 2018-10-23 | Resolved: 2020-10-14

## 2020-10-14 PROBLEM — J45.909 REACTIVE AIRWAY DISEASE: Status: RESOLVED | Noted: 2018-08-15 | Resolved: 2020-10-14

## 2020-10-14 PROBLEM — F43.23 ADJUSTMENT DISORDER WITH MIXED ANXIETY AND DEPRESSED MOOD: Status: RESOLVED | Noted: 2018-10-23 | Resolved: 2020-10-14

## 2020-10-14 PROCEDURE — 99214 OFFICE O/P EST MOD 30 MIN: CPT | Performed by: INTERNAL MEDICINE

## 2020-10-14 RX ORDER — CHLORTHALIDONE 25 MG/1
25 TABLET ORAL DAILY
COMMUNITY
Start: 2020-10-09

## 2020-11-12 ENCOUNTER — TELEPHONE (OUTPATIENT)
Dept: INTERNAL MEDICINE CLINIC | Facility: CLINIC | Age: 65
End: 2020-11-12

## 2020-11-12 DIAGNOSIS — J44.9 ASTHMA-COPD OVERLAP SYNDROME (HCC): Primary | ICD-10-CM

## 2021-01-27 ENCOUNTER — TELEPHONE (OUTPATIENT)
Dept: INTERNAL MEDICINE CLINIC | Facility: CLINIC | Age: 66
End: 2021-01-27

## 2021-01-27 DIAGNOSIS — J44.9 ASTHMA-COPD OVERLAP SYNDROME (HCC): Primary | ICD-10-CM

## 2021-01-27 RX ORDER — BUDESONIDE AND FORMOTEROL FUMARATE DIHYDRATE 160; 4.5 UG/1; UG/1
2 AEROSOL RESPIRATORY (INHALATION) 2 TIMES DAILY
Qty: 3 INHALER | Refills: 0 | Status: SHIPPED | OUTPATIENT
Start: 2021-01-27 | End: 2021-04-22 | Stop reason: SDUPTHER

## 2021-01-27 NOTE — TELEPHONE ENCOUNTER
Pt's insurance will not pay for Advair HFA, they will cover Simbicort 160/45  She uses [facility] on [address]

## 2021-02-10 ENCOUNTER — LAB (OUTPATIENT)
Dept: LAB | Facility: CLINIC | Age: 66
End: 2021-02-10
Payer: MEDICARE

## 2021-02-10 ENCOUNTER — TRANSCRIBE ORDERS (OUTPATIENT)
Dept: LAB | Facility: CLINIC | Age: 66
End: 2021-02-10

## 2021-02-10 DIAGNOSIS — I10 ESSENTIAL HYPERTENSION: ICD-10-CM

## 2021-02-10 DIAGNOSIS — N18.30 STAGE 3 CHRONIC KIDNEY DISEASE, UNSPECIFIED WHETHER STAGE 3A OR 3B CKD (HCC): ICD-10-CM

## 2021-02-10 DIAGNOSIS — N18.30 STAGE 3 CHRONIC KIDNEY DISEASE, UNSPECIFIED WHETHER STAGE 3A OR 3B CKD (HCC): Primary | ICD-10-CM

## 2021-02-10 LAB
ALBUMIN SERPL BCP-MCNC: 3.6 G/DL (ref 3.5–5)
ANION GAP SERPL CALCULATED.3IONS-SCNC: 4 MMOL/L (ref 4–13)
BILIRUB UR QL STRIP: NEGATIVE
BUN SERPL-MCNC: 19 MG/DL (ref 5–25)
CALCIUM SERPL-MCNC: 9.2 MG/DL (ref 8.3–10.1)
CHLORIDE SERPL-SCNC: 106 MMOL/L (ref 100–108)
CLARITY UR: CLEAR
CO2 SERPL-SCNC: 31 MMOL/L (ref 21–32)
COLOR UR: YELLOW
CREAT SERPL-MCNC: 1.16 MG/DL (ref 0.6–1.3)
CREAT UR-MCNC: 39.7 MG/DL
GFR SERPL CREATININE-BSD FRML MDRD: 50 ML/MIN/1.73SQ M
GLUCOSE SERPL-MCNC: 62 MG/DL (ref 65–140)
GLUCOSE UR STRIP-MCNC: NEGATIVE MG/DL
HCT VFR BLD AUTO: 38.3 % (ref 34.8–46.1)
HGB BLD-MCNC: 12.5 G/DL (ref 11.5–15.4)
HGB UR QL STRIP.AUTO: NEGATIVE
KETONES UR STRIP-MCNC: NEGATIVE MG/DL
LEUKOCYTE ESTERASE UR QL STRIP: NEGATIVE
NITRITE UR QL STRIP: NEGATIVE
PH UR STRIP.AUTO: 6.5 [PH]
PHOSPHATE SERPL-MCNC: 3.4 MG/DL (ref 2.3–4.1)
POTASSIUM SERPL-SCNC: 3.9 MMOL/L (ref 3.5–5.3)
PROT UR STRIP-MCNC: NEGATIVE MG/DL
PROT UR-MCNC: <6 MG/DL
PROT/CREAT UR: <0.15 MG/G{CREAT} (ref 0–0.1)
SODIUM SERPL-SCNC: 141 MMOL/L (ref 136–145)
SP GR UR STRIP.AUTO: 1.01 (ref 1–1.03)
UROBILINOGEN UR QL STRIP.AUTO: 0.2 E.U./DL

## 2021-02-10 PROCEDURE — 82570 ASSAY OF URINE CREATININE: CPT | Performed by: INTERNAL MEDICINE

## 2021-02-10 PROCEDURE — 80069 RENAL FUNCTION PANEL: CPT

## 2021-02-10 PROCEDURE — 81003 URINALYSIS AUTO W/O SCOPE: CPT | Performed by: INTERNAL MEDICINE

## 2021-02-10 PROCEDURE — 84156 ASSAY OF PROTEIN URINE: CPT | Performed by: INTERNAL MEDICINE

## 2021-02-10 PROCEDURE — 85018 HEMOGLOBIN: CPT

## 2021-02-10 PROCEDURE — 36415 COLL VENOUS BLD VENIPUNCTURE: CPT

## 2021-02-10 PROCEDURE — 85014 HEMATOCRIT: CPT

## 2021-03-18 DIAGNOSIS — G89.4 CHRONIC PAIN SYNDROME: ICD-10-CM

## 2021-04-09 ENCOUNTER — APPOINTMENT (OUTPATIENT)
Dept: LAB | Facility: CLINIC | Age: 66
End: 2021-04-09
Payer: MEDICARE

## 2021-04-09 ENCOUNTER — TRANSCRIBE ORDERS (OUTPATIENT)
Dept: LAB | Facility: CLINIC | Age: 66
End: 2021-04-09

## 2021-04-09 DIAGNOSIS — N18.30 STAGE 3 CHRONIC KIDNEY DISEASE, UNSPECIFIED WHETHER STAGE 3A OR 3B CKD (HCC): Primary | ICD-10-CM

## 2021-04-09 DIAGNOSIS — I10 ESSENTIAL HYPERTENSION: ICD-10-CM

## 2021-04-09 DIAGNOSIS — N18.30 STAGE 3 CHRONIC KIDNEY DISEASE, UNSPECIFIED WHETHER STAGE 3A OR 3B CKD (HCC): ICD-10-CM

## 2021-04-09 LAB
ALBUMIN SERPL BCP-MCNC: 3.6 G/DL (ref 3.5–5)
ANION GAP SERPL CALCULATED.3IONS-SCNC: 5 MMOL/L (ref 4–13)
BACTERIA UR QL AUTO: ABNORMAL /HPF
BILIRUB UR QL STRIP: NEGATIVE
BUN SERPL-MCNC: 18 MG/DL (ref 5–25)
CALCIUM SERPL-MCNC: 9.1 MG/DL (ref 8.3–10.1)
CHLORIDE SERPL-SCNC: 101 MMOL/L (ref 100–108)
CLARITY UR: CLEAR
CO2 SERPL-SCNC: 29 MMOL/L (ref 21–32)
COLOR UR: YELLOW
CREAT SERPL-MCNC: 1.24 MG/DL (ref 0.6–1.3)
CREAT UR-MCNC: 40.8 MG/DL
GFR SERPL CREATININE-BSD FRML MDRD: 46 ML/MIN/1.73SQ M
GLUCOSE SERPL-MCNC: 73 MG/DL (ref 65–140)
GLUCOSE UR STRIP-MCNC: NEGATIVE MG/DL
HCT VFR BLD AUTO: 36.6 % (ref 34.8–46.1)
HGB BLD-MCNC: 12.4 G/DL (ref 11.5–15.4)
HGB UR QL STRIP.AUTO: ABNORMAL
HYALINE CASTS #/AREA URNS LPF: ABNORMAL /LPF
KETONES UR STRIP-MCNC: NEGATIVE MG/DL
LEUKOCYTE ESTERASE UR QL STRIP: NEGATIVE
NITRITE UR QL STRIP: NEGATIVE
NON-SQ EPI CELLS URNS QL MICRO: ABNORMAL /HPF
PH UR STRIP.AUTO: 6.5 [PH]
PHOSPHATE SERPL-MCNC: 3.1 MG/DL (ref 2.3–4.1)
POTASSIUM SERPL-SCNC: 3.8 MMOL/L (ref 3.5–5.3)
PROT UR STRIP-MCNC: NEGATIVE MG/DL
PROT UR-MCNC: 6 MG/DL
PROT/CREAT UR: 0.15 MG/G{CREAT} (ref 0–0.1)
RBC #/AREA URNS AUTO: ABNORMAL /HPF
SODIUM SERPL-SCNC: 135 MMOL/L (ref 136–145)
SP GR UR STRIP.AUTO: 1.01 (ref 1–1.03)
UROBILINOGEN UR QL STRIP.AUTO: 0.2 E.U./DL
WBC #/AREA URNS AUTO: ABNORMAL /HPF

## 2021-04-09 PROCEDURE — 36415 COLL VENOUS BLD VENIPUNCTURE: CPT

## 2021-04-09 PROCEDURE — 85018 HEMOGLOBIN: CPT

## 2021-04-09 PROCEDURE — 84156 ASSAY OF PROTEIN URINE: CPT | Performed by: INTERNAL MEDICINE

## 2021-04-09 PROCEDURE — 85014 HEMATOCRIT: CPT

## 2021-04-09 PROCEDURE — 80069 RENAL FUNCTION PANEL: CPT

## 2021-04-09 PROCEDURE — 82570 ASSAY OF URINE CREATININE: CPT | Performed by: INTERNAL MEDICINE

## 2021-04-09 PROCEDURE — 81001 URINALYSIS AUTO W/SCOPE: CPT | Performed by: INTERNAL MEDICINE

## 2021-04-22 ENCOUNTER — TELEPHONE (OUTPATIENT)
Dept: INTERNAL MEDICINE CLINIC | Facility: CLINIC | Age: 66
End: 2021-04-22

## 2021-04-22 DIAGNOSIS — J44.9 ASTHMA-COPD OVERLAP SYNDROME (HCC): ICD-10-CM

## 2021-04-22 RX ORDER — BUDESONIDE AND FORMOTEROL FUMARATE DIHYDRATE 160; 4.5 UG/1; UG/1
2 AEROSOL RESPIRATORY (INHALATION) 2 TIMES DAILY
Qty: 3 INHALER | Refills: 0 | Status: SHIPPED | OUTPATIENT
Start: 2021-04-22 | End: 2021-07-19 | Stop reason: SDUPTHER

## 2021-05-17 DIAGNOSIS — J45.909 ASTHMA, UNSPECIFIED ASTHMA SEVERITY, UNSPECIFIED WHETHER COMPLICATED, UNSPECIFIED WHETHER PERSISTENT: ICD-10-CM

## 2021-05-17 RX ORDER — ALBUTEROL SULFATE 90 UG/1
2 AEROSOL, METERED RESPIRATORY (INHALATION) EVERY 6 HOURS PRN
Qty: 18 G | Refills: 5 | Status: SHIPPED | OUTPATIENT
Start: 2021-05-17 | End: 2022-02-01 | Stop reason: SDUPTHER

## 2021-05-17 NOTE — TELEPHONE ENCOUNTER
Pt is requesting a refill on ventolin hfa  Please send to [facility] in the [facility]

## 2021-07-19 DIAGNOSIS — J44.9 ASTHMA-COPD OVERLAP SYNDROME (HCC): ICD-10-CM

## 2021-07-19 RX ORDER — BUDESONIDE AND FORMOTEROL FUMARATE DIHYDRATE 160; 4.5 UG/1; UG/1
2 AEROSOL RESPIRATORY (INHALATION) 2 TIMES DAILY
Qty: 18 G | Refills: 5 | Status: SHIPPED | OUTPATIENT
Start: 2021-07-19 | End: 2022-02-01 | Stop reason: SDUPTHER

## 2021-07-19 NOTE — TELEPHONE ENCOUNTER
Pt needs a refill on budesonide-formoterol (SYMBICORT) 160-4 5 mcg/act inhaler please send to [facility] on [address]

## 2021-09-19 DIAGNOSIS — N18.30 CHRONIC RENAL IMPAIRMENT, STAGE 3 (MODERATE), UNSPECIFIED WHETHER STAGE 3A OR 3B CKD (HCC): ICD-10-CM

## 2021-09-19 DIAGNOSIS — F41.1 GAD (GENERALIZED ANXIETY DISORDER): ICD-10-CM

## 2021-09-19 DIAGNOSIS — I10 ESSENTIAL HYPERTENSION: Primary | ICD-10-CM

## 2021-09-19 DIAGNOSIS — E78.9 BORDERLINE HIGH SERUM CHOLESTEROL: ICD-10-CM

## 2021-09-30 ENCOUNTER — APPOINTMENT (OUTPATIENT)
Dept: LAB | Facility: CLINIC | Age: 66
End: 2021-09-30
Payer: MEDICARE

## 2021-09-30 ENCOUNTER — OFFICE VISIT (OUTPATIENT)
Dept: INTERNAL MEDICINE CLINIC | Facility: CLINIC | Age: 66
End: 2021-09-30
Payer: MEDICARE

## 2021-09-30 VITALS
HEART RATE: 66 BPM | WEIGHT: 103.8 LBS | SYSTOLIC BLOOD PRESSURE: 168 MMHG | DIASTOLIC BLOOD PRESSURE: 82 MMHG | BODY MASS INDEX: 19.1 KG/M2 | HEIGHT: 62 IN | OXYGEN SATURATION: 99 % | RESPIRATION RATE: 14 BRPM | TEMPERATURE: 98.7 F

## 2021-09-30 DIAGNOSIS — Z00.00 MEDICARE ANNUAL WELLNESS VISIT, INITIAL: ICD-10-CM

## 2021-09-30 DIAGNOSIS — G89.4 CHRONIC PAIN DISORDER: ICD-10-CM

## 2021-09-30 DIAGNOSIS — R80.9 PROTEINURIA, UNSPECIFIED TYPE: ICD-10-CM

## 2021-09-30 DIAGNOSIS — N18.30 CHRONIC RENAL IMPAIRMENT, STAGE 3 (MODERATE), UNSPECIFIED WHETHER STAGE 3A OR 3B CKD (HCC): ICD-10-CM

## 2021-09-30 DIAGNOSIS — F11.20 UNCOMPLICATED OPIOID DEPENDENCE (HCC): ICD-10-CM

## 2021-09-30 DIAGNOSIS — F41.1 GAD (GENERALIZED ANXIETY DISORDER): ICD-10-CM

## 2021-09-30 DIAGNOSIS — Z12.31 ENCOUNTER FOR SCREENING MAMMOGRAM FOR MALIGNANT NEOPLASM OF BREAST: ICD-10-CM

## 2021-09-30 DIAGNOSIS — J44.9 ASTHMA-COPD OVERLAP SYNDROME (HCC): ICD-10-CM

## 2021-09-30 DIAGNOSIS — M85.89 OSTEOPENIA OF MULTIPLE SITES: ICD-10-CM

## 2021-09-30 DIAGNOSIS — I10 ESSENTIAL HYPERTENSION: ICD-10-CM

## 2021-09-30 DIAGNOSIS — E78.9 BORDERLINE HIGH SERUM CHOLESTEROL: ICD-10-CM

## 2021-09-30 DIAGNOSIS — E46 PROTEIN-CALORIE MALNUTRITION, UNSPECIFIED SEVERITY (HCC): ICD-10-CM

## 2021-09-30 DIAGNOSIS — N18.31 STAGE 3A CHRONIC KIDNEY DISEASE (HCC): ICD-10-CM

## 2021-09-30 DIAGNOSIS — Z13.31 NEGATIVE DEPRESSION SCREENING: ICD-10-CM

## 2021-09-30 DIAGNOSIS — I10 ESSENTIAL HYPERTENSION: Primary | ICD-10-CM

## 2021-09-30 DIAGNOSIS — F32.0 MILD MAJOR DEPRESSION, SINGLE EPISODE (HCC): ICD-10-CM

## 2021-09-30 LAB
ALBUMIN SERPL BCP-MCNC: 3.5 G/DL (ref 3.5–5)
ALBUMIN SERPL BCP-MCNC: 3.7 G/DL (ref 3.5–5)
ALP SERPL-CCNC: 63 U/L (ref 46–116)
ALT SERPL W P-5'-P-CCNC: 21 U/L (ref 12–78)
ANION GAP SERPL CALCULATED.3IONS-SCNC: 2 MMOL/L (ref 4–13)
ANION GAP SERPL CALCULATED.3IONS-SCNC: 4 MMOL/L (ref 4–13)
AST SERPL W P-5'-P-CCNC: 23 U/L (ref 5–45)
BASOPHILS # BLD AUTO: 0.04 THOUSANDS/ΜL (ref 0–0.1)
BASOPHILS NFR BLD AUTO: 1 % (ref 0–1)
BILIRUB SERPL-MCNC: 0.24 MG/DL (ref 0.2–1)
BUN SERPL-MCNC: 18 MG/DL (ref 5–25)
BUN SERPL-MCNC: 19 MG/DL (ref 5–25)
CALCIUM SERPL-MCNC: 9.8 MG/DL (ref 8.3–10.1)
CALCIUM SERPL-MCNC: 9.8 MG/DL (ref 8.3–10.1)
CHLORIDE SERPL-SCNC: 103 MMOL/L (ref 100–108)
CHLORIDE SERPL-SCNC: 103 MMOL/L (ref 100–108)
CHOLEST SERPL-MCNC: 223 MG/DL (ref 50–200)
CO2 SERPL-SCNC: 29 MMOL/L (ref 21–32)
CO2 SERPL-SCNC: 30 MMOL/L (ref 21–32)
CREAT SERPL-MCNC: 1.23 MG/DL (ref 0.6–1.3)
CREAT SERPL-MCNC: 1.26 MG/DL (ref 0.6–1.3)
CREAT UR-MCNC: 29.5 MG/DL
EOSINOPHIL # BLD AUTO: 0.7 THOUSAND/ΜL (ref 0–0.61)
EOSINOPHIL NFR BLD AUTO: 11 % (ref 0–6)
ERYTHROCYTE [DISTWIDTH] IN BLOOD BY AUTOMATED COUNT: 12.1 % (ref 11.6–15.1)
GFR SERPL CREATININE-BSD FRML MDRD: 45 ML/MIN/1.73SQ M
GFR SERPL CREATININE-BSD FRML MDRD: 46 ML/MIN/1.73SQ M
GLUCOSE SERPL-MCNC: 84 MG/DL (ref 65–140)
GLUCOSE SERPL-MCNC: 90 MG/DL (ref 65–140)
HCT VFR BLD AUTO: 39 % (ref 34.8–46.1)
HCT VFR BLD AUTO: 39.3 % (ref 34.8–46.1)
HDLC SERPL-MCNC: 79 MG/DL
HGB BLD-MCNC: 12.8 G/DL (ref 11.5–15.4)
HGB BLD-MCNC: 13 G/DL (ref 11.5–15.4)
LDLC SERPL CALC-MCNC: 132 MG/DL (ref 0–100)
LYMPHOCYTES # BLD AUTO: 2.72 THOUSANDS/ΜL (ref 0.6–4.47)
LYMPHOCYTES NFR BLD AUTO: 41 % (ref 14–44)
MAGNESIUM SERPL-MCNC: 2.6 MG/DL (ref 1.6–2.6)
MCH RBC QN AUTO: 33.9 PG (ref 26.8–34.3)
MCHC RBC AUTO-ENTMCNC: 33.1 G/DL (ref 31.4–37.4)
MCV RBC AUTO: 103 FL (ref 82–98)
MICROALBUMIN UR-MCNC: <5 MG/L (ref 0–20)
MICROALBUMIN/CREAT 24H UR: <17 MG/G CREATININE (ref 0–30)
MONOCYTES # BLD AUTO: 0.69 THOUSAND/ΜL (ref 0.17–1.22)
MONOCYTES NFR BLD AUTO: 10 % (ref 4–12)
NEUTROPHILS # BLD AUTO: 2.49 THOUSANDS/ΜL (ref 1.85–7.62)
NEUTS SEG NFR BLD AUTO: 37 % (ref 43–75)
NRBC BLD AUTO-RTO: 0 /100 WBCS
PHOSPHATE SERPL-MCNC: 3 MG/DL (ref 2.3–4.1)
PLATELET # BLD AUTO: 353 THOUSANDS/UL (ref 149–390)
PMV BLD AUTO: 11.7 FL (ref 8.9–12.7)
POTASSIUM SERPL-SCNC: 3.8 MMOL/L (ref 3.5–5.3)
POTASSIUM SERPL-SCNC: 3.9 MMOL/L (ref 3.5–5.3)
PROT SERPL-MCNC: 7.2 G/DL (ref 6.4–8.2)
RBC # BLD AUTO: 3.83 MILLION/UL (ref 3.81–5.12)
SODIUM SERPL-SCNC: 135 MMOL/L (ref 136–145)
SODIUM SERPL-SCNC: 136 MMOL/L (ref 136–145)
TRIGL SERPL-MCNC: 60 MG/DL
TSH SERPL DL<=0.05 MIU/L-ACNC: 1.26 UIU/ML (ref 0.36–3.74)
WBC # BLD AUTO: 6.67 THOUSAND/UL (ref 4.31–10.16)

## 2021-09-30 PROCEDURE — 99214 OFFICE O/P EST MOD 30 MIN: CPT | Performed by: INTERNAL MEDICINE

## 2021-09-30 PROCEDURE — 85025 COMPLETE CBC W/AUTO DIFF WBC: CPT

## 2021-09-30 PROCEDURE — 36415 COLL VENOUS BLD VENIPUNCTURE: CPT

## 2021-09-30 PROCEDURE — 80053 COMPREHEN METABOLIC PANEL: CPT

## 2021-09-30 PROCEDURE — 1123F ACP DISCUSS/DSCN MKR DOCD: CPT | Performed by: INTERNAL MEDICINE

## 2021-09-30 PROCEDURE — 82570 ASSAY OF URINE CREATININE: CPT | Performed by: INTERNAL MEDICINE

## 2021-09-30 PROCEDURE — 85014 HEMATOCRIT: CPT

## 2021-09-30 PROCEDURE — 83735 ASSAY OF MAGNESIUM: CPT

## 2021-09-30 PROCEDURE — 80061 LIPID PANEL: CPT

## 2021-09-30 PROCEDURE — 80069 RENAL FUNCTION PANEL: CPT

## 2021-09-30 PROCEDURE — 85018 HEMOGLOBIN: CPT

## 2021-09-30 PROCEDURE — 82043 UR ALBUMIN QUANTITATIVE: CPT | Performed by: INTERNAL MEDICINE

## 2021-09-30 PROCEDURE — 84443 ASSAY THYROID STIM HORMONE: CPT

## 2021-09-30 PROCEDURE — G0402 INITIAL PREVENTIVE EXAM: HCPCS | Performed by: INTERNAL MEDICINE

## 2021-09-30 RX ORDER — LOSARTAN POTASSIUM 25 MG/1
25 TABLET ORAL DAILY
COMMUNITY
Start: 2021-08-17

## 2021-09-30 RX ORDER — BACLOFEN 10 MG/1
TABLET ORAL
COMMUNITY
Start: 2021-09-03 | End: 2022-02-15 | Stop reason: SDUPTHER

## 2021-09-30 NOTE — PROGRESS NOTES
BMI Counseling: There is no height or weight on file to calculate BMI  The BMI is below normal  Patient advised to gain weight  Rationale for BMI follow-up plan is due to patient being underweight  Assessment/Plan:  Problem List Items Addressed This Visit        Respiratory    Ast[facility]-COPD overlap syndrome ([facility] )       Cardiovascular and Mediastinum    Essential hypertension - Primary    Relevant Medications    losartan (COZAAR) 25 mg tablet    Other Relevant Orders    CBC and differential    Comprehensive metabolic panel    Lipid Panel with Direct LDL reflex    Microalbumin / creatinine urine ratio    TSH, 3rd generation with Free T4 reflex       Musculoskeletal and Integument    Osteopenia of multiple sites       Genitourinary    Chronic renal insufficiency, stage III (moderate) (HCC)    Relevant Orders    CBC and differential       Other    Uncomplicated opioid dependence ([facility] )    Protein-calorie malnutrition, unspecified severity (HCC)    Mild major depression, single episode (HCC)    ZACK (generalized anxiety disorder)    Relevant Orders    TSH, 3rd generation with Free T4 reflex    Chronic pain disorder    Borderline high serum cholesterol    Relevant Orders    Lipid Panel with Direct LDL reflex      Other Visit Diagnoses     Encounter for screening mammogram for malignant neoplasm of breast        Relevant Orders    Mammo screening bilateral w 3d & cad    Medicare annual wellness visit, initial        Negative depression screening               Diagnoses and all orders for this visit:    Essential hypertension  -     CBC and differential; Future  -     Comprehensive metabolic panel; Future  -     Lipid Panel with Direct LDL reflex; Future  -     Microalbumin / creatinine urine ratio  -     TSH, 3rd generation with Free T4 reflex;  Future    Asthma-COPD overlap syndrome (HCC)    Osteopenia of multiple sites    Chronic renal impairment, stage 3 (moderate), unspecified whether stage 3a or 3b CKD (HCC)  -     CBC and differential; Future    Borderline high serum cholesterol  -     Lipid Panel with Direct LDL reflex; Future    Chronic pain disorder    ZACK (generalized anxiety disorder)  -     TSH, 3rd generation with Free T4 reflex; Future    Encounter for screening mammogram for malignant neoplasm of breast  -     Mammo screening bilateral w 3d & cad; Future    Medicare annual wellness visit, initial    Protein-calorie malnutrition, unspecified severity ([facility] )    Mild major depression, single episode ([facility] )    Uncomplicated opioid dependence ([facility] )    Negative depression screening    Other orders  -     losartan (COZAAR) 25 mg tablet; Take 25 mg by mouth daily  -     baclofen 10 mg tablet; take 1 tablet by mouth 2 TO 3 TIMES A DAY AS NEEDED        No problem-specific Assessment & Plan notes found for this encounter  A/P: Doing ok  BP is up, but pt reports OP readings and at the nephrologists are good  Feel pt isn't well controlled in regards to her RAD and recommend adding a LAMA, but pt declines due to having another med and cost  She feels she is doing well  Defers vaccines  Will check labs  Appreciate ortho and nephro input  BMI discussed and given information on diet and exercise  Continue current treatment and RTC four months pending the labs  Will hold on BP meds adjustment and call pt for readings in several weeks  Keep f/u with nephro and ortho  Subjective:      Patient ID: Beryle Pon is a 72 y o  female  WF RTC for f/u HTN, CKD, etc  Overall doing a little better, but continues with a lot of orthopedic issues in the bilat hips  Seeing two different orthos  Notes a new right hip labrum tear and they are deciding on the best course of action  Continues with considerable pain in the both hips  Walking better  Remains as active as possible and no falls  ZACK/MDD are about the same and manageable  RAD is controlled per pt, but on some days, when active, reports using AKIKO four times a day  Seeing nephro   Due for labs and vaccines  The following portions of the patient's history were reviewed and updated as appropriate:   She has a past medical history of Aortic regurgitation, Arthritis, Essential hypertension (8/7/2019), ZACK (generalized anxiety disorder) (12/11/2018), Moderate persistent asthma without complication (36/97/8272), Pulmonary emphysema ([facility] ) (10/23/2018), and Stage 3 chronic kidney disease ([facility] ) (12/11/2019)  ,  does not have any pertinent problems on file  ,   has a past surgical history that includes Facial cosmetic surgery; Colonoscopy; Dental surgery; and Hip surgery (Left, 06/2019)  ,  family history includes Aneurysm in her mother; No Known Problems in her brother and father  ,   reports that she has never smoked  She has never used smokeless tobacco  She reports that she does not drink alcohol and does not use drugs  ,  is allergic to nsaids, amlodipine, iv contrast [iodinated diagnostic agents], lisinopril, vancomycin, celebrex [celecoxib], fosamax [alendronate], ibuprofen, and influenza vaccines     Current Outpatient Medications   Medication Sig Dispense Refill    albuterol (Ventolin HFA) 90 mcg/act inhaler Inhale 2 puffs every 6 (six) hours as needed for wheezing 18 g 5    budesonide-formoterol (SYMBICORT) 160-4 5 mcg/act inhaler Inhale 2 puffs 2 (two) times a day Rinse mouth after use  18 g 5    chlorthalidone 25 mg tablet Take 25 mg by mouth daily      losartan (COZAAR) 25 mg tablet Take 25 mg by mouth daily      traMADol (ULTRAM) 50 mg tablet Take 1 tablet (50 mg total) by mouth every 6 (six) hours as needed for moderate pain 30 tablet 0    baclofen 10 mg tablet take 1 tablet by mouth 2 TO 3 TIMES A DAY AS NEEDED       No current facility-administered medications for this visit  Review of Systems   Constitutional: Negative for activity change, chills, diaphoresis, fatigue and fever  HENT: Negative  Eyes: Negative for visual disturbance     Respiratory: Negative for cough, chest tightness, shortness of breath and wheezing  Cardiovascular: Negative for chest pain, palpitations and leg swelling  Gastrointestinal: Negative for abdominal pain, constipation, diarrhea, nausea and vomiting  Endocrine: Negative for cold intolerance and heat intolerance  Genitourinary: Negative for difficulty urinating, dysuria and frequency  Musculoskeletal: Positive for arthralgias, gait problem and joint swelling  Negative for myalgias  Neurological: Negative for dizziness, seizures, weakness, light-headedness and headaches  Psychiatric/Behavioral: Negative for confusion, dysphoric mood and sleep disturbance  The patient is nervous/anxious  PHQ-9 Depression Screening    PHQ-9:   Frequency of the following problems over the past two weeks:      Little interest or pleasure in doing things: 0 - not at all  Feeling down, depressed, or hopeless: 0 - not at all  PHQ-2 Score: 0        Objective:  Vitals:    09/30/21 1143   BP: 168/82   BP Location: Right arm   Patient Position: Sitting   Cuff Size: Child   Pulse: 66   Resp: 14   Temp: 98 7 °F (37 1 °C)   SpO2: 99%   Weight: 47 1 kg (103 lb 12 8 oz)   Height: 5' 2" (1 575 m)     Body mass index is 18 99 kg/m²  Physical Exam  Vitals and nursing note reviewed  Constitutional:       General: She is not in acute distress  Appearance: Normal appearance  She is not ill-appearing  HENT:      Head: Normocephalic and atraumatic  Mouth/Throat:      Mouth: Mucous membranes are moist    Eyes:      Extraocular Movements: Extraocular movements intact  Conjunctiva/sclera: Conjunctivae normal       Pupils: Pupils are equal, round, and reactive to light  Cardiovascular:      Rate and Rhythm: Normal rate and regular rhythm  Heart sounds: Normal heart sounds  Pulmonary:      Effort: Pulmonary effort is normal  No respiratory distress  Breath sounds: Normal breath sounds  No wheezing or rales     Abdominal:      General: Bowel sounds are normal  There is no distension  Palpations: Abdomen is soft  Tenderness: There is no abdominal tenderness  Musculoskeletal:         General: Tenderness present  Normal range of motion  Cervical back: Neck supple  Right lower leg: No edema  Left lower leg: No edema  Comments: Bilat hip tenderness  Neurological:      General: No focal deficit present  Mental Status: She is alert and oriented to person, place, and time  Mental status is at baseline  Psychiatric:         Mood and Affect: Mood normal          Behavior: Behavior normal          Thought Content: Thought content normal          Judgment: Judgment normal          BMI Counseling: Body mass index is 18 99 kg/m²  The BMI is below normal  Patient was advised to gain weight

## 2021-09-30 NOTE — PATIENT INSTRUCTIONS
Chronic Hypertension   AMBULATORY CARE:   Hypertension  is high blood pressure  Your blood pressure is the force of your blood moving against the walls of your arteries  Hypertension causes your blood pressure to get so high that your heart has to work much harder than normal  This can damage your heart  Even if you have hypertension for years, lifestyle changes, medicines, or both can help bring your blood pressure to normal   Call your local emergency number (911 in the [address] [facility]) or have someone call if:   · You have chest pain  · You have any of the following signs of a heart attack:      ? Squeezing, pressure, or pain in your chest    ? You may  also have any of the following:     § Discomfort or pain in your back, neck, jaw, stomach, or arm    § Shortness of breath    § Nausea or vomiting    § Lightheadedness or a sudden cold sweat    · You become confused or have difficulty speaking  · You suddenly feel lightheaded or have trouble breathing  Seek care immediately if:   · You have a severe headache or vision loss  · You have weakness in an arm or leg  Call your doctor if:   · You feel faint, dizzy, confused, or drowsy  · You have been taking your blood pressure medicine but your pressure is higher than your provider says it should be  · You have questions or concerns about your condition or care  Treatment for chronic hypertension  may include medicine to lower your blood pressure and cholesterol levels  A low cholesterol level helps prevent heart disease and makes it easier to control your blood pressure  Heart disease can make your blood pressure harder to control  You may also need to make lifestyle changes  What you need to know about the stages of hypertension:       · Normal blood pressure is 119/79 or lower   Your healthcare provider may only check your blood pressure each year if it stays at a normal level  · Elevated blood pressure is 120/79 to 129/79    This is sometimes called prehypertension  Your healthcare provider may suggest lifestyle changes to help lower your blood pressure to a normal level  He or she may then check it again in 3 to 6 months  · Stage 1 hypertension is 130/80  to 139/89   Your provider may recommend lifestyle changes, medication, and checks every 3 to 6 months until your blood pressure is controlled  · Stage 2 hypertension is 140/90 or higher   Your provider will recommend lifestyle changes and have you take 2 kinds of hypertension medicines  You will also need to have your blood pressure checked monthly until it is controlled  Manage chronic hypertension:   · Check your blood pressure at home  Avoid smoking, caffeine, and exercise at least 30 minutes before checking your blood pressure  Sit and rest for 5 minutes before you take your blood pressure  Extend your arm and support it on a flat surface  Your arm should be at the same level as your heart  Follow the directions that came with your blood pressure monitor  Check your blood pressure 2 times, 1 minute apart, before you take your medicine in the morning  Also check your blood pressure before your evening meal  Keep a record of your readings and bring it to your follow-up visits  Ask your healthcare provider what your blood pressure should be  · Manage any other health conditions you have  Health conditions such as diabetes can increase your risk for hypertension  Follow your healthcare provider's instructions and take all your medicines as directed  Talk to your healthcare provider about any new health conditions you have recently developed  · Ask about all medicines  Certain medicines can increase your blood pressure  Examples include oral birth control pills, decongestants, herbal supplements, and NSAIDs, such as ibuprofen  Your healthcare provider can tell you which medicines are safe for you to take  This includes prescription and over-the-counter medicines      Lifestyle changes you can make to lower your blood pressure: Your provider may want you to make more lifestyle changes if you are having trouble controlling your blood pressure  This may feel difficult over time, especially if you think you are making good changes but your pressure is still high  It might help to focus on one new change at a time  For example, try to add 1 more day of exercise, or exercise for an extra 10 minutes on 2 days  Small changes can make a big difference  Your healthcare provider can also refer you to specialists such as a dietitian who can help you make small changes  Your family members may be included in helping you learn to create lifestyle changes, such as the following:  · Limit sodium (salt) as directed  Too much sodium can affect your fluid balance  Check labels to find low-sodium or no-salt-added foods  Some low-sodium foods use potassium salts for flavor  Too much potassium can also cause health problems  Your healthcare provider will tell you how much sodium and potassium are safe for you to have in a day  He or she may recommend that you limit sodium to 2,300 mg a day  · Follow the meal plan recommended by your healthcare provider  A dietitian or your provider can give you more information on low-sodium plans or the DASH (Dietary Approaches to Stop Hypertension) eating plan  The DASH plan is low in sodium, processed sugar, unhealthy fats, and total fat  It is high in potassium, calcium, and fiber  These can be found in vegetables, fruit, and whole-grain foods  · Be physically active throughout the day  Physical activity, such as exercise, can help control your blood pressure and your weight  Be physically active for at least 30 minutes per day, on most days of the week  Include aerobic activity, such as walking or riding a bicycle  Also include strength training at least 2 times each week  Your healthcare providers can help you create a physical activity plan  · Decrease stress  This may help lower your blood pressure  Learn ways to relax, such as deep breathing or listening to music  · Limit alcohol as directed  Alcohol can increase your blood pressure  A drink of alcohol is 12 ounces of beer, 5 ounces of wine, or 1½ ounces of liquor  · Do not smoke  Nicotine and other chemicals in cigarettes and cigars can increase your blood pressure and also cause lung damage  Ask your healthcare provider for information if you currently smoke and need help to quit  E-cigarettes or smokeless tobacco still contain nicotine  Talk to your healthcare provider before you use these products  Follow up with your doctor as directed: You will need to return to have your blood pressure checked and to have other lab tests done  Write down your questions so you remember to ask them during your visits  © Copyright [employer] 2021 Information is for End User's use only and may not be sold, redistributed or otherwise used for commercial purposes  All illustrations and images included in CareNotes® are the copyrighted property of A D A M , Inc  or [address] [place], [place] [address]   The above information is an  only  It is not intended as medical advice for individual conditions or treatments  Talk to your doctor, nurse or pharmacist before following any medical regimen to see if it is safe and effective for you  Low Fat Diet   AMBULATORY CARE:   A low-fat diet  is an eating plan that is low in total fat, unhealthy fat, and cholesterol  You may need to follow a low-fat diet if you have trouble digesting or absorbing fat  You may also need to follow this diet if you have high cholesterol  You can also lower your cholesterol by increasing the amount of fiber in your diet  Soluble fiber is a type of fiber that helps to decrease cholesterol levels  Different types of fat in food:   · Limit unhealthy fats    A diet that is high in cholesterol, saturated fat, and trans fat may cause unhealthy cholesterol levels  Unhealthy cholesterol levels increase your risk of heart disease  ? Cholesterol:  Limit intake of cholesterol to less than 200 mg per day  Cholesterol is found in meat, eggs, and dairy  ? Saturated fat:  Limit saturated fat to less than 7% of your total daily calories  Ask your dietitian how many calories you need each day  Saturated fat is found in butter, cheese, ice cream, whole milk, and palm oil  Saturated fat is also found in meat, such as beef, pork, chicken skin, and processed meats  Processed meats include sausage, hot dogs, and bologna  ? Trans fat:  Avoid trans fat as much as possible  Trans fat is used in fried and baked foods  Foods that say trans fat free on the label may still have up to 0 5 grams of trans fat per serving  · Include healthy fats  Replace foods that are high in saturated and trans fat with foods high in healthy fats  This may help to decrease high cholesterol levels  ? Monounsaturated fats: These are found in avocados, nuts, and vegetable oils, such as olive, canola, and sunflower oil  ? Polyunsaturated fats: These can be found in vegetable oils, such as soybean or corn oil  Omega-3 fats can help to decrease the risk of heart disease  Omega-3 fats are found in fish, such as salmon, herring, trout, and tuna  Omega-3 fats can also be found in plant foods, such as walnuts, flaxseed, soybeans, and canola oil  Foods to limit or avoid:   · Grains:      ? Snacks that are made with partially hydrogenated oils, such as chips, regular crackers, and butter-flavored popcorn    ? High-fat baked goods, such as biscuits, croissants, doughnuts, pies, cookies, and pastries    · Dairy:      ? Whole milk, 2% milk, and yogurt and ice cream made with whole milk    ?  Half and half creamer, heavy cream, and whipping cream    ? Cheese, cream cheese, and sour cream    · Meats and proteins:      ? High-fat cuts of meat (T-bone steak, regular hamburger, and ribs)    ? Fried meat, poultry (turkey and chicken), and fish    ? Poultry (chicken and turkey) with skin    ? Cold cuts (salami or bologna), hot dogs, wilson, and sausage    ? Whole eggs and egg yolks    · Vegetables and fruits with added fat:      ? Fried vegetables or vegetables in butter or high-fat sauces, such as cream or cheese sauces    ? Fried fruit or fruit served with butter or cream    · Fats:      ? Butter, stick margarine, and shortening    ? Coconut, palm oil, and palm kernel oil    Foods to include:   · Grains:      ? Whole-grain breads, cereals, pasta, and brown rice    ? Low-fat crackers and pretzels    · Vegetables and fruits:      ? Fresh, frozen, or canned vegetables (no salt or low-sodium)    ? Fresh, frozen, dried, or canned fruit (canned in light syrup or fruit juice)    ? Avocado    · Low-fat dairy products:      ? Nonfat (skim) or 1% milk    ? Nonfat or low-fat cheese, yogurt, and cottage cheese    · Meats and proteins:      ? Chicken or turkey with no skin    ? Baked or broiled fish    ? Lean beef and pork (loin, round, extra lean hamburger)    ? Beans and peas, unsalted nuts, soy products    ? Egg whites and substitutes    ? Seeds and nuts    · Fats:      ? Unsaturated oil, such as canola, olive, peanut, soybean, or sunflower oil    ? Soft or liquid margarine and vegetable oil spread    ? Low-fat salad dressing    Other ways to decrease fat:   · Read food labels before you buy foods  Choose foods that have less than 30% of calories from fat  Choose low-fat or fat-free dairy products  Remember that fat free does not mean calorie free  These foods still contain calories, and too many calories can lead to weight gain  · Trim fat from meat and avoid fried food  Trim all visible fat from meat before you cook it  Remove the skin from poultry  Do not rueda meat, fish, or poultry  Bake, roast, boil, or broil these foods instead  Avoid fried foods   Eat a baked potato instead of [place] fries  Steam vegetables instead of sautéing them in butter  · Add less fat to foods  Use imitation wilson bits on salads and baked potatoes instead of regular wilson bits  Use fat-free or low-fat salad dressings instead of regular dressings  Use low-fat or nonfat butter-flavored topping instead of regular butter or margarine on popcorn and other foods  Ways to decrease fat in recipes:  Replace high-fat ingredients with low-fat or nonfat ones  This may cause baked goods to be drier than usual  You may need to use nonfat cooking spray on pans to prevent food from sticking  You also may need to change the amount of other ingredients, such as water, in the recipe  Try the following:  · Use low-fat or light margarine instead of regular margarine or shortening  · Use lean ground turkey breast or chicken, or lean ground beef (less than 5% fat) instead of hamburger  · Add 1 teaspoon of canola oil to 8 ounces of skim milk instead of using cream or half and half  · Use grated zucchini, carrots, or apples in breads instead of coconut  · Use blenderized, low-fat cottage cheese, plain tofu, or low-fat ricotta cheese instead of cream cheese  · Use 1 egg white and 1 teaspoon of canola oil, or use ¼ cup (2 ounces) of fat-free egg substitute instead of a whole egg  · Replace half of the oil that is called for in a recipe with applesauce when you bake  Use 3 tablespoons of cocoa powder and 1 tablespoon of canola oil instead of a square of baking chocolate  How to increase fiber:  Eat enough high-fiber foods to get 20 to 30 grams of fiber every day  Slowly increase your fiber intake to avoid stomach cramps, gas, and other problems  · Eat 3 ounces of whole-grain foods each day  An ounce is about 1 slice of bread  Eat whole-grain breads, such as whole-wheat bread  Whole wheat, whole-wheat flour, or other whole grains should be listed as the first ingredient on the food label   Replace white flour with whole-grain flour or use half of each in recipes  Whole-grain flour is heavier than white flour, so you may have to add more yeast or baking powder  · Eat a high-fiber cereal for breakfast   Oatmeal is a good source of soluble fiber  Look for cereals that have bran or fiber in the name  Choose whole-grain products, such as brown rice, barley, and whole-wheat pasta  · Eat more beans, peas, and lentils  For example, add beans to soups or salads  Eat at least 5 cups of fruits and vegetables each day  Eat fruits and vegetables with the peel because the peel is high in fiber  © Copyright [employer] 2021 Information is for End User's use only and may not be sold, redistributed or otherwise used for commercial purposes  All illustrations and images included in CareNotes® are the copyrighted property of A D A M , Inc  or [facility] [employer]   The above information is an  only  It is not intended as medical advice for individual conditions or treatments  Talk to your doctor, nurse or pharmacist before following any medical regimen to see if it is safe and effective for you  Heart Healthy Diet   AMBULATORY CARE:   A heart healthy diet  is an eating plan low in unhealthy fats and sodium (salt)  The plan is high in healthy fats and fiber  A heart healthy diet helps improve your cholesterol levels and lowers your risk for heart disease and stroke  A dietitian will teach you how to read and understand food labels  Heart healthy diet guidelines to follow:   · Choose foods that contain healthy fats  ? Unsaturated fats  include monounsaturated and polyunsaturated fats  Unsaturated fat is found in foods such as soybean, canola, olive, corn, and safflower oils  It is also found in soft tub margarine that is made with liquid vegetable oil  ? Omega-3 fat  is found in certain fish, such as salmon, tuna, and trout, and in walnuts and flaxseed   Eat fish high in omega-3 fats at least 2 times a week        · Get 20 to 30 grams of fiber each day  Fruits, vegetables, whole-grain foods, and legumes (cooked beans) are good sources of fiber  · Limit or do not have unhealthy fats  ? Cholesterol  is found in animal foods, such as eggs and lobster, and in dairy products made from whole milk  Limit cholesterol to less than 200 mg each day  ? Saturated fat  is found in meats, such as wilson and hamburger  It is also found in chicken or turkey skin, whole milk, and butter  Limit saturated fat to less than 7% of your total daily calories  ? Trans fat  is found in packaged foods, such as potato chips and cookies  It is also in hard margarine, some fried foods, and shortening  Do not eat foods that contain trans fats  · Limit sodium as directed  You may be told to limit sodium to 2,000 to 2,300 mg each day  Choose low-sodium or no-salt-added foods  Add little or no salt to food you prepare  Use herbs and spices in place of salt  Include the following in your heart healthy plan:  Ask your dietitian or healthcare provider how many servings to have from each of the following food groups:  · Grains:      ? Whole-wheat breads, cereals, and pastas, and brown rice    ? Low-fat, low-sodium crackers and chips    · Vegetables:      ? Broccoli, green beans, green peas, and spinach    ? Collards, kale, and lima beans    ? Carrots, sweet potatoes, tomatoes, and peppers    ? Canned vegetables with no salt added    · Fruits:      ? Bananas, peaches, pears, and pineapple    ? Grapes, raisins, and dates    ? Oranges, tangerines, grapefruit, orange juice, and grapefruit juice    ? Apricots, mangoes, melons, and papaya    ? Raspberries and strawberries    ? Canned fruit with no added sugar    · Low-fat dairy:      ? Nonfat (skim) milk, 1% milk, and low-fat almond, cashew, or soy milks fortified with calcium    ?  Low-fat cheese, regular or frozen yogurt, and cottage cheese    · Meats and proteins:      ? Lean cuts of beef and pork (loin, leg, round), skinless chicken and turkey    ? Legumes, soy products, egg whites, or nuts    Limit or do not include the following in your heart healthy plan:   · Unhealthy fats and oils:      ? Whole or 2% milk, cream cheese, sour cream, or cheese    ? High-fat cuts of beef (T-bone steaks, ribs), chicken or turkey with skin, and organ meats such as liver    ? Butter, stick margarine, shortening, and cooking oils such as coconut or palm oil    · Foods and liquids high in sodium:      ? Packaged foods, such as frozen dinners, cookies, macaroni and cheese, and cereals with more than 300 mg of sodium per serving    ? Vegetables with added sodium, such as instant potatoes, vegetables with added sauces, or regular canned vegetables    ? Cured or smoked meats, such as hot dogs, wilson, and sausage    ? High-sodium ketchup, barbecue sauce, salad dressing, pickles, olives, soy sauce, or miso    · Foods and liquids high in sugar:      ? Candy, cake, cookies, pies, or doughnuts    ? Soft drinks (soda), sports drinks, or sweetened tea    ? Canned or dry mixes for cakes, soups, sauces, or gravies    Other healthy heart guidelines:   · Do not smoke  Nicotine and other chemicals in cigarettes and cigars can cause lung and heart damage  Ask your healthcare provider for information if you currently smoke and need help to quit  E-cigarettes or smokeless tobacco still contain nicotine  Talk to your healthcare provider before you use these products  · Limit or do not drink alcohol as directed  Alcohol can damage your heart and raise your blood pressure  Your healthcare provider may give you specific daily and weekly limits  The general recommended limit is 1 drink a day for women 21 or older and for men 72 or older  Do not have more than 3 drinks in a day or 7 in a week  The recommended limit is 2 drinks a day for men 24to 59years of age  Do not have more than 4 drinks in a day or 14 in a week   A drink of alcohol is 12 ounces of beer, 5 ounces of wine, or 1½ ounces of liquor  · Exercise regularly  Exercise can help you maintain a healthy weight and improve your blood pressure and cholesterol levels  Regular exercise can also decrease your risk for heart problems  Ask your healthcare provider about the best exercise plan for you  Do not start an exercise program without asking your healthcare provider  Follow up with your doctor or cardiologist as directed:  Write down your questions so you remember to ask them during your visits  © Copyright [employer] 2021 Information is for End User's use only and may not be sold, redistributed or otherwise used for commercial purposes  All illustrations and images included in CareNotes® are the copyrighted property of A D A M , Inc  or [facility] [employer]   The above information is an  only  It is not intended as medical advice for individual conditions or treatments  Talk to your doctor, nurse or pharmacist before following any medical regimen to see if it is safe and effective for you  Calorie Counting Diet   WHAT YOU NEED TO KNOW:   What is a calorie counting diet? It is a meal plan based on counting calories each day to reach a healthy body weight  You will need to eat fewer calories if you are trying to lose weight  Weight loss may decrease your risk for certain health problems or improve your health if you have health problems  Some of these health problems include heart disease, high blood pressure, and diabetes  What foods should I avoid? Your dietitian will tell you if you need to avoid certain foods based on your body weight and health condition  You may need to avoid high-fat foods if you are at risk for or have heart disease  You may need to eat fewer foods from the breads and starches food group if you have diabetes  How many calories are in foods?   The following is a list of foods and drinks with the approximate number of calories in each  Check the food label to find the exact number of calories  A dietitian can tell you how many calories you should have from each food group each day  · Carbohydrate:      ? ½ of a 3-inch bagel, 1 slice of bread, or ½ of a hamburger bun or hot dog bun (80)    ? 1 (8-inch) flour tortilla or ½ cup of cooked rice (100)    ? 1 (6-inch) corn tortilla (80)    ? 1 (6-inch) pancake or 1 cup of bran flakes cereal (110)    ? ½ cup of cooked cereal (80)    ? ½ cup of cooked pasta (85)    ? 1 ounce of pretzels (100)    ? 3 cups of air-popped popcorn without butter or oil (80)    · Dairy:      ? 1 cup of skim or 1% milk (90)    ? 1 cup of 2% milk (120)    ? 1 cup of whole milk (160)    ? 1 cup of 2% chocolate milk (220)    ? 1 ounce of low-fat cheese with 3 grams of fat per ounce (70)    ? 1 ounce of cheddar cheese (114)    ? ½ cup of 1% fat cottage cheese (80)    ? 1 cup of plain or sugar-free, fat-free yogurt (90)    · Protein foods:      ? 3 ounces of fish (not breaded or fried) (95)    ? 3 ounces of breaded, fried fish (195)    ? ¾ cup of tuna canned in water (105)    ? 3 ounces of chicken breast without skin (105)    ? 1 fried chicken breast with skin (350)    ? ¼ cup of fat free egg substitute (40)    ? 1 large egg (75)    ? 3 ounces of lean beef or pork (165)    ? 3 ounces of fried pork chop or ham (185)    ? ½ cup of cooked dried beans, such as kidney, irizarry, lentils, or navy (115)    ? 3 ounces of bologna or lunch meat (225)    ? 2 links of breakfast sausage (140)    · Vegetables:      ? ½ cup of sliced mushrooms (10)    ? 1 cup of salad greens, such as lettuce, spinach, or manan (15)    ? ½ cup of steamed asparagus (20)    ? ½ cup of cooked summer squash, zucchini squash, or green or wax beans (25)    ? 1 cup of broccoli or cauliflower florets, or 1 medium tomato (25)    ? 1 large raw carrot or ½ cup of cooked carrots (40)    ? ? of a medium cucumber or 1 stalk of celery (5)    ?  1 small baked potato (160)    ? 1 cup of breaded, fried vegetables (230)    · Fruit:      ? 1 (6-inch) banana (55)     ? ½ of a 4-inch grapefruit (55)    ? 15 grapes (60)    ? 1 medium orange or apple (70)    ? 1 large peach (65)    ? 1 cup of fresh pineapple chunks (75)    ? 1 cup of melon cubes (50)    ? 1¼ cups of whole strawberries (45)    ? ½ cup of fruit canned in juice (55)    ? ½ cup of fruit canned in heavy syrup (110)    ? ? cup of raisins (130)    ? ½ cup of unsweetened fruit juice (60)    ? ½ cup of grape, cranberry, or prune juice (90)    · Fat:      ? 10 peanuts or 2 teaspoons of peanut butter (55)    ? 2 tablespoons of avocado or 1 tablespoon of regular salad dressing (45)    ? 2 slices of wilson (90)    ? 1 teaspoon of oil, such as safflower, canola, corn, or olive oil (45)    ? 2 teaspoons of low-fat margarine, or 1 tablespoon of low-fat mayonnaise (50)    ? 1 teaspoon of regular margarine (40)    ? 1 tablespoon of regular mayonnaise (135)    ? 1 tablespoon of cream cheese or 2 tablespoons of low-fat cream cheese (45)    ? 2 tablespoons of vegetable shortening (215)    · Dessert and sweets:      ? 8 animal crackers or 5 vanilla wafers (80)    ? 1 frozen fruit juice bar (80)    ? ½ cup of ice milk or low-fat frozen yogurt (90)    ? ½ cup of sherbet or sorbet (125)    ? ½ cup of sugar-free pudding or custard (60)    ? ½ cup of ice cream (140)    ? ½ cup of pudding or custard (175)    ? 1 (2-inch) square chocolate brownie (185)    · Combination foods:      ? Bean burrito made with an 8-inch tortilla, without cheese (275)    ? Chicken breast sandwich with lettuce and tomato (325)    ? 1 cup of chicken noodle soup (60)    ? 1 beef taco (175)    ? Regular hamburger with lettuce and tomato (310)    ? Regular cheeseburger with lettuce and tomato (410)     ? ¼ of a 12-inch cheese pizza (280)    ? Fried fish sandwich with lettuce and tomato (425)    ? Hot dog and bun (275)    ? 1½ cups of macaroni and cheese (310)    ?  Taco salad with a fried tortilla shell (870)    · Low-calorie foods:      ? 1 tablespoon of ketchup or 1 tablespoon of fat free sour cream (15)    ? 1 teaspoon of mustard (5)    ? ¼ cup of salsa (20)    ? 1 large dill pickle (15)    ? 1 tablespoon of fat free salad dressing (10)    ? 2 teaspoons of low-sugar, light jam or jelly, or 1 tablespoon of sugar-free syrup (15)    ? 1 sugar-free popsicle (15)    ? 1 cup of club soda, seltzer water, or diet soda (0)    CARE AGREEMENT:   You have the right to help plan your care  Discuss treatment options with your healthcare provider to decide what care you want to receive  You always have the right to refuse treatment  The above information is an  only  It is not intended as medical advice for individual conditions or treatments  Talk to your doctor, nurse or pharmacist before following any medical regimen to see if it is safe and effective for you  © Copyright [employer] 2021 Information is for End User's use only and may not be sold, redistributed or otherwise used for commercial purposes  All illustrations and images included in CareNotes® are the copyrighted property of [employer]  or [facility] Preventive Visit Patient Instructions  Thank you for completing your Welcome to Medicare Visit or Medicare Annual Wellness Visit today  Your next wellness visit will be due in one year (10/1/2022)  The screening/preventive services that you may require over the next 5-10 years are detailed below  Some tests may not apply to you based off risk factors and/or age  Screening tests ordered at today's visit but not completed yet may show as past due  Also, please note that scanned in results may not display below    Preventive Screenings:  Service Recommendations Previous Testing/Comments   Colorectal Cancer Screening  * Colonoscopy    * Fecal Occult Blood Test (FOBT)/Fecal Immunochemical Test (FIT)  * Fecal DNA/Cologuard Test  * Flexible Sigmoidoscopy Age: 54-65 years old   Colonoscopy: every 10 years (may be performed more frequently if at higher risk)  OR  FOBT/FIT: every 1 year  OR  Cologuard: every 3 years  OR  Sigmoidoscopy: every 5 years  Screening may be recommended earlier than age 48 if at higher risk for colorectal cancer  Also, an individualized decision between you and your healthcare provider will decide whether screening between the ages of 74-80 would be appropriate  Colonoscopy: 12/17/2019  FOBT/FIT: Not on file  Cologuard: Not on file  Sigmoidoscopy: Not on file    Screening Current     Breast Cancer Screening Age: 36 years old  Frequency: every 1-2 years  Not required if history of left and right mastectomy Mammogram: 02/05/2019        Cervical Cancer Screening Between the ages of 21-29, pap smear recommended once every 3 years  Between the ages of 33-67, can perform pap smear with HPV co-testing every 5 years  Recommendations may differ for women with a history of total hysterectomy, cervical cancer, or abnormal pap smears in past  Pap Smear: Not on file    Screening Not Indicated   Hepatitis C Screening Once for adults born between 1945 and 1965  More frequently in patients at high risk for Hepatitis C Hep C Antibody: 08/24/2018    Screening Current   Diabetes Screening 1-2 times per year if you're at risk for diabetes or have pre-diabetes Fasting glucose: 87 mg/dL   A1C: 6 2 %    Screening Current   Cholesterol Screening Once every 5 years if you don't have a lipid disorder  May order more often based on risk factors  Lipid panel: 06/08/2020    Screening Current     Other Preventive Screenings Covered by Medicare:  1  Abdominal Aortic Aneurysm (AAA) Screening: covered once if your at risk  You're considered to be at risk if you have a family history of AAA    2  Lung Cancer Screening: covers low dose CT scan once per year if you meet all of the following conditions: (1) Age 50-69; (2) No signs or symptoms of lung cancer; (3) Current smoker or have quit smoking within the last 15 years; (4) You have a tobacco smoking history of at least 30 pack years (packs per day multiplied by number of years you smoked); (5) You get a written order from a healthcare provider  3  Glaucoma Screening: covered annually if you're considered high risk: (1) You have diabetes OR (2) Family history of glaucoma OR (3)  aged 48 and older OR (3)  American aged 72 and older  3  Osteoporosis Screening: covered every 2 years if you meet one of the following conditions: (1) You're estrogen deficient and at risk for osteoporosis based off medical history and other findings; (2) Have a vertebral abnormality; (3) On glucocorticoid therapy for more than 3 months; (4) Have primary hyperparathyroidism; (5) On osteoporosis medications and need to assess response to drug therapy  · Last bone density test (DXA Scan): 06/22/2020   5  HIV Screening: covered annually if you're between the age of 15-65  Also covered annually if you are younger than 13 and older than 72 with risk factors for HIV infection  For pregnant patients, it is covered up to 3 times per pregnancy  Immunizations:  Immunization Recommendations   Influenza Vaccine Annual influenza vaccination during flu season is recommended for all persons aged >= 6 months who do not have contraindications   Pneumococcal Vaccine (Prevnar and Pneumovax)  * Prevnar = PCV13  * Pneumovax = PPSV23   Adults 25-60 years old: 1-3 doses may be recommended based on certain risk factors  Adults 72 years old: Prevnar (PCV13) vaccine recommended followed by Pneumovax (PPSV23) vaccine  If already received PPSV23 since turning 65, then PCV13 recommended at least one year after PPSV23 dose     Hepatitis B Vaccine 3 dose series if at intermediate or high risk (ex: diabetes, end stage renal disease, liver disease)   Tetanus (Td) Vaccine - COST NOT COVERED BY MEDICARE PART B Following completion of primary series, a booster dose should be given every 10 years to maintain immunity against tetanus  Td may also be given as tetanus wound prophylaxis  Tdap Vaccine - COST NOT COVERED BY MEDICARE PART B Recommended at least once for all adults  For pregnant patients, recommended with each pregnancy  Shingles Vaccine (Shingrix) - COST NOT COVERED BY MEDICARE PART B  2 shot series recommended in those aged 48 and above     Health Maintenance Due:      Topic Date Due    Cervical Cancer Screening  Never done    Breast Cancer Screening: Mammogram  02/05/2021    DXA SCAN  06/22/2022    Colorectal Cancer Screening  12/17/2024    HIV Screening  Completed    Hepatitis C Screening  Completed     Immunizations Due:      Topic Date Due    Pneumococcal Vaccine: 65+ Years (1 of 2 - PPSV23) Never done    COVID-19 Vaccine (1) Never done     Advance Directives   What are advance directives? Advance directives are legal documents that state your wishes and plans for medical care  These plans are made ahead of time in case you lose your ability to make decisions for yourself  Advance directives can apply to any medical decision, such as the treatments you want, and if you want to donate organs  What are the types of advance directives? There are many types of advance directives, and each state has rules about how to use them  You may choose a combination of any of the following:  · Living will: This is a written record of the treatment you want  You can also choose which treatments you do not want, which to limit, and which to stop at a certain time  This includes surgery, medicine, IV fluid, and tube feedings  · Durable power of  for healthcare [facility]): This is a written record that states who you want to make healthcare choices for you when you are unable to make them for yourself  This person, called a proxy, is usually a family member or a friend  You may choose more than 1 proxy    · Do not resuscitate (DNR) order:  A DNR order is used in case your heart stops beating or you stop breathing  It is a request not to have certain forms of treatment, such as CPR  A DNR order may be included in other types of advance directives  · Medical directive: This covers the care that you want if you are in a coma, near death, or unable to make decisions for yourself  You can list the treatments you want for each condition  Treatment may include pain medicine, surgery, blood transfusions, dialysis, IV or tube feedings, and a ventilator (breathing machine)  · Values history: This document has questions about your views, beliefs, and how you feel and think about life  This information can help others choose the care that you would choose  Why are advance directives important? An advance directive helps you control your care  Although spoken wishes may be used, it is better to have your wishes written down  Spoken wishes can be misunderstood, or not followed  Treatments may be given even if you do not want them  An advance directive may make it easier for your family to make difficult choices about your care  Narcotic (Opioid) Safety    Use narcotics safely:  · Take prescribed narcotics exactly as directed  · Do not give narcotics to others or take narcotics that belong to someone else  · Do not mix narcotics without medicines or alcohol  · Do not drive or operate heavy machinery after you take the narcotic  · Monitor for side effects and notify your healthcare provider if you experienced side effects such as nausea, sleepiness, itching, or trouble thinking clearly  Manage constipation:    Constipation is the most common side effect of narcotic medicine  Constipation is when you have hard, dry bowel movements, or you go longer than usual between bowel movements  Tell your healthcare provider about all changes in your bowel movements while you are taking narcotics  He or she may recommend laxative medicine to help you have a bowel movement  He or she may also change the kind of narcotic you are taking, or change when you take it  The following are more ways you can prevent or relieve constipation:    · Drink liquids as directed  You may need to drink extra liquids to help soften and move your bowels  Ask how much liquid to drink each day and which liquids are best for you  · Eat high-fiber foods  This may help decrease constipation by adding bulk to your bowel movements  High-fiber foods include fruits, vegetables, whole-grain breads and cereals, and beans  Your healthcare provider or dietitian can help you create a high-fiber meal plan  Your provider may also recommend a fiber supplement if you cannot get enough fiber from food  · Exercise regularly  Regular physical activity can help stimulate your intestines  Walking is a good exercise to prevent or relieve constipation  Ask which exercises are best for you  · Schedule a time each day to have a bowel movement  This may help train your body to have regular bowel movements  Bend forward while you are on the toilet to help move the bowel movement out  Sit on the toilet for at least 10 minutes, even if you do not have a bowel movement  Store narcotics safely:   · Store narcotics where others cannot easily get them  Keep them in a locked cabinet or secure area  Do not  keep them in a purse or other bag you carry with you  A person may be looking for something else and find the narcotics  · Make sure narcotics are stored out of the reach of children  A child can easily overdose on narcotics  Narcotics may look like candy to a small child  The best way to dispose of narcotics: The laws vary by country and area  In the [place], the best way is to return the narcotics through a take-back program  This program is offered by the [facility]AVE[facility])  The following are options for using the program:  · Take the narcotics to a [employer] collection site    The site is often a law enforcement center  Call your local law enforcement center for scheduled take-back days in your area  You will be given information on where to go if the collection site is in a different location  · Take the narcotics to an approved pharmacy or hospital   A pharmacy or hospital may be set up as a collection site  You will need to ask if it is a PARTHA collection site if you were not directed there  A pharmacy or doctor's office may not be able to take back narcotics unless it is a PARTHA site  · Use a mail-back system  This means you are given containers to put the narcotics into  You will then mail them in the containers  · Use a take-back drop box  This is a place to leave the narcotics at any time  People and animals will not be able to get into the box  Your local law enforcement agency can tell you where to find a drop box in your area  Other ways to manage pain:   · Ask your healthcare provider about non-narcotic medicines to control pain  Nonprescription medicines include NSAIDs (such as ibuprofen) and acetaminophen  Prescription medicines include muscle relaxers, antidepressants, and steroids  · Pain may be managed without any medicines  Some ways to relieve pain include massage, aromatherapy, or meditation  Physical or occupational therapy may also help  For more information:   · Drug Enforcement Administration  [facility] [address]  Phone: [phone]  Web Address: [facility]/drug_disposal/    · [address] and Drug Administration  [place] , [address] [facility]  Phone: 8- 820 [phone]  Web Address: http://Directly/     © Copyright [employer] 2018 Information is for End User's use only and may not be sold, redistributed or otherwise used for commercial purposes   All illustrations and images included in CareNotes® are the copyrighted property of [employer]  or [facility] Preventive Visit Patient Instructions  Thank you for completing your Welcome to Medicare Visit or Medicare Annual Wellness Visit today  Your next wellness visit will be due in one year (10/1/2022)  The screening/preventive services that you may require over the next 5-10 years are detailed below  Some tests may not apply to you based off risk factors and/or age  Screening tests ordered at today's visit but not completed yet may show as past due  Also, please note that scanned in results may not display below  Preventive Screenings:  Service Recommendations Previous Testing/Comments   Colorectal Cancer Screening  * Colonoscopy    * Fecal Occult Blood Test (FOBT)/Fecal Immunochemical Test (FIT)  * Fecal DNA/Cologuard Test  * Flexible Sigmoidoscopy Age: 54-65 years old   Colonoscopy: every 10 years (may be performed more frequently if at higher risk)  OR  FOBT/FIT: every 1 year  OR  Cologuard: every 3 years  OR  Sigmoidoscopy: every 5 years  Screening may be recommended earlier than age 48 if at higher risk for colorectal cancer  Also, an individualized decision between you and your healthcare provider will decide whether screening between the ages of 74-80 would be appropriate  Colonoscopy: 12/17/2019  FOBT/FIT: Not on file  Cologuard: Not on file  Sigmoidoscopy: Not on file    Screening Current     Breast Cancer Screening Age: 36 years old  Frequency: every 1-2 years  Not required if history of left and right mastectomy Mammogram: 02/05/2019        Cervical Cancer Screening Between the ages of 21-29, pap smear recommended once every 3 years  Between the ages of 33-67, can perform pap smear with HPV co-testing every 5 years     Recommendations may differ for women with a history of total hysterectomy, cervical cancer, or abnormal pap smears in past  Pap Smear: Not on file    Screening Not Indicated   Hepatitis C Screening Once for adults born between [facility]  More frequently in patients at high risk for Hepatitis C Hep C Antibody: 08/24/2018    Screening Current   Diabetes Screening 1-2 times per year if you're at risk for diabetes or have pre-diabetes Fasting glucose: 87 mg/dL   A1C: 6 2 %    Screening Current   Cholesterol Screening Once every 5 years if you don't have a lipid disorder  May order more often based on risk factors  Lipid panel: 06/08/2020    Screening Current     Other Preventive Screenings Covered by Medicare:  6  Abdominal Aortic Aneurysm (AAA) Screening: covered once if your at risk  You're considered to be at risk if you have a family history of AAA  7  Lung Cancer Screening: covers low dose CT scan once per year if you meet all of the following conditions: (1) Age 50-69; (2) No signs or symptoms of lung cancer; (3) Current smoker or have quit smoking within the last 15 years; (4) You have a tobacco smoking history of at least 30 pack years (packs per day multiplied by number of years you smoked); (5) You get a written order from a healthcare provider  8  Glaucoma Screening: covered annually if you're considered high risk: (1) You have diabetes OR (2) Family history of glaucoma OR (3)  aged 48 and older OR (3)  American aged 72 and older  5  Osteoporosis Screening: covered every 2 years if you meet one of the following conditions: (1) You're estrogen deficient and at risk for osteoporosis based off medical history and other findings; (2) Have a vertebral abnormality; (3) On glucocorticoid therapy for more than 3 months; (4) Have primary hyperparathyroidism; (5) On osteoporosis medications and need to assess response to drug therapy  · Last bone density test (DXA Scan): 06/22/2020  10  HIV Screening: covered annually if you're between the age of 12-76  Also covered annually if you are younger than 13 and older than 72 with risk factors for HIV infection  For pregnant patients, it is covered up to 3 times per pregnancy      Immunizations:  Immunization Recommendations   Influenza Vaccine Annual influenza vaccination during flu season is recommended for all persons aged >= 6 months who do not have contraindications   Pneumococcal Vaccine (Prevnar and Pneumovax)  * Prevnar = PCV13  * Pneumovax = PPSV23   Adults 25-60 years old: 1-3 doses may be recommended based on certain risk factors  Adults 72 years old: Prevnar (PCV13) vaccine recommended followed by Pneumovax (PPSV23) vaccine  If already received PPSV23 since turning 65, then PCV13 recommended at least one year after PPSV23 dose  Hepatitis B Vaccine 3 dose series if at intermediate or high risk (ex: diabetes, end stage renal disease, liver disease)   Tetanus (Td) Vaccine - COST NOT COVERED BY MEDICARE PART B Following completion of primary series, a booster dose should be given every 10 years to maintain immunity against tetanus  Td may also be given as tetanus wound prophylaxis  Tdap Vaccine - COST NOT COVERED BY MEDICARE PART B Recommended at least once for all adults  For pregnant patients, recommended with each pregnancy  Shingles Vaccine (Shingrix) - COST NOT COVERED BY MEDICARE PART B  2 shot series recommended in those aged 48 and above     Health Maintenance Due:      Topic Date Due    Cervical Cancer Screening  Never done    Breast Cancer Screening: Mammogram  02/05/2021    DXA SCAN  06/22/2022    Colorectal Cancer Screening  12/17/2024    HIV Screening  Completed    Hepatitis C Screening  Completed     Immunizations Due:      Topic Date Due    Pneumococcal Vaccine: 65+ Years (1 of 2 - PPSV23) Never done    COVID-19 Vaccine (1) Never done     Advance Directives   What are advance directives? Advance directives are legal documents that state your wishes and plans for medical care  These plans are made ahead of time in case you lose your ability to make decisions for yourself  Advance directives can apply to any medical decision, such as the treatments you want, and if you want to donate organs     What are the types of advance directives? There are many types of advance directives, and each state has rules about how to use them  You may choose a combination of any of the following:  · Living will: This is a written record of the treatment you want  You can also choose which treatments you do not want, which to limit, and which to stop at a certain time  This includes surgery, medicine, IV fluid, and tube feedings  · Durable power of  for healthcare [facility]): This is a written record that states who you want to make healthcare choices for you when you are unable to make them for yourself  This person, called a proxy, is usually a family member or a friend  You may choose more than 1 proxy  · Do not resuscitate (DNR) order:  A DNR order is used in case your heart stops beating or you stop breathing  It is a request not to have certain forms of treatment, such as CPR  A DNR order may be included in other types of advance directives  · Medical directive: This covers the care that you want if you are in a coma, near death, or unable to make decisions for yourself  You can list the treatments you want for each condition  Treatment may include pain medicine, surgery, blood transfusions, dialysis, IV or tube feedings, and a ventilator (breathing machine)  · Values history: This document has questions about your views, beliefs, and how you feel and think about life  This information can help others choose the care that you would choose  Why are advance directives important? An advance directive helps you control your care  Although spoken wishes may be used, it is better to have your wishes written down  Spoken wishes can be misunderstood, or not followed  Treatments may be given even if you do not want them  An advance directive may make it easier for your family to make difficult choices about your care     Narcotic (Opioid) Safety    Use narcotics safely:  · Take prescribed narcotics exactly as directed  · Do not give narcotics to others or take narcotics that belong to someone else  · Do not mix narcotics without medicines or alcohol  · Do not drive or operate heavy machinery after you take the narcotic  · Monitor for side effects and notify your healthcare provider if you experienced side effects such as nausea, sleepiness, itching, or trouble thinking clearly  Manage constipation:    Constipation is the most common side effect of narcotic medicine  Constipation is when you have hard, dry bowel movements, or you go longer than usual between bowel movements  Tell your healthcare provider about all changes in your bowel movements while you are taking narcotics  He or she may recommend laxative medicine to help you have a bowel movement  He or she may also change the kind of narcotic you are taking, or change when you take it  The following are more ways you can prevent or relieve constipation:    · Drink liquids as directed  You may need to drink extra liquids to help soften and move your bowels  Ask how much liquid to drink each day and which liquids are best for you  · Eat high-fiber foods  This may help decrease constipation by adding bulk to your bowel movements  High-fiber foods include fruits, vegetables, whole-grain breads and cereals, and beans  Your healthcare provider or dietitian can help you create a high-fiber meal plan  Your provider may also recommend a fiber supplement if you cannot get enough fiber from food  · Exercise regularly  Regular physical activity can help stimulate your intestines  Walking is a good exercise to prevent or relieve constipation  Ask which exercises are best for you  · Schedule a time each day to have a bowel movement  This may help train your body to have regular bowel movements  Bend forward while you are on the toilet to help move the bowel movement out  Sit on the toilet for at least 10 minutes, even if you do not have a bowel movement      Store narcotics safely:   · Store narcotics where others cannot easily get them  Keep them in a locked cabinet or secure area  Do not  keep them in a purse or other bag you carry with you  A person may be looking for something else and find the narcotics  · Make sure narcotics are stored out of the reach of children  A child can easily overdose on narcotics  Narcotics may look like candy to a small child  The best way to dispose of narcotics: The laws vary by country and area  In the [place], the best way is to return the narcotics through a take-back program  This program is offered by the [employer] ([employer])  The following are options for using the program:  · Take the narcotics to a PARTHA collection site  The site is often a law enforcement center  Call your local law enforcement center for scheduled take-back days in your area  You will be given information on where to go if the collection site is in a different location  · Take the narcotics to an approved pharmacy or hospital   A pharmacy or hospital may be set up as a collection site  You will need to ask if it is a PARTHA collection site if you were not directed there  A pharmacy or doctor's office may not be able to take back narcotics unless it is a PARTHA site  · Use a mail-back system  This means you are given containers to put the narcotics into  You will then mail them in the containers  · Use a take-back drop box  This is a place to leave the narcotics at any time  People and animals will not be able to get into the box  Your local law enforcement agency can tell you where to find a drop box in your area  Other ways to manage pain:   · Ask your healthcare provider about non-narcotic medicines to control pain  Nonprescription medicines include NSAIDs (such as ibuprofen) and acetaminophen  Prescription medicines include muscle relaxers, antidepressants, and steroids  · Pain may be managed without any medicines    Some ways to relieve pain include massage, aromatherapy, or meditation  Physical or occupational therapy may also help  For more information:   · Drug Enforcement Administration  [address] [facility] [address] [address]  Phone: 8- [phone] - [phone]  Web Address: [facility]/drug_disposal/    · [address] and Drug Administration  [place]  [place] , [address]  Phone: 9- [phone] - [phone]  Web Address: http://[employer]/     © Copyright [employer] 2018 Information is for End User's use only and may not be sold, redistributed or otherwise used for commercial purposes   All illustrations and images included in CareNotes® are the copyrighted property of A D A M , Inc  or [address] [place], [place] [address]paW[facility]

## 2021-09-30 NOTE — PROGRESS NOTES
Assessment and Plan:     Problem List Items Addressed This Visit        Respiratory    Asthma-COPD overlap syndrome ([facility] )       Cardiovascular and Mediastinum    Essential hypertension - Primary    Relevant Medications    losartan (COZAAR) 25 mg tablet    Other Relevant Orders    CBC and differential    Comprehensive metabolic panel    Lipid Panel with Direct LDL reflex    Microalbumin / creatinine urine ratio    TSH, 3rd generation with Free T4 reflex       Musculoskeletal and Integument    Osteopenia of multiple sites       Genitourinary    Chronic renal insufficiency, stage III (moderate) (HCC)    Relevant Orders    CBC and differential       Other    Uncomplicated opioid dependence ([facility] )    Protein-calorie malnutrition, unspecified severity (HCC)    Mild major depression, single episode (HCC)    ZACK (generalized anxiety disorder)    Relevant Orders    TSH, 3rd generation with Free T4 reflex    Chronic pain disorder    Borderline high serum cholesterol    Relevant Orders    Lipid Panel with Direct LDL reflex      Other Visit Diagnoses     Encounter for screening mammogram for malignant neoplasm of breast        Relevant Orders    Mammo screening bilateral w 3d & cad    Medicare annual wellness visit, initial        Negative depression screening               Preventive health issues were discussed with patient, and age appropriate screening tests were ordered as noted in patient's After Visit Summary  Personalized health advice and appropriate referrals for health education or preventive services given if needed, as noted in patient's After Visit Summary       History of Present Illness:     Patient presents for Medicare Annual Wellness visit    Patient Care Team:  Everett Lombard, DO as PCP - General (Internal Medicine)  Everett Lombard, DO as PCP - [address] [place] [address] (RTE)     Problem List:     Patient Active Problem List   Diagnosis    Environmental and seasonal allergies    Arthralgia of right temporomandibular joint    Chronic cough    Borderline high serum cholesterol    Chronic nonintractable headache    Osteoarthritis of left hip    Uncomplicated opioid dependence (HCC)    Chronic pain disorder    Tear of left acetabular labrum    ZACK (generalized anxiety disorder)    Essential hypertension    Weight loss    History of thrush    Chronic renal insufficiency, stage III (moderate) ([facility])    Diverticulosis    Polyp of colon    Osteopenia of multiple sites    Trace tricuspid valve regurgitation    Trace mitral valve regurgitation    Moderate aortic regurgitation    Bile duct stricture    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    Chronic midline low back pain    DDD (degenerative disc disease), lumbosacral    Facet arthritis of lumbosacral region    Herniated lumbar intervertebral disc    Spinal stenosis of lumbar region without neurogenic claudication    Asthma-COPD overlap syndrome ([facility])    Protein-calorie malnutrition, unspecified severity ([facility])    Mild major depression, single episode ([address] )      Past Medical and Surgical History:     Past Medical History:   Diagnosis Date    Aortic regurgitation     Arthritis     Essential hypertension 8/7/2019    ZACK (generalized anxiety disorder) 12/11/2018    Moderate persistent asthma without complication 63/35/1016    Pulmonary emphysema ([address] ) 10/23/2018    Stage 3 chronic kidney disease ([address] ) 12/11/2019     Past Surgical History:   Procedure Laterality Date    COLONOSCOPY      DENTAL SURGERY      FACIAL COSMETIC SURGERY      HIP SURGERY Left 06/2019      Family History:     Family History   Problem Relation Age of Onset    Aneurysm Mother     No Known Problems Father     No Known Problems Brother         eye issues      Social History:     Social History     Socioeconomic History    Marital status: Single     Spouse name: None    Number of children: 1    Years of education: None    Highest education level: None   Occupational History    Occupation: disabled   Tobacco Use    Smoking status: Never Smoker    Smokeless tobacco: Never Used   Vaping Use    Vaping Use: Never used   Substance and Sexual Activity    Alcohol use: No    Drug use: No    Sexual activity: Not Currently     Partners: Male   Other Topics Concern    None   Social History Narrative    Lives alone     Social Determinants of Health     Financial Resource Strain:     Difficulty of Paying Living Expenses:    Food Insecurity:     Worried About Running Out of Food in the Last Year:     Ran Out of Food in the Last Year:    Transportation Needs:     Lack of Transportation (Medical):  Lack of Transportation (Non-Medical):    Physical Activity:     Days of Exercise per Week:     Minutes of Exercise per Session:    Stress:     Feeling of Stress :    Social Connections:     Frequency of Communication with Friends and Family:     Frequency of Social Gatherings with Friends and Family:     Attends [employer] Services:     Active Member of Clubs or Organizations:     Attends Club or Organization Meetings:     Marital Status:    Intimate Partner Violence:     Fear of Current or Ex-Partner:     Emotionally Abused:     Physically Abused:     Sexually Abused:       Medications and Allergies:     Current Outpatient Medications   Medication Sig Dispense Refill    albuterol (Ventolin HFA) 90 mcg/act inhaler Inhale 2 puffs every 6 (six) hours as needed for wheezing 18 g 5    budesonide-formoterol (SYMBICORT) 160-4 5 mcg/act inhaler Inhale 2 puffs 2 (two) times a day Rinse mouth after use   18 g 5    chlorthalidone 25 mg tablet Take 25 mg by mouth daily      losartan (COZAAR) 25 mg tablet Take 25 mg by mouth daily      traMADol (ULTRAM) 50 mg tablet Take 1 tablet (50 mg total) by mouth every 6 (six) hours as needed for moderate pain 30 tablet 0    baclofen 10 mg tablet take 1 tablet by mouth 2 TO 3 TIMES A DAY AS NEEDED       No current facility-administered medications for this visit  Allergies   Allergen Reactions    Nsaids GI Bleeding    Amlodipine Edema    Iv Contrast [Iodinated Diagnostic Agents] Other (See Comments)     CKD    Lisinopril Other (See Comments) and Cough     Kidney injury    Vancomycin Other (See Comments)     Kidney injury    Celebrex [Celecoxib] Swelling    Fosamax [Alendronate]      Bone pain    Ibuprofen Swelling    Influenza Vaccines       Immunizations:     Immunization History   Administered Date(s) Administered    Hep B, adult 03/06/2017    Tdap 03/06/2017      Health Maintenance:         Topic Date Due    Cervical Cancer Screening  Never done    Breast Cancer Screening: Mammogram  02/05/2021    DXA SCAN  06/22/2022    Colorectal Cancer Screening  12/17/2024    HIV Screening  Completed    Hepatitis C Screening  Completed         Topic Date Due    Pneumococcal Vaccine: 65+ Years (1 of 2 - PPSV23) Never done    COVID-19 Vaccine (1) Never done      Medicare Health Risk Assessment:     /82 (BP Location: Right arm, Patient Position: Sitting, Cuff Size: Child)   Pulse 66   Temp 98 7 °F (37 1 °C)   Resp 14   Ht 5' 2" (1 575 m)   Wt 47 1 kg (103 lb 12 8 oz)   LMP  (LMP Unknown)   SpO2 99%   BMI 18 99 kg/m²      Thea Ferro is here for her Initial Wellness visit  Health Risk Assessment:   Patient rates overall health as fair  Patient feels that their physical health rating is same  Patient is dissatisfied with their life  Eyesight was rated as same  Hearing was rated as same  Patient feels that their emotional and mental health rating is same  Patients states they are never, rarely angry  Patient states they are sometimes unusually tired/fatigued  Pain experienced in the last 7 days has been none  Patient states that she has experienced no weight loss or gain in last 6 months  Depression Screening:   PHQ-2 Score: 0      Fall Risk Screening:    In the past year, patient has experienced: no history of falling in past year      Urinary Incontinence Screening:   Patient has not leaked urine accidently in the last six months  Home Safety:  Patient does not have trouble with stairs inside or outside of their home  Patient has working smoke alarms and has working carbon monoxide detector  Home safety hazards include: none  Nutrition:   Current diet is Other (please comment)  vegatarian    Medications:   Patient is currently taking over-the-counter supplements  OTC medications include: see medication list  Patient is able to manage medications  Activities of Daily Living (ADLs)/Instrumental Activities of Daily Living (IADLs):   Walk and transfer into and out of bed and chair?: Yes  Dress and groom yourself?: Yes    Bathe or shower yourself?: Yes    Feed yourself? Yes  Do your laundry/housekeeping?: Yes  Manage your money, pay your bills and track your expenses?: Yes  Make your own meals?: Yes    Do your own shopping?: Yes    Previous Hospitalizations:   Any hospitalizations or ED visits within the last 12 months?: No      Advance Care Planning:   Living will: Yes    Durable POA for healthcare:  Yes    Advanced directive: Yes    Advanced directive counseling given: Yes    Five wishes given: No    Patient declined ACP directive: No    End of Life Decisions reviewed with patient: Yes    Provider agrees with end of life decisions: Yes      Cognitive Screening:   Provider or family/friend/caregiver concerned regarding cognition?: No    PREVENTIVE SCREENINGS      Cardiovascular Screening:    General: Screening Current    Due for: Lipid Panel      Diabetes Screening:     General: Screening Current    Due for: Blood Glucose      Colorectal Cancer Screening:     General: Screening Current      Breast Cancer Screening:       Due for: Mammogram        Cervical Cancer Screening:    General: Screening Not Indicated      Osteoporosis Screening:    General: Screening Current      Abdominal Aortic Aneurysm (AAA) Screening:        General: Screening Not Indicated      Lung Cancer Screening:     General: Screening Not Indicated      Hepatitis C Screening:    General: Screening Current    Screening, Brief Intervention, and Referral to Treatment (SBIRT)    Screening  Typical number of drinks in a day: 0  Typical number of drinks in a week: 0  Interpretation: Low risk drinking behavior  Single Item Drug Screening:  How often have you used an illegal drug (including marijuana) or a prescription medication for non-medical reasons in the past year? never    Single Item Drug Screen Score: 0  Interpretation: Negative screen for possible drug use disorder    Review of Current Opioid Use    Opioid Risk Tool (ORT) Interpretation: Complete Opioid Risk Tool (ORT)    Other Counseling Topics:   Car/seat belt/driving safety, sunscreen and calcium and vitamin D intake and regular weightbearing exercise  A/P: Doing well and no falls reported  Denies depression and feels safe at home  Diverse diet  No problems operating a MV and uses seat belts  Has a living will and POA  No DME or referrals needed today  RTC one year for medicare wellness       Gera Sommer, DO

## 2021-10-18 ENCOUNTER — TELEPHONE (OUTPATIENT)
Dept: INTERNAL MEDICINE CLINIC | Facility: CLINIC | Age: 66
End: 2021-10-18

## 2021-11-23 ENCOUNTER — HOSPITAL ENCOUNTER (OUTPATIENT)
Dept: ULTRASOUND IMAGING | Facility: HOSPITAL | Age: 66
Discharge: HOME | End: 2021-11-23
Payer: MEDICARE

## 2021-11-23 ENCOUNTER — HOSPITAL ENCOUNTER (OUTPATIENT)
Dept: MAMMOGRAPHY | Facility: HOSPITAL | Age: 66
Discharge: HOME | End: 2021-11-23
Attending: INTERNAL MEDICINE
Payer: MEDICARE

## 2021-11-23 VITALS — WEIGHT: 103 LBS | BODY MASS INDEX: 18.95 KG/M2 | HEIGHT: 62 IN

## 2021-11-23 DIAGNOSIS — N63.0 BREAST LUMP IN FEMALE: ICD-10-CM

## 2021-11-23 PROCEDURE — G0279 TOMOSYNTHESIS, MAMMO: HCPCS

## 2021-11-23 PROCEDURE — 77066 DX MAMMO INCL CAD BI: CPT

## 2021-11-23 PROCEDURE — 76642 ULTRASOUND BREAST LIMITED: CPT

## 2022-01-27 ENCOUNTER — APPOINTMENT (OUTPATIENT)
Dept: LAB | Facility: CLINIC | Age: 67
End: 2022-01-27
Payer: MEDICARE

## 2022-01-27 DIAGNOSIS — F41.1 GAD (GENERALIZED ANXIETY DISORDER): ICD-10-CM

## 2022-01-27 DIAGNOSIS — I10 ESSENTIAL HYPERTENSION: ICD-10-CM

## 2022-01-27 DIAGNOSIS — N18.30 CHRONIC RENAL IMPAIRMENT, STAGE 3 (MODERATE), UNSPECIFIED WHETHER STAGE 3A OR 3B CKD (HCC): ICD-10-CM

## 2022-01-27 DIAGNOSIS — E78.9 BORDERLINE HIGH SERUM CHOLESTEROL: ICD-10-CM

## 2022-01-27 LAB
ALBUMIN SERPL BCP-MCNC: 3.9 G/DL (ref 3.5–5)
ALP SERPL-CCNC: 69 U/L (ref 46–116)
ALT SERPL W P-5'-P-CCNC: 19 U/L (ref 12–78)
ANION GAP SERPL CALCULATED.3IONS-SCNC: 3 MMOL/L (ref 4–13)
AST SERPL W P-5'-P-CCNC: 20 U/L (ref 5–45)
BASOPHILS # BLD AUTO: 0.05 THOUSANDS/ΜL (ref 0–0.1)
BASOPHILS NFR BLD AUTO: 1 % (ref 0–1)
BILIRUB SERPL-MCNC: 0.6 MG/DL (ref 0.2–1)
BUN SERPL-MCNC: 15 MG/DL (ref 5–25)
CALCIUM SERPL-MCNC: 9.7 MG/DL (ref 8.3–10.1)
CHLORIDE SERPL-SCNC: 98 MMOL/L (ref 100–108)
CHOLEST SERPL-MCNC: 252 MG/DL
CO2 SERPL-SCNC: 33 MMOL/L (ref 21–32)
CREAT SERPL-MCNC: 1.24 MG/DL (ref 0.6–1.3)
CREAT UR-MCNC: 126 MG/DL
EOSINOPHIL # BLD AUTO: 0.39 THOUSAND/ΜL (ref 0–0.61)
EOSINOPHIL NFR BLD AUTO: 5 % (ref 0–6)
ERYTHROCYTE [DISTWIDTH] IN BLOOD BY AUTOMATED COUNT: 12.2 % (ref 11.6–15.1)
GFR SERPL CREATININE-BSD FRML MDRD: 45 ML/MIN/1.73SQ M
GLUCOSE P FAST SERPL-MCNC: 89 MG/DL (ref 65–99)
HCT VFR BLD AUTO: 38.3 % (ref 34.8–46.1)
HDLC SERPL-MCNC: 96 MG/DL
HGB BLD-MCNC: 12.9 G/DL (ref 11.5–15.4)
IMM GRANULOCYTES # BLD AUTO: 0.03 THOUSAND/UL (ref 0–0.2)
IMM GRANULOCYTES NFR BLD AUTO: 0 % (ref 0–2)
LDLC SERPL CALC-MCNC: 145 MG/DL (ref 0–100)
LYMPHOCYTES # BLD AUTO: 3.36 THOUSANDS/ΜL (ref 0.6–4.47)
LYMPHOCYTES NFR BLD AUTO: 42 % (ref 14–44)
MCH RBC QN AUTO: 32.7 PG (ref 26.8–34.3)
MCHC RBC AUTO-ENTMCNC: 33.7 G/DL (ref 31.4–37.4)
MCV RBC AUTO: 97 FL (ref 82–98)
MICROALBUMIN UR-MCNC: 6.1 MG/L (ref 0–20)
MICROALBUMIN/CREAT 24H UR: 5 MG/G CREATININE (ref 0–30)
MONOCYTES # BLD AUTO: 0.83 THOUSAND/ΜL (ref 0.17–1.22)
MONOCYTES NFR BLD AUTO: 10 % (ref 4–12)
NEUTROPHILS # BLD AUTO: 3.31 THOUSANDS/ΜL (ref 1.85–7.62)
NEUTS SEG NFR BLD AUTO: 42 % (ref 43–75)
NRBC BLD AUTO-RTO: 0 /100 WBCS
PLATELET # BLD AUTO: 424 THOUSANDS/UL (ref 149–390)
PMV BLD AUTO: 9.2 FL (ref 8.9–12.7)
POTASSIUM SERPL-SCNC: 3.8 MMOL/L (ref 3.5–5.3)
PROT SERPL-MCNC: 7.4 G/DL (ref 6.4–8.2)
RBC # BLD AUTO: 3.95 MILLION/UL (ref 3.81–5.12)
SODIUM SERPL-SCNC: 134 MMOL/L (ref 136–145)
TRIGL SERPL-MCNC: 57 MG/DL
TSH SERPL DL<=0.05 MIU/L-ACNC: 1.42 UIU/ML (ref 0.36–3.74)
WBC # BLD AUTO: 7.97 THOUSAND/UL (ref 4.31–10.16)

## 2022-01-27 PROCEDURE — 85025 COMPLETE CBC W/AUTO DIFF WBC: CPT

## 2022-01-27 PROCEDURE — 80061 LIPID PANEL: CPT

## 2022-01-27 PROCEDURE — 82043 UR ALBUMIN QUANTITATIVE: CPT | Performed by: INTERNAL MEDICINE

## 2022-01-27 PROCEDURE — 82570 ASSAY OF URINE CREATININE: CPT | Performed by: INTERNAL MEDICINE

## 2022-01-27 PROCEDURE — 80053 COMPREHEN METABOLIC PANEL: CPT

## 2022-01-27 PROCEDURE — 84443 ASSAY THYROID STIM HORMONE: CPT

## 2022-01-27 PROCEDURE — 36415 COLL VENOUS BLD VENIPUNCTURE: CPT

## 2022-02-01 ENCOUNTER — OFFICE VISIT (OUTPATIENT)
Dept: INTERNAL MEDICINE CLINIC | Facility: CLINIC | Age: 67
End: 2022-02-01
Payer: MEDICARE

## 2022-02-01 VITALS
OXYGEN SATURATION: 99 % | BODY MASS INDEX: 19.06 KG/M2 | HEIGHT: 62 IN | TEMPERATURE: 98.6 F | WEIGHT: 103.6 LBS | HEART RATE: 79 BPM | DIASTOLIC BLOOD PRESSURE: 80 MMHG | SYSTOLIC BLOOD PRESSURE: 138 MMHG

## 2022-02-01 DIAGNOSIS — H11.151 PINGUECULA OF RIGHT EYE: ICD-10-CM

## 2022-02-01 DIAGNOSIS — N18.30 CHRONIC RENAL IMPAIRMENT, STAGE 3 (MODERATE), UNSPECIFIED WHETHER STAGE 3A OR 3B CKD (HCC): ICD-10-CM

## 2022-02-01 DIAGNOSIS — J44.9 ASTHMA-COPD OVERLAP SYNDROME (HCC): ICD-10-CM

## 2022-02-01 DIAGNOSIS — F41.1 GAD (GENERALIZED ANXIETY DISORDER): ICD-10-CM

## 2022-02-01 DIAGNOSIS — F32.0 MILD MAJOR DEPRESSION, SINGLE EPISODE (HCC): ICD-10-CM

## 2022-02-01 DIAGNOSIS — M16.12 OSTEOARTHRITIS OF LEFT HIP, UNSPECIFIED OSTEOARTHRITIS TYPE: ICD-10-CM

## 2022-02-01 DIAGNOSIS — J45.909 ASTHMA, UNSPECIFIED ASTHMA SEVERITY, UNSPECIFIED WHETHER COMPLICATED, UNSPECIFIED WHETHER PERSISTENT: ICD-10-CM

## 2022-02-01 DIAGNOSIS — M85.89 OSTEOPENIA OF MULTIPLE SITES: ICD-10-CM

## 2022-02-01 DIAGNOSIS — Z23 ENCOUNTER FOR VACCINATION: ICD-10-CM

## 2022-02-01 DIAGNOSIS — F11.20 UNCOMPLICATED OPIOID DEPENDENCE (HCC): ICD-10-CM

## 2022-02-01 DIAGNOSIS — G89.4 CHRONIC PAIN DISORDER: ICD-10-CM

## 2022-02-01 DIAGNOSIS — Z13.31 NEGATIVE DEPRESSION SCREENING: ICD-10-CM

## 2022-02-01 DIAGNOSIS — I10 ESSENTIAL HYPERTENSION: Primary | ICD-10-CM

## 2022-02-01 PROCEDURE — 99214 OFFICE O/P EST MOD 30 MIN: CPT | Performed by: INTERNAL MEDICINE

## 2022-02-01 RX ORDER — BUDESONIDE AND FORMOTEROL FUMARATE DIHYDRATE 160; 4.5 UG/1; UG/1
2 AEROSOL RESPIRATORY (INHALATION) 2 TIMES DAILY
Qty: 18 G | Refills: 5 | Status: SHIPPED | OUTPATIENT
Start: 2022-02-01

## 2022-02-01 RX ORDER — ALBUTEROL SULFATE 90 UG/1
2 AEROSOL, METERED RESPIRATORY (INHALATION) EVERY 6 HOURS PRN
Qty: 18 G | Refills: 5 | Status: SHIPPED | OUTPATIENT
Start: 2022-02-01 | End: 2022-04-22 | Stop reason: SDUPTHER

## 2022-02-01 NOTE — PATIENT INSTRUCTIONS
Chronic Hypertension   AMBULATORY CARE:   Hypertension  is high blood pressure  Your blood pressure is the force of your blood moving against the walls of your arteries  Hypertension causes your blood pressure to get so high that your heart has to work much harder than normal  This can damage your heart  Even if you have hypertension for years, lifestyle changes, medicines, or both can help bring your blood pressure to normal   Call your local emergency number (911 in the [address] [facility]) or have someone call if:   · You have chest pain  · You have any of the following signs of a heart attack:      ? Squeezing, pressure, or pain in your chest    ? You may  also have any of the following:     § Discomfort or pain in your back, neck, jaw, stomach, or arm    § Shortness of breath    § Nausea or vomiting    § Lightheadedness or a sudden cold sweat    · You become confused or have difficulty speaking  · You suddenly feel lightheaded or have trouble breathing  Seek care immediately if:   · You have a severe headache or vision loss  · You have weakness in an arm or leg  Call your doctor or cardiologist if:   · You feel faint, dizzy, confused, or drowsy  · You have been taking your blood pressure medicine but your pressure is higher than your provider says it should be  · You have questions or concerns about your condition or care  Treatment for chronic hypertension  may include medicine to lower your blood pressure and cholesterol levels  A low cholesterol level helps prevent heart disease and makes it easier to control your blood pressure  Heart disease can make your blood pressure harder to control  You may also need to make lifestyle changes  What you need to know about the stages of hypertension:       · Normal blood pressure is 119/79 or lower   Your healthcare provider may only check your blood pressure each year if it stays at a normal level  · Elevated blood pressure is 120/79 to 129/79    This is sometimes called prehypertension  Your healthcare provider may suggest lifestyle changes to help lower your blood pressure to a normal level  He or she may then check it again in 3 to 6 months  · Stage 1 hypertension is 130/80  to 139/89   Your provider may recommend lifestyle changes, medication, and checks every 3 to 6 months until your blood pressure is controlled  · Stage 2 hypertension is 140/90 or higher   Your provider will recommend lifestyle changes and have you take 2 kinds of hypertension medicines  You will also need to have your blood pressure checked monthly until it is controlled  Manage chronic hypertension:   · Check your blood pressure at home  Avoid smoking, caffeine, and exercise at least 30 minutes before checking your blood pressure  Sit and rest for 5 minutes before you take your blood pressure  Extend your arm and support it on a flat surface  Your arm should be at the same level as your heart  Follow the directions that came with your blood pressure monitor  Check your blood pressure 2 times, 1 minute apart, before you take your medicine in the morning  Also check your blood pressure before your evening meal  Keep a record of your readings and bring it to your follow-up visits  Ask your healthcare provider what your blood pressure should be  · Manage any other health conditions you have  Health conditions such as diabetes can increase your risk for hypertension  Follow your healthcare provider's instructions and take all your medicines as directed  Talk to your healthcare provider about any new health conditions you have recently developed  · Ask about all medicines  Certain medicines can increase your blood pressure  Examples include oral birth control pills, decongestants, herbal supplements, and NSAIDs, such as ibuprofen  Your healthcare provider can tell you which medicines are safe for you to take   This includes prescription and over-the-counter medicines  Lifestyle changes you can make to lower your blood pressure: Your provider may want you to make more lifestyle changes if you are having trouble controlling your blood pressure  This may feel difficult over time, especially if you think you are making good changes but your pressure is still high  It might help to focus on one new change at a time  For example, try to add 1 more day of exercise, or exercise for an extra 10 minutes on 2 days  Small changes can make a big difference  Your healthcare provider can also refer you to specialists such as a dietitian who can help you make small changes  Your family members may be included in helping you learn to create lifestyle changes, such as the following:     · Limit sodium (salt) as directed  Too much sodium can affect your fluid balance  Check labels to find low-sodium or no-salt-added foods  Some low-sodium foods use potassium salts for flavor  Too much potassium can also cause health problems  Your healthcare provider will tell you how much sodium and potassium are safe for you to have in a day  He or she may recommend that you limit sodium to 2,300 mg a day  · Follow the meal plan recommended by your healthcare provider  A dietitian or your provider can give you more information on low-sodium plans or the DASH (Dietary Approaches to Stop Hypertension) eating plan  The DASH plan is low in sodium, processed sugar, unhealthy fats, and total fat  It is high in potassium, calcium, and fiber  These can be found in vegetables, fruit, and whole-grain foods  Pinguecula   WHAT YOU NEED TO KNOW:   What is a pinguecula? A pinguecula is a yellow bump on the white part of your eye (sclera)  It may also cause dryness, redness, swelling, or pain in your eye  What increases my risk for a pinguecula? There is no known cause of a pinguecula   The following may increase your risk:  · Frequent exposure to sun, wind, or dust    · Dry eye disease    · Age older than 36    How is a pinguecula diagnosed and treated? Your healthcare provider will examine your eyes  Tell him about your symptoms  You may not need treatment  You may be given eyedrops to decrease dryness, redness, swelling, or pain  Rarely, surgery may be needed to remove the pinguecula  What can I do to manage my symptoms? · Wear sunglasses and a hat when you are outside  This will protect your eyes from sunlight, wind, and dust  It may also prevent your condition from getting worse  · Wear protective eye gear  Do this anytime you work with chemicals such as pest sprays or you clean tee areas  · Use artificial tears, gels, and lubricating ointments as directed  They are available without a doctor's order  These products can replace tears and help add moisture to your eyes  This may decrease dryness, redness, and pain  Ask your healthcare provider how often to use these products  · Do not smoke  Nicotine and other chemicals in cigarettes and cigars can make your symptoms worse  Ask your healthcare provider for information if you currently smoke and need help to quit  E-cigarettes or smokeless tobacco still contain nicotine  Talk to your healthcare provider before you use these products  When should I contact my healthcare provider? · Your pinguecula changes in size, shape, or color  · You have thick, yellow drainage from one or both eyes  · Your eyelids or the skin around your eyes is red and swollen  · Your symptoms do not improve with treatment  · You have changes in your vision  · You have questions or concerns about your condition or care  CARE AGREEMENT:   You have the right to help plan your care  Learn about your health condition and how it may be treated  Discuss treatment options with your healthcare providers to decide what care you want to receive  You always have the right to refuse treatment   The above information is an  only  It is not intended as medical advice for individual conditions or treatments  Talk to your doctor, nurse or pharmacist before following any medical regimen to see if it is safe and effective for you  © Copyright [employer] 2021 Information is for End User's use only and may not be sold, redistributed or otherwise used for commercial purposes  All illustrations and images included in CareNotes® are the copyrighted property of [employer]  or [employer]  Pterygium   WHAT YOU NEED TO KNOW:   What is pterygium? Pterygium is a growth over a section of your eye that slowly appears over time  It often grows on the inner edge of your eye between your pupil and your nose  It may also grow on the outer edge of your eye, between your pupil and the side of your face  Pterygium may affect one or both eyes  What increases my risk for pterygium? · Long-term exposure to ultraviolet (UV) light or sunlight, especially sunlight reflected off snow or water    · Long-term exposure to dry, windy, or tee conditions    What are the signs and symptoms of pterygium? · Yellow patch or bump on your eye    · Itchy, burning, or dry, gritty feeling in your eye    · Eye redness, swelling, or irritation    · Blurred or impaired vision    How is pterygium diagnosed? Your healthcare provider will examine your eye and ask you about your signs and symptoms  He will ask if you have been exposed to sunlight or dust over a long period of time without wearing eye protection  He may also use a microscope with a strong light to look inside your eye  How may pterygium be treated? You may not need treatment unless your condition bothers you or affects your vision  · Lubricating eye drops  help moisten and soothe your eyes if they are irritated  · Steroidal eye drops  help reduce swelling and irritation  · Surgery  may be performed to remove the growth from your eye   Ask your healthcare provider about surgery or other treatment you may need  What are the risks of pterygium? Surgery may cause swelling, irritation, or infection  Your eyesight may also not be as sharp as before  Your pterygium may grow back after surgery  Without treatment, your symptoms such as swelling or burning may become worse  A pterygium that grows too large can affect your vision  How can I help prevent pterygium? · Wear UV-protectant sunglasses or eyewear  to shield your eyes from dust, wind, and sunlight  This will also protect your eyes from bright reflections on water or snow  · Use lubricating eye drops  to help soothe and moisten your eyes  · Wear a wide-brimmed hat  to help shield your eyes from the sun  When should I contact my healthcare provider? · Your eyes are more irritated or watery than usual      · You have questions or concerns about your condition  When should I seek immediate care? · You have severe eye pain that does not go away  CARE AGREEMENT:   You have the right to help plan your care  Learn about your health condition and how it may be treated  Discuss treatment options with your healthcare providers to decide what care you want to receive  You always have the right to refuse treatment  The above information is an  only  It is not intended as medical advice for individual conditions or treatments  Talk to your doctor, nurse or pharmacist before following any medical regimen to see if it is safe and effective for you  © Copyright [employer] 2021 Information is for End User's use only and may not be sold, redistributed or otherwise used for commercial purposes  All illustrations and images included in CareNotes® are the copyrighted property of [employer]  or [facility] [employer]          · Be physically active throughout the day  Physical activity, such as exercise, can help control your blood pressure and your weight   Be physically active for at least 30 minutes per day, on most days of the week  Include aerobic activity, such as walking or riding a bicycle  Also include strength training at least 2 times each week  Your healthcare providers can help you create a physical activity plan  · Decrease stress  This may help lower your blood pressure  Learn ways to relax, such as deep breathing or listening to music  · Limit alcohol as directed  Alcohol can increase your blood pressure  A drink of alcohol is 12 ounces of beer, 5 ounces of wine, or 1½ ounces of liquor  · Do not smoke  Nicotine and other chemicals in cigarettes and cigars can increase your blood pressure and also cause lung damage  Ask your healthcare provider for information if you currently smoke and need help to quit  E-cigarettes or smokeless tobacco still contain nicotine  Talk to your healthcare provider before you use these products  Follow up with your doctor or cardiologist as directed: You will need to return to have your blood pressure checked and to have other lab tests done  Write down your questions so you remember to ask them during your visits  © Copyright [employer] 2021 Information is for End User's use only and may not be sold, redistributed or otherwise used for commercial purposes  All illustrations and images included in CareNotes® are the copyrighted property of [employer]  or [employer]   The above information is an  only  It is not intended as medical advice for individual conditions or treatments  Talk to your doctor, nurse or pharmacist before following any medical regimen to see if it is safe and effective for you

## 2022-02-01 NOTE — PROGRESS NOTES
Assessment/Plan:  Problem List Items Addressed This Visit        Respiratory    Asthma-COPD overlap syndrome ([facility] )       Cardiovascular and Mediastinum    Essential hypertension - Primary       Musculoskeletal and Integument    Osteopenia of multiple sites    Osteoarthritis of left hip       Genitourinary    Chronic renal insufficiency, stage III (moderate) (HCC)       Other    Uncomplicated opioid dependence (HCC)    Mild major depression, single episode ([facility])    ZACK (generalized anxiety disorder)    Chronic pain disorder      Other Visit Diagnoses     Encounter for vaccination        Negative depression screening        Pinguecula of right eye               Diagnoses and all orders for this visit:    Essential hypertension  -     Cancel: Comprehensive metabolic panel; Future  -     Cancel: LDL cholesterol, direct; Future  -     Cancel: Triglycerides; Future    Asthma-COPD overlap syndrome (HCC)    Osteoarthritis of left hip, unspecified osteoarthritis type    Osteopenia of multiple sites    Chronic renal impairment, stage 3 (moderate), unspecified whether stage 3a or 3b CKD (HCC)  -     Cancel: Comprehensive metabolic panel; Future  -     Cancel: LDL cholesterol, direct; Future  -     Cancel: Triglycerides; Future    Chronic pain disorder    ZACK (generalized anxiety disorder)  -     Cancel: TSH, 3rd generation with Free T4 reflex; Future    Mild major depression, single episode ([facility] )    Encounter for vaccination    Uncomplicated opioid dependence ([facility] )    Negative depression screening    Pinguecula of right eye        No problem-specific Assessment & Plan notes found for this encounter  A/P: Doing ok and labs are up to date  Discussed labs, especially the LDL  Recommended meds and pt requesting omega 3  Discussed that this will improve the TG, but in some cases, may increase the LDL  Recommend statin/zetia/niacin, but defers    Discussed BMI and encouraged to gain some wt   Discussed vaccines defers flu and pneumonia    Continue current treatment and RTC four months for routine  Keep f/u with her specialists  Subjective:      Patient ID: Edgardo Chambers is a 77 y o  female  WF RTC for f/u HTN, CKD, etc  Doing ok and no new issues  Remains as active as her DJD will allow and no falls  Chronic pain is manageable  ZACK/MDD is controlled off meds  RAD is good with limited rescue MDI use  Due for vaccines  Recent labs with stable CKD3a, but elevated LDL  The following portions of the patient's history were reviewed and updated as appropriate:   She has a past medical history of Aortic regurgitation, Arthritis, Essential hypertension (8/7/2019), ZACK (generalized anxiety disorder) (12/11/2018), Moderate persistent asthma without complication (96/63/1776), Pulmonary emphysema ([email or web address] ) (10/23/2018), and Stage 3 chronic kidney disease ([facility] ) (12/11/2019)  ,  does not have any pertinent problems on file  ,   has a past surgical history that includes Facial cosmetic surgery; Colonoscopy; Dental surgery; and Hip surgery (Left, 06/2019)  ,  family history includes Aneurysm in her mother; No Known Problems in her brother, father, maternal grandmother, paternal grandmother, sister, sister, and sister  ,   reports that she has never smoked  She has never used smokeless tobacco  She reports that she does not drink alcohol and does not use drugs  ,  is allergic to nsaids, amlodipine, iv contrast [iodinated diagnostic agents], lisinopril, vancomycin, celebrex [celecoxib], fosamax [alendronate], ibuprofen, and influenza vaccines     Current Outpatient Medications   Medication Sig Dispense Refill    albuterol (Ventolin HFA) 90 mcg/act inhaler Inhale 2 puffs every 6 (six) hours as needed for wheezing 18 g 5    baclofen 10 mg tablet take 1 tablet by mouth 2 TO 3 TIMES A DAY AS NEEDED      budesonide-formoterol (SYMBICORT) 160-4 5 mcg/act inhaler Inhale 2 puffs 2 (two) times a day Rinse mouth after use   18 g 5    chlorthalidone 25 mg tablet Take 25 mg by mouth daily      losartan (COZAAR) 25 mg tablet Take 25 mg by mouth daily      traMADol (ULTRAM) 50 mg tablet Take 1 tablet (50 mg total) by mouth every 6 (six) hours as needed for moderate pain 30 tablet 0     No current facility-administered medications for this visit  Review of Systems   Constitutional: Negative for activity change, chills, diaphoresis, fatigue and fever  HENT: Negative  Eyes: Negative for visual disturbance  Respiratory: Negative for cough, chest tightness, shortness of breath and wheezing  Cardiovascular: Negative for chest pain, palpitations and leg swelling  Gastrointestinal: Negative for abdominal pain, constipation, diarrhea, nausea and vomiting  Endocrine: Negative for cold intolerance and heat intolerance  Genitourinary: Negative for difficulty urinating, dysuria and frequency  Musculoskeletal: Positive for arthralgias  Negative for gait problem, joint swelling and myalgias  Neurological: Negative for dizziness, seizures, syncope, weakness, light-headedness and headaches  Psychiatric/Behavioral: Negative for confusion, dysphoric mood and sleep disturbance  The patient is not nervous/anxious  PHQ-2/9 Depression Screening    Little interest or pleasure in doing things: 0 - not at all  Feeling down, depressed, or hopeless: 0 - not at all  Trouble falling or staying asleep, or sleeping too much: 0 - not at all  Feeling tired or having little energy: 0 - not at all  Poor appetite or overeatin - not at all  Feeling bad about yourself - or that you are a failure or have let yourself or your family down: 0 - not at all  Trouble concentrating on things, such as reading the newspaper or watching television: 0 - not at all  Moving or speaking so slowly that other people could have noticed   Or the opposite - being so fidgety or restless that you have been moving around a lot more than usual: 0 - not at all  Thoughts that you would be better off dead, or of hurting yourself in some way: 0 - not at all  PHQ-9 Score: 0   PHQ-9 Interpretation: No or Minimal depression         Objective:  Vitals:    02/01/22 1102   BP: 138/80   Pulse: 79   Temp: 98 6 °F (37 °C)   TempSrc: Tympanic   SpO2: 99%   Weight: 47 kg (103 lb 9 6 oz)   Height: 5' 2" (1 575 m)     Body mass index is 18 95 kg/m²  Physical Exam  Vitals and nursing note reviewed  Constitutional:       General: She is not in acute distress  Appearance: She is not ill-appearing  Comments: underweight   HENT:      Head: Normocephalic and atraumatic  Mouth/Throat:      Mouth: Mucous membranes are moist    Eyes:      Extraocular Movements: Extraocular movements intact  Conjunctiva/sclera: Conjunctivae normal       Pupils: Pupils are equal, round, and reactive to light  Neck:      Vascular: No carotid bruit  Cardiovascular:      Rate and Rhythm: Normal rate and regular rhythm  Heart sounds: Normal heart sounds  Pulmonary:      Effort: Pulmonary effort is normal  No respiratory distress  Breath sounds: Normal breath sounds  No wheezing or rales  Abdominal:      General: Bowel sounds are normal  There is no distension  Palpations: Abdomen is soft  Tenderness: There is no abdominal tenderness  Musculoskeletal:      Cervical back: Neck supple  Right lower leg: No edema  Left lower leg: No edema  Neurological:      General: No focal deficit present  Mental Status: She is alert and oriented to person, place, and time  Mental status is at baseline  Psychiatric:         Mood and Affect: Mood normal          Behavior: Behavior normal          Thought Content:  Thought content normal          Judgment: Judgment normal

## 2022-02-15 ENCOUNTER — OFFICE VISIT (OUTPATIENT)
Dept: INTERNAL MEDICINE CLINIC | Facility: CLINIC | Age: 67
End: 2022-02-15
Payer: MEDICARE

## 2022-02-15 VITALS
WEIGHT: 102.4 LBS | DIASTOLIC BLOOD PRESSURE: 80 MMHG | BODY MASS INDEX: 18.84 KG/M2 | SYSTOLIC BLOOD PRESSURE: 134 MMHG | OXYGEN SATURATION: 99 % | HEIGHT: 62 IN | HEART RATE: 79 BPM | TEMPERATURE: 98.7 F

## 2022-02-15 DIAGNOSIS — M48.061 SPINAL STENOSIS OF LUMBAR REGION WITHOUT NEUROGENIC CLAUDICATION: ICD-10-CM

## 2022-02-15 DIAGNOSIS — G89.4 CHRONIC PAIN DISORDER: Primary | ICD-10-CM

## 2022-02-15 PROCEDURE — 99213 OFFICE O/P EST LOW 20 MIN: CPT | Performed by: INTERNAL MEDICINE

## 2022-02-15 RX ORDER — BACLOFEN 10 MG/1
TABLET ORAL
Qty: 270 TABLET | Refills: 0 | Status: SHIPPED | OUTPATIENT
Start: 2022-02-15

## 2022-02-15 NOTE — PATIENT INSTRUCTIONS
Baclofen (By mouth)   Baclofen (NELLY-joss-fen)  Treats muscle spasms  Brand Name(s): Ozobax   There may be other brand names for this medicine  When This Medicine Should Not Be Used: This medicine is not right for everyone  Do not use it if you had an allergic reaction to baclofen  How to Use This Medicine:   Liquid, Tablet  · Take your medicine as directed  Your dose may need to be changed several times to find what works best for you  · Oral liquid: Measure the oral liquid medicine with a marked measuring spoon, oral syringe, or medicine cup  · Missed dose: Take a dose as soon as you remember  If it is almost time for your next dose, wait until then and take a regular dose  Do not take extra medicine to make up for a missed dose  ·   ? Oral liquid: Store in the refrigerator  Do not freeze  ? Tablets: Store the medicine in a closed container at room temperature, away from heat, moisture, and direct light  Drugs and Foods to Avoid:   Ask your doctor or pharmacist before using any other medicine, including over-the-counter medicines, vitamins, and herbal products  · Do not drink alcohol while you are using this medicine  · Tell your doctor if you use anything else that makes you sleepy  Some examples are allergy medicine, narcotic pain medicine, and alcohol  Warnings While Using This Medicine:   · Tell your doctor if you are pregnant or breastfeeding, or if you have kidney disease, diabetes, epilepsy, mental illness, ovarian cyst, posture or balance problems, a recent stroke, or history of autonomic dysreflexia  · This medicine may make you dizzy or drowsy  Avoid driving, using machines, or doing anything else that could be dangerous if you are not alert  · Do not stop using this medicine suddenly  Your doctor will need to slowly decrease your dose before you stop it completely  · Your doctor will check your progress and the effects of this medicine at regular visits  Keep all appointments    · Keep all medicine out of the reach of children  Never share your medicine with anyone  Possible Side Effects While Using This Medicine:   Call your doctor right away if you notice any of these side effects:  · Allergic reaction: Itching or hives, swelling in your face or hands, swelling or tingling in your mouth or throat, chest tightness, trouble breathing  · Lightheadedness, dizziness, fainting  · Seizures  · Muscles weakness, trouble breathing, trouble seeing  · Increase in how much or how often you urinate  If you notice these less serious side effects, talk with your doctor:   · Confusion, headache, trouble sleeping  · Constipation, nausea  · Drowsiness, dizziness, or weakness  If you notice other side effects that you think are caused by this medicine, tell your doctor  Call your doctor for medical advice about side effects  You may report side effects to FDA at 7-577-FDA-7487    © Copyright [employer] [facility] 2021 Information is for End User's use only and may not be sold, redistributed or otherwise used for commercial purposes  The above information is an  only  It is not intended as medical advice for individual conditions or treatments  Talk to your doctor, nurse or pharmacist before following any medical regimen to see if it is safe and effective for you

## 2022-02-15 NOTE — PROGRESS NOTES
Assessment/Plan:  Problem List Items Addressed This Visit        Other    Spinal stenosis of lumbar region without neurogenic claudication    Relevant Medications    baclofen 10 mg tablet      Other Visit Diagnoses     Chronic pain disorder    -  Primary    Relevant Medications    baclofen 10 mg tablet           Diagnoses and all orders for this visit:    Chronic pain disorder  -     baclofen 10 mg tablet; One po tid PRN  Spinal stenosis of lumbar region without neurogenic claudication  -     baclofen 10 mg tablet; One po tid PRN  No problem-specific Assessment & Plan notes found for this encounter  A/P: Doing poorly w/o her meds  Will restart her baclofen  Continue current meds otherwise  RTC as scheduled  Subjective:      Patient ID: Valentina Hernandez is a 77 y o  female  WF with chronic pain due to several lumbar issues like HNP, stenosis, etc  Presents for refill on her baclofen  Pt reports seeing several specialists and having PT, injections, etc  Reports seeing pain management at [place] [id number]  and was on baclofen that was helping  Reportedly MD left and pt can't get her script  Told to see her PCP  TOlerating the med  Pain is a 10/10 w/o the med  Trouble walking  The following portions of the patient's history were reviewed and updated as appropriate:   She has a past medical history of Aortic regurgitation, Arthritis, Essential hypertension (8/7/2019), ZACK (generalized anxiety disorder) (12/11/2018), Moderate persistent asthma without complication (10/91/5320), Pulmonary emphysema ([facility] ) (10/23/2018), and Stage 3 chronic kidney disease ([facility] ) (12/11/2019)  ,  does not have any pertinent problems on file  ,   has a past surgical history that includes Facial cosmetic surgery; Colonoscopy; Dental surgery; and Hip surgery (Left, 06/2019)  ,  family history includes Aneurysm in her mother; No Known Problems in her brother, father, maternal grandmother, paternal grandmother, sister, sister, and sister  ,   reports that she has never smoked  She has never used smokeless tobacco  She reports that she does not drink alcohol and does not use drugs  ,  is allergic to nsaids, amlodipine, iv contrast [iodinated diagnostic agents], lisinopril, vancomycin, celebrex [celecoxib], fosamax [alendronate], ibuprofen, and influenza vaccines     Current Outpatient Medications   Medication Sig Dispense Refill    albuterol (Ventolin HFA) 90 mcg/act inhaler Inhale 2 puffs every 6 (six) hours as needed for wheezing 18 g 5    baclofen 10 mg tablet One po tid PRN  270 tablet 0    budesonide-formoterol (SYMBICORT) 160-4 5 mcg/act inhaler Inhale 2 puffs 2 (two) times a day Rinse mouth after use  18 g 5    chlorthalidone 25 mg tablet Take 25 mg by mouth daily      losartan (COZAAR) 25 mg tablet Take 25 mg by mouth daily      traMADol (ULTRAM) 50 mg tablet Take 1 tablet (50 mg total) by mouth every 6 (six) hours as needed for moderate pain 30 tablet 0     No current facility-administered medications for this visit  Review of Systems   Constitutional: Positive for activity change  Negative for chills, diaphoresis, fatigue and fever  Respiratory: Negative for cough, chest tightness, shortness of breath and wheezing  Cardiovascular: Negative for chest pain, palpitations and leg swelling  Gastrointestinal: Negative for abdominal pain, constipation, diarrhea, nausea and vomiting  Genitourinary: Negative for difficulty urinating, dysuria and frequency  Musculoskeletal: Positive for back pain and gait problem  Negative for arthralgias and myalgias  Neurological: Negative for weakness, light-headedness and headaches  Psychiatric/Behavioral: Negative for confusion  The patient is not nervous/anxious          PHQ-2/9 Depression Screening          Objective:  Vitals:    02/15/22 1356   BP: 134/80   Pulse: 79   Temp: 98 7 °F (37 1 °C)   TempSrc: Tympanic   SpO2: 99%   Weight: 46 4 kg (102 lb 6 4 oz)   Height: 5' 2" (1 575 m)     Body mass index is 18 73 kg/m²  Physical Exam  Vitals and nursing note reviewed  Constitutional:       General: She is not in acute distress  Appearance: She is not ill-appearing  HENT:      Head: Normocephalic and atraumatic  Mouth/Throat:      Mouth: Mucous membranes are moist    Eyes:      Extraocular Movements: Extraocular movements intact  Conjunctiva/sclera: Conjunctivae normal       Pupils: Pupils are equal, round, and reactive to light  Musculoskeletal:         General: Tenderness present  Comments: LS Spine w/o gross deformities, increase temp, erythema, swelling, or lesions  Tenderness midline L3-S1  ROM decreased 50% all planes  Spasms noted  LE strength 5/5 with tone/ROM WNL  DTR 2/4  Trouble walking on her toes and heels  Neurological:      General: No focal deficit present  Mental Status: She is alert and oriented to person, place, and time  Mental status is at baseline  Psychiatric:         Mood and Affect: Mood normal          Behavior: Behavior normal          Thought Content:  Thought content normal          Judgment: Judgment normal

## 2022-04-22 DIAGNOSIS — J45.909 ASTHMA, UNSPECIFIED ASTHMA SEVERITY, UNSPECIFIED WHETHER COMPLICATED, UNSPECIFIED WHETHER PERSISTENT: ICD-10-CM

## 2022-04-28 ENCOUNTER — APPOINTMENT (OUTPATIENT)
Dept: LAB | Facility: CLINIC | Age: 67
End: 2022-04-28
Payer: MEDICARE

## 2022-04-28 DIAGNOSIS — N18.31 CHRONIC KIDNEY DISEASE (CKD) STAGE G3A/A1, MODERATELY DECREASED GLOMERULAR FILTRATION RATE (GFR) BETWEEN 45-59 ML/MIN/1.73 SQUARE METER AND ALBUMINURIA CREATININE RATIO LESS THAN 30 MG/G (HCC): ICD-10-CM

## 2022-04-28 LAB
ALBUMIN SERPL BCP-MCNC: 3.7 G/DL (ref 3.5–5)
ANION GAP SERPL CALCULATED.3IONS-SCNC: 7 MMOL/L (ref 4–13)
BUN SERPL-MCNC: 13 MG/DL (ref 5–25)
CALCIUM SERPL-MCNC: 9.4 MG/DL (ref 8.3–10.1)
CHLORIDE SERPL-SCNC: 95 MMOL/L (ref 100–108)
CO2 SERPL-SCNC: 29 MMOL/L (ref 21–32)
CREAT SERPL-MCNC: 1.18 MG/DL (ref 0.6–1.3)
GFR SERPL CREATININE-BSD FRML MDRD: 48 ML/MIN/1.73SQ M
GLUCOSE SERPL-MCNC: 79 MG/DL (ref 65–140)
HCT VFR BLD AUTO: 34.4 % (ref 34.8–46.1)
HGB BLD-MCNC: 11.7 G/DL (ref 11.5–15.4)
PHOSPHATE SERPL-MCNC: 3.6 MG/DL (ref 2.3–4.1)
POTASSIUM SERPL-SCNC: 3.7 MMOL/L (ref 3.5–5.3)
PTH-INTACT SERPL-MCNC: 44.7 PG/ML (ref 18.4–80.1)
SODIUM SERPL-SCNC: 131 MMOL/L (ref 136–145)

## 2022-04-28 PROCEDURE — 85018 HEMOGLOBIN: CPT

## 2022-04-28 PROCEDURE — 83970 ASSAY OF PARATHORMONE: CPT

## 2022-04-28 PROCEDURE — 85014 HEMATOCRIT: CPT

## 2022-04-28 PROCEDURE — 80069 RENAL FUNCTION PANEL: CPT

## 2022-04-28 PROCEDURE — 36415 COLL VENOUS BLD VENIPUNCTURE: CPT

## 2022-05-26 NOTE — PROGRESS NOTES
Assessment/Plan:  Problem List Items Addressed This Visit        Other    Chronic nonintractable headache    Relevant Orders    Amylase    Comprehensive metabolic panel    Hemoglobin A1C    Occult Blood, Fecal Immunochemical    UA w Reflex to Microscopic w Reflex to Culture    TSH, 3rd generation with Free T4 reflex    Lipase    XR chest pa & lateral    XR femur 2 vw left      Other Visit Diagnoses     Weight loss    -  Primary    Relevant Medications    famotidine (PEPCID) 20 mg tablet    Other Relevant Orders    Amylase    Comprehensive metabolic panel    Hemoglobin A1C    Occult Blood, Fecal Immunochemical    UA w Reflex to Microscopic w Reflex to Culture    TSH, 3rd generation with Free T4 reflex    Lipase    XR chest pa & lateral    XR femur 2 vw left    ANNEMARIE Screen w/ Reflex to Titer/Pattern    RF Screen w/ Reflex to Titer    Lyme Antibody Profile with reflex to WB    Sore throat        Relevant Medications    famotidine (PEPCID) 20 mg tablet    Generalized abdominal pain        Relevant Medications    famotidine (PEPCID) 20 mg tablet    Other Relevant Orders    Amylase    Comprehensive metabolic panel    Hemoglobin A1C    Occult Blood, Fecal Immunochemical    UA w Reflex to Microscopic w Reflex to Culture    TSH, 3rd generation with Free T4 reflex    Lipase    XR chest pa & lateral    XR femur 2 vw left    Nausea        Relevant Orders    Amylase    Comprehensive metabolic panel    Hemoglobin A1C    Occult Blood, Fecal Immunochemical    UA w Reflex to Microscopic w Reflex to Culture    TSH, 3rd generation with Free T4 reflex    Lipase    XR chest pa & lateral    XR femur 2 vw left    Left thigh pain        Relevant Orders    Amylase    Comprehensive metabolic panel    Hemoglobin A1C    Occult Blood, Fecal Immunochemical    UA w Reflex to Microscopic w Reflex to Culture    TSH, 3rd generation with Free T4 reflex    Lipase    XR chest pa & lateral    XR femur 2 vw left    ANNEMARIE Screen w/ Reflex to Titer/Pattern RF Screen w/ Reflex to Titer    Lyme Antibody Profile with reflex to WB    Positive depression screening        Mild major depression, single episode ([facility] )        Anxiety with depression               Diagnoses and all orders for this visit:    Weight loss  -     Amylase; Future  -     Comprehensive metabolic panel; Future  -     Hemoglobin A1C; Future  -     Occult Blood, Fecal Immunochemical; Future  -     UA w Reflex to Microscopic w Reflex to Culture  -     TSH, 3rd generation with Free T4 reflex; Future  -     Lipase; Future  -     XR chest pa & lateral; Future  -     Cancel: XR abdomen obstruction series; Future  -     XR femur 2 vw left; Future  -     famotidine (PEPCID) 20 mg tablet; Take 1 tablet (20 mg total) by mouth 2 (two) times a day  -     ANNEMARIE Screen w/ Reflex to Titer/Pattern; Future  -     RF Screen w/ Reflex to Titer; Future  -     Lyme Antibody Profile with reflex to WB; Future    Sore throat  -     famotidine (PEPCID) 20 mg tablet; Take 1 tablet (20 mg total) by mouth 2 (two) times a day    Chronic nonintractable headache, unspecified headache type  -     Amylase; Future  -     Comprehensive metabolic panel; Future  -     Hemoglobin A1C; Future  -     Occult Blood, Fecal Immunochemical; Future  -     UA w Reflex to Microscopic w Reflex to Culture  -     TSH, 3rd generation with Free T4 reflex; Future  -     Lipase; Future  -     XR chest pa & lateral; Future  -     Cancel: XR abdomen obstruction series; Future  -     XR femur 2 vw left; Future    Generalized abdominal pain  -     Amylase; Future  -     Comprehensive metabolic panel; Future  -     Hemoglobin A1C; Future  -     Occult Blood, Fecal Immunochemical; Future  -     UA w Reflex to Microscopic w Reflex to Culture  -     TSH, 3rd generation with Free T4 reflex; Future  -     Lipase; Future  -     XR chest pa & lateral; Future  -     Cancel: XR abdomen obstruction series; Future  -     XR femur 2 vw left;  Future  -     famotidine (PEPCID) 20 mg tablet; Take 1 tablet (20 mg total) by mouth 2 (two) times a day    Nausea  -     Amylase; Future  -     Comprehensive metabolic panel; Future  -     Hemoglobin A1C; Future  -     Occult Blood, Fecal Immunochemical; Future  -     UA w Reflex to Microscopic w Reflex to Culture  -     TSH, 3rd generation with Free T4 reflex; Future  -     Lipase; Future  -     XR chest pa & lateral; Future  -     Cancel: XR abdomen obstruction series; Future  -     XR femur 2 vw left; Future    Left thigh pain  -     Amylase; Future  -     Comprehensive metabolic panel; Future  -     Hemoglobin A1C; Future  -     Occult Blood, Fecal Immunochemical; Future  -     UA w Reflex to Microscopic w Reflex to Culture  -     TSH, 3rd generation with Free T4 reflex; Future  -     Lipase; Future  -     XR chest pa & lateral; Future  -     Cancel: XR abdomen obstruction series; Future  -     XR femur 2 vw left; Future  -     ANNEMARIE Screen w/ Reflex to Titer/Pattern; Future  -     RF Screen w/ Reflex to Titer; Future  -     Lyme Antibody Profile with reflex to WB; Future    Positive depression screening    Mild major depression, single episode ([facility] )    Anxiety with depression        No problem-specific Assessment & Plan notes found for this encounter  A/P: Pt looks very thin and wt is down  PE unimpressive for any cause  ??pancreatitis, GB, gastritis, esophageal thrush, or cancer  ESR and CRP and CBC ok so some less likely  Denies high risk activity, but may need to be tested  Will start an empiric PPI  Obtain labs and imaging  Will check a FIT test, but may need an EGD, leon with the history of thrush, and a colonoscopy  Doubt that her depression is causing all her s/s, but may need treatment  RTC one week and may need an US /HIDA or CT  Subjective:      Patient ID: Ana Reyna is a 61 y o  female  WF presents with several c/o's  Pt s/p left hip surgery several months ago and reports complicated coarse   First, notes progressive wt loss with generalized abdominal pain and intermittent nausea w/o emesis  No fever or chills  No sweats  Reports having a good appetite  Not exercising  Appetite is off  No ETOH or NSAID use  No BRPBR, melena, hematochezia, etc  Sore throat, but no heartburn  NO yellowing of the eyes, skin, and no intense itching  NO new meds except for iron  NO dietary changes  Reports having thrush after sx  Denies high risk activities  Reports being up to date on mammo and paps  ?food causes s/s  No specific or types of food  No travel  Slight dry cough, but has asthma  No urinary s/s  Notes continue pain in the left thigh  Reports a h/o osteoporosis and prior "stress" fractures of the femur  Admits to some depression  Ortho just did a CBC, sed rate, and CRP, all of which were good except for slight increase in plt  The following portions of the patient's history were reviewed and updated as appropriate:   She has a past medical history of Arthritis, Essential hypertension (8/7/2019), ZACK (generalized anxiety disorder) (12/11/2018), Moderate persistent asthma without complication (08/77/9504), and Pulmonary emphysema ([facility] ) (10/23/2018)  ,  does not have any pertinent problems on file  ,   has a past surgical history that includes Facial cosmetic surgery; Colonoscopy; Dental surgery; and Hip surgery (Left, 06/2019)  ,  family history includes Aneurysm in her mother; No Known Problems in her brother  ,   reports that she has never smoked  She has never used smokeless tobacco  She reports that she does not drink alcohol or use drugs  ,  is allergic to nsaids; celebrex [celecoxib]; fosamax [alendronate]; ibuprofen; and influenza vaccines     Current Outpatient Medications   Medication Sig Dispense Refill    albuterol (VENTOLIN HFA) 90 mcg/act inhaler Inhale 2 puffs every 6 (six) hours as needed for wheezing 18 g 5    amLODIPine (NORVASC) 5 mg tablet take 1 tablet by mouth once daily 90 tablet 0    Calcium Carbonate-Vitamin D (OSCAL 500/200 D-3 PO) Take by mouth 3 (three) times a day        Cholecalciferol (VITAMIN D3) 68936 units TABS Take by mouth daily        cyanocobalamin (VITAMIN B-12) 1,000 mcg tablet Take by mouth daily      cyclobenzaprine (FLEXERIL) 10 mg tablet Take 1 tablet (10 mg total) by mouth 3 (three) times a day as needed for muscle spasms 90 tablet 1    lisinopril (ZESTRIL) 10 mg tablet Take 1 tablet (10 mg total) by mouth daily 90 tablet 3    mometasone-formoterol (DULERA) 100-5 MCG/ACT inhaler Inhale 2 puffs 2 (two) times a day Rinse mouth after use  1 Inhaler 5    nystatin (MYCOSTATIN) 500,000 units/5 mL suspension Apply 5 mL (500,000 Units total) to the mouth or throat 4 (four) times a day 473 mL 0    pyridoxine (VITAMIN B6) 100 mg tablet Take 100 mg by mouth daily      QUEtiapine (SEROquel XR) 50 mg Take 1 tablet (50 mg total) by mouth daily at bedtime 30 tablet 1    traMADol (ULTRAM) 50 mg tablet Take 1 tablet (50 mg total) by mouth every 6 (six) hours as needed for moderate pain 30 tablet 0    famotidine (PEPCID) 20 mg tablet Take 1 tablet (20 mg total) by mouth 2 (two) times a day 180 tablet 0     No current facility-administered medications for this visit  Review of Systems   Constitutional: Positive for activity change, appetite change, fatigue and unexpected weight change  Negative for chills, diaphoresis and fever  HENT: Negative  Respiratory: Positive for cough  Negative for chest tightness, shortness of breath and wheezing  Cardiovascular: Negative for chest pain, palpitations and leg swelling  Gastrointestinal: Positive for abdominal pain and nausea  Negative for abdominal distention, anal bleeding, blood in stool, constipation, diarrhea, rectal pain and vomiting  Genitourinary: Negative for difficulty urinating, dysuria, frequency, hematuria and vaginal discharge  Musculoskeletal: Positive for arthralgias and myalgias  Negative for gait problem     Neurological: Negative for light-headedness and headaches  Psychiatric/Behavioral: Positive for dysphoric mood  Negative for confusion and sleep disturbance  The patient is not nervous/anxious  PHQ-9 Depression Screening    PHQ-9:    Frequency of the following problems over the past two weeks:       Little interest or pleasure in doing things:  1 - several days  Feeling down, depressed, or hopeless:  1 - several days  PHQ-2 Score:  2        Objective:  Vitals:    10/24/19 1101   BP: 116/82   BP Location: Left arm   Patient Position: Sitting   Cuff Size: Large   Pulse: 84   Resp: 16   Temp: 98 °F (36 7 °C)   SpO2: 100%   Weight: 42 9 kg (94 lb 9 6 oz)   Height: 5' 2" (1 575 m)     Body mass index is 17 3 kg/m²  Physical Exam   Constitutional: She is oriented to person, place, and time  She appears well-developed  No distress  cachetic   HENT:   Head: Normocephalic and atraumatic  Right Ear: External ear normal    Left Ear: External ear normal    Mouth/Throat: Oropharynx is clear and moist    Eyes: Pupils are equal, round, and reactive to light  Conjunctivae and EOM are normal    Neck: Normal range of motion  Neck supple  No JVD present  No tracheal deviation present  No thyromegaly present  Cardiovascular: Normal rate, regular rhythm and normal heart sounds  Pulmonary/Chest: Effort normal and breath sounds normal  No respiratory distress  She has no wheezes  She has no rales  Abdominal: Soft  Bowel sounds are normal  She exhibits no distension and no mass  There is no tenderness  There is no rebound and no guarding  Musculoskeletal: She exhibits tenderness  She exhibits no edema or deformity  ? tenderness along the entire left thigh and with ROM  No lesions or deformities  Lymphadenopathy:     She has no cervical adenopathy  Neurological: She is alert and oriented to person, place, and time     Psychiatric: Her behavior is normal  Judgment and thought content normal    Very emotional    Nursing note and vitals reviewed  Depression Screening Follow-up Plan: Patient's depression screening was positive with a PHQ-2 score of 2  Their PHQ-9 score was   Patient assessed for underlying major depression  They have no active suicidal ideations  Brief counseling provided and recommend additional follow-up/re-evaluation next office visit  no

## 2022-06-07 ENCOUNTER — APPOINTMENT (OUTPATIENT)
Dept: LAB | Facility: CLINIC | Age: 67
End: 2022-06-07
Payer: MEDICARE

## 2022-06-07 DIAGNOSIS — E87.1 HYPOSMOLALITY SYNDROME: ICD-10-CM

## 2022-06-07 DIAGNOSIS — N18.31 CHRONIC KIDNEY DISEASE (CKD) STAGE G3A/A1, MODERATELY DECREASED GLOMERULAR FILTRATION RATE (GFR) BETWEEN 45-59 ML/MIN/1.73 SQUARE METER AND ALBUMINURIA CREATININE RATIO LESS THAN 30 MG/G (HCC): ICD-10-CM

## 2022-06-07 LAB
ANION GAP SERPL CALCULATED.3IONS-SCNC: 3 MMOL/L (ref 4–13)
BUN SERPL-MCNC: 11 MG/DL (ref 5–25)
CALCIUM SERPL-MCNC: 9.5 MG/DL (ref 8.3–10.1)
CHLORIDE SERPL-SCNC: 99 MMOL/L (ref 100–108)
CO2 SERPL-SCNC: 32 MMOL/L (ref 21–32)
CREAT SERPL-MCNC: 1.14 MG/DL (ref 0.6–1.3)
GFR SERPL CREATININE-BSD FRML MDRD: 50 ML/MIN/1.73SQ M
GLUCOSE SERPL-MCNC: 86 MG/DL (ref 65–140)
POTASSIUM SERPL-SCNC: 3.8 MMOL/L (ref 3.5–5.3)
SODIUM SERPL-SCNC: 134 MMOL/L (ref 136–145)

## 2022-06-07 PROCEDURE — 36415 COLL VENOUS BLD VENIPUNCTURE: CPT

## 2022-06-07 PROCEDURE — 80048 BASIC METABOLIC PNL TOTAL CA: CPT

## 2022-06-13 ENCOUNTER — OFFICE VISIT (OUTPATIENT)
Dept: INTERNAL MEDICINE CLINIC | Facility: CLINIC | Age: 67
End: 2022-06-13
Payer: MEDICARE

## 2022-06-13 VITALS
WEIGHT: 105 LBS | HEIGHT: 62 IN | TEMPERATURE: 97.9 F | DIASTOLIC BLOOD PRESSURE: 80 MMHG | OXYGEN SATURATION: 99 % | HEART RATE: 75 BPM | SYSTOLIC BLOOD PRESSURE: 128 MMHG | BODY MASS INDEX: 19.32 KG/M2

## 2022-06-13 DIAGNOSIS — F32.0 MILD MAJOR DEPRESSION, SINGLE EPISODE (HCC): ICD-10-CM

## 2022-06-13 DIAGNOSIS — F11.20 UNCOMPLICATED OPIOID DEPENDENCE (HCC): ICD-10-CM

## 2022-06-13 DIAGNOSIS — M85.89 OSTEOPENIA OF MULTIPLE SITES: ICD-10-CM

## 2022-06-13 DIAGNOSIS — F41.1 GAD (GENERALIZED ANXIETY DISORDER): ICD-10-CM

## 2022-06-13 DIAGNOSIS — Z13.1 SCREENING FOR DIABETES MELLITUS (DM): ICD-10-CM

## 2022-06-13 DIAGNOSIS — Z13.220 SCREENING FOR LIPID DISORDERS: ICD-10-CM

## 2022-06-13 DIAGNOSIS — I10 ESSENTIAL HYPERTENSION: Primary | ICD-10-CM

## 2022-06-13 DIAGNOSIS — R79.9 ABNORMAL FINDING OF BLOOD CHEMISTRY, UNSPECIFIED: ICD-10-CM

## 2022-06-13 DIAGNOSIS — N18.30 CHRONIC RENAL IMPAIRMENT, STAGE 3 (MODERATE), UNSPECIFIED WHETHER STAGE 3A OR 3B CKD (HCC): ICD-10-CM

## 2022-06-13 DIAGNOSIS — J44.9 ASTHMA-COPD OVERLAP SYNDROME (HCC): ICD-10-CM

## 2022-06-13 DIAGNOSIS — M16.12 OSTEOARTHRITIS OF LEFT HIP, UNSPECIFIED OSTEOARTHRITIS TYPE: ICD-10-CM

## 2022-06-13 DIAGNOSIS — E46 PROTEIN-CALORIE MALNUTRITION, UNSPECIFIED SEVERITY (HCC): ICD-10-CM

## 2022-06-13 PROCEDURE — 99214 OFFICE O/P EST MOD 30 MIN: CPT | Performed by: INTERNAL MEDICINE

## 2022-06-13 NOTE — PROGRESS NOTES
Assessment/Plan:  Problem List Items Addressed This Visit        Respiratory    Asthma-COPD overlap syndrome ([address] )       Cardiovascular and Mediastinum    Essential hypertension - Primary    Relevant Orders    Hemoglobin A1C       Musculoskeletal and Integument    Osteopenia of multiple sites    Osteoarthritis of left hip       Genitourinary    Chronic renal insufficiency, stage III (moderate) (HCC)       Other    Uncomplicated opioid dependence (HCC)    Protein-calorie malnutrition, unspecified severity (HCC)    Mild major depression, single episode (HCC)    ZACK (generalized anxiety disorder)      Other Visit Diagnoses     Screening for diabetes mellitus (DM)        Relevant Orders    Hemoglobin A1C    Screening for lipid disorders        Relevant Orders    LDL cholesterol, direct    Triglycerides    Abnormal finding of blood chemistry, unspecified         Relevant Orders    Hemoglobin A1C           Diagnoses and all orders for this visit:    Essential hypertension  -     Hemoglobin A1C; Future    Asthma-COPD overlap syndrome (HCC)    Osteopenia of multiple sites    Osteoarthritis of left hip, unspecified osteoarthritis type    Chronic renal impairment, stage 3 (moderate), unspecified whether stage 3a or 3b CKD (HCC)    Uncomplicated opioid dependence (HCC)    Mild major depression, single episode (HCC)    ZACK (generalized anxiety disorder)    Screening for diabetes mellitus (DM)  -     Hemoglobin A1C; Future    Screening for lipid disorders  -     LDL cholesterol, direct; Future  -     Triglycerides; Future    Protein-calorie malnutrition, unspecified severity (HCC)    Abnormal finding of blood chemistry, unspecified   -     Hemoglobin A1C; Future      No problem-specific Assessment & Plan notes found for this encounter  A/P: Doing well and will check labs  Continue current treatment and RTC four months for routine  Keep f/u with specialists  Subjective:      Patient ID: Matthew Castillo is a 77 y o  female   RTC for f/u HTN, RAD, etc  Doing ok and no new issues  Remains active as much as her DJD/DDD will allow and no falls  Chronic pain is manageable  ZACK/MDD are controlled  RAD is good with limited rescue MDI use  Due for labs  The following portions of the patient's history were reviewed and updated as appropriate:   She has a past medical history of Aortic regurgitation, Arthritis, Essential hypertension (8/7/2019), ZACK (generalized anxiety disorder) (12/11/2018), Moderate persistent asthma without complication (17/15/2341), Pulmonary emphysema ([facility] [address] ) (10/23/2018), and Stage 3 chronic kidney disease ([facility] ) (12/11/2019)  ,  does not have any pertinent problems on file  ,   has a past surgical history that includes Facial cosmetic surgery; Colonoscopy; Dental surgery; and Hip surgery (Left, 06/2019)  ,  family history includes Aneurysm in her mother; No Known Problems in her brother, father, maternal grandmother, paternal grandmother, sister, sister, and sister  ,   reports that she has never smoked  She has never used smokeless tobacco  She reports that she does not drink alcohol and does not use drugs  ,  is allergic to nsaids, amlodipine, iv contrast [iodinated diagnostic agents], lisinopril, vancomycin, celebrex [celecoxib], fosamax [alendronate], ibuprofen, and influenza vaccines     Current Outpatient Medications   Medication Sig Dispense Refill    baclofen 10 mg tablet One po tid PRN  270 tablet 0    budesonide-formoterol (SYMBICORT) 160-4 5 mcg/act inhaler Inhale 2 puffs 2 (two) times a day Rinse mouth after use   18 g 5    chlorthalidone 25 mg tablet Take 25 mg by mouth daily      losartan (COZAAR) 25 mg tablet Take 25 mg by mouth daily      traMADol (ULTRAM) 50 mg tablet Take 1 tablet (50 mg total) by mouth every 6 (six) hours as needed for moderate pain 30 tablet 0    Ventolin  (90 Base) MCG/ACT inhaler Inhale 2 puffs every 6 (six) hours as needed for wheezing 18 g 5     No current facility-administered medications for this visit  Review of Systems   Constitutional: Negative for activity change, chills, diaphoresis, fatigue and fever  HENT: Negative  Eyes: Negative for visual disturbance  Respiratory: Negative for cough, chest tightness, shortness of breath and wheezing  Cardiovascular: Negative for chest pain, palpitations and leg swelling  Gastrointestinal: Negative for abdominal pain, constipation, diarrhea, nausea and vomiting  Endocrine: Negative for cold intolerance and heat intolerance  Genitourinary: Negative for difficulty urinating, dysuria and frequency  Musculoskeletal: Negative for arthralgias, gait problem and myalgias  Neurological: Negative for dizziness, seizures, syncope, weakness, light-headedness and headaches  Psychiatric/Behavioral: Negative for confusion, dysphoric mood and sleep disturbance  The patient is not nervous/anxious  PHQ-2/9 Depression Screening          Objective:  Vitals:    06/13/22 1027   BP: 128/80   Pulse: 75   Temp: 97 9 °F (36 6 °C)   SpO2: 99%   Weight: 47 6 kg (105 lb)   Height: 5' 2" (1 575 m)     Body mass index is 19 2 kg/m²  Physical Exam  Vitals and nursing note reviewed  Constitutional:       General: She is not in acute distress  Appearance: Normal appearance  She is not ill-appearing  HENT:      Head: Normocephalic and atraumatic  Mouth/Throat:      Mouth: Mucous membranes are moist    Eyes:      Extraocular Movements: Extraocular movements intact  Conjunctiva/sclera: Conjunctivae normal       Pupils: Pupils are equal, round, and reactive to light  Neck:      Vascular: No carotid bruit  Cardiovascular:      Rate and Rhythm: Normal rate and regular rhythm  Heart sounds: Normal heart sounds  Pulmonary:      Effort: Pulmonary effort is normal  No respiratory distress  Breath sounds: Normal breath sounds  No wheezing or rales     Abdominal:      General: Bowel sounds are normal  There is no distension  Palpations: Abdomen is soft  Tenderness: There is no abdominal tenderness  Musculoskeletal:      Cervical back: Neck supple  Right lower leg: No edema  Left lower leg: No edema  Neurological:      General: No focal deficit present  Mental Status: She is alert and oriented to person, place, and time  Mental status is at baseline  Psychiatric:         Mood and Affect: Mood normal          Behavior: Behavior normal          Thought Content:  Thought content normal          Judgment: Judgment normal

## 2022-06-13 NOTE — PATIENT INSTRUCTIONS
Chronic Hypertension   AMBULATORY CARE:   Hypertension  is high blood pressure  Your blood pressure is the force of your blood moving against the walls of your arteries  Hypertension causes your blood pressure to get so high that your heart has to work much harder than normal  This can damage your heart  Even if you have hypertension for years, lifestyle changes, medicines, or both can help bring your blood pressure to normal   Call your local emergency number (911 in the [address] [facility] [facility]) or have someone call if:   You have chest pain  You have any of the following signs of a heart attack:      Squeezing, pressure, or pain in your chest    You may  also have any of the following:     Discomfort or pain in your back, neck, jaw, stomach, or arm    Shortness of breath    Nausea or vomiting    Lightheadedness or a sudden cold sweat    You become confused or have difficulty speaking  You suddenly feel lightheaded or have trouble breathing  Seek care immediately if:   You have a severe headache or vision loss  You have weakness in an arm or leg  Call your doctor or cardiologist if:   You feel faint, dizzy, confused, or drowsy  You have been taking your blood pressure medicine but your pressure is higher than your provider says it should be  You have questions or concerns about your condition or care  Treatment for chronic hypertension  may include medicine to lower your blood pressure and cholesterol levels  A low cholesterol level helps prevent heart disease and makes it easier to control your blood pressure  Heart disease can make your blood pressure harder to control  You may also need to make lifestyle changes  What you need to know about the stages of hypertension:       Normal blood pressure is 119/79 or lower   Your healthcare provider may only check your blood pressure each year if it stays at a normal level  Elevated blood pressure is 120/79 to 129/79   This is sometimes called prehypertension  Your healthcare provider may suggest lifestyle changes to help lower your blood pressure to a normal level  He or she may then check it again in 3 to 6 months  Stage 1 hypertension is 130/80  to 139/89   Your provider may recommend lifestyle changes, medication, and checks every 3 to 6 months until your blood pressure is controlled  Stage 2 hypertension is 140/90 or higher   Your provider will recommend lifestyle changes and have you take 2 kinds of hypertension medicines  You will also need to have your blood pressure checked monthly until it is controlled  Manage chronic hypertension:   Check your blood pressure at home  Avoid smoking, caffeine, and exercise at least 30 minutes before checking your blood pressure  Sit and rest for 5 minutes before you take your blood pressure  Extend your arm and support it on a flat surface  Your arm should be at the same level as your heart  Follow the directions that came with your blood pressure monitor  Check your blood pressure 2 times, 1 minute apart, before you take your medicine in the morning  Also check your blood pressure before your evening meal  Keep a record of your readings and bring it to your follow-up visits  Ask your healthcare provider what your blood pressure should be  Manage any other health conditions you have  Health conditions such as diabetes can increase your risk for hypertension  Follow your healthcare provider's instructions and take all your medicines as directed  Talk to your healthcare provider about any new health conditions you have recently developed  Ask about all medicines  Certain medicines can increase your blood pressure  Examples include oral birth control pills, decongestants, herbal supplements, and NSAIDs, such as ibuprofen  Your healthcare provider can tell you which medicines are safe for you to take  This includes prescription and over-the-counter medicines      Lifestyle changes you can make to lower your blood pressure: Your provider may want you to make more lifestyle changes if you are having trouble controlling your blood pressure  This may feel difficult over time, especially if you think you are making good changes but your pressure is still high  It might help to focus on one new change at a time  For example, try to add 1 more day of exercise, or exercise for an extra 10 minutes on 2 days  Small changes can make a big difference  Your healthcare provider can also refer you to specialists such as a dietitian who can help you make small changes  Your family members may be included in helping you learn to create lifestyle changes, such as the following:     Limit sodium (salt) as directed  Too much sodium can affect your fluid balance  Check labels to find low-sodium or no-salt-added foods  Some low-sodium foods use potassium salts for flavor  Too much potassium can also cause health problems  Your healthcare provider will tell you how much sodium and potassium are safe for you to have in a day  He or she may recommend that you limit sodium to 2,300 mg a day  Follow the meal plan recommended by your healthcare provider  A dietitian or your provider can give you more information on low-sodium plans or the DASH (Dietary Approaches to Stop Hypertension) eating plan  The DASH plan is low in sodium, processed sugar, unhealthy fats, and total fat  It is high in potassium, calcium, and fiber  These can be found in vegetables, fruit, and whole-grain foods  Be physically active throughout the day  Physical activity, such as exercise, can help control your blood pressure and your weight  Be physically active for at least 30 minutes per day, on most days of the week  Include aerobic activity, such as walking or riding a bicycle  Also include strength training at least 2 times each week  Your healthcare providers can help you create a physical activity plan  Decrease stress    This may help lower your blood pressure  Learn ways to relax, such as deep breathing or listening to music  Limit alcohol as directed  Alcohol can increase your blood pressure  A drink of alcohol is 12 ounces of beer, 5 ounces of wine, or 1½ ounces of liquor  Do not smoke  Nicotine and other chemicals in cigarettes and cigars can increase your blood pressure and also cause lung damage  Ask your healthcare provider for information if you currently smoke and need help to quit  E-cigarettes or smokeless tobacco still contain nicotine  Talk to your healthcare provider before you use these products  Follow up with your doctor or cardiologist as directed: You will need to return to have your blood pressure checked and to have other lab tests done  Write down your questions so you remember to ask them during your visits  © Copyright [employer]Â® 2022 Information is for End User's use only and may not be sold, redistributed or otherwise used for commercial purposes  All illustrations and images included in CareNotes® are the copyrighted property of A D A M , Inc  or [facility] [employer]   The above information is an  only  It is not intended as medical advice for individual conditions or treatments  Talk to your doctor, nurse or pharmacist before following any medical regimen to see if it is safe and effective for you

## 2022-07-11 ENCOUNTER — APPOINTMENT (OUTPATIENT)
Dept: LAB | Facility: CLINIC | Age: 67
End: 2022-07-11
Payer: MEDICARE

## 2022-07-11 DIAGNOSIS — N18.31 STAGE 3A CHRONIC KIDNEY DISEASE (CKD) (HCC): ICD-10-CM

## 2022-07-11 LAB
ALBUMIN SERPL BCP-MCNC: 3.7 G/DL (ref 3.5–5)
ANION GAP SERPL CALCULATED.3IONS-SCNC: 3 MMOL/L (ref 4–13)
BUN SERPL-MCNC: 21 MG/DL (ref 5–25)
CALCIUM SERPL-MCNC: 9.6 MG/DL (ref 8.3–10.1)
CHLORIDE SERPL-SCNC: 98 MMOL/L (ref 100–108)
CO2 SERPL-SCNC: 32 MMOL/L (ref 21–32)
CREAT SERPL-MCNC: 1.23 MG/DL (ref 0.6–1.3)
GFR SERPL CREATININE-BSD FRML MDRD: 45 ML/MIN/1.73SQ M
GLUCOSE SERPL-MCNC: 90 MG/DL (ref 65–140)
HCT VFR BLD AUTO: 35.6 % (ref 34.8–46.1)
HGB BLD-MCNC: 11.7 G/DL (ref 11.5–15.4)
PHOSPHATE SERPL-MCNC: 3.6 MG/DL (ref 2.3–4.1)
POTASSIUM SERPL-SCNC: 3.6 MMOL/L (ref 3.5–5.3)
PTH-INTACT SERPL-MCNC: 46.1 PG/ML (ref 18.4–80.1)
SODIUM SERPL-SCNC: 133 MMOL/L (ref 136–145)

## 2022-07-11 PROCEDURE — 85018 HEMOGLOBIN: CPT

## 2022-07-11 PROCEDURE — 80069 RENAL FUNCTION PANEL: CPT

## 2022-07-11 PROCEDURE — 36415 COLL VENOUS BLD VENIPUNCTURE: CPT

## 2022-07-11 PROCEDURE — 85014 HEMATOCRIT: CPT

## 2022-07-11 PROCEDURE — 83970 ASSAY OF PARATHORMONE: CPT

## 2022-08-11 ENCOUNTER — RA CDI HCC (OUTPATIENT)
Dept: OTHER | Facility: HOSPITAL | Age: 67
End: 2022-08-11

## 2022-08-11 ENCOUNTER — TELEPHONE (OUTPATIENT)
Dept: INTERNAL MEDICINE CLINIC | Facility: CLINIC | Age: 67
End: 2022-08-11

## 2022-08-11 DIAGNOSIS — R53.82 CHRONIC FATIGUE: Primary | ICD-10-CM

## 2022-08-11 DIAGNOSIS — R64 CACHEXIA (HCC): ICD-10-CM

## 2022-08-11 NOTE — PROGRESS NOTES
[address]  coding opportunities       Chart reviewed, no opportunity found: CHART REVIEWED, NO OPPORTUNITY FOUND        Patients Insurance     Medicare Insurance: Medicare

## 2022-08-11 NOTE — TELEPHONE ENCOUNTER
Pt called and stated that she has been feeling very fatigue for the past month and wondering if you can put an order in to have her iron checked  She has an appt on 8-

## 2022-08-12 ENCOUNTER — APPOINTMENT (OUTPATIENT)
Dept: LAB | Facility: CLINIC | Age: 67
End: 2022-08-12
Payer: MEDICARE

## 2022-08-12 DIAGNOSIS — R64 CACHEXIA (HCC): ICD-10-CM

## 2022-08-12 DIAGNOSIS — R53.82 CHRONIC FATIGUE: ICD-10-CM

## 2022-08-12 LAB
FERRITIN SERPL-MCNC: 76 NG/ML (ref 8–388)
IRON SATN MFR SERPL: 25 % (ref 15–50)
IRON SERPL-MCNC: 78 UG/DL (ref 50–170)
TIBC SERPL-MCNC: 310 UG/DL (ref 250–450)

## 2022-08-12 PROCEDURE — 82728 ASSAY OF FERRITIN: CPT

## 2022-08-12 PROCEDURE — 83540 ASSAY OF IRON: CPT

## 2022-08-12 PROCEDURE — 83550 IRON BINDING TEST: CPT

## 2022-08-12 PROCEDURE — 36415 COLL VENOUS BLD VENIPUNCTURE: CPT

## 2022-08-26 ENCOUNTER — APPOINTMENT (OUTPATIENT)
Dept: LAB | Facility: CLINIC | Age: 67
End: 2022-08-26
Payer: MEDICARE

## 2022-08-26 DIAGNOSIS — N18.31 CHRONIC KIDNEY DISEASE (CKD) STAGE G3A/A1, MODERATELY DECREASED GLOMERULAR FILTRATION RATE (GFR) BETWEEN 45-59 ML/MIN/1.73 SQUARE METER AND ALBUMINURIA CREATININE RATIO LESS THAN 30 MG/G (HCC): ICD-10-CM

## 2022-08-26 DIAGNOSIS — Z13.220 SCREENING FOR LIPID DISORDERS: ICD-10-CM

## 2022-08-26 DIAGNOSIS — R79.9 ABNORMAL FINDING OF BLOOD CHEMISTRY, UNSPECIFIED: ICD-10-CM

## 2022-08-26 DIAGNOSIS — I10 ESSENTIAL HYPERTENSION: ICD-10-CM

## 2022-08-26 DIAGNOSIS — E87.1 HYPOSMOLALITY SYNDROME: ICD-10-CM

## 2022-08-26 DIAGNOSIS — Z13.1 SCREENING FOR DIABETES MELLITUS (DM): ICD-10-CM

## 2022-08-26 LAB
ANION GAP SERPL CALCULATED.3IONS-SCNC: 7 MMOL/L (ref 4–13)
BUN SERPL-MCNC: 17 MG/DL (ref 5–25)
CALCIUM SERPL-MCNC: 9.4 MG/DL (ref 8.3–10.1)
CHLORIDE SERPL-SCNC: 94 MMOL/L (ref 96–108)
CO2 SERPL-SCNC: 30 MMOL/L (ref 21–32)
CREAT SERPL-MCNC: 1.25 MG/DL (ref 0.6–1.3)
GFR SERPL CREATININE-BSD FRML MDRD: 44 ML/MIN/1.73SQ M
GLUCOSE SERPL-MCNC: 71 MG/DL (ref 65–140)
POTASSIUM SERPL-SCNC: 3.2 MMOL/L (ref 3.5–5.3)
SODIUM SERPL-SCNC: 131 MMOL/L (ref 135–147)

## 2022-08-26 PROCEDURE — 80048 BASIC METABOLIC PNL TOTAL CA: CPT

## 2022-08-26 PROCEDURE — 36415 COLL VENOUS BLD VENIPUNCTURE: CPT

## 2022-08-29 ENCOUNTER — EPISODE CHANGES (OUTPATIENT)
Dept: CASE MANAGEMENT | Facility: OTHER | Age: 67
End: 2022-08-29

## 2022-08-31 ENCOUNTER — PATIENT OUTREACH (OUTPATIENT)
Dept: INTERNAL MEDICINE CLINIC | Facility: CLINIC | Age: 67
End: 2022-08-31

## 2022-08-31 NOTE — PROGRESS NOTES
Chart review completed for the following sections:   Allergies/Contradictions   Medication Review    History    SDOH    Problem List   Past hospitalizations and major procedures, including surgery   Significant past illnesses and treatment history including: History and Physical and Other provider notes   Relevant past medications related to the patient's condition    BETINA SANDOVAL reviewed chart  Outreach scheduled for 9/7/22

## 2022-09-07 ENCOUNTER — PATIENT OUTREACH (OUTPATIENT)
Dept: INTERNAL MEDICINE CLINIC | Facility: CLINIC | Age: 67
End: 2022-09-07

## 2022-09-07 NOTE — PROGRESS NOTES
BETINA SANDOVAL outreached patient to introduce self and offer information about [address]  Patient did not answer  BETINA SANDOVAL left message asking patient to return call  Next outreach scheduled for 9/14

## 2022-09-14 ENCOUNTER — PATIENT OUTREACH (OUTPATIENT)
Dept: INTERNAL MEDICINE CLINIC | Facility: CLINIC | Age: 67
End: 2022-09-14

## 2022-09-14 NOTE — PROGRESS NOTES
BETINA SANDOVAL made second outreach attempt to patient to introduce self and offer information on [address]  Patient did not answer  BETINA SANDOVAL left message asking patient to return call  Unable to reach letter will be sent  BETINA SANDOVAL will close case in two weeks if patient does not respond to outreach attempts

## 2022-09-14 NOTE — LETTER
[facility] [address]    Re: Charlynn Mcburney to Reach   9/14/2022       Dear Francisco Otero am a [address]  and wanted to be certain you had information to contact me should you desire assistance with or have questions about non-medical aspects of your care such as [but not limited to] medical insurance, housing, transportation, material needs, or emergency needs, as I have been unable to reach you  If I do not have an answer I will assist you in finding the appropriate agency or individual who can help  Please feel free to contact me at [phone]  Thank You      Sincerely,         [facility]

## 2022-09-16 ENCOUNTER — TELEPHONE (OUTPATIENT)
Dept: OBGYN CLINIC | Facility: OTHER | Age: 67
End: 2022-09-16

## 2022-09-16 ENCOUNTER — TELEPHONE (OUTPATIENT)
Dept: OBGYN CLINIC | Facility: HOSPITAL | Age: 67
End: 2022-09-16

## 2022-09-16 ENCOUNTER — APPOINTMENT (OUTPATIENT)
Dept: LAB | Facility: CLINIC | Age: 67
End: 2022-09-16
Payer: MEDICARE

## 2022-09-16 DIAGNOSIS — N18.31 STAGE 3A CHRONIC KIDNEY DISEASE (CKD) (HCC): ICD-10-CM

## 2022-09-16 DIAGNOSIS — E87.1 HYPONATREMIA: ICD-10-CM

## 2022-09-16 LAB
ANION GAP SERPL CALCULATED.3IONS-SCNC: 7 MMOL/L (ref 4–13)
BUN SERPL-MCNC: 15 MG/DL (ref 5–25)
CALCIUM SERPL-MCNC: 9.4 MG/DL (ref 8.3–10.1)
CHLORIDE SERPL-SCNC: 96 MMOL/L (ref 96–108)
CO2 SERPL-SCNC: 30 MMOL/L (ref 21–32)
CREAT SERPL-MCNC: 1.23 MG/DL (ref 0.6–1.3)
GFR SERPL CREATININE-BSD FRML MDRD: 45 ML/MIN/1.73SQ M
GLUCOSE SERPL-MCNC: 76 MG/DL (ref 65–140)
POTASSIUM SERPL-SCNC: 3.2 MMOL/L (ref 3.5–5.3)
SODIUM SERPL-SCNC: 133 MMOL/L (ref 135–147)

## 2022-09-16 PROCEDURE — 80048 BASIC METABOLIC PNL TOTAL CA: CPT

## 2022-09-16 PROCEDURE — 36415 COLL VENOUS BLD VENIPUNCTURE: CPT

## 2022-09-16 NOTE — TELEPHONE ENCOUNTER
Returned patient's call to schedule appointment with Dr Hebert Mt  Left message for patient requesting for patient to return my call to work on scheduling  Provided patient with my direct extension

## 2022-09-16 NOTE — TELEPHONE ENCOUNTER
Hello,  Please advise if the following patient can be forced onto the schedule:    Patient: Tristan Nettles     : 1955     MRN: [id number]     Call back #: [phone]     Insurance: Medicare    Reason for appointment: Right Shoulder sub coracoid impingement// has MRI // Scheduled next available and she took but requesting sooner but states this type of injury needs immediate attention      Requested doctor/location:  Only wants Dr Manning @ [facility]       Thank you      Email sent to [facility]

## 2022-09-27 ENCOUNTER — OFFICE VISIT (OUTPATIENT)
Dept: OBGYN CLINIC | Facility: OTHER | Age: 67
End: 2022-09-27
Payer: MEDICARE

## 2022-09-27 VITALS — BODY MASS INDEX: 19.32 KG/M2 | WEIGHT: 105 LBS | HEIGHT: 62 IN

## 2022-09-27 DIAGNOSIS — S46.811A FULL THICKNESS TEAR OF RIGHT SUBSCAPULARIS TENDON, INITIAL ENCOUNTER: ICD-10-CM

## 2022-09-27 DIAGNOSIS — M75.101 TEAR OF RIGHT SUPRASPINATUS TENDON: ICD-10-CM

## 2022-09-27 DIAGNOSIS — M25.511 ACUTE PAIN OF RIGHT SHOULDER: Primary | ICD-10-CM

## 2022-09-27 PROCEDURE — 99204 OFFICE O/P NEW MOD 45 MIN: CPT | Performed by: ORTHOPAEDIC SURGERY

## 2022-09-27 NOTE — PROGRESS NOTES
Assessment  Diagnoses and all orders for this visit:    Tear of right supraspinatus tendon    Full thickness tear of right subscapularis tendon, initial encounter        Discussion and Plan:    Discussed with the patient the MRI shows a full thickness retracted subscapularis tear which which demonstrate atrophy and in my opinion is not repairable  Explained that this has actually been present for some time and she likely flared the shoulder recently  There is a small partial-thickness supraspinatus tendon tear for which I feel nonoperative care certainly indicated and the form of physical therapy for the patient is our treatment evaluated for and is scheduled to continue  I have discussed with the patient the pathophysiology of this diagnosis and reviewed how the examination correlates with this diagnosis  Treatment options were discussed at length and after discussing these treatment options, the patient was provided with a referral to physical therapy and follow up PRN  Subjective:   Patient ID: Israel Cardenas is a 77 y o  female      HPI  The patient presents with a chief complaint of right shoulder pain  The pain began 1 month(s) ago and is associated with an acute injury  Patient reports she injured the shoulder doing flys in the gym 8/01/22  The patient describes the pain as aching, dull and sharp in intensity,  intermittent in timing, and localizes the pain to the  right globally  The pain is worse with movement and relieved by rest   The pain is not associated with numbness and tingling  The pain is not associated with constitutional symptoms  The patient is not awoken at night by the pain      The patient has had 1 physical therapy visit       The following portions of the patient's history were reviewed and updated as appropriate: allergies, current medications, past family history, past medical history, past social history, past surgical history and problem list       Review of Systems Constitutional: Negative for chills and fever  HENT: Negative for drooling and sneezing  Eyes: Negative for redness  Respiratory: Negative for cough and wheezing  Gastrointestinal: Negative for nausea and vomiting  Musculoskeletal:        Please see ortho exam   Psychiatric/Behavioral: Negative for behavioral problems  The patient is not nervous/anxious  Objective:  Ht 5' 2" (1 575 m)   Wt 47 6 kg (105 lb)   LMP  (LMP Unknown)   BMI 19 20 kg/m²       Right Shoulder Exam     Range of Motion   External rotation: 80   Forward flexion: 170   Internal rotation 0 degrees: Sacrum     Muscle Strength   External rotation: 4/5   Supraspinatus: 3/5   Subscapularis: 3/5     Tests   Mao test: positive  Impingement: positive    Other   Erythema: absent  Sensation: normal  Pulse: present            Physical Exam  Vitals reviewed  Constitutional:       Appearance: She is well-developed  Eyes:      Pupils: Pupils are equal, round, and reactive to light  Pulmonary:      Effort: Pulmonary effort is normal       Breath sounds: Normal breath sounds  Abdominal:      General: Abdomen is flat  There is no distension  Skin:     General: Skin is warm and dry  Neurological:      Mental Status: She is alert and oriented to person, place, and time  I have personally reviewed pertinent films in PACS and my interpretation is as follows  Right shoulder MRI demonstrates full thickness subscapularis tear with retraction medial to the glenoid with moderate atrophy, highgrade partial supraspinatus tear no retraction or atrophy         Scribe Attestation    I,:  Siomara Wilks am acting as a scribe while in the presence of the attending physician :       I,:  Sal Martinez MD personally performed the services described in this documentation    as scribed in my presence :

## 2022-09-29 ENCOUNTER — PATIENT OUTREACH (OUTPATIENT)
Dept: INTERNAL MEDICINE CLINIC | Facility: CLINIC | Age: 67
End: 2022-09-29

## 2022-10-06 ENCOUNTER — RA CDI HCC (OUTPATIENT)
Dept: OTHER | Facility: HOSPITAL | Age: 67
End: 2022-10-06

## 2022-10-10 ENCOUNTER — APPOINTMENT (OUTPATIENT)
Dept: LAB | Facility: CLINIC | Age: 67
End: 2022-10-10
Payer: MEDICARE

## 2022-10-10 LAB
LDLC SERPL DIRECT ASSAY-MCNC: 110 MG/DL (ref 0–100)
TRIGL SERPL-MCNC: 79 MG/DL

## 2022-10-10 PROCEDURE — 84478 ASSAY OF TRIGLYCERIDES: CPT

## 2022-10-10 PROCEDURE — 36415 COLL VENOUS BLD VENIPUNCTURE: CPT

## 2022-10-10 PROCEDURE — 83721 ASSAY OF BLOOD LIPOPROTEIN: CPT

## 2022-10-10 PROCEDURE — 83036 HEMOGLOBIN GLYCOSYLATED A1C: CPT

## 2022-10-11 LAB
EST. AVERAGE GLUCOSE BLD GHB EST-MCNC: 117 MG/DL
HBA1C MFR BLD: 5.7 %

## 2022-10-12 RX ORDER — POTASSIUM CHLORIDE 750 MG/1
20 TABLET, EXTENDED RELEASE ORAL DAILY
COMMUNITY
Start: 2022-09-19

## 2022-10-13 ENCOUNTER — LAB (OUTPATIENT)
Dept: LAB | Facility: CLINIC | Age: 67
End: 2022-10-13
Payer: MEDICARE

## 2022-10-13 ENCOUNTER — OFFICE VISIT (OUTPATIENT)
Dept: INTERNAL MEDICINE CLINIC | Facility: CLINIC | Age: 67
End: 2022-10-13
Payer: MEDICARE

## 2022-10-13 ENCOUNTER — PATIENT OUTREACH (OUTPATIENT)
Dept: INTERNAL MEDICINE CLINIC | Facility: CLINIC | Age: 67
End: 2022-10-13

## 2022-10-13 VITALS
DIASTOLIC BLOOD PRESSURE: 70 MMHG | TEMPERATURE: 98 F | OXYGEN SATURATION: 96 % | HEART RATE: 79 BPM | BODY MASS INDEX: 18.77 KG/M2 | SYSTOLIC BLOOD PRESSURE: 130 MMHG | WEIGHT: 102 LBS | HEIGHT: 62 IN

## 2022-10-13 DIAGNOSIS — F32.0 MILD MAJOR DEPRESSION, SINGLE EPISODE (HCC): ICD-10-CM

## 2022-10-13 DIAGNOSIS — M67.911 ROTATOR CUFF DISORDER, RIGHT: ICD-10-CM

## 2022-10-13 DIAGNOSIS — Z59.82 INABILITY TO ACQUIRE TRANSPORTATION: ICD-10-CM

## 2022-10-13 DIAGNOSIS — E87.1 HYPONATREMIA: ICD-10-CM

## 2022-10-13 DIAGNOSIS — J44.9 ASTHMA-COPD OVERLAP SYNDROME (HCC): ICD-10-CM

## 2022-10-13 DIAGNOSIS — F41.1 GAD (GENERALIZED ANXIETY DISORDER): ICD-10-CM

## 2022-10-13 DIAGNOSIS — M15.9 PRIMARY OSTEOARTHRITIS INVOLVING MULTIPLE JOINTS: ICD-10-CM

## 2022-10-13 DIAGNOSIS — I10 ESSENTIAL HYPERTENSION: ICD-10-CM

## 2022-10-13 DIAGNOSIS — R73.03 PRE-DIABETES: ICD-10-CM

## 2022-10-13 DIAGNOSIS — N18.30 CHRONIC RENAL IMPAIRMENT, STAGE 3 (MODERATE), UNSPECIFIED WHETHER STAGE 3A OR 3B CKD (HCC): ICD-10-CM

## 2022-10-13 DIAGNOSIS — Z23 ENCOUNTER FOR VACCINATION: ICD-10-CM

## 2022-10-13 DIAGNOSIS — M48.061 SPINAL STENOSIS OF LUMBAR REGION WITHOUT NEUROGENIC CLAUDICATION: ICD-10-CM

## 2022-10-13 DIAGNOSIS — E87.6 HYPOKALEMIA: ICD-10-CM

## 2022-10-13 DIAGNOSIS — Z78.0 POSTMENOPAUSAL STATUS (AGE-RELATED) (NATURAL): Primary | ICD-10-CM

## 2022-10-13 DIAGNOSIS — E28.39 MENOPAUSE OVARIAN FAILURE: ICD-10-CM

## 2022-10-13 DIAGNOSIS — E78.9 BORDERLINE HIGH SERUM CHOLESTEROL: ICD-10-CM

## 2022-10-13 DIAGNOSIS — M85.89 OSTEOPENIA OF MULTIPLE SITES: ICD-10-CM

## 2022-10-13 DIAGNOSIS — M75.101 TEAR OF RIGHT SUPRASPINATUS TENDON: ICD-10-CM

## 2022-10-13 DIAGNOSIS — Z59.9 FINANCIAL DIFFICULTIES: ICD-10-CM

## 2022-10-13 PROBLEM — M15.0 PRIMARY OSTEOARTHRITIS INVOLVING MULTIPLE JOINTS: Status: ACTIVE | Noted: 2022-10-13

## 2022-10-13 LAB
ALBUMIN SERPL BCP-MCNC: 3.7 G/DL (ref 3.5–5)
ALP SERPL-CCNC: 53 U/L (ref 46–116)
ALT SERPL W P-5'-P-CCNC: 18 U/L (ref 12–78)
ANION GAP SERPL CALCULATED.3IONS-SCNC: 3 MMOL/L (ref 4–13)
AST SERPL W P-5'-P-CCNC: 19 U/L (ref 5–45)
BILIRUB SERPL-MCNC: 0.38 MG/DL (ref 0.2–1)
BUN SERPL-MCNC: 15 MG/DL (ref 5–25)
CALCIUM SERPL-MCNC: 9.7 MG/DL (ref 8.3–10.1)
CHLORIDE SERPL-SCNC: 100 MMOL/L (ref 96–108)
CO2 SERPL-SCNC: 31 MMOL/L (ref 21–32)
CREAT SERPL-MCNC: 1.27 MG/DL (ref 0.6–1.3)
CREAT UR-MCNC: 72.9 MG/DL
GFR SERPL CREATININE-BSD FRML MDRD: 44 ML/MIN/1.73SQ M
GLUCOSE P FAST SERPL-MCNC: 81 MG/DL (ref 65–99)
MICROALBUMIN UR-MCNC: 5.8 MG/L (ref 0–20)
MICROALBUMIN/CREAT 24H UR: 8 MG/G CREATININE (ref 0–30)
POTASSIUM SERPL-SCNC: 3.2 MMOL/L (ref 3.5–5.3)
PROT SERPL-MCNC: 6.8 G/DL (ref 6.4–8.4)
SODIUM SERPL-SCNC: 134 MMOL/L (ref 135–147)
TSH SERPL DL<=0.05 MIU/L-ACNC: 0.98 UIU/ML (ref 0.45–4.5)

## 2022-10-13 PROCEDURE — 99214 OFFICE O/P EST MOD 30 MIN: CPT | Performed by: INTERNAL MEDICINE

## 2022-10-13 PROCEDURE — 82043 UR ALBUMIN QUANTITATIVE: CPT | Performed by: INTERNAL MEDICINE

## 2022-10-13 PROCEDURE — G0438 PPPS, INITIAL VISIT: HCPCS | Performed by: INTERNAL MEDICINE

## 2022-10-13 PROCEDURE — 80053 COMPREHEN METABOLIC PANEL: CPT

## 2022-10-13 PROCEDURE — 82570 ASSAY OF URINE CREATININE: CPT | Performed by: INTERNAL MEDICINE

## 2022-10-13 PROCEDURE — 84443 ASSAY THYROID STIM HORMONE: CPT

## 2022-10-13 PROCEDURE — 36415 COLL VENOUS BLD VENIPUNCTURE: CPT

## 2022-10-13 SDOH — ECONOMIC STABILITY - TRANSPORTATION SECURITY: TRANSPORTATION INSECURITY: Z59.82

## 2022-10-13 SDOH — ECONOMIC STABILITY - INCOME SECURITY: PROBLEM RELATED TO HOUSING AND ECONOMIC CIRCUMSTANCES, UNSPECIFIED: Z59.9

## 2022-10-13 NOTE — PROGRESS NOTES
Assessment/Plan:  Problem List Items Addressed This Visit        Respiratory    Asthma-COPD overlap syndrome ([facility] [address] )       Cardiovascular and Mediastinum    Essential hypertension    Relevant Orders    TSH, 3rd generation with Free T4 reflex       Musculoskeletal and Integument    Tear of right supraspinatus tendon    Primary osteoarthritis involving multiple joints    Osteopenia of multiple sites    Relevant Orders    DXA bone density spine hip and pelvis       Genitourinary    Chronic renal insufficiency, stage III (moderate) ([facility])    Relevant Orders    Comprehensive metabolic panel    TSH, 3rd generation with Free T4 reflex       Other    Spinal stenosis of lumbar region without neurogenic claudication    Pre-diabetes    Mild major depression, single episode ([facility])    Relevant Medications    sertraline (ZOLOFT) 50 mg tablet    ZACK (generalized anxiety disorder)    Relevant Medications    sertraline (ZOLOFT) 50 mg tablet    Other Relevant Orders    TSH, 3rd generation with Free T4 reflex    Borderline high serum cholesterol      Other Visit Diagnoses     Postmenopausal status (age-related) (natural)    -  Primary    Relevant Orders    Microalbumin / creatinine urine ratio    Encounter for vaccination        Menopause ovarian failure        Relevant Orders    DXA bone density spine hip and pelvis    Financial difficulties        Relevant Orders    Ambulatory referral to social work care management program    Inability to acquire transportation        Relevant Orders    Ambulatory referral to social work care management program    Hyponatremia        Relevant Orders    Comprehensive metabolic panel    Hypokalemia        Relevant Orders    Comprehensive metabolic panel    Rotator cuff disorder, right               Diagnoses and all orders for this visit:    Postmenopausal status (age-related) (natural)  -     Microalbumin / creatinine urine ratio    Essential hypertension  -     TSH, 3rd generation with Free T4 reflex; Future    Asthma-COPD overlap syndrome (HCC)    Osteopenia of multiple sites  -     DXA bone density spine hip and pelvis; Future    Chronic renal impairment, stage 3 (moderate), unspecified whether stage 3a or 3b CKD (HCC)  -     Comprehensive metabolic panel; Future  -     TSH, 3rd generation with Free T4 reflex; Future    ZACK (generalized anxiety disorder)  -     TSH, 3rd generation with Free T4 reflex; Future    Mild major depression, single episode ([address] )    Spinal stenosis of lumbar region without neurogenic claudication    Borderline high serum cholesterol    Primary osteoarthritis involving multiple joints    Encounter for vaccination    Menopause ovarian failure  -     DXA bone density spine hip and pelvis; Future    Pre-diabetes    Financial difficulties  -     Ambulatory referral to social work care management program; Future    Inability to acquire transportation  -     Ambulatory referral to social work care management program; Future    Hyponatremia  -     Comprehensive metabolic panel; Future    Hypokalemia  -     Comprehensive metabolic panel; Future    Rotator cuff disorder, right    Tear of right supraspinatus tendon    Other orders  -     potassium chloride (K-DUR,KLOR-CON) 10 mEq tablet; Take 20 mEq by mouth daily  -     sertraline (ZOLOFT) 50 mg tablet; Take 50 mg by mouth every morning      No problem-specific Assessment & Plan notes found for this encounter  A/P: Doing ok and discussed labs  Will recheck potassium and sodium  Watch diet for the sugars and ?increase due to steroids injections recently     Will order additional labs  Discussed vaccines defers    Order dexa  Continue current treatment and RTC four months for routine and keep f/u with specialists  Subjective:      Patient ID: Maude Baird is a 77 y o  female  WF RTC for f/u HTN, CKD, etc  Doing ok and no new issues except for right shoulder pain due to rotator cuff injury   Seen by ortho and is getting a second opinion in the future    Remains as active as her DJD/DDD with allow and no falls  RAD is controlled and limited rescue MDI use  Chronic pain is manageable  ZACK/MDD is controlled  Recent labs ok except for HgA1c of 5 7, low sodium and low potassium  Additional labs due  Dexa and vaccines due as well  The following portions of the patient's history were reviewed and updated as appropriate:   She has a past medical history of Aortic regurgitation, Arthritis, Essential hypertension (8/7/2019), ZACK (generalized anxiety disorder) (12/11/2018), Moderate persistent asthma without complication (22/25/1391), Pulmonary emphysema ([facility] [address] ) (10/23/2018), and Stage 3 chronic kidney disease ([facility] ) (12/11/2019)  ,  does not have any pertinent problems on file  ,   has a past surgical history that includes Facial cosmetic surgery; Colonoscopy; Dental surgery; and Hip surgery (Left, 06/2019)  ,  family history includes Aneurysm in her mother; No Known Problems in her brother, father, maternal grandmother, paternal grandmother, sister, sister, and sister  ,   reports that she has never smoked  She has never used smokeless tobacco  She reports that she does not drink alcohol and does not use drugs  ,  is allergic to nsaids, amlodipine, iv contrast [iodinated diagnostic agents], lisinopril, vancomycin, celebrex [celecoxib], fosamax [alendronate], ibuprofen, and influenza vaccines     Current Outpatient Medications   Medication Sig Dispense Refill   • baclofen 10 mg tablet One po tid PRN  270 tablet 0   • budesonide-formoterol (SYMBICORT) 160-4 5 mcg/act inhaler Inhale 2 puffs 2 (two) times a day Rinse mouth after use   18 g 5   • chlorthalidone 25 mg tablet Take 25 mg by mouth daily     • losartan (COZAAR) 25 mg tablet Take 25 mg by mouth daily     • potassium chloride (K-DUR,KLOR-CON) 10 mEq tablet Take 20 mEq by mouth daily     • sertraline (ZOLOFT) 50 mg tablet Take 50 mg by mouth every morning     • traMADol (ULTRAM) 50 mg tablet Take 1 tablet (50 mg total) by mouth every 6 (six) hours as needed for moderate pain 30 tablet 0   • Ventolin  (90 Base) MCG/ACT inhaler Inhale 2 puffs every 6 (six) hours as needed for wheezing 18 g 5     No current facility-administered medications for this visit  Review of Systems   Constitutional: Negative for activity change, chills, diaphoresis, fatigue and fever  HENT: Negative  Eyes: Negative for visual disturbance  Respiratory: Negative for cough, chest tightness, shortness of breath and wheezing  Cardiovascular: Negative for chest pain, palpitations and leg swelling  Gastrointestinal: Negative for abdominal pain, constipation, diarrhea, nausea and vomiting  Endocrine: Negative for cold intolerance and heat intolerance  Genitourinary: Negative for difficulty urinating, dysuria and frequency  Musculoskeletal: Positive for arthralgias and gait problem  Negative for joint swelling and myalgias  Neurological: Negative for dizziness, seizures, syncope, weakness, light-headedness and headaches  Psychiatric/Behavioral: Negative for confusion, dysphoric mood and sleep disturbance  The patient is not nervous/anxious  PHQ-2/9 Depression Screening    Little interest or pleasure in doing things: 0 - not at all  Feeling down, depressed, or hopeless: 0 - not at all  Trouble falling or staying asleep, or sleeping too much: 0 - not at all  Feeling tired or having little energy: 0 - not at all  Poor appetite or overeatin - not at all  Feeling bad about yourself - or that you are a failure or have let yourself or your family down: 0 - not at all  Trouble concentrating on things, such as reading the newspaper or watching television: 0 - not at all  Moving or speaking so slowly that other people could have noticed   Or the opposite - being so fidgety or restless that you have been moving around a lot more than usual: 0 - not at all  Thoughts that you would be better off dead, or of hurting yourself in some way: 0 - not at all  PHQ-9 Score: 0   PHQ-9 Interpretation: No or Minimal depression         Objective:  Vitals:    10/13/22 1122   BP: 130/70   Pulse: 79   Temp: 98 °F (36 7 °C)   TempSrc: Tympanic   SpO2: 96%   Weight: 46 3 kg (102 lb)   Height: 5' 2" (1 575 m)     Body mass index is 18 66 kg/m²  Physical Exam  Vitals and nursing note reviewed  Constitutional:       General: She is not in acute distress  Appearance: Normal appearance  She is not ill-appearing  HENT:      Head: Normocephalic and atraumatic  Mouth/Throat:      Mouth: Mucous membranes are moist    Eyes:      Extraocular Movements: Extraocular movements intact  Conjunctiva/sclera: Conjunctivae normal       Pupils: Pupils are equal, round, and reactive to light  Neck:      Vascular: No carotid bruit  Cardiovascular:      Rate and Rhythm: Normal rate and regular rhythm  Heart sounds: Normal heart sounds  Pulmonary:      Effort: Pulmonary effort is normal  No respiratory distress  Breath sounds: Normal breath sounds  No wheezing, rhonchi or rales  Abdominal:      General: Bowel sounds are normal  There is no distension  Palpations: Abdomen is soft  Tenderness: There is no abdominal tenderness  Musculoskeletal:      Cervical back: Neck supple  Right lower leg: No edema  Left lower leg: No edema  Neurological:      General: No focal deficit present  Mental Status: She is alert and oriented to person, place, and time  Mental status is at baseline  Psychiatric:         Mood and Affect: Mood normal          Behavior: Behavior normal          Thought Content:  Thought content normal          Judgment: Judgment normal

## 2022-10-13 NOTE — PROGRESS NOTES
BETINA SANDOVAL has not received a response from patient after last outreach attempt  Case closed at this time  BETINA SANDOVAL will remain available for any future needs

## 2022-10-13 NOTE — PATIENT INSTRUCTIONS
Medicare Preventive Visit Patient Instructions  Thank you for completing your Welcome to Medicare Visit or Medicare Annual Wellness Visit today  Your next wellness visit will be due in one year (10/14/2023)  The screening/preventive services that you may require over the next 5-10 years are detailed below  Some tests may not apply to you based off risk factors and/or age  Screening tests ordered at today's visit but not completed yet may show as past due  Also, please note that scanned in results may not display below  Preventive Screenings:  Service Recommendations Previous Testing/Comments   Colorectal Cancer Screening  * Colonoscopy    * Fecal Occult Blood Test (FOBT)/Fecal Immunochemical Test (FIT)  * Fecal DNA/Cologuard Test  * Flexible Sigmoidoscopy Age: 39-70 years old   Colonoscopy: every 10 years (may be performed more frequently if at higher risk)  OR  FOBT/FIT: every 1 year  OR  Cologuard: every 3 years  OR  Sigmoidoscopy: every 5 years  Screening may be recommended earlier than age 39 if at higher risk for colorectal cancer  Also, an individualized decision between you and your healthcare provider will decide whether screening between the ages of 74-80 would be appropriate  Colonoscopy: 12/17/2019  FOBT/FIT: Not on file  Cologuard: Not on file  Sigmoidoscopy: Not on file    Screening Current     Breast Cancer Screening Age: 36 years old  Frequency: every 1-2 years  Not required if history of left and right mastectomy Mammogram: 11/23/2021    Screening Current   Cervical Cancer Screening Between the ages of 21-29, pap smear recommended once every 3 years  Between the ages of 33-67, can perform pap smear with HPV co-testing every 5 years     Recommendations may differ for women with a history of total hysterectomy, cervical cancer, or abnormal pap smears in past  Pap Smear: Not on file    Screening Not Indicated   Hepatitis C Screening Once for adults born between 1945 and 1965  More frequently in patients at high risk for Hepatitis C Hep C Antibody: 08/24/2018    Screening Current   Diabetes Screening 1-2 times per year if you're at risk for diabetes or have pre-diabetes Fasting glucose: 89 mg/dL (1/27/2022)  A1C: 5 7 % (10/10/2022)  Screening Current   Cholesterol Screening Once every 5 years if you don't have a lipid disorder  May order more often based on risk factors  Lipid panel: 01/27/2022    Screening Current     Other Preventive Screenings Covered by Medicare:  1  Abdominal Aortic Aneurysm (AAA) Screening: covered once if your at risk  You're considered to be at risk if you have a family history of AAA  2  Lung Cancer Screening: covers low dose CT scan once per year if you meet all of the following conditions: (1) Age 50-69; (2) No signs or symptoms of lung cancer; (3) Current smoker or have quit smoking within the last 15 years; (4) You have a tobacco smoking history of at least 20 pack years (packs per day multiplied by number of years you smoked); (5) You get a written order from a healthcare provider  3  Glaucoma Screening: covered annually if you're considered high risk: (1) You have diabetes OR (2) Family history of glaucoma OR (3)  aged 48 and older OR (3)  American aged 72 and older  3  Osteoporosis Screening: covered every 2 years if you meet one of the following conditions: (1) You're estrogen deficient and at risk for osteoporosis based off medical history and other findings; (2) Have a vertebral abnormality; (3) On glucocorticoid therapy for more than 3 months; (4) Have primary hyperparathyroidism; (5) On osteoporosis medications and need to assess response to drug therapy  · Last bone density test (DXA Scan): 06/22/2020   5  HIV Screening: covered annually if you're between the age of 15-65  Also covered annually if you are younger than 13 and older than 72 with risk factors for HIV infection   For pregnant patients, it is covered up to 3 times per pregnancy  Immunizations:  Immunization Recommendations   Influenza Vaccine Annual influenza vaccination during flu season is recommended for all persons aged >= 6 months who do not have contraindications   Pneumococcal Vaccine   * Pneumococcal conjugate vaccine = PCV13 (Prevnar 13), PCV15 (Vaxneuvance), PCV20 (Prevnar 20)  * Pneumococcal polysaccharide vaccine = PPSV23 (Pneumovax) Adults 25-60 years old: 1-3 doses may be recommended based on certain risk factors  Adults 72 years old: 1-2 doses may be recommended based off what pneumonia vaccine you previously received   Hepatitis B Vaccine 3 dose series if at intermediate or high risk (ex: diabetes, end stage renal disease, liver disease)   Tetanus (Td) Vaccine - COST NOT COVERED BY MEDICARE PART B Following completion of primary series, a booster dose should be given every 10 years to maintain immunity against tetanus  Td may also be given as tetanus wound prophylaxis  Tdap Vaccine - COST NOT COVERED BY MEDICARE PART B Recommended at least once for all adults  For pregnant patients, recommended with each pregnancy  Shingles Vaccine (Shingrix) - COST NOT COVERED BY MEDICARE PART B  2 shot series recommended in those aged 48 and above     Health Maintenance Due:      Topic Date Due   • Cervical Cancer Screening  Never done   • DXA SCAN  06/22/2022   • Breast Cancer Screening: Mammogram  11/23/2023   • Colorectal Cancer Screening  12/17/2024   • Hepatitis C Screening  Completed     Immunizations Due:      Topic Date Due   • COVID-19 Vaccine (1) Never done   • Pneumococcal Vaccine: 65+ Years (1 - PCV) Never done   • Influenza Vaccine (1) Never done     Advance Directives   What are advance directives? Advance directives are legal documents that state your wishes and plans for medical care  These plans are made ahead of time in case you lose your ability to make decisions for yourself   Advance directives can apply to any medical decision, such as the treatments you want, and if you want to donate organs  What are the types of advance directives? There are many types of advance directives, and each state has rules about how to use them  You may choose a combination of any of the following:  · Living will: This is a written record of the treatment you want  You can also choose which treatments you do not want, which to limit, and which to stop at a certain time  This includes surgery, medicine, IV fluid, and tube feedings  · Durable power of  for healthcare [facility]): This is a written record that states who you want to make healthcare choices for you when you are unable to make them for yourself  This person, called a proxy, is usually a family member or a friend  You may choose more than 1 proxy  · Do not resuscitate (DNR) order:  A DNR order is used in case your heart stops beating or you stop breathing  It is a request not to have certain forms of treatment, such as CPR  A DNR order may be included in other types of advance directives  · Medical directive: This covers the care that you want if you are in a coma, near death, or unable to make decisions for yourself  You can list the treatments you want for each condition  Treatment may include pain medicine, surgery, blood transfusions, dialysis, IV or tube feedings, and a ventilator (breathing machine)  · Values history: This document has questions about your views, beliefs, and how you feel and think about life  This information can help others choose the care that you would choose  Why are advance directives important? An advance directive helps you control your care  Although spoken wishes may be used, it is better to have your wishes written down  Spoken wishes can be misunderstood, or not followed  Treatments may be given even if you do not want them  An advance directive may make it easier for your family to make difficult choices about your care     Narcotic (Opioid) Safety    Use narcotics safely:  · Take prescribed narcotics exactly as directed  · Do not give narcotics to others or take narcotics that belong to someone else  · Do not mix narcotics without medicines or alcohol  · Do not drive or operate heavy machinery after you take the narcotic  · Monitor for side effects and notify your healthcare provider if you experienced side effects such as nausea, sleepiness, itching, or trouble thinking clearly  Manage constipation:    Constipation is the most common side effect of narcotic medicine  Constipation is when you have hard, dry bowel movements, or you go longer than usual between bowel movements  Tell your healthcare provider about all changes in your bowel movements while you are taking narcotics  He or she may recommend laxative medicine to help you have a bowel movement  He or she may also change the kind of narcotic you are taking, or change when you take it  The following are more ways you can prevent or relieve constipation:    · Drink liquids as directed  You may need to drink extra liquids to help soften and move your bowels  Ask how much liquid to drink each day and which liquids are best for you  · Eat high-fiber foods  This may help decrease constipation by adding bulk to your bowel movements  High-fiber foods include fruits, vegetables, whole-grain breads and cereals, and beans  Your healthcare provider or dietitian can help you create a high-fiber meal plan  Your provider may also recommend a fiber supplement if you cannot get enough fiber from food  · Exercise regularly  Regular physical activity can help stimulate your intestines  Walking is a good exercise to prevent or relieve constipation  Ask which exercises are best for you  · Schedule a time each day to have a bowel movement  This may help train your body to have regular bowel movements  Bend forward while you are on the toilet to help move the bowel movement out   Sit on the toilet for at least 10 minutes, even if you do not have a bowel movement  Store narcotics safely:   · Store narcotics where others cannot easily get them  Keep them in a locked cabinet or secure area  Do not  keep them in a purse or other bag you carry with you  A person may be looking for something else and find the narcotics  · Make sure narcotics are stored out of the reach of children  A child can easily overdose on narcotics  Narcotics may look like candy to a small child  The best way to dispose of narcotics: The laws vary by country and area  In the [facility], the best way is to return the narcotics through a take-back program  This program is offered by the [employer] ([employer])  The following are options for using the program:  · Take the narcotics to a PARTHA collection site  The site is often a law enforcement center  Call your local law enforcement center for scheduled take-back days in your area  You will be given information on where to go if the collection site is in a different location  · Take the narcotics to an approved pharmacy or hospital   A pharmacy or hospital may be set up as a collection site  You will need to ask if it is a PARTHA collection site if you were not directed there  A pharmacy or doctor's office may not be able to take back narcotics unless it is a PARTHA site  · Use a mail-back system  This means you are given containers to put the narcotics into  You will then mail them in the containers  · Use a take-back drop box  This is a place to leave the narcotics at any time  People and animals will not be able to get into the box  Your local law enforcement agency can tell you where to find a drop box in your area  Other ways to manage pain:   · Ask your healthcare provider about non-narcotic medicines to control pain  Nonprescription medicines include NSAIDs (such as ibuprofen) and acetaminophen  Prescription medicines include muscle relaxers, antidepressants, and steroids    · Pain may be managed without any medicines  Some ways to relieve pain include massage, aromatherapy, or meditation  Physical or occupational therapy may also help  For more information:   · Drug Enforcement Administration  [address] [place] , [address]  Phone: 8- [phone]  Web Address: [facility]/drug_[email or web address]    · [address] and Drug Administration  [place] , [address]  Phone: 7- [phone] - [phone]  Web Address: http://[employer]/     © Copyright [employer] 2018 Information is for End User's use only and may not be sold, redistributed or otherwise used for commercial purposes  All illustrations and images included in CareNotes® are the copyrighted property of [employer]  or [facility] Preventive Visit Patient Instructions  Thank you for completing your Welcome to Medicare Visit or Medicare Annual Wellness Visit today  Your next wellness visit will be due in one year (10/14/2023)  The screening/preventive services that you may require over the next 5-10 years are detailed below  Some tests may not apply to you based off risk factors and/or age  Screening tests ordered at today's visit but not completed yet may show as past due  Also, please note that scanned in results may not display below  Preventive Screenings:  Service Recommendations Previous Testing/Comments   Colorectal Cancer Screening  * Colonoscopy    * Fecal Occult Blood Test (FOBT)/Fecal Immunochemical Test (FIT)  * Fecal DNA/Cologuard Test  * Flexible Sigmoidoscopy Age: 39-70 years old   Colonoscopy: every 10 years (may be performed more frequently if at higher risk)  OR  FOBT/FIT: every 1 year  OR  Cologuard: every 3 years  OR  Sigmoidoscopy: every 5 years  Screening may be recommended earlier than age 39 if at higher risk for colorectal cancer   Also, an individualized decision between you and your healthcare provider will decide whether screening between the ages of 74-80 would be appropriate  Colonoscopy: 12/17/2019  FOBT/FIT: Not on file  Cologuard: Not on file  Sigmoidoscopy: Not on file    Screening Current     Breast Cancer Screening Age: 36 years old  Frequency: every 1-2 years  Not required if history of left and right mastectomy Mammogram: 11/23/2021    Screening Current   Cervical Cancer Screening Between the ages of 21-29, pap smear recommended once every 3 years  Between the ages of 33-67, can perform pap smear with HPV co-testing every 5 years  Recommendations may differ for women with a history of total hysterectomy, cervical cancer, or abnormal pap smears in past  Pap Smear: Not on file    Screening Not Indicated   Hepatitis C Screening Once for adults born between 1945 and 1965  More frequently in patients at high risk for Hepatitis C Hep C Antibody: 08/24/2018    Screening Current   Diabetes Screening 1-2 times per year if you're at risk for diabetes or have pre-diabetes Fasting glucose: 89 mg/dL (1/27/2022)  A1C: 5 7 % (10/10/2022)  Screening Current   Cholesterol Screening Once every 5 years if you don't have a lipid disorder  May order more often based on risk factors  Lipid panel: 01/27/2022    Screening Current     Other Preventive Screenings Covered by Medicare:  6  Abdominal Aortic Aneurysm (AAA) Screening: covered once if your at risk  You're considered to be at risk if you have a family history of AAA  7  Lung Cancer Screening: covers low dose CT scan once per year if you meet all of the following conditions: (1) Age 50-69; (2) No signs or symptoms of lung cancer; (3) Current smoker or have quit smoking within the last 15 years; (4) You have a tobacco smoking history of at least 20 pack years (packs per day multiplied by number of years you smoked); (5) You get a written order from a healthcare provider    8  Glaucoma Screening: covered annually if you're considered high risk: (1) You have diabetes OR (2) Family history of glaucoma OR (3)  aged 48 and older OR (3)  American aged 72 and older  5  Osteoporosis Screening: covered every 2 years if you meet one of the following conditions: (1) You're estrogen deficient and at risk for osteoporosis based off medical history and other findings; (2) Have a vertebral abnormality; (3) On glucocorticoid therapy for more than 3 months; (4) Have primary hyperparathyroidism; (5) On osteoporosis medications and need to assess response to drug therapy  · Last bone density test (DXA Scan): 06/22/2020  10  HIV Screening: covered annually if you're between the age of 12-76  Also covered annually if you are younger than 13 and older than 72 with risk factors for HIV infection  For pregnant patients, it is covered up to 3 times per pregnancy  Immunizations:  Immunization Recommendations   Influenza Vaccine Annual influenza vaccination during flu season is recommended for all persons aged >= 6 months who do not have contraindications   Pneumococcal Vaccine   * Pneumococcal conjugate vaccine = PCV13 (Prevnar 13), PCV15 (Vaxneuvance), PCV20 (Prevnar 20)  * Pneumococcal polysaccharide vaccine = PPSV23 (Pneumovax) Adults 25-60 years old: 1-3 doses may be recommended based on certain risk factors  Adults 72 years old: 1-2 doses may be recommended based off what pneumonia vaccine you previously received   Hepatitis B Vaccine 3 dose series if at intermediate or high risk (ex: diabetes, end stage renal disease, liver disease)   Tetanus (Td) Vaccine - COST NOT COVERED BY MEDICARE PART B Following completion of primary series, a booster dose should be given every 10 years to maintain immunity against tetanus  Td may also be given as tetanus wound prophylaxis  Tdap Vaccine - COST NOT COVERED BY MEDICARE PART B Recommended at least once for all adults  For pregnant patients, recommended with each pregnancy     Shingles Vaccine (Shingrix) - COST NOT COVERED BY MEDICARE PART B  2 shot series recommended in those aged 48 and above     Health Maintenance Due:      Topic Date Due   • Cervical Cancer Screening  Never done   • DXA SCAN  06/22/2022   • Breast Cancer Screening: Mammogram  11/23/2023   • Colorectal Cancer Screening  12/17/2024   • Hepatitis C Screening  Completed     Immunizations Due:      Topic Date Due   • COVID-19 Vaccine (1) Never done   • Pneumococcal Vaccine: 65+ Years (1 - PCV) Never done   • Influenza Vaccine (1) Never done     Advance Directives   What are advance directives? Advance directives are legal documents that state your wishes and plans for medical care  These plans are made ahead of time in case you lose your ability to make decisions for yourself  Advance directives can apply to any medical decision, such as the treatments you want, and if you want to donate organs  What are the types of advance directives? There are many types of advance directives, and each state has rules about how to use them  You may choose a combination of any of the following:  · Living will: This is a written record of the treatment you want  You can also choose which treatments you do not want, which to limit, and which to stop at a certain time  This includes surgery, medicine, IV fluid, and tube feedings  · Durable power of  for healthcare [facility]): This is a written record that states who you want to make healthcare choices for you when you are unable to make them for yourself  This person, called a proxy, is usually a family member or a friend  You may choose more than 1 proxy  · Do not resuscitate (DNR) order:  A DNR order is used in case your heart stops beating or you stop breathing  It is a request not to have certain forms of treatment, such as CPR  A DNR order may be included in other types of advance directives  · Medical directive: This covers the care that you want if you are in a coma, near death, or unable to make decisions for yourself   You can list the treatments you want for each condition  Treatment may include pain medicine, surgery, blood transfusions, dialysis, IV or tube feedings, and a ventilator (breathing machine)  · Values history: This document has questions about your views, beliefs, and how you feel and think about life  This information can help others choose the care that you would choose  Why are advance directives important? An advance directive helps you control your care  Although spoken wishes may be used, it is better to have your wishes written down  Spoken wishes can be misunderstood, or not followed  Treatments may be given even if you do not want them  An advance directive may make it easier for your family to make difficult choices about your care  Narcotic (Opioid) Safety    Use narcotics safely:  · Take prescribed narcotics exactly as directed  · Do not give narcotics to others or take narcotics that belong to someone else  · Do not mix narcotics without medicines or alcohol  · Do not drive or operate heavy machinery after you take the narcotic  · Monitor for side effects and notify your healthcare provider if you experienced side effects such as nausea, sleepiness, itching, or trouble thinking clearly  Manage constipation:    Constipation is the most common side effect of narcotic medicine  Constipation is when you have hard, dry bowel movements, or you go longer than usual between bowel movements  Tell your healthcare provider about all changes in your bowel movements while you are taking narcotics  He or she may recommend laxative medicine to help you have a bowel movement  He or she may also change the kind of narcotic you are taking, or change when you take it  The following are more ways you can prevent or relieve constipation:    · Drink liquids as directed  You may need to drink extra liquids to help soften and move your bowels  Ask how much liquid to drink each day and which liquids are best for you    · Eat high-fiber foods   This may help decrease constipation by adding bulk to your bowel movements  High-fiber foods include fruits, vegetables, whole-grain breads and cereals, and beans  Your healthcare provider or dietitian can help you create a high-fiber meal plan  Your provider may also recommend a fiber supplement if you cannot get enough fiber from food  · Exercise regularly  Regular physical activity can help stimulate your intestines  Walking is a good exercise to prevent or relieve constipation  Ask which exercises are best for you  · Schedule a time each day to have a bowel movement  This may help train your body to have regular bowel movements  Bend forward while you are on the toilet to help move the bowel movement out  Sit on the toilet for at least 10 minutes, even if you do not have a bowel movement  Store narcotics safely:   · Store narcotics where others cannot easily get them  Keep them in a locked cabinet or secure area  Do not  keep them in a purse or other bag you carry with you  A person may be looking for something else and find the narcotics  · Make sure narcotics are stored out of the reach of children  A child can easily overdose on narcotics  Narcotics may look like candy to a small child  The best way to dispose of narcotics: The laws vary by country and area  In the [place], the best way is to return the narcotics through a take-back program  This program is offered by the [employer] ([employer])  The following are options for using the program:  · Take the narcotics to a PARTHA collection site  The site is often a law enforcement center  Call your local law enforcement center for scheduled take-back days in your area  You will be given information on where to go if the collection site is in a different location  · Take the narcotics to an approved pharmacy or hospital   A pharmacy or hospital may be set up as a collection site   You will need to ask if it is a PARTHA collection site if you were not directed there  A pharmacy or doctor's office may not be able to take back narcotics unless it is a PARTHA site  · Use a mail-back system  This means you are given containers to put the narcotics into  You will then mail them in the containers  · Use a take-back drop box  This is a place to leave the narcotics at any time  People and animals will not be able to get into the box  Your local law enforcement agency can tell you where to find a drop box in your area  Other ways to manage pain:   · Ask your healthcare provider about non-narcotic medicines to control pain  Nonprescription medicines include NSAIDs (such as ibuprofen) and acetaminophen  Prescription medicines include muscle relaxers, antidepressants, and steroids  · Pain may be managed without any medicines  Some ways to relieve pain include massage, aromatherapy, or meditation  Physical or occupational therapy may also help  For more information:   · Drug Enforcement Administration  [address] [place] [address]  [address]  Phone: [phone]  Web Address: [facility]/drug_disposal/    · [address]  and Drug Administration  [facility] , [address] [facility]  Phone: [phone]  Web Address: http://[employer]/     © Copyright [employer] 2018 Information is for End User's use only and may not be sold, redistributed or otherwise used for commercial purposes   All illustrations and images included in CareNotes® are the copyrighted property of A D A [employer]  or [address] [place] [address]

## 2022-10-13 NOTE — PROGRESS NOTES
Assessment and Plan:     Problem List Items Addressed This Visit        Respiratory    Asthma-COPD overlap syndrome ([address] )       Cardiovascular and Mediastinum    Essential hypertension    Relevant Orders    TSH, 3rd generation with Free T4 reflex       Musculoskeletal and Integument    Tear of right supraspinatus tendon    Primary osteoarthritis involving multiple joints    Osteopenia of multiple sites    Relevant Orders    DXA bone density spine hip and pelvis       Genitourinary    Chronic renal insufficiency, stage III (moderate) ([facility])    Relevant Orders    Comprehensive metabolic panel    TSH, 3rd generation with Free T4 reflex       Other    Spinal stenosis of lumbar region without neurogenic claudication    Pre-diabetes    Mild major depression, single episode (HCC)    Relevant Medications    sertraline (ZOLOFT) 50 mg tablet    ZACK (generalized anxiety disorder)    Relevant Medications    sertraline (ZOLOFT) 50 mg tablet    Other Relevant Orders    TSH, 3rd generation with Free T4 reflex    Borderline high serum cholesterol      Other Visit Diagnoses     Postmenopausal status (age-related) (natural)    -  Primary    Relevant Orders    Microalbumin / creatinine urine ratio    Encounter for vaccination        Menopause ovarian failure        Relevant Orders    DXA bone density spine hip and pelvis    Financial difficulties        Relevant Orders    Ambulatory referral to social work care management program    Inability to acquire transportation        Relevant Orders    Ambulatory referral to social work care management program    Hyponatremia        Relevant Orders    Comprehensive metabolic panel    Hypokalemia        Relevant Orders    Comprehensive metabolic panel    Rotator cuff disorder, right               Preventive health issues were discussed with patient, and age appropriate screening tests were ordered as noted in patient's After Visit Summary    Personalized health advice and appropriate referrals for health education or preventive services given if needed, as noted in patient's After Visit Summary       History of Present Illness:     Patient presents for a Medicare Wellness Visit    HPI   Patient Care Team:  Allison Hernández DO as PCP - General (Internal Medicine)  Allison Hernández DO as PCP - [address] [place], [place] [address]Th [facility] ([facility])  Niru Zuñiga, RN as RN Care Manager (Care Coordination)  Eryn Reddy LCSW as  Care Manager ([address])     Review of Systems:     Review of Systems     Problem List:     Patient Active Problem List   Diagnosis   • Environmental and seasonal allergies   • Arthralgia of right temporomandibular joint   • Chronic cough   • Borderline high serum cholesterol   • Chronic nonintractable headache   • Osteoarthritis of left hip   • Uncomplicated opioid dependence ([facility])   • Tear of left acetabular labrum   • ZACK (generalized anxiety disorder)   • Essential hypertension   • Weight loss   • History of thrush   • Chronic renal insufficiency, stage III (moderate) ([facility])   • Diverticulosis   • Polyp of colon   • Osteopenia of multiple sites   • Trace tricuspid valve regurgitation   • Trace mitral valve regurgitation   • Moderate aortic regurgitation   • Bile duct stricture   • 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   • Chronic midline low back pain   • DDD (degenerative disc disease), lumbosacral   • Facet arthritis of lumbosacral region   • Herniated lumbar intervertebral disc   • Spinal stenosis of lumbar region without neurogenic claudication   • Asthma-COPD overlap syndrome ([facility])   • Protein-calorie malnutrition, unspecified severity ([facility])   • Mild major depression, single episode ([facility])   • Tear of right supraspinatus tendon   • Full thickness tear of right subscapularis tendon   • Primary osteoarthritis involving multiple joints   • Pre-diabetes      Past Medical and Surgical History:     Past Medical History:   Diagnosis Date   • Aortic regurgitation    • Arthritis    • Essential hypertension 8/7/2019   • ZACK (generalized anxiety disorder) 12/11/2018   • Moderate persistent asthma without complication 39/21/3208   • Pulmonary emphysema ([facility] ) 10/23/2018   • Stage 3 chronic kidney disease ([facility] ) 12/11/2019     Past Surgical History:   Procedure Laterality Date   • COLONOSCOPY     • DENTAL SURGERY     • FACIAL COSMETIC SURGERY     • HIP SURGERY Left 06/2019      Family History:     Family History   Problem Relation Age of Onset   • Aneurysm Mother    • No Known Problems Father    • No Known Problems Brother         eye issues   • No Known Problems Sister    • No Known Problems Maternal Grandmother    • No Known Problems Paternal Grandmother    • No Known Problems Sister    • No Known Problems Sister       Social History:     Social History     Socioeconomic History   • Marital status: Single     Spouse name: None   • Number of children: 1   • Years of education: None   • Highest education level: None   Occupational History   • Occupation: disabled   Tobacco Use   • Smoking status: Never Smoker   • Smokeless tobacco: Never Used   Vaping Use   • Vaping Use: Never used   Substance and Sexual Activity   • Alcohol use: No   • Drug use: No   • Sexual activity: Not Currently     Partners: Male   Other Topics Concern   • None   Social History Narrative    Lives alone     Social Determinants of Health     Financial Resource Strain: High Risk   • Difficulty of Paying Living Expenses: Very hard   Food Insecurity: Not on file   Transportation Needs: Unmet Transportation Needs   • Lack of Transportation (Medical): Yes   • Lack of Transportation (Non-Medical): No   Physical Activity: Not on file   Stress: Not on file   Social Connections: Not on file   Intimate Partner Violence: Not on file   Housing Stability: Not on file      Medications and Allergies:     Current Outpatient Medications   Medication Sig Dispense Refill   • baclofen 10 mg tablet One po tid PRN   270 tablet 0   • budesonide-formoterol (SYMBICORT) 160-4 5 mcg/act inhaler Inhale 2 puffs 2 (two) times a day Rinse mouth after use  18 g 5   • chlorthalidone 25 mg tablet Take 25 mg by mouth daily     • losartan (COZAAR) 25 mg tablet Take 25 mg by mouth daily     • potassium chloride (K-DUR,KLOR-CON) 10 mEq tablet Take 20 mEq by mouth daily     • sertraline (ZOLOFT) 50 mg tablet Take 50 mg by mouth every morning     • traMADol (ULTRAM) 50 mg tablet Take 1 tablet (50 mg total) by mouth every 6 (six) hours as needed for moderate pain 30 tablet 0   • Ventolin  (90 Base) MCG/ACT inhaler Inhale 2 puffs every 6 (six) hours as needed for wheezing 18 g 5     No current facility-administered medications for this visit  Allergies   Allergen Reactions   • Nsaids GI Bleeding   • Amlodipine Edema   • Iv Contrast [Iodinated Diagnostic Agents] Other (See Comments)     CKD   • Lisinopril Other (See Comments) and Cough     Kidney injury   • Vancomycin Other (See Comments)     Kidney injury   • Celebrex [Celecoxib] Swelling   • Fosamax [Alendronate]      Bone pain   • Ibuprofen Swelling   • Influenza Vaccines       Immunizations:     Immunization History   Administered Date(s) Administered   • Hep B, adult 03/06/2017   • Tdap 03/06/2017      Health Maintenance:         Topic Date Due   • Cervical Cancer Screening  Never done   • DXA SCAN  06/22/2022   • Breast Cancer Screening: Mammogram  11/23/2023   • Colorectal Cancer Screening  12/17/2024   • Hepatitis C Screening  Completed         Topic Date Due   • COVID-19 Vaccine (1) Never done   • Pneumococcal Vaccine: 65+ Years (1 - PCV) Never done   • Influenza Vaccine (1) Never done      Medicare Screening Tests and Risk Assessments:     Bk Wang is here for her Subsequent Wellness visit  Last Medicare Wellness visit information reviewed, patient interviewed and updates made to the record today  Health Risk Assessment:   Patient rates overall health as fair   Patient feels that their physical health rating is slightly worse  Patient is satisfied with their life  Eyesight was rated as same  Hearing was rated as same  Patient feels that their emotional and mental health rating is same  Patients states they are never, rarely angry  Patient states they are never, rarely unusually tired/fatigued  Pain experienced in the last 7 days has been a lot  Patient's pain rating has been 9/10  Patient states that she has experienced no weight loss or gain in last 6 months  Right shoulder    Depression Screening:   PHQ-9 Score: 0      Fall Risk Screening: In the past year, patient has experienced: no history of falling in past year      Urinary Incontinence Screening:   Patient has not leaked urine accidently in the last six months  Home Safety:  Patient does not have trouble with stairs inside or outside of their home  Patient has working smoke alarms and has working carbon monoxide detector  Home safety hazards include: none  Nutrition:   vegetarian    Medications:   Patient is currently taking over-the-counter supplements  OTC medications include: see medication list  Patient is able to manage medications  Activities of Daily Living (ADLs)/Instrumental Activities of Daily Living (IADLs):   Walk and transfer into and out of bed and chair?: Yes  Dress and groom yourself?: Yes    Bathe or shower yourself?: Yes    Feed yourself? Yes  Do your laundry/housekeeping?: Yes  Manage your money, pay your bills and track your expenses?: Yes  Make your own meals?: Yes    Do your own shopping?: Yes    Previous Hospitalizations:   Any hospitalizations or ED visits within the last 12 months?: No      Advance Care Planning:   Living will: Yes    Durable POA for healthcare: Yes    Advanced directive: Yes    Advanced directive counseling given: Yes    Five wishes given: No    Patient declined ACP directive: No    End of Life Decisions reviewed with patient: Yes    Provider agrees with end of life decisions:  Yes Cognitive Screening:   Provider or family/friend/caregiver concerned regarding cognition?: No    PREVENTIVE SCREENINGS      Cardiovascular Screening:    General: Screening Current    Due for: Lipid Panel      Diabetes Screening:     General: Screening Current    Due for: Blood Glucose      Colorectal Cancer Screening:     General: Screening Current      Breast Cancer Screening:     General: Screening Current      Cervical Cancer Screening:    General: Screening Not Indicated      Osteoporosis Screening:      Due for: DXA Appendicular      Abdominal Aortic Aneurysm (AAA) Screening:        General: Screening Not Indicated      Lung Cancer Screening:     General: Screening Not Indicated      Hepatitis C Screening:    General: Screening Current    Screening, Brief Intervention, and Referral to Treatment (SBIRT)    Screening  Typical number of drinks in a day: 0  Typical number of drinks in a week: 0  Interpretation: Low risk drinking behavior  Single Item Drug Screening:  How often have you used an illegal drug (including marijuana) or a prescription medication for non-medical reasons in the past year? never    Single Item Drug Screen Score: 0  Interpretation: Negative screen for possible drug use disorder    Review of Current Opioid Use    Opioid Risk Tool (ORT) Interpretation: Complete Opioid Risk Tool (ORT)    Other Counseling Topics:   Car/seat belt/driving safety, sunscreen and calcium and vitamin D intake and regular weightbearing exercise  No exam data present     Physical Exam:     /70   Pulse 79   Temp 98 °F (36 7 °C) (Tympanic)   Ht 5' 2" (1 575 m)   Wt 46 3 kg (102 lb)   LMP  (LMP Unknown)   SpO2 96%   BMI 18 66 kg/m²     Physical Exam   A/P: Doing well and no falls reported  Denies depression and feels safe at home  Diverse diet  No problems operating a MV and uses seat belts  Has a living will and POA  No DME or referrals needed today  RTC one year for medicare wellness       Consuelo Pressleyh Sumaya, DO

## 2022-10-14 NOTE — RESULT ENCOUNTER NOTE
Labs and urine ok except for continued borderline low sodium and low potassium  Increase potassium in your diet

## 2023-02-09 ENCOUNTER — RA CDI HCC (OUTPATIENT)
Dept: OTHER | Facility: HOSPITAL | Age: 68
End: 2023-02-09

## 2023-02-09 DIAGNOSIS — N64.4 PAIN IN BREAST: ICD-10-CM

## 2023-02-09 DIAGNOSIS — N63.0 BREAST LUMP IN FEMALE: Primary | ICD-10-CM

## 2023-02-16 ENCOUNTER — OFFICE VISIT (OUTPATIENT)
Dept: INTERNAL MEDICINE CLINIC | Facility: CLINIC | Age: 68
End: 2023-02-16

## 2023-02-16 VITALS
OXYGEN SATURATION: 99 % | HEART RATE: 87 BPM | HEIGHT: 62 IN | BODY MASS INDEX: 18.45 KG/M2 | DIASTOLIC BLOOD PRESSURE: 76 MMHG | TEMPERATURE: 98.6 F | WEIGHT: 100.25 LBS | SYSTOLIC BLOOD PRESSURE: 166 MMHG

## 2023-02-16 DIAGNOSIS — E28.39 MENOPAUSE OVARIAN FAILURE: ICD-10-CM

## 2023-02-16 DIAGNOSIS — R51.9 CHRONIC NONINTRACTABLE HEADACHE, UNSPECIFIED HEADACHE TYPE: ICD-10-CM

## 2023-02-16 DIAGNOSIS — I10 ESSENTIAL HYPERTENSION: Primary | ICD-10-CM

## 2023-02-16 DIAGNOSIS — E46 PROTEIN-CALORIE MALNUTRITION, UNSPECIFIED SEVERITY (HCC): ICD-10-CM

## 2023-02-16 DIAGNOSIS — F32.0 MILD MAJOR DEPRESSION, SINGLE EPISODE (HCC): ICD-10-CM

## 2023-02-16 DIAGNOSIS — G89.29 CHRONIC NONINTRACTABLE HEADACHE, UNSPECIFIED HEADACHE TYPE: ICD-10-CM

## 2023-02-16 DIAGNOSIS — M85.89 OSTEOPENIA OF MULTIPLE SITES: ICD-10-CM

## 2023-02-16 DIAGNOSIS — M48.061 SPINAL STENOSIS OF LUMBAR REGION WITHOUT NEUROGENIC CLAUDICATION: ICD-10-CM

## 2023-02-16 DIAGNOSIS — Z23 ENCOUNTER FOR VACCINATION: ICD-10-CM

## 2023-02-16 DIAGNOSIS — F41.1 GAD (GENERALIZED ANXIETY DISORDER): ICD-10-CM

## 2023-02-16 DIAGNOSIS — J44.9 ASTHMA-COPD OVERLAP SYNDROME (HCC): ICD-10-CM

## 2023-02-16 DIAGNOSIS — I83.93 SPIDER VEINS OF BOTH LOWER EXTREMITIES: ICD-10-CM

## 2023-02-16 DIAGNOSIS — M15.9 PRIMARY OSTEOARTHRITIS INVOLVING MULTIPLE JOINTS: ICD-10-CM

## 2023-02-16 DIAGNOSIS — N18.30 CHRONIC RENAL IMPAIRMENT, STAGE 3 (MODERATE), UNSPECIFIED WHETHER STAGE 3A OR 3B CKD (HCC): ICD-10-CM

## 2023-02-16 DIAGNOSIS — F11.20 UNCOMPLICATED OPIOID DEPENDENCE (HCC): ICD-10-CM

## 2023-02-16 DIAGNOSIS — Z13.220 SCREENING FOR LIPID DISORDERS: ICD-10-CM

## 2023-02-16 NOTE — PATIENT INSTRUCTIONS
Chronic Hypertension   AMBULATORY CARE:   Hypertension  is high blood pressure  Your blood pressure is the force of your blood moving against the walls of your arteries  Hypertension causes your blood pressure to get so high that your heart has to work much harder than normal  This can damage your heart  Even if you have hypertension for years, lifestyle changes, medicines, or both can help bring your blood pressure to normal   Call your local emergency number (911 in the [facility] [facility]) or have someone call if:   You have chest pain  You have any of the following signs of a heart attack:      Squeezing, pressure, or pain in your chest    You may  also have any of the following:     Discomfort or pain in your back, neck, jaw, stomach, or arm    Shortness of breath    Nausea or vomiting    Lightheadedness or a sudden cold sweat    You become confused or have difficulty speaking  You suddenly feel lightheaded or have trouble breathing  Seek care immediately if:   You have a severe headache or vision loss  You have weakness in an arm or leg  Call your doctor or cardiologist if:   You feel faint, dizzy, confused, or drowsy  You have been taking your blood pressure medicine but your pressure is higher than your provider says it should be  You have questions or concerns about your condition or care  Treatment for chronic hypertension  may include medicine to lower your blood pressure and cholesterol levels  A low cholesterol level helps prevent heart disease and makes it easier to control your blood pressure  Heart disease can make your blood pressure harder to control  You may also need to make lifestyle changes  What you need to know about the stages of hypertension:       Normal blood pressure is 119/79 or lower   Your healthcare provider may only check your blood pressure each year if it stays at a normal level  Elevated blood pressure is 120/79 to 129/79   This is sometimes called prehypertension  Your healthcare provider may suggest lifestyle changes to help lower your blood pressure to a normal level  He or she may then check it again in 3 to 6 months  Stage 1 hypertension is 130/80  to 139/89   Your provider may recommend lifestyle changes, medication, and checks every 3 to 6 months until your blood pressure is controlled  Stage 2 hypertension is 140/90 or higher   Your provider will recommend lifestyle changes and have you take 2 kinds of hypertension medicines  You will also need to have your blood pressure checked monthly until it is controlled  Manage chronic hypertension:   Check your blood pressure at home  Avoid smoking, caffeine, and exercise at least 30 minutes before checking your blood pressure  Sit and rest for 5 minutes before you take your blood pressure  Extend your arm and support it on a flat surface  Your arm should be at the same level as your heart  Follow the directions that came with your blood pressure monitor  Check your blood pressure 2 times, 1 minute apart, before you take your medicine in the morning  Also check your blood pressure before your evening meal  Keep a record of your readings and bring it to your follow-up visits  Ask your healthcare provider what your blood pressure should be  Manage any other health conditions you have  Health conditions such as diabetes can increase your risk for hypertension  Follow your healthcare provider's instructions and take all your medicines as directed  Talk to your healthcare provider about any new health conditions you have recently developed  Ask about all medicines  Certain medicines can increase your blood pressure  Examples include oral birth control pills, decongestants, herbal supplements, and NSAIDs, such as ibuprofen  Your healthcare provider can tell you which medicines are safe for you to take  This includes prescription and over-the-counter medicines      Lifestyle changes you can make to lower your blood pressure: Your provider may want you to make more lifestyle changes if you are having trouble controlling your blood pressure  This may feel difficult over time, especially if you think you are making good changes but your pressure is still high  It might help to focus on one new change at a time  For example, try to add 1 more day of exercise, or exercise for an extra 10 minutes on 2 days  Small changes can make a big difference  Your healthcare provider can also refer you to specialists such as a dietitian who can help you make small changes  Your family members may be included in helping you learn to create lifestyle changes, such as the following:     Limit sodium (salt) as directed  Too much sodium can affect your fluid balance  Check labels to find low-sodium or no-salt-added foods  Some low-sodium foods use potassium salts for flavor  Too much potassium can also cause health problems  Your healthcare provider will tell you how much sodium and potassium are safe for you to have in a day  He or she may recommend that you limit sodium to 2,300 mg a day  Follow the meal plan recommended by your healthcare provider  A dietitian or your provider can give you more information on low-sodium plans or the DASH (Dietary Approaches to Stop Hypertension) eating plan  The DASH plan is low in sodium, processed sugar, unhealthy fats, and total fat  It is high in potassium, calcium, and fiber  These can be found in vegetables, fruit, and whole-grain foods  Be physically active throughout the day  Physical activity, such as exercise, can help control your blood pressure and your weight  Be physically active for at least 30 minutes per day, on most days of the week  Include aerobic activity, such as walking or riding a bicycle  Also include strength training at least 2 times each week  Your healthcare providers can help you create a physical activity plan  Decrease stress    This may help lower your blood pressure  Learn ways to relax, such as deep breathing or listening to music  Limit alcohol as directed  Alcohol can increase your blood pressure  A drink of alcohol is 12 ounces of beer, 5 ounces of wine, or 1½ ounces of liquor  Do not smoke  Nicotine and other chemicals in cigarettes and cigars can increase your blood pressure and also cause lung damage  Ask your healthcare provider for information if you currently smoke and need help to quit  E-cigarettes or smokeless tobacco still contain nicotine  Talk to your healthcare provider before you use these products  Follow up with your doctor or cardiologist as directed: You will need to return to have your blood pressure checked and to have other lab tests done  Write down your questions so you remember to ask them during your visits  © Copyright [employer] 2022 Information is for End User's use only and may not be sold, redistributed or otherwise used for commercial purposes  All illustrations and images included in CareNotes® are the copyrighted property of A [employer] A M [employer] Inc  or [facility] [employer] [employer]   The above information is an  only  It is not intended as medical advice for individual conditions or treatments  Talk to your doctor, nurse or pharmacist before following any medical regimen to see if it is safe and effective for you

## 2023-02-16 NOTE — PROGRESS NOTES
BMI Counseling: Body mass index is 18 34 kg/m²  The BMI is below normal  Patient advised to gain weight  Rationale for BMI follow-up plan is due to patient being underweight  Assessment/Plan:  Problem List Items Addressed This Visit        Respiratory    Asthma-COPD overlap syndrome ([facility] )       Cardiovascular and Mediastinum    Essential hypertension - Primary    Relevant Orders    Comprehensive metabolic panel    CBC and differential    Lipid Panel with Direct LDL reflex       Musculoskeletal and Integument    Primary osteoarthritis involving multiple joints    Osteopenia of multiple sites    Relevant Orders    Vitamin D 25 hydroxy       Genitourinary    Chronic renal insufficiency, stage III (moderate) (HCC)    Relevant Orders    Comprehensive metabolic panel    CBC and differential    Vitamin D 25 hydroxy       Other    Uncomplicated opioid dependence ([facility] )    Spinal stenosis of lumbar region without neurogenic claudication    Protein-calorie malnutrition, unspecified severity ([facility])    Mild major depression, single episode ([facility])    ZACK (generalized anxiety disorder)    Chronic nonintractable headache   Other Visit Diagnoses     Screening for lipid disorders        Relevant Orders    Lipid Panel with Direct LDL reflex    Encounter for vaccination        Menopause ovarian failure        Spider veins of both lower extremities        Relevant Orders    Ambulatory Referral to Vascular Surgery           Diagnoses and all orders for this visit:    Essential hypertension  -     Comprehensive metabolic panel; Future  -     CBC and differential; Future  -     Lipid Panel with Direct LDL reflex; Future    Asthma-COPD overlap syndrome (HCC)    Osteopenia of multiple sites  -     Vitamin D 25 hydroxy; Future    Primary osteoarthritis involving multiple joints    Chronic renal impairment, stage 3 (moderate), unspecified whether stage 3a or 3b CKD (HCC)  -     Comprehensive metabolic panel;  Future  -     CBC and differential; Future  -     Vitamin D 25 hydroxy; Future    ZACK (generalized anxiety disorder)    Chronic nonintractable headache, unspecified headache type    Spinal stenosis of lumbar region without neurogenic claudication    Protein-calorie malnutrition, unspecified severity (HCC)    Mild major depression, single episode ([facility] )    Screening for lipid disorders  -     Lipid Panel with Direct LDL reflex; Future    Uncomplicated opioid dependence ([facility] )    Encounter for vaccination    Menopause ovarian failure    Spider veins of both lower extremities  -     Ambulatory Referral to Vascular Surgery; Future      No problem-specific Assessment & Plan notes found for this encounter  A/P: Doing ok and will check labs  BP is up and recheck still up  Pt asymptomatic, but is worked up  Pt defers med change and will start checking at home and call in several weeks for readings  May need med adjustment  Defers Dexa due to ongoing shoulder and breast issues  BMI still down and to try and gain wt  Discussed vaccines defers  Continue current treatment and RTC four months for routine  Keep f/u with specialists  Keep f/u with PT and ortho  Keep f/u for US of the breast      Subjective:      Patient ID: Leon Mancilla is a 79 y o  female  WF RTC for f/u HTN, CKD, etc  Doing ok, but is getting injections in shoulder for pain and has breast imaging scheduled for pain in her breast  Remains active w/o difficulty otherwise and no falls  Denies CP,SOB, palpitations, edema, orthopnea or PND  Chronic pain is otherwise manageable  ZACK/MDD are controlled  Due for labs, dexa, and vaccines         The following portions of the patient's history were reviewed and updated as appropriate:   She has a past medical history of Aortic regurgitation, Arthritis, Essential hypertension (8/7/2019), ZACK (generalized anxiety disorder) (12/11/2018), Moderate persistent asthma without complication (41/75/6378), Pulmonary emphysema ([facility] ) (10/23/2018), and Stage 3 chronic kidney disease ([facility] ) (12/11/2019)  ,  does not have any pertinent problems on file  ,   has a past surgical history that includes Facial cosmetic surgery; Colonoscopy; Dental surgery; and Hip surgery (Left, 06/2019)  ,  family history includes Aneurysm in her mother; No Known Problems in her brother, father, maternal grandmother, paternal grandmother, sister, sister, and sister  ,   reports that she has never smoked  She has never used smokeless tobacco  She reports that she does not drink alcohol and does not use drugs  ,  is allergic to nsaids, amlodipine, iv contrast [iodinated contrast media], lisinopril, vancomycin, celebrex [celecoxib], fosamax [alendronate], ibuprofen, and influenza vaccines     Current Outpatient Medications   Medication Sig Dispense Refill   • baclofen 10 mg tablet One po tid PRN  270 tablet 0   • budesonide-formoterol (SYMBICORT) 160-4 5 mcg/act inhaler Inhale 2 puffs 2 (two) times a day Rinse mouth after use  18 g 5   • chlorthalidone 25 mg tablet Take 25 mg by mouth daily     • losartan (COZAAR) 25 mg tablet Take 25 mg by mouth daily     • potassium chloride (K-DUR,KLOR-CON) 10 mEq tablet Take 20 mEq by mouth daily     • sertraline (ZOLOFT) 50 mg tablet Take 50 mg by mouth every morning     • traMADol (ULTRAM) 50 mg tablet Take 1 tablet (50 mg total) by mouth every 6 (six) hours as needed for moderate pain 30 tablet 0   • Ventolin  (90 Base) MCG/ACT inhaler Inhale 2 puffs every 6 (six) hours as needed for wheezing 18 g 5     No current facility-administered medications for this visit  Review of Systems   Constitutional: Negative for activity change, chills, diaphoresis, fatigue and fever  HENT: Negative  Eyes: Negative for visual disturbance  Respiratory: Negative for cough, chest tightness, shortness of breath and wheezing  Cardiovascular: Negative for chest pain, palpitations and leg swelling     Gastrointestinal: Negative for abdominal pain, constipation, diarrhea, nausea and vomiting  Endocrine: Negative for cold intolerance and heat intolerance  Genitourinary: Negative for difficulty urinating, dysuria and frequency  Musculoskeletal: Positive for arthralgias  Negative for gait problem, joint swelling and myalgias  Neurological: Negative for dizziness, seizures, syncope, weakness, light-headedness and headaches  Psychiatric/Behavioral: Negative for confusion, dysphoric mood and sleep disturbance  The patient is not nervous/anxious  PHQ-2/9 Depression Screening          Objective:  Vitals:    02/16/23 1108 02/16/23 1127   BP: 162/80 166/76   BP Location: Left arm    Patient Position: Sitting    Pulse: 87    Temp: 98 6 °F (37 °C)    SpO2: 99%    Weight: 45 5 kg (100 lb 4 oz)    Height: 5' 2" (1 575 m)      Body mass index is 18 34 kg/m²  Physical Exam  Vitals and nursing note reviewed  Constitutional:       General: She is not in acute distress  Appearance: Normal appearance  She is not ill-appearing  HENT:      Head: Normocephalic and atraumatic  Mouth/Throat:      Mouth: Mucous membranes are moist    Eyes:      Extraocular Movements: Extraocular movements intact  Conjunctiva/sclera: Conjunctivae normal       Pupils: Pupils are equal, round, and reactive to light  Neck:      Vascular: No carotid bruit  Cardiovascular:      Rate and Rhythm: Normal rate and regular rhythm  Heart sounds: Normal heart sounds  No murmur heard  Pulmonary:      Effort: Pulmonary effort is normal  No respiratory distress  Breath sounds: Normal breath sounds  No wheezing, rhonchi or rales  Abdominal:      General: Bowel sounds are normal  There is no distension  Palpations: Abdomen is soft  Tenderness: There is no abdominal tenderness  Musculoskeletal:      Cervical back: Neck supple  Right lower leg: No edema  Left lower leg: No edema  Neurological:      General: No focal deficit present  Mental Status: She is alert and oriented to person, place, and time  Mental status is at baseline  Psychiatric:         Mood and Affect: Mood normal          Behavior: Behavior normal          Thought Content:  Thought content normal          Judgment: Judgment normal

## 2023-03-13 ENCOUNTER — TELEPHONE (OUTPATIENT)
Dept: INTERNAL MEDICINE CLINIC | Facility: CLINIC | Age: 68
End: 2023-03-13

## 2023-03-13 NOTE — TELEPHONE ENCOUNTER
----- Message from Enrique Landry DO sent at 2/16/2023 11:27 AM EST -----  Call pt in three weeks and get BP results

## 2023-03-28 ENCOUNTER — HOSPITAL ENCOUNTER (OUTPATIENT)
Dept: MAMMOGRAPHY | Facility: HOSPITAL | Age: 68
Discharge: HOME | End: 2023-03-28
Attending: INTERNAL MEDICINE

## 2023-03-28 ENCOUNTER — HOSPITAL ENCOUNTER (OUTPATIENT)
Dept: ULTRASOUND IMAGING | Facility: HOSPITAL | Age: 68
Discharge: HOME | End: 2023-03-28

## 2023-03-28 DIAGNOSIS — N64.4 PAIN IN BREAST: ICD-10-CM

## 2023-04-12 PROBLEM — I87.2 VENOUS INSUFFICIENCY OF BOTH LOWER EXTREMITIES: Status: ACTIVE | Noted: 2023-04-12

## 2023-04-12 PROBLEM — I83.93 SPIDER VEINS OF BOTH LOWER EXTREMITIES: Status: ACTIVE | Noted: 2023-04-12

## 2023-05-03 ENCOUNTER — OFFICE VISIT (OUTPATIENT)
Dept: INTERNAL MEDICINE CLINIC | Facility: CLINIC | Age: 68
End: 2023-05-03

## 2023-05-03 VITALS
WEIGHT: 106 LBS | DIASTOLIC BLOOD PRESSURE: 90 MMHG | BODY MASS INDEX: 19.51 KG/M2 | HEART RATE: 66 BPM | RESPIRATION RATE: 18 BRPM | SYSTOLIC BLOOD PRESSURE: 160 MMHG | TEMPERATURE: 98.8 F | HEIGHT: 62 IN

## 2023-05-03 DIAGNOSIS — R51.9 NONINTRACTABLE EPISODIC HEADACHE, UNSPECIFIED HEADACHE TYPE: Primary | ICD-10-CM

## 2023-05-03 DIAGNOSIS — R23.2 VASOMOTOR FLUSHING: ICD-10-CM

## 2023-05-03 DIAGNOSIS — N18.32 CHRONIC RENAL IMPAIRMENT, STAGE 3B (HCC): ICD-10-CM

## 2023-05-03 DIAGNOSIS — I10 ESSENTIAL HYPERTENSION: ICD-10-CM

## 2023-05-03 DIAGNOSIS — R61 NIGHT SWEATS: ICD-10-CM

## 2023-05-03 DIAGNOSIS — I10 WHITE COAT SYNDROME WITH DIAGNOSIS OF HYPERTENSION: ICD-10-CM

## 2023-05-03 PROBLEM — F11.20 UNCOMPLICATED OPIOID DEPENDENCE (HCC): Status: RESOLVED | Noted: 2018-11-26 | Resolved: 2023-05-03

## 2023-05-03 PROBLEM — I35.1 MODERATE AORTIC REGURGITATION: Status: RESOLVED | Noted: 2020-05-13 | Resolved: 2023-05-03

## 2023-05-03 PROBLEM — K83.1 BILE DUCT STRICTURE: Status: RESOLVED | Noted: 2020-05-22 | Resolved: 2023-05-03

## 2023-05-03 PROBLEM — S73.192A TEAR OF LEFT ACETABULAR LABRUM: Status: RESOLVED | Noted: 2018-11-26 | Resolved: 2023-05-03

## 2023-05-03 PROBLEM — M26.621 ARTHRALGIA OF RIGHT TEMPOROMANDIBULAR JOINT: Status: RESOLVED | Noted: 2018-08-15 | Resolved: 2023-05-03

## 2023-05-03 PROBLEM — Z86.19 HISTORY OF THRUSH: Status: RESOLVED | Noted: 2019-11-20 | Resolved: 2023-05-03

## 2023-05-03 PROBLEM — R63.4 WEIGHT LOSS: Status: RESOLVED | Noted: 2019-11-20 | Resolved: 2023-05-03

## 2023-05-03 PROBLEM — M16.12 OSTEOARTHRITIS OF LEFT HIP: Status: RESOLVED | Noted: 2018-11-26 | Resolved: 2023-05-03

## 2023-05-03 PROBLEM — I07.1 TRACE TRICUSPID VALVE REGURGITATION: Status: RESOLVED | Noted: 2020-05-13 | Resolved: 2023-05-03

## 2023-05-03 PROBLEM — E46 PROTEIN-CALORIE MALNUTRITION, UNSPECIFIED SEVERITY (HCC): Status: RESOLVED | Noted: 2021-09-30 | Resolved: 2023-05-03

## 2023-05-03 RX ORDER — FLUTICASONE PROPIONATE 50 MCG
1 SPRAY, SUSPENSION (ML) NASAL DAILY
Qty: 16 G | Refills: 0 | Status: SHIPPED | OUTPATIENT
Start: 2023-05-03

## 2023-05-03 RX ORDER — AZITHROMYCIN 250 MG/1
TABLET, FILM COATED ORAL
Qty: 6 TABLET | Refills: 0 | Status: SHIPPED | OUTPATIENT
Start: 2023-05-03 | End: 2023-05-08

## 2023-05-03 RX ORDER — GUAIFENESIN 600 MG/1
600 TABLET, EXTENDED RELEASE ORAL EVERY 12 HOURS SCHEDULED
Qty: 20 TABLET | Refills: 0 | Status: SHIPPED | OUTPATIENT
Start: 2023-05-03

## 2023-05-03 NOTE — PROGRESS NOTES
Assessment/Plan:  Problem List Items Addressed This Visit        Cardiovascular and Mediastinum    White coat syndrome with diagnosis of hypertension    Essential hypertension    Relevant Orders    Comprehensive metabolic panel       Genitourinary    Chronic renal insufficiency, stage III (moderate) (HCC)    Relevant Orders    Comprehensive metabolic panel   Other Visit Diagnoses     Nonintractable episodic headache, unspecified headache type    -  Primary    Relevant Medications    fluticasone (FLONASE) 50 mcg/act nasal spray    guaiFENesin (Mucinex) 600 mg 12 hr tablet    azithromycin (ZITHROMAX) 250 mg tablet    Other Relevant Orders    FSH and LH    CBC and differential    Comprehensive metabolic panel    Vasomotor flushing        Relevant Orders    FSH and LH    CBC and differential    Comprehensive metabolic panel    Ambulatory Referral to Endocrinology    Ambulatory Referral to Gynecology    Night sweats        Relevant Orders    FSH and LH    CBC and differential    Comprehensive metabolic panel    Ambulatory Referral to Endocrinology    Ambulatory Referral to Gynecology           Diagnoses and all orders for this visit:    Nonintractable episodic headache, unspecified headache type  -     FSH and LH; Future  -     CBC and differential; Future  -     Comprehensive metabolic panel; Future  -     fluticasone (FLONASE) 50 mcg/act nasal spray; 1 spray into each nostril daily  -     guaiFENesin (Mucinex) 600 mg 12 hr tablet; Take 1 tablet (600 mg total) by mouth every 12 (twelve) hours  -     azithromycin (ZITHROMAX) 250 mg tablet; Take 2 tablets today then 1 tablet daily x 4 days    Chronic renal impairment, stage 3b (HCC)  -     Comprehensive metabolic panel; Future    Essential hypertension  -     Comprehensive metabolic panel; Future    Vasomotor flushing  -     FSH and LH; Future  -     CBC and differential; Future  -     Comprehensive metabolic panel;  Future  -     Ambulatory Referral to Endocrinology; Future  -     Ambulatory Referral to Gynecology; Future    Night sweats  -     FSH and LH; Future  -     CBC and differential; Future  -     Comprehensive metabolic panel; Future  -     Ambulatory Referral to Endocrinology; Future  -     Ambulatory Referral to Gynecology; Future    White coat syndrome with diagnosis of hypertension      No problem-specific Assessment & Plan notes found for this encounter  A/P: Stable and VSS ok except for elevated BP, but OP readings ok  ?white coat syndrome  PE with some ? sinus pathology and ?cause of the headaches despite no URI s/s  Will try abx, mucinex, and INS  ? TMJ, but reports s/s controlled  Unable to take NSAID due to CKD, but see no reason to not try tylenol with last LFT's ok  Sees an eye doc  If headaches continue, re-eval and may need head imaging  Would expect the pt to be post lynn  ?cause of the flushing and sweats  Will check labs, including FSH and LH  Pt requesting referral to endo and Gyn due to wanting to start HRT  RTC one month for f/u HA, sweats, flushing, etc  If s/s continue, ?need for CXR and additional labs  ?need to w/u for lymphoma, Pheo, etc      Subjective:      Patient ID: Metta Meigs is a 79 y o  female  WF with PMH of intermittent headaches, presents c/o one week h/o frontal headache  No trauma, new meds, or dietary changes  Some nausea  No prodrome or aura  No known triggers  H/o a lot of MS issues, but no c spine pain    No associated slurred speech, facial droop, paralysis, or paresthesia  No recent URI s/s  Reports being unable to take tylenol and NSAID's due to liver and renal issues  Also reports months of night sweats and hot flashes  Uncertain if she has gone through menopause  No change in wt  Brought in BP machine and log with sBP less than 130         The following portions of the patient's history were reviewed and updated as appropriate:   She has a past medical history of Aortic regurgitation, Arthritis, Essential hypertension (8/7/2019), ZACK (generalized anxiety disorder) (12/11/2018), Moderate persistent asthma without complication (66/67/9787), Pulmonary emphysema ([facility] ) (10/23/2018), and Stage 3 chronic kidney disease ([facility] ) (12/11/2019)  ,  does not have any pertinent problems on file  ,   has a past surgical history that includes Facial cosmetic surgery; Colonoscopy; Dental surgery; and Hip surgery (Left, 06/2019)  ,  family history includes Aneurysm in her mother; No Known Problems in her brother, father, maternal grandmother, paternal grandmother, sister, sister, and sister  ,   reports that she has never smoked  She has never used smokeless tobacco  She reports that she does not drink alcohol and does not use drugs  ,  is allergic to nsaids, amlodipine, iv contrast [iodinated contrast media], lisinopril, vancomycin, celebrex [celecoxib], fosamax [alendronate], ibuprofen, and influenza vaccines     Current Outpatient Medications   Medication Sig Dispense Refill    azithromycin (ZITHROMAX) 250 mg tablet Take 2 tablets today then 1 tablet daily x 4 days 6 tablet 0    baclofen 10 mg tablet One po tid PRN  270 tablet 0    budesonide-formoterol (SYMBICORT) 160-4 5 mcg/act inhaler Inhale 2 puffs 2 (two) times a day Rinse mouth after use   18 g 5    chlorthalidone 25 mg tablet Take 25 mg by mouth daily      fluticasone (FLONASE) 50 mcg/act nasal spray 1 spray into each nostril daily 16 g 0    guaiFENesin (Mucinex) 600 mg 12 hr tablet Take 1 tablet (600 mg total) by mouth every 12 (twelve) hours 20 tablet 0    losartan (COZAAR) 25 mg tablet Take 25 mg by mouth daily      potassium chloride (K-DUR,KLOR-CON) 10 mEq tablet Take 20 mEq by mouth daily      sertraline (ZOLOFT) 50 mg tablet Take 50 mg by mouth every morning      traMADol (ULTRAM) 50 mg tablet Take 1 tablet (50 mg total) by mouth every 6 (six) hours as needed for moderate pain 30 tablet 0    Ventolin  (90 Base) MCG/ACT inhaler Inhale 2 puffs every 6 (six) hours as needed for wheezing 18 g 5     No current facility-administered medications for this visit  Review of Systems   Constitutional: Negative for activity change, chills, diaphoresis, fatigue and fever  HENT: Negative  Eyes: Negative for visual disturbance  Respiratory: Negative for cough, chest tightness, shortness of breath and wheezing  Cardiovascular: Negative for chest pain, palpitations and leg swelling  Gastrointestinal: Negative for abdominal pain, constipation, diarrhea, nausea and vomiting  Endocrine: Positive for heat intolerance  Negative for cold intolerance  Hot flashes/Night sweat s  Genitourinary: Negative for difficulty urinating, dysuria and frequency  Musculoskeletal: Negative for arthralgias, gait problem and myalgias  Neurological: Positive for headaches  Negative for dizziness, tremors, seizures, syncope, facial asymmetry, speech difficulty, weakness, light-headedness and numbness  Psychiatric/Behavioral: Negative for confusion, dysphoric mood and sleep disturbance  The patient is not nervous/anxious  PHQ-2/9 Depression Screening    Little interest or pleasure in doing things: 0 - not at all  Feeling down, depressed, or hopeless: 0 - not at all  Trouble falling or staying asleep, or sleeping too much: 0 - not at all  Feeling tired or having little energy: 0 - not at all  Poor appetite or overeatin - not at all  Feeling bad about yourself - or that you are a failure or have let yourself or your family down: 0 - not at all  Trouble concentrating on things, such as reading the newspaper or watching television: 0 - not at all  Moving or speaking so slowly that other people could have noticed   Or the opposite - being so fidgety or restless that you have been moving around a lot more than usual: 0 - not at all  Thoughts that you would be better off dead, or of hurting yourself in some way: 0 - not at all  PHQ-9 Score: 0   PHQ-9 Interpretation: No or "Minimal depression         Objective:  Vitals:    05/03/23 1049   BP: 160/90   Pulse: 66   Resp: 18   Temp: 98 8 °F (37 1 °C)   Weight: 48 1 kg (106 lb)   Height: 5' 2\" (1 575 m)     Body mass index is 19 39 kg/m²  Physical Exam  Vitals and nursing note reviewed  Constitutional:       General: She is not in acute distress  Appearance: Normal appearance  She is not ill-appearing  HENT:      Head: Normocephalic and atraumatic  Comments: ?TMJ     Right Ear: Tympanic membrane, ear canal and external ear normal  There is no impacted cerumen  Left Ear: Tympanic membrane, ear canal and external ear normal  There is no impacted cerumen  Nose: Congestion present  Comments: Sinus tenderness with turbinates red and inflamed and cobblestoning  Mouth/Throat:      Mouth: Mucous membranes are moist    Eyes:      Extraocular Movements: Extraocular movements intact  Conjunctiva/sclera: Conjunctivae normal       Pupils: Pupils are equal, round, and reactive to light  Neck:      Vascular: No carotid bruit  Cardiovascular:      Rate and Rhythm: Normal rate and regular rhythm  Heart sounds: Normal heart sounds  No murmur heard  Pulmonary:      Effort: Pulmonary effort is normal  No respiratory distress  Breath sounds: Normal breath sounds  No wheezing, rhonchi or rales  Abdominal:      General: Bowel sounds are normal  There is no distension  Palpations: Abdomen is soft  Tenderness: There is no abdominal tenderness  Musculoskeletal:      Cervical back: Neck supple  Right lower leg: No edema  Left lower leg: No edema  Neurological:      General: No focal deficit present  Mental Status: She is alert and oriented to person, place, and time  Mental status is at baseline  Cranial Nerves: No cranial nerve deficit  Motor: No weakness        Coordination: Coordination normal       Gait: Gait normal       Deep Tendon Reflexes: Reflexes normal  " Psychiatric:         Mood and Affect: Mood normal          Behavior: Behavior normal          Thought Content:  Thought content normal          Judgment: Judgment normal

## 2023-05-04 ENCOUNTER — TELEPHONE (OUTPATIENT)
Dept: ENDOCRINOLOGY | Facility: CLINIC | Age: 68
End: 2023-05-04

## 2023-05-04 ENCOUNTER — TELEPHONE (OUTPATIENT)
Dept: INTERNAL MEDICINE CLINIC | Facility: CLINIC | Age: 68
End: 2023-05-04

## 2023-05-04 DIAGNOSIS — M79.10 MYALGIA: Primary | ICD-10-CM

## 2023-05-04 NOTE — TELEPHONE ENCOUNTER
"Patient called and stated that she was in the office yesterday and she spoke with her PCP about her \"rapid muscle loss\" how it was very quick all in about a month, and she looked at her lab work and she doesn't see the lab to check for that   She said the lab test is called a ACK,       She would like to get it done,     Please advise  Thank you   "

## 2023-05-05 ENCOUNTER — TELEPHONE (OUTPATIENT)
Dept: ENDOCRINOLOGY | Facility: CLINIC | Age: 68
End: 2023-05-05

## 2023-05-05 NOTE — TELEPHONE ENCOUNTER
Received referral for Maicol Heck  Left voicemail for patient to contact office to schedule Consult/New Patient Appointment

## 2023-06-07 ENCOUNTER — APPOINTMENT (OUTPATIENT)
Dept: LAB | Facility: CLINIC | Age: 68
End: 2023-06-07
Payer: MEDICARE

## 2023-06-07 DIAGNOSIS — M79.10 MYALGIA: ICD-10-CM

## 2023-06-07 LAB — CK SERPL-CCNC: 184 U/L (ref 26–192)

## 2023-06-07 PROCEDURE — 36415 COLL VENOUS BLD VENIPUNCTURE: CPT

## 2023-06-07 PROCEDURE — 82550 ASSAY OF CK (CPK): CPT

## 2023-07-05 ENCOUNTER — APPOINTMENT (OUTPATIENT)
Dept: LAB | Facility: CLINIC | Age: 68
End: 2023-07-05
Payer: MEDICARE

## 2023-07-05 DIAGNOSIS — R61 NIGHT SWEATS: ICD-10-CM

## 2023-07-05 DIAGNOSIS — R51.9 NONINTRACTABLE EPISODIC HEADACHE, UNSPECIFIED HEADACHE TYPE: ICD-10-CM

## 2023-07-05 DIAGNOSIS — N18.30 CHRONIC RENAL IMPAIRMENT, STAGE 3 (MODERATE), UNSPECIFIED WHETHER STAGE 3A OR 3B CKD (HCC): ICD-10-CM

## 2023-07-05 DIAGNOSIS — M85.89 OSTEOPENIA OF MULTIPLE SITES: ICD-10-CM

## 2023-07-05 DIAGNOSIS — R23.2 VASOMOTOR FLUSHING: ICD-10-CM

## 2023-07-05 DIAGNOSIS — I10 ESSENTIAL HYPERTENSION: ICD-10-CM

## 2023-07-05 LAB
25(OH)D3 SERPL-MCNC: 33.1 NG/ML (ref 30–100)
BASOPHILS # BLD AUTO: 0.05 THOUSANDS/ÂΜL (ref 0–0.1)
BASOPHILS NFR BLD AUTO: 1 % (ref 0–1)
EOSINOPHIL # BLD AUTO: 0.48 THOUSAND/ÂΜL (ref 0–0.61)
EOSINOPHIL NFR BLD AUTO: 8 % (ref 0–6)
ERYTHROCYTE [DISTWIDTH] IN BLOOD BY AUTOMATED COUNT: 12.2 % (ref 11.6–15.1)
FSH SERPL-ACNC: 122.2 MIU/ML
HCT VFR BLD AUTO: 35.8 % (ref 34.8–46.1)
HGB BLD-MCNC: 11.5 G/DL (ref 11.5–15.4)
IMM GRANULOCYTES # BLD AUTO: 0.02 THOUSAND/UL (ref 0–0.2)
IMM GRANULOCYTES NFR BLD AUTO: 0 % (ref 0–2)
LH SERPL-ACNC: 54.5 MIU/ML
LYMPHOCYTES # BLD AUTO: 2.43 THOUSANDS/ÂΜL (ref 0.6–4.47)
LYMPHOCYTES NFR BLD AUTO: 39 % (ref 14–44)
MCH RBC QN AUTO: 32.9 PG (ref 26.8–34.3)
MCHC RBC AUTO-ENTMCNC: 32.1 G/DL (ref 31.4–37.4)
MCV RBC AUTO: 102 FL (ref 82–98)
MONOCYTES # BLD AUTO: 0.49 THOUSAND/ÂΜL (ref 0.17–1.22)
MONOCYTES NFR BLD AUTO: 8 % (ref 4–12)
NEUTROPHILS # BLD AUTO: 2.83 THOUSANDS/ÂΜL (ref 1.85–7.62)
NEUTS SEG NFR BLD AUTO: 44 % (ref 43–75)
NRBC BLD AUTO-RTO: 0 /100 WBCS
PLATELET # BLD AUTO: 304 THOUSANDS/UL (ref 149–390)
PMV BLD AUTO: 10.7 FL (ref 8.9–12.7)
RBC # BLD AUTO: 3.5 MILLION/UL (ref 3.81–5.12)
WBC # BLD AUTO: 6.3 THOUSAND/UL (ref 4.31–10.16)

## 2023-07-05 PROCEDURE — 85025 COMPLETE CBC W/AUTO DIFF WBC: CPT

## 2023-07-05 PROCEDURE — 83001 ASSAY OF GONADOTROPIN (FSH): CPT

## 2023-07-05 PROCEDURE — 82306 VITAMIN D 25 HYDROXY: CPT

## 2023-07-05 PROCEDURE — 36415 COLL VENOUS BLD VENIPUNCTURE: CPT

## 2023-07-05 PROCEDURE — 83002 ASSAY OF GONADOTROPIN (LH): CPT

## 2023-07-11 ENCOUNTER — RA CDI HCC (OUTPATIENT)
Dept: OTHER | Facility: HOSPITAL | Age: 68
End: 2023-07-11

## 2023-07-11 NOTE — PROGRESS NOTES
[address] [facility] coding opportunities       Chart reviewed, no opportunity found: CHART REVIEWED, NO OPPORTUNITY FOUND        Patients Insurance     Medicare Insurance: Medicare

## 2023-07-12 ENCOUNTER — APPOINTMENT (OUTPATIENT)
Dept: LAB | Facility: CLINIC | Age: 68
End: 2023-07-12
Payer: MEDICARE

## 2023-07-12 DIAGNOSIS — I10 ESSENTIAL HYPERTENSION: ICD-10-CM

## 2023-07-12 DIAGNOSIS — R51.9 NONINTRACTABLE EPISODIC HEADACHE, UNSPECIFIED HEADACHE TYPE: ICD-10-CM

## 2023-07-12 DIAGNOSIS — R61 NIGHT SWEATS: ICD-10-CM

## 2023-07-12 DIAGNOSIS — R23.2 VASOMOTOR FLUSHING: ICD-10-CM

## 2023-07-12 DIAGNOSIS — N18.32 CHRONIC RENAL IMPAIRMENT, STAGE 3B (HCC): ICD-10-CM

## 2023-07-12 PROCEDURE — 80053 COMPREHEN METABOLIC PANEL: CPT

## 2023-07-12 PROCEDURE — 36415 COLL VENOUS BLD VENIPUNCTURE: CPT

## 2023-07-13 LAB
ALBUMIN SERPL BCP-MCNC: 3.6 G/DL (ref 3.5–5)
ALP SERPL-CCNC: 64 U/L (ref 46–116)
ALT SERPL W P-5'-P-CCNC: 18 U/L (ref 12–78)
ANION GAP SERPL CALCULATED.3IONS-SCNC: 0 MMOL/L
AST SERPL W P-5'-P-CCNC: 20 U/L (ref 5–45)
BILIRUB SERPL-MCNC: 0.41 MG/DL (ref 0.2–1)
BUN SERPL-MCNC: 16 MG/DL (ref 5–25)
CALCIUM SERPL-MCNC: 9.4 MG/DL (ref 8.3–10.1)
CHLORIDE SERPL-SCNC: 103 MMOL/L (ref 96–108)
CO2 SERPL-SCNC: 32 MMOL/L (ref 21–32)
CREAT SERPL-MCNC: 1.22 MG/DL (ref 0.6–1.3)
GFR SERPL CREATININE-BSD FRML MDRD: 45 ML/MIN/1.73SQ M
GLUCOSE P FAST SERPL-MCNC: 81 MG/DL (ref 65–99)
POTASSIUM SERPL-SCNC: 3.9 MMOL/L (ref 3.5–5.3)
PROT SERPL-MCNC: 6.8 G/DL (ref 6.4–8.4)
SODIUM SERPL-SCNC: 135 MMOL/L (ref 135–147)

## 2023-07-18 ENCOUNTER — OFFICE VISIT (OUTPATIENT)
Dept: INTERNAL MEDICINE CLINIC | Facility: CLINIC | Age: 68
End: 2023-07-18
Payer: MEDICARE

## 2023-07-18 VITALS
HEART RATE: 71 BPM | HEIGHT: 62 IN | SYSTOLIC BLOOD PRESSURE: 140 MMHG | DIASTOLIC BLOOD PRESSURE: 80 MMHG | WEIGHT: 103 LBS | TEMPERATURE: 98.2 F | BODY MASS INDEX: 18.95 KG/M2 | OXYGEN SATURATION: 97 %

## 2023-07-18 DIAGNOSIS — R23.2 VASOMOTOR FLUSHING: ICD-10-CM

## 2023-07-18 DIAGNOSIS — M79.10 MYALGIA: Primary | ICD-10-CM

## 2023-07-18 DIAGNOSIS — J44.9 ASTHMA-COPD OVERLAP SYNDROME (HCC): ICD-10-CM

## 2023-07-18 DIAGNOSIS — E28.39 MENOPAUSE OVARIAN FAILURE: ICD-10-CM

## 2023-07-18 PROCEDURE — 99213 OFFICE O/P EST LOW 20 MIN: CPT | Performed by: INTERNAL MEDICINE

## 2023-07-18 RX ORDER — BUDESONIDE AND FORMOTEROL FUMARATE DIHYDRATE 160; 4.5 UG/1; UG/1
2 AEROSOL RESPIRATORY (INHALATION) 2 TIMES DAILY
Qty: 6 G | Refills: 5 | Status: SHIPPED | OUTPATIENT
Start: 2023-07-18

## 2023-07-18 NOTE — PROGRESS NOTES
Assessment/Plan:  Problem List Items Addressed This Visit        Respiratory    Asthma-COPD overlap syndrome (HCC)    Relevant Medications    budesonide-formoterol (SYMBICORT) 160-4.5 mcg/act inhaler   Other Visit Diagnoses     Myalgia    -  Primary    Menopause ovarian failure        Vasomotor flushing               Diagnoses and all orders for this visit:    Myalgia    Asthma-COPD overlap syndrome (HCC)  -     budesonide-formoterol (SYMBICORT) 160-4.5 mcg/act inhaler; Inhale 2 puffs 2 (two) times a day Rinse mouth after use. Menopause ovarian failure    Vasomotor flushing        No problem-specific Assessment & Plan notes found for this encounter. A/P: I have spent a total time of 20 minutes on 07/18/23 in caring for this patient including Diagnostic results, Risks and benefits of tx options, Instructions for management, Impressions, Counseling / Coordination of care, Documenting in the medical record, Reviewing / ordering tests, medicine, procedures   and Communicating with other healthcare professionals  Labs and PE w/o any significant abnormalities at this time. Nothing to point a rapid decline in muscle. Discussed the risks of HRT and growth hormone. Pt already has an appt with Endo to discuss replacement. Also, recommend she see GYN as well. Doubt any muscular dystrophy, lymphoma, etc. Given lab studies and past imaging. Continue current treatment and await specialist input. RTC four months for routine. . .      Subjective:      Patient ID: Mansoor Mistry is a 79 y.o. female. WF, former ,  RTC for one month f/u multiple issues. Pt presented last month c/o several months of overall decline. Pt reports going to the gym several times a week, for hours and notes significant muscle wasting. Notes difficulty with sleep, intermittent hot flashes, etc. Was unsure if she had ever gone through menopause and recent labs confirm her passage.  Pt concerned that she may have a muscle disease and that her loss of hormones is causing the problem. Reports being on HRT a few years back and felt great. No fever or chills. NO wt change. No new meds, dietary changes, or exposures. Is requesting HRT and growth hormones to slow the process. The following portions of the patient's history were reviewed and updated as appropriate:   She has a past medical history of Aortic regurgitation, Arthritis, Essential hypertension (8/7/2019), ZACK (generalized anxiety disorder) (12/11/2018), Moderate persistent asthma without complication (14/73/3250), Pulmonary emphysema ([address]) (10/23/2018), and Stage 3 chronic kidney disease ([address]) (12/11/2019). ,  does not have any pertinent problems on file. ,   has a past surgical history that includes Facial cosmetic surgery; Colonoscopy; Dental surgery; and Hip surgery (Left, 06/2019). ,  family history includes Aneurysm in her mother; No Known Problems in her brother, father, maternal grandmother, paternal grandmother, sister, sister, and sister. ,   reports that she has never smoked. She has never used smokeless tobacco. She reports that she does not drink alcohol and does not use drugs. ,  is allergic to nsaids, amlodipine, iv contrast [iodinated contrast media], lisinopril, vancomycin, celebrex [celecoxib], fosamax [alendronate], ibuprofen, and influenza vaccines. .  Current Outpatient Medications   Medication Sig Dispense Refill   • budesonide-formoterol (SYMBICORT) 160-4.5 mcg/act inhaler Inhale 2 puffs 2 (two) times a day Rinse mouth after use. 6 g 5   • baclofen 10 mg tablet One po tid PRN.  270 tablet 0   • chlorthalidone 25 mg tablet Take 25 mg by mouth daily     • fluticasone (FLONASE) 50 mcg/act nasal spray 1 spray into each nostril daily 16 g 0   • guaiFENesin (Mucinex) 600 mg 12 hr tablet Take 1 tablet (600 mg total) by mouth every 12 (twelve) hours 20 tablet 0   • losartan (COZAAR) 25 mg tablet Take 25 mg by mouth daily     • potassium chloride (K-DUR,KLOR-CON) 10 mEq tablet Take 20 mEq by mouth daily     • sertraline (ZOLOFT) 50 mg tablet Take 50 mg by mouth every morning     • traMADol (ULTRAM) 50 mg tablet Take 1 tablet (50 mg total) by mouth every 6 (six) hours as needed for moderate pain 30 tablet 0   • Ventolin  (90 Base) MCG/ACT inhaler Inhale 2 puffs every 6 (six) hours as needed for wheezing 18 g 5     No current facility-administered medications for this visit. Review of Systems   Constitutional: Positive for activity change and fatigue. Negative for chills, diaphoresis and fever. HENT: Negative. Eyes: Negative for visual disturbance. Respiratory: Negative for cough, chest tightness, shortness of breath and wheezing. Cardiovascular: Negative for chest pain, palpitations and leg swelling. Gastrointestinal: Negative for abdominal pain, constipation, diarrhea, nausea and vomiting. Endocrine: Negative for cold intolerance and heat intolerance. Genitourinary: Negative for difficulty urinating, dysuria and frequency. Musculoskeletal: Negative for arthralgias, gait problem and myalgias. Muscle loss   Neurological: Negative for dizziness, seizures, syncope, weakness, light-headedness and headaches. Psychiatric/Behavioral: Positive for sleep disturbance. Negative for confusion and dysphoric mood. The patient is not nervous/anxious. PHQ-2/9 Depression Screening          Objective:  Vitals:    07/18/23 1102   BP: 140/80   Pulse: 71   Temp: 98.2 °F (36.8 °C)   SpO2: 97%   Weight: 46.7 kg (103 lb)   Height: 5' 2" (1.575 m)     Body mass index is 18.84 kg/m². Physical Exam  Vitals and nursing note reviewed. Constitutional:       General: She is not in acute distress. Appearance: Normal appearance. She is not ill-appearing. HENT:      Head: Normocephalic and atraumatic. Mouth/Throat:      Mouth: Mucous membranes are moist.   Eyes:      Extraocular Movements: Extraocular movements intact.       Conjunctiva/sclera: Conjunctivae normal. Pupils: Pupils are equal, round, and reactive to light. Neurological:      General: No focal deficit present. Mental Status: She is alert and oriented to person, place, and time. Mental status is at baseline. Psychiatric:         Mood and Affect: Mood normal.         Behavior: Behavior normal.         Thought Content:  Thought content normal.         Judgment: Judgment normal.

## 2023-07-19 ENCOUNTER — TELEPHONE (OUTPATIENT)
Dept: INTERNAL MEDICINE CLINIC | Facility: CLINIC | Age: 68
End: 2023-07-19

## 2023-07-25 ENCOUNTER — CONSULT (OUTPATIENT)
Dept: ENDOCRINOLOGY | Facility: CLINIC | Age: 68
End: 2023-07-25
Payer: MEDICARE

## 2023-07-25 VITALS
HEIGHT: 62 IN | RESPIRATION RATE: 18 BRPM | SYSTOLIC BLOOD PRESSURE: 170 MMHG | BODY MASS INDEX: 18.95 KG/M2 | OXYGEN SATURATION: 99 % | HEART RATE: 76 BPM | DIASTOLIC BLOOD PRESSURE: 82 MMHG | WEIGHT: 103 LBS

## 2023-07-25 DIAGNOSIS — R61 NIGHT SWEATS: ICD-10-CM

## 2023-07-25 DIAGNOSIS — R23.2 VASOMOTOR FLUSHING: ICD-10-CM

## 2023-07-25 DIAGNOSIS — R23.2 HOT FLASHES: Primary | ICD-10-CM

## 2023-07-25 PROCEDURE — 99204 OFFICE O/P NEW MOD 45 MIN: CPT | Performed by: STUDENT IN AN ORGANIZED HEALTH CARE EDUCATION/TRAINING PROGRAM

## 2023-07-25 NOTE — PROGRESS NOTES
Mansoor Mistry 79 y.o. female MRN: [id number]    Encounter: [id number]      Assessment/Plan     1. Vasomotor flushing    - Ambulatory Referral to Endocrinology  - Catecholamines, fractionated, plasma; Future  - Metanephrine, Fractionated Plasma Free; Future  - TSH, 3rd generation Lab Collect; Future  - T4, free Lab Collect; Future  - Insulin-like growth factor 1 (IGF-1) Lab Collect; Future    2. Night sweats    - Ambulatory Referral to Endocrinology  - Catecholamines, fractionated, plasma; Future  - Metanephrine, Fractionated Plasma Free; Future  - TSH, 3rd generation Lab Collect; Future  - T4, free Lab Collect; Future  - Insulin-like growth factor 1 (IGF-1) Lab Collect; Future  - Cortisol Level,7-9 AM Specimen; Future  - ACTH- Lab Collect; Future    3. Hot flashes  - Ambulatory Referral to Endocrinology  - Catecholamines, fractionated, plasma; Future  - Metanephrine, Fractionated Plasma Free; Future  - TSH, 3rd generation Lab Collect; Future  - T4, free Lab Collect; Future  - Insulin-like growth factor 1 (IGF-1) Lab Collect; Future  - Cortisol Level,7-9 AM Specimen; Future  - ACTH- Lab Collect; Future    [place] presented with progressive symptoms including night sweat, hot flashes for more than 2 years duration,  I reviewed differentials with Lay, infectious and lymphoproliferative causes looks less likely but I leave it to primary team for further evaluation,  Endocrinology causes were reviewed, pheochromocytoma, hyperthyroidism, acromegaly are less likely as well,   I checked metanephrine and catecholamines and IGF-1 , but my suspicious is low,  Hypoglycemia is unlikely as well.   She was receiving HRT for many years until few years ago, it is possible that she is experiencing post menopausal symptoms, she was offered Paxil which she is reluctant,   She was advised to follow up with OBGYN,  She is on steroid shots for muscle pain, I do recommend to decrease the dose and frequency if possible to prevent secondary cushing or adrenal insufficieny,         CC:     Hot flashes, night sweat. History of Present Illness     HPI:    Jessica Mandel is a 79year old female with history of hypertension who is referred by primary physician for hot flushes and night sweat. She reports unpleasant feeling from her head and neck and flushing,  Lay reports head and neck sweating and then feels her entire body becomes hot,  She reports night sweat, she has to change her pillow case and her cloth,  She is concerned about losing muscle,   She was working as   She has been following vegetarian diet. She had been on ortho Tr-cyclin for more than 20 year, she stopped taking them at age 58 when she was living in [address] [place] [address]. She reports losing weight  She lost 10 Ib, in few years duration,  She denies palpitation, tremor, diarrhea, but reports feeling anxious or nervious,  She denies episodic headaches, sweating and tachycardia  She denies flushing associated with diarrhea and or wheezing  She denies change in ring or shoe size, sleep apnea, changes in her appearance,  She has been receiving steroid shot for joint pain and muscle tear. Review of Systems   Constitutional: Positive for fatigue and unexpected weight change. Negative for appetite change. HENT: Negative for trouble swallowing and voice change. Eyes: Negative for visual disturbance. Respiratory: Negative for cough, shortness of breath and wheezing. Cardiovascular: Negative for palpitations and leg swelling. Gastrointestinal: Negative for abdominal pain, constipation, diarrhea, nausea and vomiting. Endocrine: Positive for heat intolerance. Negative for cold intolerance, polyphagia and polyuria. Musculoskeletal: Negative for arthralgias. Skin: Negative for color change, rash and wound. Neurological: Negative for dizziness, tremors, weakness, light-headedness, numbness and headaches.    Psychiatric/Behavioral: Negative for agitation and sleep disturbance. The patient is not nervous/anxious. Historical Information   Past Medical History:   Diagnosis Date   • Aortic regurgitation    • Arthritis    • Essential hypertension 8/7/2019   • ZACK (generalized anxiety disorder) 12/11/2018   • Moderate persistent asthma without complication 81/95/6467   • Pulmonary emphysema ([address]) 10/23/2018   • Stage 3 chronic kidney disease ([address]) 12/11/2019     Past Surgical History:   Procedure Laterality Date   • COLONOSCOPY     • DENTAL SURGERY     • FACIAL COSMETIC SURGERY     • HIP SURGERY Left 06/2019     Social History   Social History     Substance and Sexual Activity   Alcohol Use No     Social History     Substance and Sexual Activity   Drug Use No     Social History     Tobacco Use   Smoking Status Never   Smokeless Tobacco Never     Family History:   Family History   Problem Relation Age of Onset   • Aneurysm Mother    • No Known Problems Father    • No Known Problems Brother         eye issues   • No Known Problems Sister    • No Known Problems Maternal Grandmother    • No Known Problems Paternal Grandmother    • No Known Problems Sister    • No Known Problems Sister        Meds/Allergies   Current Outpatient Medications   Medication Sig Dispense Refill   • baclofen 10 mg tablet One po tid PRN. 270 tablet 0   • budesonide-formoterol (SYMBICORT) 160-4.5 mcg/act inhaler Inhale 2 puffs 2 (two) times a day Rinse mouth after use.  6 g 5   • chlorthalidone 25 mg tablet Take 25 mg by mouth daily     • fluticasone (FLONASE) 50 mcg/act nasal spray 1 spray into each nostril daily 16 g 0   • guaiFENesin (Mucinex) 600 mg 12 hr tablet Take 1 tablet (600 mg total) by mouth every 12 (twelve) hours 20 tablet 0   • losartan (COZAAR) 25 mg tablet Take 25 mg by mouth daily     • potassium chloride (K-DUR,KLOR-CON) 10 mEq tablet Take 20 mEq by mouth daily     • sertraline (ZOLOFT) 50 mg tablet Take 50 mg by mouth every morning     • traMADol (ULTRAM) 50 mg tablet Take 1 tablet (50 mg total) by mouth every 6 (six) hours as needed for moderate pain 30 tablet 0   • Ventolin  (90 Base) MCG/ACT inhaler Inhale 2 puffs every 6 (six) hours as needed for wheezing 18 g 5     No current facility-administered medications for this visit. Allergies   Allergen Reactions   • Nsaids GI Bleeding   • Amlodipine Edema   • Iv Contrast [Iodinated Contrast Media] Other (See Comments)     CKD   • Lisinopril Other (See Comments) and Cough     Kidney injury   • Vancomycin Other (See Comments)     Kidney injury   • Celebrex [Celecoxib] Swelling   • Fosamax [Alendronate]      Bone pain   • Ibuprofen Swelling   • Influenza Vaccines        Objective   Vitals: There were no vitals taken for this visit. Physical Exam  Constitutional:       Appearance: She is well-developed. HENT:      Head: Normocephalic and atraumatic. Nose: Nose normal.   Eyes:      Pupils: Pupils are equal, round, and reactive to light. Neck:      Thyroid: No thyromegaly. Vascular: No JVD. Cardiovascular:      Rate and Rhythm: Normal rate and regular rhythm. Heart sounds: Normal heart sounds. No murmur heard. No friction rub. No gallop. Pulmonary:      Effort: Pulmonary effort is normal. No respiratory distress. Breath sounds: Normal breath sounds. No stridor. No wheezing or rales. Chest:      Chest wall: No tenderness. Abdominal:      General: Bowel sounds are normal. There is no distension. Palpations: Abdomen is soft. There is no mass. Tenderness: There is no abdominal tenderness. There is no guarding. Musculoskeletal:         General: No deformity. Normal range of motion. Cervical back: Normal range of motion. Skin:     General: Skin is warm. Neurological:      Mental Status: She is alert and oriented to person, place, and time. The history was obtained from the review of the chart, patient.     Lab Results:   Lab Results   Component Value Date/Time    Potassium 3.9 07/12/2023 01:53 PM    Potassium 3.2 (L) 10/13/2022 11:57 AM    Potassium 3.2 (L) 09/16/2022 12:44 PM    Chloride 103 07/12/2023 01:53 PM    Chloride 100 10/13/2022 11:57 AM    Chloride 96 09/16/2022 12:44 PM    CO2 32 07/12/2023 01:53 PM    CO2 31 10/13/2022 11:57 AM    CO2 30 09/16/2022 12:44 PM    BUN 16 07/12/2023 01:53 PM    BUN 15 10/13/2022 11:57 AM    BUN 15 09/16/2022 12:44 PM    Creatinine 1.22 07/12/2023 01:53 PM    Creatinine 1.27 10/13/2022 11:57 AM    Creatinine 1.23 09/16/2022 12:44 PM    Glucose, Fasting 81 07/12/2023 01:53 PM    Glucose, Fasting 81 10/13/2022 11:57 AM    Calcium 9.4 07/12/2023 01:53 PM    Calcium 9.7 10/13/2022 11:57 AM    Calcium 9.4 09/16/2022 12:44 PM    eGFR 45 07/12/2023 01:53 PM    eGFR 44 10/13/2022 11:57 AM    eGFR 45 09/16/2022 12:44 PM    TSH 3RD GENERATON 0.981 10/13/2022 11:57 AM    .2 07/05/2023 02:39 PM    LH 54.5 07/05/2023 02:39 PM          Latest Reference Range & Units Most Recent 05/11/20 10:16 06/08/20 08:41 09/30/21 11:12 01/27/22 11:52 04/28/22 11:45 07/11/22 10:06   LUTEINIZING HORMONE See Comment mIU/mL 54.5  7/5/23 14:39         FSH, POC See Comment mIU/mL 122.2  7/5/23 14:39         Hemoglobin A1C Normal 3.8-5.6%; PreDiabetic 5.7-6.4%; Diabetic >=6.5%; Glycemic control for adults with diabetes <7.0% % 5.7 (H)  10/10/22 12:09         eAG, EST AVG Glucose mg/dl 117  10/10/22 12:09         PARATHYROID HORMONE 18.4 - 80.1 pg/mL 46.1  7/11/22 10:06 55.5    44.7 46.1   TSH 3RD GENERATON 0.450 - 4.500 uIU/mL 0.981  10/13/22 11:57  1.320 1.260 1.420     (H): Data is abnormally high    Imaging Studies:         I have personally reviewed pertinent reports. Portions of the record may have been created with voice recognition software. Occasional wrong word or "sound a like" substitutions may have occurred due to the inherent limitations of voice recognition software.  Read the chart carefully and recognize, using context, where substitutions have occurred.

## 2023-08-11 ENCOUNTER — HOSPITAL ENCOUNTER (OUTPATIENT)
Dept: CT IMAGING | Facility: HOSPITAL | Age: 68
End: 2023-08-11
Payer: COMMERCIAL

## 2023-08-11 DIAGNOSIS — H05.89 ORBITAL FLOOR SYNDROME: ICD-10-CM

## 2023-08-11 PROCEDURE — G1004 CDSM NDSC: HCPCS

## 2023-08-11 PROCEDURE — 70482 CT ORBIT/EAR/FOSSA W/O&W/DYE: CPT

## 2023-08-11 RX ADMIN — IOHEXOL 85 ML: 350 INJECTION, SOLUTION INTRAVENOUS at 13:02

## 2023-09-14 NOTE — LETTER
2019    Armond Torres DO  [facility] [address] [facility]    Patient: Alejandro Esteves   YOB: 1955   Date of Visit: 2019     Encounter Diagnosis     ICD-10-CM    1  Primary osteoarthritis of left hip M16 12    2  Trochanteric bursitis of left hip M70 62    3  Chronic bilateral low back pain without sciatica M54 5     G89 29    4  Primary osteoarthritis of right hip M16 11        Dear Dr Fritz Dominique: Thank you for your recent referral of Alejandro Esteves  Please review the attached evaluation summary from Lay's recent visit  Please verify that you agree with the plan of care by signing the attached order  If you have any questions or concerns, please do not hesitate to call  I sincerely appreciate the opportunity to share in the care of one of your patients and hope to have another opportunity to work with you in the near future  Sincerely,    Hope Bell, PT      Referring Provider:      I certify that I have read the below Plan of Care and certify the need for these services furnished under this plan of treatment while under my care  Armond Torres DO  [address] [facility] [address]  [place]          PT Re-Evaluation     Today's date: 2019  Patient name: Alejandro Esteves  : 1955  MRN: [id number]  Referring provider: Alta Armijo DO  Dx:   Encounter Diagnosis     ICD-10-CM    1  Primary osteoarthritis of left hip M16 12    2  Trochanteric bursitis of left hip M70 62    3  Chronic bilateral low back pain without sciatica M54 5     G89 29    4  Primary osteoarthritis of right hip M16 11                   Assessment  Assessment details: Alejandro Esteves is a 59 y o  female presenting to outpatient physical therapy with diagnosis of Primary osteoarthritis of right hip and   Trochanteric bursitis of left hip  History of total hip replacement, left 19   Patient's current impairments include L hip and thigh pain, impaired soft tissue mobility, reduced L hip range of motion, reduced L hip  strength, reduced postural awareness, and reduced activity tolerance  Patient's present functional limitations include difficulty with ADLs with increased need for assistance, reliance on medication and/or modalities for pain relief, poor tolerance for functional mobility and activity, and difficulty completing [facility]  responsibilities  Patient to benefit from skilled outpatient physical therapy 2x/week for 6-8 weeks in order to reduce pain, maximize pain free range of motion, increase strength and stability, and improve functional mobility/functional activity in order to maximize return to prior level of function with reduced limitations  Thank you for your referral  11/15/19 Re-eval for LBP  Pt has long hx of B LBP w/o radic sx  Pt reports diff standing,walking,cleaning,bending and performing self care due to same  Has functional limitations as listed above  Plan to add back stretching and strengthening ex to current program and cont with current goals  12/19/19: Pt seen for re-eval today  She has had 14 OPPT visits to date  She reports an overall 20% improvement in L hip and notes no change in back symptoms  Exam today revealed improved AROM and strength L hip  Pt cont to report high pain levels with no outward expressions of pain  Smiling and conversing pleasantly t/o tx  Impairments: abnormal or restricted ROM, activity intolerance, impaired physical strength, pain with function and poor posture   Barriers to therapy: Perceived pain  to current ex  program   Understanding of Dx/Px/POC: good   Prognosis: fair    Goals  STGs to be achieved in 4 weeks:  1  Pt to demonstrate reduced subjective pain rating "at worst" by at least 2-3 points from Initial Eval in order to allow for reduced pain with ADLs and improved functional activity tolerance   CONT'D  2  Pt to demonstrate increased AROM of L hip  by at least 5-10 degrees in order to allow for greater ease and independence with ADLs and functional mobility  MET  3  Pt to demonstrate full PROM of L hip in order to maximize joint mobility and function and allow for progression of exercise program and achievement of goals  MET  4  Pt to demonstrate increased MMT of L hip  by at least 1/2-1 grade in order to improve safety and stability with ADLs and functional mobility  MET    LTGs to be achieved in 6-8 weeks:  1  Pt will be I with HEP in order to continue to improve quality of life and independence and reduce risk for re-injury  MET  2  Pt to demonstrate return to painfree amb  without limitations or restrictions  Cont'd  3  Pt to demonstrate improved function as noted by achieving or exceeding predicted score on FOTO outcomes assessment tool  CONT'D      Plan  Patient would benefit from: skilled physical therapy  Planned modality interventions: thermotherapy: hydrocollator packs  Planned therapy interventions: manual therapy, strengthening, stretching, therapeutic exercise, therapeutic activities, gait training and balance  Frequency: 2x week  Duration in weeks: 6  Plan of Care beginning date: 12/19/2019  Plan of Care expiration date: 1/30/2019  Treatment plan discussed with: patient        Subjective Evaluation    History of Present Illness  Date of surgery: 6/14/2019  Mechanism of injury: surgery  Mechanism of injury: Pt underwent L THR 6/14/19 at [facility] [address]  Pt had anterior approach L THR  Pt is having a CTscan of the L hip tomorrow due to ongoing pain  Pt has been attending PT at [facility] since the surgery  Pt states she has had complications after surgery including a swollen face and scratched cornea, kidney ds and R leg injury from the OR table  States RLE was swollen and ecchymotic  Pt is having pain in L groin wrapping around to posterior L hip and down to knee  Dxed with troch bursitis  Pt states doctor wanted to inject the hip but pt refused stating it is too tender  11/15/19 LBP eval;: Pt states she has near constant pain in her low back and she feels she has to stretch it  Sleeping is difficult as is bending  Pain w/rotation, tieing shoes, lifting objects, showering, cleaning, vacuuming  19 UPDATE: Pt states she feels her L hip strength is improving but nerve pain and reduced sensation L thigh persist States she notes no change in her back  overall 20% improvement in hip  Recurrent probem    Quality of life: poor    Pain  Current pain ratin (back 9, hip 7)  At best pain ratin  At worst pain ratin  Location: B LBP L>R  Quality: dull ache, throbbing and sharp  Relieving factors: heat, medications and change in position (stretching)  Aggravating factors: standing, walking, lifting, stair climbing and sitting (bending)  Progression: improved    Social Support  Steps to enter house: yes (6)  Stairs in house: no   Lives in: [facility]  Lives with: alone    Employment status: not working  Hand dominance: right      Diagnostic Tests  Abnormal x-ray: back, L hip   Abnormal CT scan: tomorrow  Treatments  Previous treatment: medication and chiropractic  Current treatment: massage, medication and physical therapy  Patient Goals  Patient goals for therapy: decreased pain, increased strength, independence with ADLs/IADLs and increased motion  Patient goal: painfree mobility         Objective     Concurrent Complaints  Positive for disturbed sleep, kidney problem and stomach problem  Postural Observations  Seated posture: good  Correction of posture: makes symptoms better    Additional Postural Observation Details  Decreased thoracic kyphosis and decreased lumbar curve    Observations   Left Hip  Positive for incision (well healed anterior L hip i)       Additional Observation Details  Leg length discrep L longer by approx 1/4 inch in supine, no apparent leg length discrep at this time    Palpation   Left   Tenderness of the erector spinae, iliacus, iliopsoas, lumbar interspinals, lumbar paraspinals and quadratus lumborum  Right   Tenderness of the erector spinae  Tenderness     Left Hip   Tenderness in the greater trochanter  Additional Tenderness Details  Extremely sensitive to touch L gr troch and thigh  (symptom magnification)    Lumbar Screen  Lumbar range of motion within normal limits  Neurological Testing     Sensation     Lumbar   Left   Diminished: light touch    Right   Intact: light touch    Comments   Left light touch: L thigh, lat thigh, glut    Hip   Left Hip   Diminished: light touch    Reflexes   Left   Patellar (L4): normal (2+)    Right   Patellar (L4): normal (2+)    Active Range of Motion     Lumbar   Flexion:  WFL  Extension:  Restriction level: moderate  Left lateral flexion:  WFL Restriction level: minimal  Right lateral flexion:  WFL Restriction level: minimal  Left rotation:  WFL Restriction level: minimal  Right rotation:  WFL Restriction level: minimal  Left Hip   Flexion: 125 degrees   Abduction: 38 degrees     Right Hip   Flexion: 135 degrees   Abduction: 45 degrees   Mechanical Assessment    Cervical      Thoracic      Lumbar    Standing flexion: repeated movements   Pain location:no change  Pain intensity: worse  Pain level: increased  Standing extension: repeated movements  Pain location: no change  Pain intensity: worse  Pain level: increased    Strength/Myotome Testing     Left Hip   Planes of Motion   Flexion: 4+  Extension: 4  Abduction: 4+  Adduction: 5    Right Hip   Planes of Motion   Flexion: 5  Extension: 4+  Abduction: 5  Adduction: 5    Left Knee   Flexion: 5  Extension: 5    Right Knee   Flexion: 5  Extension: 5    Additional Strength Details  Pt puts forth questionable effort for MMT      Tests     Lumbar     Left   Negative slump test      Right   Negative slump test      Left Pelvic Girdle/Sacrum   Negative: active SLR test      Right Pelvic Girdle/Sacrum   Negative: active SLR test      Left Hip   Negative long sit  Right Hip   Negative long sit  Ambulation   Weight-Bearing Status   Weight-Bearing Status (Left): full weight bearing   Weight-Bearing Status (Right): full weight-bearing    Assistive device used: none (pt states she may need to go back on crutches )    Ambulation: Level Surfaces   Ambulation without assistive device: independent    Ambulation: Stairs   Ascend stairs: independent  Pattern: non-reciprocal  Railings: one rail  Descend stairs: independent  Pattern: non-reciprocal  Railings: one rail    Observational Gait   Gait: antalgic   Right step length within functional limits  Increased right stance time  Decreased walking speed, stride length, left stance time, left swing time, right swing time and left step length  Left foot contact pattern: foot flat  Right foot contact pattern: heel to toe    General Comments:    Lower quarter screen   Knees: unremarkable  Foot/ankle: unremarkable    Hip Comments   Recent L anterior approach hip repl   6/14/19 12/19: Pt states her stomach is still upset from colonoscopy yest and would only like to go on Nustep         Precautions: symptom magnification,chronic pain syndrome, anxiety    Re-eval Date:12/9/19          Date 12/9/19 12/11 12/16/19 12/19/19    Visit Count 11 12 13 14    FOTO    NV    Pain In 6/10 7/10 hip and back 6/10 hip  8/10 LB     Pain Out 6/10  same     Next MD 1/17 Sunday 12/19 [place]               Manual  12/9 12/11 12/16 12/19    Ham stretch B 4x30"self declined declined     Piriformis stretch        ITB stretch B 3x30" declined declined     Gastroc/soleus L 5x/30" declined decclined                 Date 12/9/19 12/11 12/16/19 12/19    [address] [facility] vs  Nustep NS L2  10' [address] [facility]  L2 12' NS L1 12' NS  L!  11'    Hip flexor stretch   B 3x/30" declined      Heel slides HEP       SAQs HEP       Hip add squeezes  HEP       hooklying abd w/TB    HEP       Clamshells    See below       90/90 SL abd         SLRs     HEP       [employer] See below       TR/HR          Wobble board  Side-side  Fwd/back          elliptical          Leg ext mach   22# 40x 22# 3x20 22# 4x20     Leg  curl mach   11# 40x 11#  3x20 11# 4x20     Resisted amb in [place]  4 dir          Obstacle course        Leg press   70# 60 70# 60 70# 60x     SKTC  DKTC B 10x/5"  10x/5" B 10x5"  10x5" B 10x5"  10x5"     LTR 10x5" 12x5" 12x5"     Bridge 20x/5" 20x5" 20x5"     Clamshells Sidely on R  40x SL on R  40x SL  on R  40x                       Modalities  12/9 12/11 12/16     HP to L thigh/hip Semi-reclined to L hip and LB   10 min 10 min 10 min to low back Additional Notes: Pt to sign records release for recent lab result. Pt had > 50 skin tags. Detail Level: Simple Render Risk Assessment In Note?: no Additional Notes: Pt has early acnathosis in axillae and on neck.   Pt denies h/o hypertension or DM.  I have asked that he forward his recent s labs to me as I want to make sure an A1C was done.

## 2023-09-21 ENCOUNTER — APPOINTMENT (OUTPATIENT)
Dept: LAB | Facility: CLINIC | Age: 68
End: 2023-09-21
Payer: MEDICARE

## 2023-09-21 DIAGNOSIS — R80.9 PROTEINURIA, UNSPECIFIED TYPE: ICD-10-CM

## 2023-09-21 DIAGNOSIS — N18.31 ANEMIA IN STAGE 3A CHRONIC KIDNEY DISEASE: ICD-10-CM

## 2023-09-21 DIAGNOSIS — R31.9 HEMATURIA, UNSPECIFIED TYPE: ICD-10-CM

## 2023-09-21 DIAGNOSIS — I10 ESSENTIAL HYPERTENSION: ICD-10-CM

## 2023-09-21 DIAGNOSIS — E87.1 HYPONATREMIA: ICD-10-CM

## 2023-09-21 DIAGNOSIS — N18.31 STAGE 3A CHRONIC KIDNEY DISEASE (CKD) (HCC): ICD-10-CM

## 2023-09-21 DIAGNOSIS — D63.1 ANEMIA IN STAGE 3A CHRONIC KIDNEY DISEASE: ICD-10-CM

## 2023-09-21 PROCEDURE — 36415 COLL VENOUS BLD VENIPUNCTURE: CPT

## 2023-09-21 PROCEDURE — 85018 HEMOGLOBIN: CPT

## 2023-09-21 PROCEDURE — 83550 IRON BINDING TEST: CPT

## 2023-09-21 PROCEDURE — 83540 ASSAY OF IRON: CPT

## 2023-09-21 PROCEDURE — 82728 ASSAY OF FERRITIN: CPT

## 2023-09-21 PROCEDURE — 85014 HEMATOCRIT: CPT

## 2023-09-21 PROCEDURE — 83735 ASSAY OF MAGNESIUM: CPT

## 2023-09-21 PROCEDURE — 80069 RENAL FUNCTION PANEL: CPT

## 2023-09-22 LAB
ALBUMIN SERPL BCP-MCNC: 4 G/DL (ref 3.5–5)
ANION GAP SERPL CALCULATED.3IONS-SCNC: 6 MMOL/L
BUN SERPL-MCNC: 20 MG/DL (ref 5–25)
CALCIUM SERPL-MCNC: 9.8 MG/DL (ref 8.4–10.2)
CHLORIDE SERPL-SCNC: 101 MMOL/L (ref 96–108)
CO2 SERPL-SCNC: 32 MMOL/L (ref 21–32)
CREAT SERPL-MCNC: 1.44 MG/DL (ref 0.6–1.3)
FERRITIN SERPL-MCNC: 95 NG/ML (ref 11–307)
GFR SERPL CREATININE-BSD FRML MDRD: 37 ML/MIN/1.73SQ M
GLUCOSE P FAST SERPL-MCNC: 91 MG/DL (ref 65–99)
HCT VFR BLD AUTO: 37.5 % (ref 34.8–46.1)
HGB BLD-MCNC: 12.3 G/DL (ref 11.5–15.4)
IRON SATN MFR SERPL: 26 % (ref 15–50)
IRON SERPL-MCNC: 77 UG/DL (ref 50–212)
MAGNESIUM SERPL-MCNC: 1.9 MG/DL (ref 1.9–2.7)
PHOSPHATE SERPL-MCNC: 3.7 MG/DL (ref 2.3–4.1)
POTASSIUM SERPL-SCNC: 4 MMOL/L (ref 3.5–5.3)
SODIUM SERPL-SCNC: 139 MMOL/L (ref 135–147)
TIBC SERPL-MCNC: 292 UG/DL (ref 250–450)
UIBC SERPL-MCNC: 215 UG/DL (ref 155–355)

## 2024-01-25 ENCOUNTER — TELEPHONE (OUTPATIENT)
Dept: INTERNAL MEDICINE CLINIC | Facility: CLINIC | Age: 69
End: 2024-01-25

## 2024-01-25 NOTE — TELEPHONE ENCOUNTER
Patient is calling with a requesting a uric acid test, because her toes are killing her and she is concerned it may be gout .

## 2024-01-26 ENCOUNTER — APPOINTMENT (OUTPATIENT)
Dept: LAB | Facility: CLINIC | Age: 69
End: 2024-01-26
Payer: MEDICARE

## 2024-01-26 DIAGNOSIS — N18.32 CHRONIC RENAL IMPAIRMENT, STAGE 3B (HCC): Primary | ICD-10-CM

## 2024-01-26 DIAGNOSIS — R73.03 PRE-DIABETES: ICD-10-CM

## 2024-01-26 DIAGNOSIS — E78.9 BORDERLINE HIGH SERUM CHOLESTEROL: ICD-10-CM

## 2024-01-26 DIAGNOSIS — R61 NIGHT SWEATS: ICD-10-CM

## 2024-01-26 DIAGNOSIS — N18.32 CHRONIC RENAL IMPAIRMENT, STAGE 3B (HCC): ICD-10-CM

## 2024-01-26 DIAGNOSIS — D75.89 MACROCYTOSIS WITHOUT ANEMIA: ICD-10-CM

## 2024-01-26 DIAGNOSIS — Z13.220 SCREENING FOR LIPID DISORDERS: ICD-10-CM

## 2024-01-26 DIAGNOSIS — D52.0 DIETARY FOLATE DEFICIENCY ANEMIA: ICD-10-CM

## 2024-01-26 DIAGNOSIS — R23.2 VASOMOTOR FLUSHING: ICD-10-CM

## 2024-01-26 DIAGNOSIS — I10 ESSENTIAL HYPERTENSION: ICD-10-CM

## 2024-01-26 DIAGNOSIS — N18.30 CHRONIC RENAL IMPAIRMENT, STAGE 3 (MODERATE), UNSPECIFIED WHETHER STAGE 3A OR 3B CKD (HCC): ICD-10-CM

## 2024-01-26 LAB — URATE SERPL-MCNC: 4.7 MG/DL (ref 2–7.5)

## 2024-01-26 PROCEDURE — 84550 ASSAY OF BLOOD/URIC ACID: CPT

## 2024-01-26 PROCEDURE — 36415 COLL VENOUS BLD VENIPUNCTURE: CPT

## 2024-01-30 ENCOUNTER — TELEPHONE (OUTPATIENT)
Dept: PODIATRY | Facility: CLINIC | Age: 69
End: 2024-01-30

## 2024-01-30 ENCOUNTER — TELEPHONE (OUTPATIENT)
Age: 69
End: 2024-01-30

## 2024-01-30 NOTE — TELEPHONE ENCOUNTER
Caller: Lay Marie    Doctor: Ha    Reason for call: Please call Lay back.  She is upset and says she needs to be seen ASAP. As she has an infection.    Call back#: [phone]

## 2024-01-31 ENCOUNTER — TELEPHONE (OUTPATIENT)
Dept: PODIATRY | Facility: CLINIC | Age: 69
End: 2024-01-31

## 2024-01-31 ENCOUNTER — TELEPHONE (OUTPATIENT)
Age: 69
End: 2024-01-31

## 2024-01-31 NOTE — TELEPHONE ENCOUNTER
Caller: Lay Frank    Doctor: Noé Bonner DPM    Reason for call: Lay has been waiting for a call from the office to schedule an appointment.  There is a message in the chart that the office called and LVOM for patient to schedule an appt for 2/1/24.  She never received the voicemail and there are no available same day appointments to give her.  Please call her as she has an infection and is very worried.    Call back#: [phone]

## 2024-02-01 ENCOUNTER — TELEPHONE (OUTPATIENT)
Dept: PODIATRY | Facility: CLINIC | Age: 69
End: 2024-02-01

## 2024-02-02 ENCOUNTER — TELEPHONE (OUTPATIENT)
Dept: PODIATRY | Facility: CLINIC | Age: 69
End: 2024-02-02

## 2024-02-15 ENCOUNTER — OFFICE VISIT (OUTPATIENT)
Dept: PODIATRY | Facility: CLINIC | Age: 69
End: 2024-02-15
Payer: MEDICARE

## 2024-02-15 VITALS — WEIGHT: 103 LBS | BODY MASS INDEX: 18.84 KG/M2

## 2024-02-15 DIAGNOSIS — L60.0 INGROWING NAIL: ICD-10-CM

## 2024-02-15 DIAGNOSIS — M79.675 PAIN OF TOE OF LEFT FOOT: Primary | ICD-10-CM

## 2024-02-15 PROCEDURE — 99203 OFFICE O/P NEW LOW 30 MIN: CPT | Performed by: PODIATRIST

## 2024-02-15 PROCEDURE — 11750 EXCISION NAIL&NAIL MATRIX: CPT | Performed by: PODIATRIST

## 2024-02-15 RX ORDER — LIDOCAINE HYDROCHLORIDE 10 MG/ML
3 INJECTION, SOLUTION EPIDURAL; INFILTRATION; INTRACAUDAL; PERINEURAL ONCE
Status: COMPLETED | OUTPATIENT
Start: 2024-02-15 | End: 2024-02-15

## 2024-02-15 RX ADMIN — LIDOCAINE HYDROCHLORIDE 3 ML: 10 INJECTION, SOLUTION EPIDURAL; INFILTRATION; INTRACAUDAL; PERINEURAL at 14:09

## 2024-02-15 NOTE — PROGRESS NOTES
Podiatry - Clinic Visit  Lay Marie 68 y.o. female MRN: [id number]    Assessment/Plan    No problem-specific Assessment & Plan notes found for this encounter.       Diagnoses and all orders for this visit:    Pain of toe of left foot  -     lidocaine (PF) (XYLOCAINE-MPF) 1 % injection 3 mL    Ingrowing nail  -     lidocaine (PF) (XYLOCAINE-MPF) 1 % injection 3 mL    Other orders  -     Nail removal         Plan:  - Pt seen/examined  - Please see procedure note  - Instructed pt to remove bandage tomorrow and start epsom salt soak baths  - Instructed to apply triple antibiotic ointment, and cover with a band-aid after soaks daily.  - rtc 2 week, phenol    Nail removal    Date/Time: 2/15/2024 1:30 PM    Performed by: Noé Bonner DPM  Authorized by: Noé Bonner DPM    Patient location:  ClinicUniversal Protocol:  Consent: Verbal consent obtained.  Risks and benefits: risks, benefits and alternatives were discussed  Consent given by: patient  Timeout called at: 2/15/2024 2:04 PM.  Patient understanding: patient states understanding of the procedure being performed  Patient consent: the patient's understanding of the procedure matches consent given    Location:     Foot:  L big toe  Pre-procedure details:     Skin preparation:  Betadine and alcohol  Anesthesia (see MAR for exact dosages):     Anesthesia method:  Local infiltration  Nail Removal:     Nail removed:  Partial    Nail side:  Lateral  Ingrown nail:     Nail matrix removed or ablated:  Partial  Post-procedure details:     Dressing:  4x4 sterile gauze, antibiotic ointment and gauze roll    Patient tolerance of procedure:  Tolerated well, no immediate complications          History of Present Illness     HPI:  Patient presents with pain to the Laterally: left toe. This has been present for months. They have not experienced redness, swelling, increased pain and discharge. They have attempted soaking in epsom salts without success.  They are having continued pain and drainage. They have not attempted antibiotics.    Review of Systems   Constitutional: Negative.    HENT: Negative.    Eyes: Negative.    Respiratory: Negative.    Cardiovascular: Negative.    Gastrointestinal: Negative.    Musculoskeletal: neg   Skin: ingrowing nail.   Neurological: Negative.        Historical Information   Past Medical History:   Diagnosis Date    Aortic regurgitation     Arthritis     Essential hypertension 8/7/2019    ZACK (generalized anxiety disorder) 12/11/2018    Moderate persistent asthma without complication 10/23/2018    Pulmonary emphysema (HCC) 10/23/2018    Stage 3 chronic kidney disease (HCC) 12/11/2019     Past Surgical History:   Procedure Laterality Date    COLONOSCOPY      DENTAL SURGERY      FACIAL COSMETIC SURGERY      HIP SURGERY Left 06/2019     Social History   Social History     Substance and Sexual Activity   Alcohol Use No     Social History     Substance and Sexual Activity   Drug Use No     Social History     Tobacco Use   Smoking Status Never   Smokeless Tobacco Never     Family History:   Family History   Problem Relation Age of Onset    Aneurysm Mother     No Known Problems Father     No Known Problems Brother         eye issues    No Known Problems Sister     No Known Problems Maternal Grandmother     No Known Problems Paternal Grandmother     No Known Problems Sister     No Known Problems Sister        Meds/Allergies   (Not in a hospital admission)    Allergies   Allergen Reactions    Nsaids GI Bleeding    Amlodipine Edema    Iv Contrast [Iodinated Contrast Media] Other (See Comments)     CKD    Lisinopril Other (See Comments) and Cough     Kidney injury    Vancomycin Other (See Comments)     Kidney injury    Celebrex [Celecoxib] Swelling    Fosamax [Alendronate]      Bone pain    Ibuprofen Swelling    Influenza Vaccines        Objective   First Vitals:   @VSFIRST2(5,8,6,7,9,11,14,10:FIRST)@    Current Vitals:   Weight - Scale: 46.7  kg (103 lb) (02/15/24 1357)        Wt 46.7 kg (103 lb)   LMP  (LMP Unknown)   BMI 18.84 kg/m²     General Appearance:    Alert, cooperative, no distress    Constitutional: Patient is not distressed.   Patient is well developed.   Patient is not obese.   Extremities:   MMT is 5/5 to all compartments of the LE, +0/4 edema B/L, Digital ROM is intact, Pain on palpation of hallux nail.  Normal range of motion to ankle, subtalar joint, and midtarsal joint.   Normal range of motion first MTPJ.   Manual muscle testing 5 out of 5 for inversion/eversion/dorsiflexion/plantarflexion.  On stance patients feet are generally rectus.   Pulses:   R DP is +2/4, R PT is +2/4, L DP is +2/4, L PT is +2/4, CFT< 3sec to all digits. No erythema.   No edema.   No significant varicosities.   Skin:   Ingrowing nail to medial border of left hallux nail. no drainage.          Dermatology: No rash.   No open lesions.   Present pedal hair.   Skin has healthy turgor.    Neurological: Monofilament sensation is intact.   Vibratory sensation is intact.   Achilles reflex is normal.   Proprioception is normal    Respiratory: Normal respiratory effort, no distress    Psych: Patient is AAOx3. Normal mood.     Lymphatic: nonpalpable popliteal lymph nodes  Nonpalpable groin lymph nodes

## 2024-02-15 NOTE — PATIENT INSTRUCTIONS
Ingrown Nail   WHAT YOU NEED TO KNOW:   An ingrown nail is when the edge of your fingernail or toenail grows into the skin next to it. The most common cause is when nails are trimmed too short.       DISCHARGE INSTRUCTIONS:   Return to the emergency department if:   You have a red streak running up your leg or arm.      Contact your healthcare provider if:   Your pain is getting worse.    Your nail and skin are more swollen or start to drain pus.    You have a fever or chills.    Your ingrown nail is not better in 7 days.    You have questions or concerns about your condition or care.    Medicines:   Acetaminophen  decreases pain and can be bought without a doctor's order. Ask how much to take and how often to take it. Follow directions. Acetaminophen can cause liver damage if not taken correctly.    NSAIDs , such as ibuprofen, help decrease swelling, pain, and fever. This medicine is available with or without a doctor's order. NSAIDs can cause stomach bleeding or kidney problems in certain people. If you take blood thinner medicine, always ask your healthcare provider if NSAIDs are safe for you. Always read the medicine label and follow directions.    Antibiotics  help treat or prevent a bacterial infection. They may be given as an ointment, pill, or both.    Take your medicine as directed.  Contact your healthcare provider if you think your medicine is not helping or if you have side effects. Tell your provider if you are allergic to any medicine. Keep a list of the medicines, vitamins, and herbs you take. Include the amounts, and when and why you take them. Bring the list or the pill bottles to follow-up visits. Carry your medicine list with you in case of an emergency.    Self-care:   Soak and lift the nail.  Soak your ingrown nail in warm water for 20 minutes, 2 to 3 times each day. Then gently lift the edge of the ingrown nail away from the skin. Wedge a small piece of cotton or gauze under the corner of the  nail. You can also put dental floss under the nail to lift the edge away from the skin. This may help keep the nail from growing into the skin.     Keep your nails clean and dry.  Wash your hands and feet with soap and water. Pat dry with a clean towel. Dry in between each toe. Do not put lotion between your toes.    Prevent another ingrown nail:   Carefully trim your nails.  Cut your nails straight across. Do not cut them too short. Lightly file the nail corners if you have sharp edges. Do not round your nails. Do not rip or tear off the tips of your nails. This may cause your nail edge to grow into the skin. Use clippers, not nail scissors.         Wear shoes and socks that fit well.  Make sure they are not too tight. You may need to wear a shoe with the toe cut out, such as sandals, until your ingrown toenail heals. Do not wear shoes that have pointed toes or heels that are more than 2 inches high. Do not wear tight hose or socks. Wear socks that pull moisture away from your feet, such as cotton-acrylic blends.    Inspect your nails daily.  Look for signs of an ingrown nail. Manage problems early so the nail does not become infected.    Follow up with your healthcare provider as directed:  You may be referred to a podiatrist. Write down your questions so you remember to ask them during your visits.   © Copyright [employer] 2023 Information is for End User's use only and may not be sold, redistributed or otherwise used for commercial purposes.  The above information is an  only. It is not intended as medical advice for individual conditions or treatments. Talk to your doctor, nurse or pharmacist before following any medical regimen to see if it is safe and effective for you.

## 2024-02-20 ENCOUNTER — TELEPHONE (OUTPATIENT)
Dept: PODIATRY | Facility: CLINIC | Age: 69
End: 2024-02-20

## 2024-02-20 ENCOUNTER — TELEPHONE (OUTPATIENT)
Dept: OBGYN CLINIC | Facility: HOSPITAL | Age: 69
End: 2024-02-20

## 2024-02-20 NOTE — TELEPHONE ENCOUNTER
Hello,     Please advise if the following patient can be forced onto the schedule:     Patient: Lay Marie  : 1955  MRN: [id number]    Call back:   [phone]  Reason for appointment: Dr. Bonner would like patient to return in 2 weeks from 2/15/24 for ingrown toenail.  Schedule is full   Requested doctor/location: Dr. Bonner/[facility]        Thank you,

## 2024-03-06 ENCOUNTER — OFFICE VISIT (OUTPATIENT)
Dept: PODIATRY | Facility: CLINIC | Age: 69
End: 2024-03-06
Payer: MEDICARE

## 2024-03-06 VITALS — BODY MASS INDEX: 18.84 KG/M2 | WEIGHT: 103 LBS

## 2024-03-06 DIAGNOSIS — M79.675 PAIN OF TOE OF LEFT FOOT: Primary | ICD-10-CM

## 2024-03-06 DIAGNOSIS — L60.0 INGROWING NAIL: ICD-10-CM

## 2024-03-06 PROCEDURE — 99212 OFFICE O/P EST SF 10 MIN: CPT | Performed by: PODIATRIST

## 2024-03-06 NOTE — PROGRESS NOTES
Podiatry - Clinic Visit  Lay Marie 68 y.o. female MRN: [id number]    Assessment/Plan    No problem-specific Assessment & Plan notes found for this encounter.       Diagnoses and all orders for this visit:    Pain of toe of left foot    Ingrowing nail         Plan:  - Pt seen/examined, now 2 weeks s/p IGTN removal  - signs infection are now resolved, minimal to no pain on examination.  There is minimal drainage.  Patient may continue with normal shoe gear and no further restrictions.  - patient is to return to clinic p.r.n.    History of Present Illness     HPI:  HPI:  Patient presents with pain to the Laterally: left toe.  Had an ingrown toenail removal approximately 2 weeks ago, and they have been complying with postoperative instructions such as soaking antibiotic ointment and Band-Aid.  They note improved pain, and nearly resolved drainage.  They have no other complaints      Review of Systems   Constitutional: Negative.    HENT: Negative.    Eyes: Negative.    Respiratory: Negative.    Cardiovascular: Negative.    Gastrointestinal: Negative.    Musculoskeletal: Negative   Skin: ingrowing nail sp removal  Neurological: Negative.        Historical Information   Past Medical History:   Diagnosis Date    Aortic regurgitation     Arthritis     Essential hypertension 8/7/2019    ZACK (generalized anxiety disorder) 12/11/2018    Moderate persistent asthma without complication 10/23/2018    Pulmonary emphysema (HCC) 10/23/2018    Stage 3 chronic kidney disease (HCC) 12/11/2019     Past Surgical History:   Procedure Laterality Date    COLONOSCOPY      DENTAL SURGERY      FACIAL COSMETIC SURGERY      HIP SURGERY Left 06/2019     Social History   Social History     Substance and Sexual Activity   Alcohol Use No     Social History     Substance and Sexual Activity   Drug Use No     Social History     Tobacco Use   Smoking Status Never   Smokeless Tobacco Never     Family History:   Family History   Problem Relation Age  of Onset    Aneurysm Mother     No Known Problems Father     No Known Problems Brother         eye issues    No Known Problems Sister     No Known Problems Maternal Grandmother     No Known Problems Paternal Grandmother     No Known Problems Sister     No Known Problems Sister        Meds/Allergies   (Not in a hospital admission)    Allergies   Allergen Reactions    Nsaids GI Bleeding    Amlodipine Edema    Iv Contrast [Iodinated Contrast Media] Other (See Comments)     CKD    Lisinopril Other (See Comments) and Cough     Kidney injury    Vancomycin Other (See Comments)     Kidney injury    Celebrex [Celecoxib] Swelling    Fosamax [Alendronate]      Bone pain    Ibuprofen Swelling    Influenza Vaccines        Objective   First Vitals:   @VSFIRST2(5,8,6,7,9,11,14,10:FIRST)@    Current Vitals:   Weight - Scale: 46.7 kg (103 lb) (03/06/24 1323)        Wt 46.7 kg (103 lb)   LMP  (LMP Unknown)   BMI 18.84 kg/m²     General Appearance:    Alert, cooperative, no distress    Constitutional: Patient is not distressed.   Patient is well developed.   Patient is not obese.   Extremities:   MMT is 5/5 to all compartments of the LE, +0/4 edema B/L, Digital ROM is intact  Normal range of motion to ankle, subtalar joint, and midtarsal joint.   Normal range of motion first MTPJ.   Manual muscle testing 5 out of 5 for inversion/eversion/dorsiflexion/plantarflexion.  On stance patients feet are generally rectus.   Pulses:   R DP is +2/4, R PT is +2/4, L DP is +2/4, L PT is +2/4, CFT< 3sec to all digits. No erythema.   No edema.   No significant varicosities.   Skin:   Resolved Ingrowing nail to medial border of left hallux nail. Dried  drainage.          Dermatology: No rash.   No open lesions.   Present pedal hair.   Skin has healthy turgor.    Neurological: Monofilament sensation is intact.   Vibratory sensation is intact.   Achilles reflex is normal.   Proprioception is normal    Respiratory: Normal respiratory effort, no  distress    Psych: Patient is AAOx3. Normal mood.     Lymphatic: nonpalpable popliteal lymph nodes  Nonpalpable groin lymph nodes

## 2024-04-09 ENCOUNTER — TELEPHONE (OUTPATIENT)
Dept: INTERNAL MEDICINE CLINIC | Facility: CLINIC | Age: 69
End: 2024-04-09

## 2024-05-22 ENCOUNTER — TELEPHONE (OUTPATIENT)
Age: 69
End: 2024-05-22

## 2024-05-22 NOTE — TELEPHONE ENCOUNTER
PT called in stating she was bit by a tick and would like to have an order for blood work to ensure she does not have Lyme Disease.    Please call pt back asap in regards to her request or further questions. Thank you!    Lay Marie (Self)   [phone] (Mobile)

## 2024-06-10 ENCOUNTER — OFFICE VISIT (OUTPATIENT)
Dept: OBGYN CLINIC | Facility: CLINIC | Age: 69
End: 2024-06-10
Payer: MEDICARE

## 2024-06-10 VITALS
DIASTOLIC BLOOD PRESSURE: 70 MMHG | WEIGHT: 103.6 LBS | SYSTOLIC BLOOD PRESSURE: 140 MMHG | HEIGHT: 62 IN | BODY MASS INDEX: 19.06 KG/M2

## 2024-06-10 DIAGNOSIS — N95.1 VASOMOTOR SYMPTOMS DUE TO MENOPAUSE: ICD-10-CM

## 2024-06-10 DIAGNOSIS — Z12.31 ENCOUNTER FOR SCREENING MAMMOGRAM FOR MALIGNANT NEOPLASM OF BREAST: ICD-10-CM

## 2024-06-10 DIAGNOSIS — Z01.419 ENCOUNTER FOR GYNECOLOGICAL EXAMINATION: Primary | ICD-10-CM

## 2024-06-10 PROCEDURE — G0101 CA SCREEN;PELVIC/BREAST EXAM: HCPCS | Performed by: STUDENT IN AN ORGANIZED HEALTH CARE EDUCATION/TRAINING PROGRAM

## 2024-06-10 PROCEDURE — 99203 OFFICE O/P NEW LOW 30 MIN: CPT | Performed by: STUDENT IN AN ORGANIZED HEALTH CARE EDUCATION/TRAINING PROGRAM

## 2024-06-10 RX ORDER — ESTRADIOL 0.04 MG/D
1 PATCH, EXTENDED RELEASE TRANSDERMAL 2 TIMES WEEKLY
Qty: 8 PATCH | Refills: 11 | Status: SHIPPED | OUTPATIENT
Start: 2024-06-10 | End: 2024-06-24 | Stop reason: ALTCHOICE

## 2024-06-10 RX ORDER — PROGESTERONE 100 MG/1
1 CAPSULE ORAL
Qty: 30 CAPSULE | Refills: 11 | Status: SHIPPED | OUTPATIENT
Start: 2024-06-10 | End: 2024-06-24 | Stop reason: ALTCHOICE

## 2024-06-11 DIAGNOSIS — R51.9 NONINTRACTABLE EPISODIC HEADACHE, UNSPECIFIED HEADACHE TYPE: ICD-10-CM

## 2024-06-11 RX ORDER — FLUTICASONE PROPIONATE 50 MCG
1 SPRAY, SUSPENSION (ML) NASAL DAILY
Qty: 16 G | Refills: 0 | Status: SHIPPED | OUTPATIENT
Start: 2024-06-11

## 2024-06-14 ENCOUNTER — TELEPHONE (OUTPATIENT)
Age: 69
End: 2024-06-14

## 2024-06-14 NOTE — TELEPHONE ENCOUNTER
Pt was notified of Dr. Mireles's recommendations of I would discourage patient from using the estrogen without the progesterone.   Estradiol without progesterone can cause the uterine lining to build up and can cause abnormal bleeding and endometrial cancer.     Pt verbalized understanding, no further questions at this time.

## 2024-06-14 NOTE — TELEPHONE ENCOUNTER
Pt called in stating that she is prescribe the progesterone and estradiol medications, pt stating that the progesterone is on back order and she is currently waiting for it to come back in stock. Patient wanted to know if it is OK to start taking the estradiol patch without the progesterone?     Patient also wanted to notify the office that both medications need a prior auth:     estradiol (Vivelle-Dot) 0.0375 MG/24HR [[id number]]   Progesterone 100 MG CAPS [[id number]]

## 2024-06-18 NOTE — TELEPHONE ENCOUNTER
PA for Progesterone 100 MG CAPS     Submitted via    []CMM-KEY   [x][employer]-Case ID # [id number]   []Faxed to plan   []Other website   []Phone call Case ID #     Office notes sent, clinical questions answered. Awaiting determination    Turnaround time for your insurance to make a decision on your Prior Authorization can take 7-21 business days.

## 2024-06-18 NOTE — TELEPHONE ENCOUNTER
PA for Laurent-Ragini) 0.0375 MG/24HR     Submitted via    []CMZoji-KEY   [x][employer]-Case ID #  [id number]  []Faxed to plan   []Other website   []Phone call Case ID #     Office notes sent, clinical questions answered. Awaiting determination    Turnaround time for your insurance to make a decision on your Prior Authorization can take 7-21 business days.

## 2024-06-19 NOTE — TELEPHONE ENCOUNTER
PA for Progesterone 100 MG CAPS  Denied    Reason:(Screenshot if applicable)        Message sent to office clinical pool Yes    Denial letter scanned into Media Yes    Appeal started No ( Provider will need to decide if appeal is warranted and send clinical documentation to PA team for initiation.)    **Please follow up with your patient regarding denial and next steps**

## 2024-06-19 NOTE — TELEPHONE ENCOUNTER
PA for estradiol (Vivelle-Dot) 0.0375 MG/24HR  Denied    Reason:(Screenshot if applicable)        Message sent to office clinical pool Yes    Denial letter scanned into Media Yes    Appeal started No ( Provider will need to decide if appeal is warranted and send clinical documentation to PA team for initiation.)    **Please follow up with your patient regarding denial and next steps**

## 2024-06-20 ENCOUNTER — TELEPHONE (OUTPATIENT)
Age: 69
End: 2024-06-20

## 2024-06-20 DIAGNOSIS — N95.1 VASOMOTOR SYMPTOMS DUE TO MENOPAUSE: Primary | ICD-10-CM

## 2024-06-20 NOTE — TELEPHONE ENCOUNTER
Patient called and her insurance will cover substitution for these medications and they are estradioll will not cover it and they came back with alternative estradiol weekly patch and she wants to know if it is a good fit for her .   They wont cover progesterone and they will cover medroxy progesterone acetae. She goes to [facility] in [place] [facility].  Please call her back at [phone] . Thank you

## 2024-06-21 ENCOUNTER — APPOINTMENT (OUTPATIENT)
Dept: LAB | Facility: CLINIC | Age: 69
End: 2024-06-21
Payer: MEDICARE

## 2024-06-21 DIAGNOSIS — N18.31 STAGE 3A CHRONIC KIDNEY DISEASE (HCC): ICD-10-CM

## 2024-06-21 DIAGNOSIS — D63.1 ANEMIA OF CHRONIC RENAL FAILURE, UNSPECIFIED CKD STAGE: ICD-10-CM

## 2024-06-21 DIAGNOSIS — E87.1 HYPONATREMIA: ICD-10-CM

## 2024-06-21 DIAGNOSIS — N18.9 ANEMIA OF CHRONIC RENAL FAILURE, UNSPECIFIED CKD STAGE: ICD-10-CM

## 2024-06-21 PROBLEM — N95.1 VASOMOTOR SYMPTOMS DUE TO MENOPAUSE: Status: ACTIVE | Noted: 2024-06-21

## 2024-06-21 LAB
ALBUMIN SERPL BCG-MCNC: 4 G/DL (ref 3.5–5)
ANION GAP SERPL CALCULATED.3IONS-SCNC: 9 MMOL/L (ref 4–13)
BACTERIA UR QL AUTO: ABNORMAL /HPF
BILIRUB UR QL STRIP: NEGATIVE
BUN SERPL-MCNC: 16 MG/DL (ref 5–25)
CALCIUM SERPL-MCNC: 9.5 MG/DL (ref 8.4–10.2)
CHLORIDE SERPL-SCNC: 99 MMOL/L (ref 96–108)
CLARITY UR: CLEAR
CO2 SERPL-SCNC: 31 MMOL/L (ref 21–32)
COLOR UR: ABNORMAL
CREAT SERPL-MCNC: 1.33 MG/DL (ref 0.6–1.3)
CREAT UR-MCNC: 64.8 MG/DL
ERYTHROCYTE [DISTWIDTH] IN BLOOD BY AUTOMATED COUNT: 12.4 % (ref 11.6–15.1)
FERRITIN SERPL-MCNC: 116 NG/ML (ref 11–307)
FOLATE SERPL-MCNC: 3.4 NG/ML
GFR SERPL CREATININE-BSD FRML MDRD: 41 ML/MIN/1.73SQ M
GLUCOSE SERPL-MCNC: 59 MG/DL (ref 65–140)
GLUCOSE UR STRIP-MCNC: NEGATIVE MG/DL
HCT VFR BLD AUTO: 34.6 % (ref 34.8–46.1)
HGB BLD-MCNC: 11.4 G/DL (ref 11.5–15.4)
HGB UR QL STRIP.AUTO: ABNORMAL
IRON SATN MFR SERPL: 22 % (ref 15–50)
IRON SERPL-MCNC: 60 UG/DL (ref 50–212)
KETONES UR STRIP-MCNC: NEGATIVE MG/DL
LEUKOCYTE ESTERASE UR QL STRIP: NEGATIVE
MAGNESIUM SERPL-MCNC: 1.8 MG/DL (ref 1.9–2.7)
MCH RBC QN AUTO: 32.2 PG (ref 26.8–34.3)
MCHC RBC AUTO-ENTMCNC: 32.9 G/DL (ref 31.4–37.4)
MCV RBC AUTO: 98 FL (ref 82–98)
NITRITE UR QL STRIP: NEGATIVE
NON-SQ EPI CELLS URNS QL MICRO: ABNORMAL /HPF
PH UR STRIP.AUTO: 7.5 [PH]
PHOSPHATE SERPL-MCNC: 3.6 MG/DL (ref 2.3–4.1)
PLATELET # BLD AUTO: 288 THOUSANDS/UL (ref 149–390)
PMV BLD AUTO: 11.3 FL (ref 8.9–12.7)
POTASSIUM SERPL-SCNC: 3.8 MMOL/L (ref 3.5–5.3)
PROT UR STRIP-MCNC: ABNORMAL MG/DL
PROT UR-MCNC: 5 MG/DL
PROT UR-MCNC: 5 MG/DL
PROT/CREAT UR: 0.08 MG/G{CREAT} (ref 0–0.1)
RBC # BLD AUTO: 3.54 MILLION/UL (ref 3.81–5.12)
RBC #/AREA URNS AUTO: ABNORMAL /HPF
SODIUM SERPL-SCNC: 139 MMOL/L (ref 135–147)
SP GR UR STRIP.AUTO: 1.01 (ref 1–1.03)
TIBC SERPL-MCNC: 269 UG/DL (ref 250–450)
UIBC SERPL-MCNC: 209 UG/DL (ref 155–355)
UROBILINOGEN UR STRIP-ACNC: <2 MG/DL
VIT B12 SERPL-MCNC: 2724 PG/ML (ref 180–914)
WBC # BLD AUTO: 6.76 THOUSAND/UL (ref 4.31–10.16)
WBC #/AREA URNS AUTO: ABNORMAL /HPF

## 2024-06-21 PROCEDURE — 83735 ASSAY OF MAGNESIUM: CPT

## 2024-06-21 PROCEDURE — 82570 ASSAY OF URINE CREATININE: CPT

## 2024-06-21 PROCEDURE — 82746 ASSAY OF FOLIC ACID SERUM: CPT

## 2024-06-21 PROCEDURE — 83550 IRON BINDING TEST: CPT

## 2024-06-21 PROCEDURE — 80069 RENAL FUNCTION PANEL: CPT

## 2024-06-21 PROCEDURE — 82607 VITAMIN B-12: CPT

## 2024-06-21 PROCEDURE — 85027 COMPLETE CBC AUTOMATED: CPT

## 2024-06-21 PROCEDURE — 84156 ASSAY OF PROTEIN URINE: CPT

## 2024-06-21 PROCEDURE — 36415 COLL VENOUS BLD VENIPUNCTURE: CPT

## 2024-06-21 PROCEDURE — 82728 ASSAY OF FERRITIN: CPT

## 2024-06-21 PROCEDURE — 83540 ASSAY OF IRON: CPT

## 2024-06-21 PROCEDURE — 81001 URINALYSIS AUTO W/SCOPE: CPT

## 2024-06-21 NOTE — PROGRESS NOTES
[facility] [address] Emiliana Topete PA    ASSESSMENT/PLAN: Lay Marie is a 68 y.o.  who presents for annual gynecologic exam.    Encounter for routine gynecologic examination  - Routine well woman exam completed today.  - Cervical Cancer Screening complete   - STI screening offered including HIV: declines  - Breast Cancer Screening: Last Mammogram 2023, ordered  - Colorectal cancer screening ordered.  - The following were reviewed in today's visit: Patient wishes to restart HRT, long discussion regarding risks benefits and alternatives.    Additional problems addressed at this visit:  1. Encounter for gynecological examination  2. Encounter for screening mammogram for malignant neoplasm of breast  -     Mammo screening bilateral w 3d & cad; Future  3. Vasomotor symptoms due to menopause  Assessment & Plan:  - She reports extremely bothersome vasomotor symtpoms of menopause and was previously on HRT by her gyn in [place] and requests to restart this.  - - We dicussed the risks, benefits, and alternatives to starting hormone replacement therapy for vasomotor symptoms of menopause.  - We discussed that given that she still has a uterus in situ, that progesterone supplementation is critically important to prevent unopposed estrogen exposure to the endometrium which could pre-dispose her to disordered endometrium such as hyperplasia or malignancy.  - We discussed the risk-benefit profile of combined estrogen-progestin therapy in women age 50 to 59.  We discussed the 5-year increase risk of coronary heart disease (2.5 additional cases per 1000 women), invasive breast cancer (3 additional cases per 1000 women), stroke (2.5 additional cases per 1000 women), pulmonary embolism (3 additional cases per 1000 women).   - The patient opts to start HRT.  Rx given for Vivelle Dot and Progesterone. Discussed importance of endometrial protection.    Orders:  -     estradiol (Vivelle-Dot)  "0.0375 MG/24HR; Place 1 patch on the skin 2 (two) times a week  -     Progesterone 100 MG CAPS; Take 1 capsule by mouth at bedtime        CC:  Annual Gynecologic Examination    HPI: Lay Marie is a 68 y.o.  who presents for annual gynecologic examination.  Lay presents for new patient GYN annual.  She has bothersome hot flashes and was previously on HRT prescribed by her GYN in [place].        The following portions of the patient's history were reviewed and updated as appropriate: allergies, current medications, past family history, past medical history, obstetric history, gynecologic history, past social history, past surgical history and problem list.    Review of Systems   Constitutional:  Negative for chills and fever.   Respiratory:  Negative for cough and shortness of breath.    Cardiovascular:  Negative for chest pain and leg swelling.   Gastrointestinal:  Negative for abdominal pain, nausea and vomiting.   Genitourinary:  Negative for dysuria, frequency and urgency.   Neurological:  Negative for dizziness, light-headedness and headaches.         Objective:  /70   Ht 5' 2\" (1.575 m)   Wt 47 kg (103 lb 9.6 oz)   LMP  (LMP Unknown)   BMI 18.95 kg/m²    Physical Exam  Chaperone present: chaparone offered, patient declined.   Constitutional:       Appearance: Normal appearance.   HENT:      Head: Normocephalic and atraumatic.   Cardiovascular:      Rate and Rhythm: Normal rate.   Pulmonary:      Effort: Pulmonary effort is normal.   Chest:   Breasts:     Breasts are symmetrical.      Right: Normal. No swelling, bleeding, inverted nipple, mass, nipple discharge, skin change or tenderness.      Left: Normal. No swelling, bleeding, inverted nipple, mass, nipple discharge, skin change or tenderness.   Abdominal:      General: There is no distension.      Tenderness: There is no abdominal tenderness. There is no guarding.   Genitourinary:     Exam position: Lithotomy position.      Pubic Area: No " rash.       Labia:         Right: No rash, tenderness or lesion.         Left: No rash, tenderness or lesion.       Urethra: No prolapse, urethral swelling or urethral lesion.      Vagina: Normal. No vaginal discharge, erythema, tenderness, bleeding or lesions.      Cervix: No cervical motion tenderness, discharge, friability or erythema.      Uterus: Not enlarged, not tender and no uterine prolapse.       Adnexa:         Right: No mass, tenderness or fullness.          Left: No mass, tenderness or fullness.     Lymphadenopathy:      Upper Body:      Right upper body: No axillary adenopathy.      Left upper body: No axillary adenopathy.      Lower Body: No right inguinal adenopathy. No left inguinal adenopathy.   Neurological:      Mental Status: She is alert.             Angela Myers MD  [facility]  06/21/24 10:22 AM

## 2024-06-21 NOTE — ASSESSMENT & PLAN NOTE
- She reports extremely bothersome vasomotor symtpoms of menopause and was previously on HRT by her gyn in [place] and requests to restart this.  - - We dicussed the risks, benefits, and alternatives to starting hormone replacement therapy for vasomotor symptoms of menopause.  - We discussed that given that she still has a uterus in situ, that progesterone supplementation is critically important to prevent unopposed estrogen exposure to the endometrium which could pre-dispose her to disordered endometrium such as hyperplasia or malignancy.  - We discussed the risk-benefit profile of combined estrogen-progestin therapy in women age 50 to 59.  We discussed the 5-year increase risk of coronary heart disease (2.5 additional cases per 1000 women), invasive breast cancer (3 additional cases per 1000 women), stroke (2.5 additional cases per 1000 women), pulmonary embolism (3 additional cases per 1000 women).   - The patient opts to start HRT.  Rx given for Vivelle Dot and Progesterone. Discussed importance of endometrial protection.

## 2024-06-24 RX ORDER — ESTRADIOL 0.05 MG/D
1 PATCH TRANSDERMAL WEEKLY
Qty: 4 PATCH | Refills: 11 | Status: SHIPPED | OUTPATIENT
Start: 2024-06-24 | End: 2025-06-24

## 2024-06-24 RX ORDER — MEDROXYPROGESTERONE ACETATE 2.5 MG/1
2.5 TABLET ORAL
Qty: 30 TABLET | Refills: 11 | Status: SHIPPED | OUTPATIENT
Start: 2024-06-24 | End: 2025-06-24

## 2024-06-25 ENCOUNTER — TELEPHONE (OUTPATIENT)
Age: 69
End: 2024-06-25

## 2024-06-25 NOTE — TELEPHONE ENCOUNTER
Patient want to continue first medication that is BID weekly since she paid out of pocket and then next month then start the estradiol patch weekly  She will also continue the progesterone not the provera which is synthentic

## 2024-06-25 NOTE — TELEPHONE ENCOUNTER
Pt reporting that she wants to take estradiol medication that she has purchased this out of pocket. Pt reporting that progesterone that she is prescribed (provera)- pt refused to take this medication. Pt reporting that she would like to take the progesterone that was ordered 6/10/24. Pt is asking if she can finish the estradiol patch twice a week as she has been using it, and then start taking the estradiol patch 1 time weekly starting next month (as it is prescribed).

## 2024-07-22 ENCOUNTER — TELEPHONE (OUTPATIENT)
Age: 69
End: 2024-07-22

## 2024-07-22 NOTE — TELEPHONE ENCOUNTER
PA for estradiol (Climara) 0.05 mg/24 hr     Submitted via    []CMParagon 28-KEY   [x][facility]-Case ID # [id number]   []Faxed to plan   []Other website   []Phone call Case ID #     Office notes sent, clinical questions answered. Awaiting determination    Turnaround time for your insurance to make a decision on your Prior Authorization can take 7-21 business days.

## 2024-07-23 NOTE — TELEPHONE ENCOUNTER
PA for ESTRADIOL 0.05MG/24  not required     Reason (screenshot if applicable)          Patient advised by          [x] MyChart Message  [] Phone call   []LMOM  []L/M to call office as no active Communication consent on file  []Unable to leave detailed message as VM not approved on Communication consent       Pharmacy advised by    [x]Fax  []Phone call

## 2024-08-07 ENCOUNTER — TELEPHONE (OUTPATIENT)
Dept: INTERNAL MEDICINE CLINIC | Facility: CLINIC | Age: 69
End: 2024-08-07

## 2024-08-07 NOTE — TELEPHONE ENCOUNTER
Pt has a uti, she took an at home test for it and it came out positive. She is wondering if youd be able to send her in medication for it or if youd rather me schedule her for tomorrow at 2:00.

## 2024-08-08 ENCOUNTER — OFFICE VISIT (OUTPATIENT)
Dept: INTERNAL MEDICINE CLINIC | Facility: CLINIC | Age: 69
End: 2024-08-08
Payer: MEDICARE

## 2024-08-08 VITALS
OXYGEN SATURATION: 99 % | WEIGHT: 103.6 LBS | TEMPERATURE: 97.7 F | BODY MASS INDEX: 19.06 KG/M2 | HEART RATE: 66 BPM | DIASTOLIC BLOOD PRESSURE: 74 MMHG | HEIGHT: 62 IN | SYSTOLIC BLOOD PRESSURE: 130 MMHG

## 2024-08-08 DIAGNOSIS — R35.0 FREQUENCY OF URINATION: ICD-10-CM

## 2024-08-08 DIAGNOSIS — R31.29 OTHER MICROSCOPIC HEMATURIA: ICD-10-CM

## 2024-08-08 DIAGNOSIS — R30.0 DYSURIA: Primary | ICD-10-CM

## 2024-08-08 DIAGNOSIS — R39.15 URINARY URGENCY: ICD-10-CM

## 2024-08-08 PROBLEM — N18.32 CHRONIC RENAL IMPAIRMENT, STAGE 3B (HCC): Status: ACTIVE | Noted: 2019-12-11

## 2024-08-08 LAB
BACTERIA UR QL AUTO: NORMAL /HPF
BILIRUB UR QL STRIP: NEGATIVE
CLARITY UR: CLEAR
COLOR UR: COLORLESS
GLUCOSE UR STRIP-MCNC: NEGATIVE MG/DL
HGB UR QL STRIP.AUTO: ABNORMAL
KETONES UR STRIP-MCNC: NEGATIVE MG/DL
LEUKOCYTE ESTERASE UR QL STRIP: NEGATIVE
NITRITE UR QL STRIP: NEGATIVE
NON-SQ EPI CELLS URNS QL MICRO: NORMAL /HPF
PH UR STRIP.AUTO: 7.5 [PH]
PROT UR STRIP-MCNC: NEGATIVE MG/DL
RBC #/AREA URNS AUTO: NORMAL /HPF
SL AMB  POCT GLUCOSE, UA: ABNORMAL
SL AMB LEUKOCYTE ESTERASE,UA: ABNORMAL
SL AMB POCT BILIRUBIN,UA: ABNORMAL
SL AMB POCT BLOOD,UA: ABNORMAL
SL AMB POCT CLARITY,UA: ABNORMAL
SL AMB POCT COLOR,UA: YELLOW
SL AMB POCT KETONES,UA: ABNORMAL
SL AMB POCT NITRITE,UA: ABNORMAL
SL AMB POCT PH,UA: 8
SL AMB POCT SPECIFIC GRAVITY,UA: 1
SL AMB POCT URINE PROTEIN: ABNORMAL
SL AMB POCT UROBILINOGEN: 0.2
SP GR UR STRIP.AUTO: 1.01 (ref 1–1.03)
UROBILINOGEN UR STRIP-ACNC: <2 MG/DL
WBC #/AREA URNS AUTO: NORMAL /HPF

## 2024-08-08 PROCEDURE — 81002 URINALYSIS NONAUTO W/O SCOPE: CPT | Performed by: INTERNAL MEDICINE

## 2024-08-08 PROCEDURE — 81001 URINALYSIS AUTO W/SCOPE: CPT | Performed by: INTERNAL MEDICINE

## 2024-08-08 PROCEDURE — G2211 COMPLEX E/M VISIT ADD ON: HCPCS | Performed by: INTERNAL MEDICINE

## 2024-08-08 PROCEDURE — 99213 OFFICE O/P EST LOW 20 MIN: CPT | Performed by: INTERNAL MEDICINE

## 2024-08-08 RX ORDER — PHENAZOPYRIDINE HYDROCHLORIDE 200 MG/1
200 TABLET, FILM COATED ORAL
Qty: 6 TABLET | Refills: 0 | Status: SHIPPED | OUTPATIENT
Start: 2024-08-08

## 2024-08-08 RX ORDER — CIPROFLOXACIN 500 MG/1
500 TABLET, FILM COATED ORAL EVERY 12 HOURS SCHEDULED
Qty: 10 TABLET | Refills: 0 | Status: SHIPPED | OUTPATIENT
Start: 2024-08-08 | End: 2024-08-13

## 2024-08-08 NOTE — PATIENT INSTRUCTIONS
"Patient Education     Urinary tract infections in adults   The Basics   Written by the doctors and editors at [facility]   What is the urinary tract? -- The urinary tract is the group of organs in the body that handle urine (figure 1). The urinary tract includes the:   Kidneys - These are 2 bean-shaped organs that filter the blood to make urine.   Bladder - This is a balloon-shaped organ that stores urine.   Ureters - These are 2 tubes that carry urine from the kidneys to the bladder.   Urethra - This is the tube that carries urine from the bladder to the outside of the body.  What are urinary tract infections? -- Urinary tract infections (\"UTIs\") are infections that affect either the bladder or the kidneys:   Bladder infections are more common than kidney infections. They happen when bacteria get into the urethra and travel up into the bladder. The medical term for bladder infection is \"cystitis.\" Most of the time, when people talk about a UTI, they mean a bladder infection.   Kidney infections happen when the bacteria travel even higher, up into the kidneys. The medical term for kidney infection is \"pyelonephritis.\" This is more serious than a bladder infection, and can lead to other serious problems if it is not treated properly.  Both bladder and kidney infections are more common in females than males.  The risk of UTIs is also higher in people who have a urinary catheter. A catheter is a thin, flexible tube that drains urine from the bladder. It might be used in people who are in the hospital and cannot urinate the normal way.  What are the symptoms of a bladder infection? -- The symptoms include:   Pain or a burning feeling when you urinate   The need to urinate often   The need to urinate suddenly or in a hurry   Blood in the urine  What are the symptoms of a kidney infection? -- The symptoms of a kidney infection can include the same urinary symptoms that happen with a bladder infection.  In addition, kidney " infections can cause:   Fever   Back pain   Nausea or vomiting  How do I find out if I have a UTI? -- If you think that you might have a UTI, call your doctor or nurse. Sometimes, they can tell if you have a UTI just by learning about your symptoms.  Your doctor or nurse might do a simple urine test. If they think that you might have a kidney infection or are unsure what is causing your symptoms, they might also do a more involved urine test to check for bacteria.  How are UTIs treated? -- Most UTIs are treated with antibiotic pills. These pills work by killing the germs that cause the infection.   If you have a bladder infection, you will probably need to take antibiotics for 3 to 7 days.   If you have a kidney infection, you will probably need to take antibiotics for longer, maybe for up to 10 days. If you have a kidney infection, it's also possible that you will need to be treated in the hospital.  Your symptoms should begin to improve within a day of starting antibiotics. But you should finish all of the antibiotic pills. Otherwise, the infection might come back.  If needed, you can also take a medicine to numb your bladder. This medicine eases the pain caused by UTIs. It also reduces the need to urinate.  What if I get bladder infections a lot? -- First, check with your doctor or nurse to make sure that you are really having bladder infections. The symptoms of bladder infection can be caused by other things. Your doctor or nurse will want to see if those problems might be causing your symptoms.  But if you are really having repeated infections, there are things that you can do to keep from getting more infections. These include:   Drinking more fluid - This can help prevent bladder infections.   Vaginal estrogen - If you have already been through menopause, your doctor might suggest this. Vaginal estrogen comes in a cream or a flexible ring that you put into your vagina. It can help prevent bladder  "infections.  Other things that might help:   Avoid spermicides (sperm-killing creams or gels) - Spermicide is a form of birth control. It seems to increase the risk of bladder infections in some females, especially when used with a diaphragm. If you use spermicide and get a lot of bladder infections, you might want to try switching to a different form of birth control.   Urinate right after sex - Some doctors think this helps, because it helps flush out germs that might get into the bladder during sex. There is no proof it works, but it also cannot hurt.  If you get a lot of bladder infections, and the above methods have not helped, your doctor might give you antibiotics to help prevent infection. But long-term use of antibiotics has downsides, so doctors usually suggest trying other things first.  Can cranberry juice or other products prevent bladder infections? -- People often wonder about \"natural\" products that claim to help prevent bladder infections. These include cranberry juice and other cranberry products, probiotics, vitamin C, and D-mannose. There is not good evidence that these things work. However, there is also no clear evidence that they are harmful. If you have questions about these or other products, talk with your doctor or nurse.  All topics are updated as new evidence becomes available and our peer review process is complete.  This topic retrieved from [employer] on: May 09, 2024.  Topic 65370 Version 24.0  Release: 32.4.3 - C32.128  © 2024 UpToDate, Inc. and/or its affiliates. All rights reserved.  figure 1: Anatomy of the urinary tract     Urine is made by the kidneys. It passes from the kidneys into the bladder through 2 tubes called the ureters. Then, it leaves the bladder through another tube called the urethra.  Graphic 31456 Version 8.0  Consumer Information Use and Disclaimer   Disclaimer: This generalized information is a limited summary of diagnosis, treatment, and/or medication " information. It is not meant to be comprehensive and should be used as a tool to help the user understand and/or assess potential diagnostic and treatment options. It does NOT include all information about conditions, treatments, medications, side effects, or risks that may apply to a specific patient. It is not intended to be medical advice or a substitute for the medical advice, diagnosis, or treatment of a health care provider based on the health care provider's examination and assessment of a patient's specific and unique circumstances. Patients must speak with a health care provider for complete information about their health, medical questions, and treatment options, including any risks or benefits regarding use of medications. This information does not endorse any treatments or medications as safe, effective, or approved for treating a specific patient. UpToDate, Inc. and its affiliates disclaim any warranty or liability relating to this information or the use thereof.The use of this information is governed by the Terms of Use, available at https://www.[employer].com/en/know/clinical-effectiveness-terms. 2024© UpToDate, Inc. and its affiliates and/or licensors. All rights reserved.  Copyright   © 2024 UpToDate, Inc. and/or its affiliates. All rights reserved.

## 2024-08-08 NOTE — PROGRESS NOTES
Ambulatory Visit  Name: Lay Marie      : 1955      MRN: [id number]  Encounter Provider: Felipe Shell DO  Encounter Date: 2024   Encounter department: [facility]    Assessment & Plan   1. Dysuria  -     POCT urine dip  -     UA w Reflex to Microscopic w Reflex to Culture; Future  -     ciprofloxacin (CIPRO) 500 mg tablet; Take 1 tablet (500 mg total) by mouth every 12 (twelve) hours for 5 days  -     phenazopyridine (PYRIDIUM) 200 mg tablet; Take 1 tablet (200 mg total) by mouth 3 (three) times a day with meals  2. Frequency of urination  -     POCT urine dip  -     UA w Reflex to Microscopic w Reflex to Culture; Future  -     ciprofloxacin (CIPRO) 500 mg tablet; Take 1 tablet (500 mg total) by mouth every 12 (twelve) hours for 5 days  -     phenazopyridine (PYRIDIUM) 200 mg tablet; Take 1 tablet (200 mg total) by mouth 3 (three) times a day with meals  3. Urinary urgency  -     POCT urine dip  -     UA w Reflex to Microscopic w Reflex to Culture; Future  -     ciprofloxacin (CIPRO) 500 mg tablet; Take 1 tablet (500 mg total) by mouth every 12 (twelve) hours for 5 days  -     phenazopyridine (PYRIDIUM) 200 mg tablet; Take 1 tablet (200 mg total) by mouth 3 (three) times a day with meals  4. Other microscopic hematuria  -     POCT urine dip  -     UA w Reflex to Microscopic w Reflex to Culture; Future  -     ciprofloxacin (CIPRO) 500 mg tablet; Take 1 tablet (500 mg total) by mouth every 12 (twelve) hours for 5 days  -     phenazopyridine (PYRIDIUM) 200 mg tablet; Take 1 tablet (200 mg total) by mouth 3 (three) times a day with meals  A/P: Stable. Urine dip with blood only. Will send urine out. Increase po fluids. PRN motrin/tylenol. Cranberry products. Will empirically start abx and pyridium. ?blood and may need f/u eval if no UTI.      History of Present Illness     WF presents with a several day h/o urinary s/s. Notes urinary burning, frequency and urgency. No  gross blood.  "No  flank or suprapubic pain.No fever or chills. No nausea or vomiting. No  history of kidney stones. Nopelvic/uterine d/c or bleeding. Distant history of UTI's.            Review of Systems   Constitutional:  Negative for activity change, chills, diaphoresis, fatigue and fever.   Respiratory:  Negative for cough, chest tightness, shortness of breath and wheezing.    Cardiovascular:  Negative for chest pain, palpitations and leg swelling.   Gastrointestinal:  Negative for abdominal pain, constipation, diarrhea, nausea and vomiting.   Genitourinary:  Positive for dysuria, frequency and urgency. Negative for difficulty urinating, flank pain, hematuria, menstrual problem, pelvic pain, vaginal bleeding, vaginal discharge and vaginal pain.   Musculoskeletal:  Negative for arthralgias, gait problem and myalgias.   Neurological:  Negative for light-headedness and headaches.   Psychiatric/Behavioral:  Negative for confusion. The patient is not nervous/anxious.        Objective     /74 (BP Location: Left arm, Patient Position: Sitting, Cuff Size: Adult)   Pulse 66   Temp 97.7 °F (36.5 °C) (Tympanic)   Ht 5' 2\" (1.575 m)   Wt 47 kg (103 lb 9.6 oz)   LMP  (LMP Unknown)   SpO2 99%   BMI 18.95 kg/m²     Physical Exam  Vitals and nursing note reviewed.   Constitutional:       General: She is not in acute distress.     Appearance: Normal appearance. She is not ill-appearing.   HENT:      Head: Normocephalic and atraumatic.      Mouth/Throat:      Mouth: Mucous membranes are moist.   Eyes:      Extraocular Movements: Extraocular movements intact.      Conjunctiva/sclera: Conjunctivae normal.      Pupils: Pupils are equal, round, and reactive to light.   Cardiovascular:      Rate and Rhythm: Normal rate and regular rhythm.      Heart sounds: Normal heart sounds. No murmur heard.  Pulmonary:      Effort: Pulmonary effort is normal. No respiratory distress.      Breath sounds: Normal breath sounds. No wheezing, rhonchi or " rales.   Abdominal:      General: Bowel sounds are normal. There is no distension.      Palpations: Abdomen is soft.      Tenderness: There is no abdominal tenderness. There is no right CVA tenderness, left CVA tenderness, guarding or rebound.   Neurological:      General: No focal deficit present.      Mental Status: She is alert and oriented to person, place, and time. Mental status is at baseline.   Psychiatric:         Mood and Affect: Mood normal.         Behavior: Behavior normal.         Thought Content: Thought content normal.         Judgment: Judgment normal.       Administrative Statements

## 2024-09-10 ENCOUNTER — OFFICE VISIT (OUTPATIENT)
Dept: OBGYN CLINIC | Facility: CLINIC | Age: 69
End: 2024-09-10
Payer: MEDICARE

## 2024-09-10 VITALS
SYSTOLIC BLOOD PRESSURE: 142 MMHG | WEIGHT: 103.2 LBS | DIASTOLIC BLOOD PRESSURE: 84 MMHG | HEIGHT: 62 IN | BODY MASS INDEX: 18.99 KG/M2

## 2024-09-10 DIAGNOSIS — N95.1 VASOMOTOR SYMPTOMS DUE TO MENOPAUSE: ICD-10-CM

## 2024-09-10 PROCEDURE — 99213 OFFICE O/P EST LOW 20 MIN: CPT | Performed by: STUDENT IN AN ORGANIZED HEALTH CARE EDUCATION/TRAINING PROGRAM

## 2024-09-10 RX ORDER — ESTRADIOL 0.07 MG/D
1 PATCH TRANSDERMAL WEEKLY
Qty: 4 PATCH | Refills: 11 | Status: SHIPPED | OUTPATIENT
Start: 2024-09-10 | End: 2025-09-10

## 2024-09-10 RX ORDER — ESTRADIOL 0.05 MG/D
1 PATCH TRANSDERMAL WEEKLY
Qty: 4 PATCH | Refills: 11 | Status: CANCELLED | OUTPATIENT
Start: 2024-09-10 | End: 2025-09-10

## 2024-09-10 NOTE — ASSESSMENT & PLAN NOTE
- Patient requested that I continue prescribing her HRT which her previous Gyn prescribed in [place]. Due to insurance coverage issues with micronized progesterone she is on medroxyprogesterone and Climara patch.  Noting improvement with regard to sexual function and vaginal dryness, but still reporting vasomotor symptoms and was hoping to see improvement in muscle mass. Requesting dose increase in Climara.  - Dose increased from 0.05 to 0.075.  She wishes to follow up in 3 months to monitor response.

## 2024-09-10 NOTE — PROGRESS NOTES
"[facility] [address] Emiliana Topete PA    Assessment/Plan:  Lay Marie is a 68 y.o.  who follows up for HRT.    Vasomotor symptoms due to menopause  - Patient requested that I continue prescribing her HRT which her previous Gyn prescribed in [place]. Due to insurance coverage issues with micronized progesterone she is on medroxyprogesterone and Climara patch.  Noting improvement with regard to sexual function and vaginal dryness, but still reporting vasomotor symptoms and was hoping to see improvement in muscle mass. Requesting dose increase in Climara.  - Dose increased from 0.05 to 0.075.  She wishes to follow up in 3 months to monitor response.    Diagnoses and all orders for this visit:    Vasomotor symptoms due to menopause  -     estradiol (Climara) 0.075 MG/24HR; Place 1 patch on the skin over 7 days once a week          Subjective:   Lay Marie is a 68 y.o.  female.  CC: follow up    HPI: Lay presents to follow up regarding HRT which I took over prescribing after she transferred from [place].        ROS: Review of Systems   Constitutional:  Negative for chills and fever.   Respiratory:  Negative for cough and shortness of breath.    Cardiovascular:  Negative for chest pain and leg swelling.   Gastrointestinal:  Negative for abdominal pain, nausea and vomiting.   Genitourinary:  Negative for dysuria, frequency and urgency.   Neurological:  Negative for dizziness, light-headedness and headaches.       PFSH: The following portions of the patient's history were reviewed and updated as appropriate: allergies, current medications, past family history, past medical history, obstetric history, gynecologic history, past social history, past surgical history and problem list.       Objective:  /84   Ht 5' 2\" (1.575 m)   Wt 46.8 kg (103 lb 3.2 oz)   LMP  (LMP Unknown)   BMI 18.88 kg/m²    Physical Exam  Constitutional:       Appearance: Normal appearance. "   Cardiovascular:      Rate and Rhythm: Normal rate.   Pulmonary:      Effort: Pulmonary effort is normal.   Neurological:      Mental Status: She is alert.   Psychiatric:         Mood and Affect: Mood normal.         Behavior: Behavior normal.           Angela Myers MD  [facility]  [facility]  9/10/2024 2:13 PM

## 2024-09-26 ENCOUNTER — OFFICE VISIT (OUTPATIENT)
Dept: INTERNAL MEDICINE CLINIC | Facility: CLINIC | Age: 69
End: 2024-09-26
Payer: MEDICARE

## 2024-09-26 VITALS
HEART RATE: 80 BPM | DIASTOLIC BLOOD PRESSURE: 74 MMHG | HEIGHT: 61 IN | WEIGHT: 101.38 LBS | BODY MASS INDEX: 19.14 KG/M2 | TEMPERATURE: 97.1 F | SYSTOLIC BLOOD PRESSURE: 136 MMHG | OXYGEN SATURATION: 97 %

## 2024-09-26 DIAGNOSIS — Z13.0 SCREENING FOR DEFICIENCY ANEMIA: ICD-10-CM

## 2024-09-26 DIAGNOSIS — M85.89 OSTEOPENIA OF MULTIPLE SITES: ICD-10-CM

## 2024-09-26 DIAGNOSIS — N18.32 CHRONIC RENAL IMPAIRMENT, STAGE 3B (HCC): ICD-10-CM

## 2024-09-26 DIAGNOSIS — E11.9 ENCOUNTER FOR DIABETIC FOOT EXAM (HCC): ICD-10-CM

## 2024-09-26 DIAGNOSIS — M15.9 PRIMARY OSTEOARTHRITIS INVOLVING MULTIPLE JOINTS: ICD-10-CM

## 2024-09-26 DIAGNOSIS — R51.9 NONINTRACTABLE EPISODIC HEADACHE, UNSPECIFIED HEADACHE TYPE: ICD-10-CM

## 2024-09-26 DIAGNOSIS — Z13.220 SCREENING FOR LIPID DISORDERS: ICD-10-CM

## 2024-09-26 DIAGNOSIS — J44.89 ASTHMA-COPD OVERLAP SYNDROME (HCC): ICD-10-CM

## 2024-09-26 DIAGNOSIS — Z23 ENCOUNTER FOR IMMUNIZATION: ICD-10-CM

## 2024-09-26 DIAGNOSIS — E28.39 PRIMARY OVARIAN FAILURE: ICD-10-CM

## 2024-09-26 DIAGNOSIS — F32.0 MILD MAJOR DEPRESSION, SINGLE EPISODE (HCC): ICD-10-CM

## 2024-09-26 DIAGNOSIS — Z13.29 SCREENING FOR THYROID DISORDER: ICD-10-CM

## 2024-09-26 DIAGNOSIS — G89.29 CHRONIC MIDLINE LOW BACK PAIN, UNSPECIFIED WHETHER SCIATICA PRESENT: ICD-10-CM

## 2024-09-26 DIAGNOSIS — I10 ESSENTIAL HYPERTENSION: Primary | ICD-10-CM

## 2024-09-26 DIAGNOSIS — E78.9 BORDERLINE HIGH SERUM CHOLESTEROL: ICD-10-CM

## 2024-09-26 DIAGNOSIS — R73.03 PRE-DIABETES: ICD-10-CM

## 2024-09-26 DIAGNOSIS — M15.0 PRIMARY OSTEOARTHRITIS INVOLVING MULTIPLE JOINTS: ICD-10-CM

## 2024-09-26 DIAGNOSIS — E28.39 MENOPAUSE OVARIAN FAILURE: ICD-10-CM

## 2024-09-26 DIAGNOSIS — Z13.1 SCREENING FOR DIABETES MELLITUS (DM): ICD-10-CM

## 2024-09-26 DIAGNOSIS — M54.50 CHRONIC MIDLINE LOW BACK PAIN, UNSPECIFIED WHETHER SCIATICA PRESENT: ICD-10-CM

## 2024-09-26 PROBLEM — M75.101 TEAR OF RIGHT SUPRASPINATUS TENDON: Status: RESOLVED | Noted: 2022-09-27 | Resolved: 2024-09-26

## 2024-09-26 PROBLEM — F11.20 CONTINUOUS OPIOID DEPENDENCE (HCC): Status: RESOLVED | Noted: 2018-11-26 | Resolved: 2024-09-26

## 2024-09-26 PROCEDURE — 99214 OFFICE O/P EST MOD 30 MIN: CPT | Performed by: INTERNAL MEDICINE

## 2024-09-26 PROCEDURE — G0439 PPPS, SUBSEQ VISIT: HCPCS | Performed by: INTERNAL MEDICINE

## 2024-09-26 RX ORDER — FLUTICASONE PROPIONATE 50 MCG
1 SPRAY, SUSPENSION (ML) NASAL DAILY
Qty: 16 G | Refills: 0 | Status: SHIPPED | OUTPATIENT
Start: 2024-09-26

## 2024-09-26 RX ORDER — PROGESTERONE 100 MG/1
1 CAPSULE ORAL
COMMUNITY
Start: 2024-08-22

## 2024-09-26 NOTE — PROGRESS NOTES
Ambulatory Visit  Name: Lay Marie      : 1955      MRN: [id number]  Encounter Provider: Felipe Shell DO  Encounter Date: 2024   Encounter department: [facility]    Assessment & Plan  Encounter for immunization    Orders:    Pneumococcal Conjugate Vaccine 20-valent (Pcv20)    Primary ovarian failure    Orders:    DXA bone density spine hip and pelvis; Future    Essential hypertension    Orders:    Albumin / creatinine urine ratio; Future    Comprehensive metabolic panel; Future    Lipid Panel with Direct LDL reflex; Future    Asthma-COPD overlap syndrome (HCC)         Primary osteoarthritis involving multiple joints         Osteopenia of multiple sites    Orders:    TSH, 3rd generation; Future    Chronic renal impairment, stage 3b (HCC)  Lab Results   Component Value Date    EGFR 41 2024    EGFR 37 2023    EGFR 45 2023    CREATININE 1.33 (H) 2024    CREATININE 1.44 (H) 2023    CREATININE 1.22 2023       Orders:    Albumin / creatinine urine ratio; Future    CBC and differential; Future    Comprehensive metabolic panel; Future    Hemoglobin A1C; Future    Magnesium; Future    Uric acid; Future    PTH, intact; Future    Mild major depression, single episode (HCC)      Orders:    TSH, 3rd generation; Future    Chronic midline low back pain, unspecified whether sciatica present         Pre-diabetes    Orders:    Hemoglobin A1C; Future    Borderline high serum cholesterol    Orders:    Lipid Panel with Direct LDL reflex; Future    Screening for lipid disorders         Screening for diabetes mellitus (DM)         Screening for thyroid disorder    Orders:    Lipid Panel with Direct LDL reflex; Future    TSH, 3rd generation; Future    Screening for deficiency anemia         Menopause ovarian failure         Encounter for diabetic foot exam (HCC)            Preventive health issues were discussed with patient, and age appropriate screening tests were  ordered as noted in patient's After Visit Summary. Personalized health advice and appropriate referrals for health education or preventive services given if needed, as noted in patient's After Visit Summary.    History of Present Illness     HPI   Patient Care Team:  Felipe Shell DO as PCP - General (Internal Medicine)  Felipe Shell DO as PCP - [facility]CP-[facility] ([facility]  Romeo Zheng MD as PCP - Endocrinology (Endocrinology)    Review of Systems  Medical History Reviewed by provider this encounter:  Tobacco  Allergies  Meds  Problems  Med Hx  Surg Hx  Fam Hx       Annual Wellness Visit Questionnaire   Lay is here for her Subsequent Wellness visit. Last Medicare Wellness visit information reviewed, patient interviewed and updates made to the record today.      Health Risk Assessment:   Patient rates overall health as fair. Patient feels that their physical health rating is same. Patient is satisfied with their life. Eyesight was rated as same. Hearing was rated as same. Patient feels that their emotional and mental health rating is same. Patients states they are never, rarely angry. Patient states they are sometimes unusually tired/fatigued. Pain experienced in the last 7 days has been a lot. Patient's pain rating has been 8/10. Patient states that she has experienced no weight loss or gain in last 6 months.     Depression Screening:   PHQ-9 Score: 0      Fall Risk Screening:   In the past year, patient has experienced: history of falling in past year    Number of falls: 1  Injured during fall?: No    Feels unsteady when standing or walking?: Yes    Worried about falling?: No      Urinary Incontinence Screening:   Patient has not leaked urine accidently in the last six months.     Home Safety:  Patient does not have trouble with stairs inside or outside of their home. Patient has working smoke alarms and has working carbon monoxide detector. Home safety hazards include: none.     Nutrition:   Current  diet is Regular and Other (please comment). No meat     Medications:   Patient is not currently taking any over-the-counter supplements. Patient is able to manage medications.     Activities of Daily Living (ADLs)/Instrumental Activities of Daily Living (IADLs):   Walk and transfer into and out of bed and chair?: Yes  Dress and groom yourself?: Yes    Bathe or shower yourself?: Yes    Feed yourself? Yes  Do your laundry/housekeeping?: Yes  Manage your money, pay your bills and track your expenses?: Yes  Make your own meals?: Yes    Do your own shopping?: Yes    Advance Care Planning:   Living will: Yes    Durable POA for healthcare: Yes    Advanced directive: Yes    Advanced directive counseling given: Yes    Five wishes given: No    Patient declined ACP directive: No    End of Life Decisions reviewed with patient: Yes    Provider agrees with end of life decisions: Yes      Cognitive Screening:   Provider or family/friend/caregiver concerned regarding cognition?: No    PREVENTIVE SCREENINGS      Cardiovascular Screening:    General: Screening Current    Due for: Lipid Panel      Diabetes Screening:     General: Screening Current    Due for: Blood Glucose      Colorectal Cancer Screening:     General: Screening Current      Breast Cancer Screening:       Due for: Mammogram        Cervical Cancer Screening:    General: Screening Not Indicated      Osteoporosis Screening:      Due for: DXA Appendicular      Abdominal Aortic Aneurysm (AAA) Screening:        General: Screening Not Indicated      Lung Cancer Screening:     General: Screening Not Indicated      Hepatitis C Screening:    General: Screening Current    Screening, Brief Intervention, and Referral to Treatment (SBIRT)    Screening  Typical number of drinks in a day: 0  Typical number of drinks in a week: 0  Interpretation: Low risk drinking behavior.    Single Item Drug Screening:  How often have you used an illegal drug (including marijuana) or a prescription  "medication for non-medical reasons in the past year? never    Single Item Drug Screen Score: 0  Interpretation: Negative screen for possible drug use disorder    SDOH Risk Assessment  Social determinants of health (SDOH) risk assesment tool was completed. The tool at a minimum covered housing stability, food insecurity, transportation needs, and utility difficulty. Patient had at risk responses for the following SDOH domains: food insecurity and housing stability.     Review of Current Opioid Use    Opioid Risk Tool (ORT) Interpretation: Complete Opioid Risk Tool (ORT)    Other Counseling Topics:   Car/seat belt/driving safety, sunscreen and calcium and vitamin D intake and regular weightbearing exercise.     Social Determinants of Health     Financial Resource Strain: High Risk (10/13/2022)    Overall Financial Resource Strain (CARDIA)     Difficulty of Paying Living Expenses: Very hard   Food Insecurity: Food Insecurity Present (9/26/2024)    Hunger Vital Sign     Worried About Running Out of Food in the Last Year: Often true     Ran Out of Food in the Last Year: Often true   Transportation Needs: No Transportation Needs (9/26/2024)    PRAPARE - Transportation     Lack of Transportation (Medical): No     Lack of Transportation (Non-Medical): No   Housing Stability: High Risk (9/26/2024)    Housing Stability Vital Sign     Unable to Pay for Housing in the Last Year: Yes     Number of Times Moved in the Last Year: 0     Homeless in the Last Year: No   Utilities: Not At Risk (9/26/2024)    [facility] Utilities     Threatened with loss of utilities: No     No results found.    Objective     /74 (BP Location: Left arm, Patient Position: Sitting)   Pulse 80   Temp (!) 97.1 °F (36.2 °C) (Tympanic)   Ht 5' 1\" (1.549 m)   Wt 46 kg (101 lb 6 oz)   LMP  (LMP Unknown)   SpO2 97%   BMI 19.15 kg/m²     Physical Exam    A/P: Doing well and no falls reported. Denies depression and feels safe at home. Diverse diet. No " problems operating a MV and uses seat belts. Has a living will and POA. No DME or referrals needed today. RTC one year for medicare wellness.

## 2024-09-26 NOTE — PATIENT INSTRUCTIONS
Medicare Preventive Visit Patient Instructions  Thank you for completing your Welcome to Medicare Visit or Medicare Annual Wellness Visit today. Your next wellness visit will be due in one year (9/27/2025).  The screening/preventive services that you may require over the next 5-10 years are detailed below. Some tests may not apply to you based off risk factors and/or age. Screening tests ordered at today's visit but not completed yet may show as past due. Also, please note that scanned in results may not display below.  Preventive Screenings:  Service Recommendations Previous Testing/Comments   Colorectal Cancer Screening  * Colonoscopy    * Fecal Occult Blood Test (FOBT)/Fecal Immunochemical Test (FIT)  * Fecal DNA/Cologuard Test  * Flexible Sigmoidoscopy Age: 45-75 years old   Colonoscopy: every 10 years (may be performed more frequently if at higher risk)  OR  FOBT/FIT: every 1 year  OR  Cologuard: every 3 years  OR  Sigmoidoscopy: every 5 years  Screening may be recommended earlier than age 45 if at higher risk for colorectal cancer. Also, an individualized decision between you and your healthcare provider will decide whether screening between the ages of 76-85 would be appropriate. Colonoscopy: 12/17/2019  FOBT/FIT: Not on file  Cologuard: Not on file  Sigmoidoscopy: Not on file    Screening Current     Breast Cancer Screening Age: 40+ years old  Frequency: every 1-2 years  Not required if history of left and right mastectomy Mammogram: 03/28/2023    Screening Current   Cervical Cancer Screening Between the ages of 21-29, pap smear recommended once every 3 years.   Between the ages of 30-65, can perform pap smear with HPV co-testing every 5 years.   Recommendations may differ for women with a history of total hysterectomy, cervical cancer, or abnormal pap smears in past. Pap Smear: 06/10/2024    Screening Not Indicated   Hepatitis C Screening Once for adults born between 1945 and 1965  More frequently in  patients at high risk for Hepatitis C Hep C Antibody: 08/24/2018    Screening Current   Diabetes Screening 1-2 times per year if you're at risk for diabetes or have pre-diabetes Fasting glucose: 91 mg/dL (9/21/2023)  A1C: 5.7 % (10/10/2022)  Screening Current   Cholesterol Screening Once every 5 years if you don't have a lipid disorder. May order more often based on risk factors. Lipid panel: 01/27/2022    Screening Current     Other Preventive Screenings Covered by Medicare:  Abdominal Aortic Aneurysm (AAA) Screening: covered once if your at risk. You're considered to be at risk if you have a family history of AAA.  Lung Cancer Screening: covers low dose CT scan once per year if you meet all of the following conditions: (1) Age 55-77; (2) No signs or symptoms of lung cancer; (3) Current smoker or have quit smoking within the last 15 years; (4) You have a tobacco smoking history of at least 20 pack years (packs per day multiplied by number of years you smoked); (5) You get a written order from a healthcare provider.  Glaucoma Screening: covered annually if you're considered high risk: (1) You have diabetes OR (2) Family history of glaucoma OR (3)  aged 50 and older OR (4)  American aged 65 and older  Osteoporosis Screening: covered every 2 years if you meet one of the following conditions: (1) You're estrogen deficient and at risk for osteoporosis based off medical history and other findings; (2) Have a vertebral abnormality; (3) On glucocorticoid therapy for more than 3 months; (4) Have primary hyperparathyroidism; (5) On osteoporosis medications and need to assess response to drug therapy.   Last bone density test (DXA Scan): 06/22/2020.  HIV Screening: covered annually if you're between the age of 15-65. Also covered annually if you are younger than 15 and older than 65 with risk factors for HIV infection. For pregnant patients, it is covered up to 3 times per  pregnancy.    Immunizations:  Immunization Recommendations   Influenza Vaccine Annual influenza vaccination during flu season is recommended for all persons aged >= 6 months who do not have contraindications   Pneumococcal Vaccine   * Pneumococcal conjugate vaccine = PCV13 (Prevnar 13), PCV15 (Vaxneuvance), PCV20 (Prevnar 20)  * Pneumococcal polysaccharide vaccine = PPSV23 (Pneumovax) Adults 19-65 yo with certain risk factors or if 65+ yo  If never received any pneumonia vaccine: recommend Prevnar 20 (PCV20)  Give PCV20 if previously received 1 dose of PCV13 or PPSV23   Hepatitis B Vaccine 3 dose series if at intermediate or high risk (ex: diabetes, end stage renal disease, liver disease)   Respiratory syncytial virus (RSV) Vaccine - COVERED BY MEDICARE PART D  * RSVPreF3 (Arexvy) CDC recommends that adults 60 years of age and older may receive a single dose of RSV vaccine using shared clinical decision-making (SCDM)   Tetanus (Td) Vaccine - COST NOT COVERED BY MEDICARE PART B Following completion of primary series, a booster dose should be given every 10 years to maintain immunity against tetanus. Td may also be given as tetanus wound prophylaxis.   Tdap Vaccine - COST NOT COVERED BY MEDICARE PART B Recommended at least once for all adults. For pregnant patients, recommended with each pregnancy.   Shingles Vaccine (Shingrix) - COST NOT COVERED BY MEDICARE PART B  2 shot series recommended in those 19 years and older who have or will have weakened immune systems or those 50 years and older     Health Maintenance Due:      Topic Date Due   • DXA SCAN  06/22/2022   • Colorectal Cancer Screening  12/17/2024   • Breast Cancer Screening: Mammogram  03/28/2025   • Hepatitis C Screening  Completed     Immunizations Due:      Topic Date Due   • Pneumococcal Vaccine: 65+ Years (1 of 2 - PCV) Never done   • COVID-19 Vaccine (1 - 2023-24 season) Never done   • Influenza Vaccine (1) 09/01/2024     Advance Directives   What are  advance directives?  Advance directives are legal documents that state your wishes and plans for medical care. These plans are made ahead of time in case you lose your ability to make decisions for yourself. Advance directives can apply to any medical decision, such as the treatments you want, and if you want to donate organs.   What are the types of advance directives?  There are many types of advance directives, and each state has rules about how to use them. You may choose a combination of any of the following:  Living will:  This is a written record of the treatment you want. You can also choose which treatments you do not want, which to limit, and which to stop at a certain time. This includes surgery, medicine, IV fluid, and tube feedings.   Durable power of  for healthcare (DPAHC):  This is a written record that states who you want to make healthcare choices for you when you are unable to make them for yourself. This person, called a proxy, is usually a family member or a friend. You may choose more than 1 proxy.  Do not resuscitate (DNR) order:  A DNR order is used in case your heart stops beating or you stop breathing. It is a request not to have certain forms of treatment, such as CPR. A DNR order may be included in other types of advance directives.  Medical directive:  This covers the care that you want if you are in a coma, near death, or unable to make decisions for yourself. You can list the treatments you want for each condition. Treatment may include pain medicine, surgery, blood transfusions, dialysis, IV or tube feedings, and a ventilator (breathing machine).  Values history:  This document has questions about your views, beliefs, and how you feel and think about life. This information can help others choose the care that you would choose.  Why are advance directives important?  An advance directive helps you control your care. Although spoken wishes may be used, it is better to have your  wishes written down. Spoken wishes can be misunderstood, or not followed. Treatments may be given even if you do not want them. An advance directive may make it easier for your family to make difficult choices about your care.   Fall Prevention    Fall prevention  includes ways to make your home and other areas safer. It also includes ways you can move more carefully to prevent a fall. Health conditions that cause changes in your blood pressure, vision, or muscle strength and coordination may increase your risk for falls. Medicines may also increase your risk for falls if they make you dizzy, weak, or sleepy.   Fall prevention tips:   Stand or sit up slowly.    Use assistive devices as directed.    Wear shoes that fit well and have soles that .    Wear a personal alarm.    Stay active.    Manage your medical conditions.    Home Safety Tips:  Add items to prevent falls in the bathroom.    Keep paths clear.    Install bright lights in your home.    Keep items you use often on shelves within reach.    Paint or place reflective tape on the edges of your stairs.    Narcotic (Opioid) Safety    Use narcotics safely:  Take prescribed narcotics exactly as directed  Do not give narcotics to others or take narcotics that belong to someone else  Do not mix narcotics without medicines or alcohol  Do not drive or operate heavy machinery after you take the narcotic  Monitor for side effects and notify your healthcare provider if you experienced side effects such as nausea, sleepiness, itching, or trouble thinking clearly.    Manage constipation:    Constipation is the most common side effect of narcotic medicine. Constipation is when you have hard, dry bowel movements, or you go longer than usual between bowel movements. Tell your healthcare provider about all changes in your bowel movements while you are taking narcotics. He or she may recommend laxative medicine to help you have a bowel movement. He or she may also change  the kind of narcotic you are taking, or change when you take it. The following are more ways you can prevent or relieve constipation:    Drink liquids as directed.  You may need to drink extra liquids to help soften and move your bowels. Ask how much liquid to drink each day and which liquids are best for you.  Eat high-fiber foods.  This may help decrease constipation by adding bulk to your bowel movements. High-fiber foods include fruits, vegetables, whole-grain breads and cereals, and beans. Your healthcare provider or dietitian can help you create a high-fiber meal plan. Your provider may also recommend a fiber supplement if you cannot get enough fiber from food.  Exercise regularly.  Regular physical activity can help stimulate your intestines. Walking is a good exercise to prevent or relieve constipation. Ask which exercises are best for you.  Schedule a time each day to have a bowel movement.  This may help train your body to have regular bowel movements. Bend forward while you are on the toilet to help move the bowel movement out. Sit on the toilet for at least 10 minutes, even if you do not have a bowel movement.    Store narcotics safely:   Store narcotics where others cannot easily get them.  Keep them in a locked cabinet or secure area. Do not  keep them in a purse or other bag you carry with you. A person may be looking for something else and find the narcotics.  Make sure narcotics are stored out of the reach of children.  A child can easily overdose on narcotics. Narcotics may look like candy to a small child.    The best way to dispose of narcotics:      The laws vary by country and area. In the [place], the best way is to return the narcotics through a take-back program. This program is offered by the [place] Drug Enforcement Agency ([employer]). The following are options for using the program:  Take the narcotics to a [employer]A collection site.  The site is often a law enforcement center. Call your local law  enforcement center for scheduled take-back days in your area. You will be given information on where to go if the collection site is in a different location.  Take the narcotics to an approved pharmacy or hospital.  A pharmacy or hospital may be set up as a collection site. You will need to ask if it is a [employer] collection site if you were not directed there. A pharmacy or doctor's office may not be able to take back narcotics unless it is a [employer] site.  Use a mail-back system.  This means you are given containers to put the narcotics into. You will then mail them in the containers.  Use a take-back drop box.  This is a place to leave the narcotics at any time. People and animals will not be able to get into the box. Your local law enforcement agency can tell you where to find a drop box in your area.    Other ways to manage pain:   Ask your healthcare provider about non-narcotic medicines to control pain.  Nonprescription medicines include NSAIDs (such as ibuprofen) and acetaminophen. Prescription medicines include muscle relaxers, antidepressants, and steroids.  Pain may be managed without any medicines.  Some ways to relieve pain include massage, aromatherapy, or meditation. Physical or occupational therapy may also help.    For more information:   Drug Enforcement Administration  [address] [place] [address]  Phone: 0- 337 - 735-9522  Web Address: https://www.deadiversion.[facility].gov/drug_disposal/    US Food and Drug Administration  [address] [place] [address]  Phone: 7- 832 - 432-1675  Web Address: http://www.fda.gov     © Copyright [employer] 2018 Information is for End User's use only and may not be sold, redistributed or otherwise used for commercial purposes. All illustrations and images included in CareNotes® are the copyrighted property of [employer] or [employer]      Medicare Preventive Visit Patient Instructions  Thank you for completing your Welcome  to Medicare Visit or Medicare Annual Wellness Visit today. Your next wellness visit will be due in one year (9/27/2025).  The screening/preventive services that you may require over the next 5-10 years are detailed below. Some tests may not apply to you based off risk factors and/or age. Screening tests ordered at today's visit but not completed yet may show as past due. Also, please note that scanned in results may not display below.  Preventive Screenings:  Service Recommendations Previous Testing/Comments   Colorectal Cancer Screening  * Colonoscopy    * Fecal Occult Blood Test (FOBT)/Fecal Immunochemical Test (FIT)  * Fecal DNA/Cologuard Test  * Flexible Sigmoidoscopy Age: 45-75 years old   Colonoscopy: every 10 years (may be performed more frequently if at higher risk)  OR  FOBT/FIT: every 1 year  OR  Cologuard: every 3 years  OR  Sigmoidoscopy: every 5 years  Screening may be recommended earlier than age 45 if at higher risk for colorectal cancer. Also, an individualized decision between you and your healthcare provider will decide whether screening between the ages of 76-85 would be appropriate. Colonoscopy: 12/17/2019  FOBT/FIT: Not on file  Cologuard: Not on file  Sigmoidoscopy: Not on file    Screening Current     Breast Cancer Screening Age: 40+ years old  Frequency: every 1-2 years  Not required if history of left and right mastectomy Mammogram: 03/28/2023    Screening Current   Cervical Cancer Screening Between the ages of 21-29, pap smear recommended once every 3 years.   Between the ages of 30-65, can perform pap smear with HPV co-testing every 5 years.   Recommendations may differ for women with a history of total hysterectomy, cervical cancer, or abnormal pap smears in past. Pap Smear: 06/10/2024    Screening Not Indicated   Hepatitis C Screening Once for adults born between 1945 and 1965  More frequently in patients at high risk for Hepatitis C Hep C Antibody: 08/24/2018    Screening Current    Diabetes Screening 1-2 times per year if you're at risk for diabetes or have pre-diabetes Fasting glucose: 91 mg/dL (9/21/2023)  A1C: 5.7 % (10/10/2022)  Screening Current   Cholesterol Screening Once every 5 years if you don't have a lipid disorder. May order more often based on risk factors. Lipid panel: 01/27/2022    Screening Current     Other Preventive Screenings Covered by Medicare:  Abdominal Aortic Aneurysm (AAA) Screening: covered once if your at risk. You're considered to be at risk if you have a family history of AAA.  Lung Cancer Screening: covers low dose CT scan once per year if you meet all of the following conditions: (1) Age 55-77; (2) No signs or symptoms of lung cancer; (3) Current smoker or have quit smoking within the last 15 years; (4) You have a tobacco smoking history of at least 20 pack years (packs per day multiplied by number of years you smoked); (5) You get a written order from a healthcare provider.  Glaucoma Screening: covered annually if you're considered high risk: (1) You have diabetes OR (2) Family history of glaucoma OR (3)  aged 50 and older OR (4)  American aged 65 and older  Osteoporosis Screening: covered every 2 years if you meet one of the following conditions: (1) You're estrogen deficient and at risk for osteoporosis based off medical history and other findings; (2) Have a vertebral abnormality; (3) On glucocorticoid therapy for more than 3 months; (4) Have primary hyperparathyroidism; (5) On osteoporosis medications and need to assess response to drug therapy.   Last bone density test (DXA Scan): 06/22/2020.  HIV Screening: covered annually if you're between the age of 15-65. Also covered annually if you are younger than 15 and older than 65 with risk factors for HIV infection. For pregnant patients, it is covered up to 3 times per pregnancy.    Immunizations:  Immunization Recommendations   Influenza Vaccine Annual influenza vaccination during  flu season is recommended for all persons aged >= 6 months who do not have contraindications   Pneumococcal Vaccine   * Pneumococcal conjugate vaccine = PCV13 (Prevnar 13), PCV15 (Vaxneuvance), PCV20 (Prevnar 20)  * Pneumococcal polysaccharide vaccine = PPSV23 (Pneumovax) Adults 19-65 yo with certain risk factors or if 65+ yo  If never received any pneumonia vaccine: recommend Prevnar 20 (PCV20)  Give PCV20 if previously received 1 dose of PCV13 or PPSV23   Hepatitis B Vaccine 3 dose series if at intermediate or high risk (ex: diabetes, end stage renal disease, liver disease)   Respiratory syncytial virus (RSV) Vaccine - COVERED BY MEDICARE PART D  * RSVPreF3 (Arexvy) CDC recommends that adults 60 years of age and older may receive a single dose of RSV vaccine using shared clinical decision-making (SCDM)   Tetanus (Td) Vaccine - COST NOT COVERED BY MEDICARE PART B Following completion of primary series, a booster dose should be given every 10 years to maintain immunity against tetanus. Td may also be given as tetanus wound prophylaxis.   Tdap Vaccine - COST NOT COVERED BY MEDICARE PART B Recommended at least once for all adults. For pregnant patients, recommended with each pregnancy.   Shingles Vaccine (Shingrix) - COST NOT COVERED BY MEDICARE PART B  2 shot series recommended in those 19 years and older who have or will have weakened immune systems or those 50 years and older     Health Maintenance Due:      Topic Date Due   • DXA SCAN  06/22/2022   • Colorectal Cancer Screening  12/17/2024   • Breast Cancer Screening: Mammogram  03/28/2025   • Hepatitis C Screening  Completed     Immunizations Due:      Topic Date Due   • Pneumococcal Vaccine: 65+ Years (1 of 2 - PCV) Never done   • COVID-19 Vaccine (1 - 2023-24 season) Never done   • Influenza Vaccine (1) 09/01/2024     Advance Directives   What are advance directives?  Advance directives are legal documents that state your wishes and plans for medical care.  These plans are made ahead of time in case you lose your ability to make decisions for yourself. Advance directives can apply to any medical decision, such as the treatments you want, and if you want to donate organs.   What are the types of advance directives?  There are many types of advance directives, and each state has rules about how to use them. You may choose a combination of any of the following:  Living will:  This is a written record of the treatment you want. You can also choose which treatments you do not want, which to limit, and which to stop at a certain time. This includes surgery, medicine, IV fluid, and tube feedings.   Durable power of  for healthcare (DPAHC):  This is a written record that states who you want to make healthcare choices for you when you are unable to make them for yourself. This person, called a proxy, is usually a family member or a friend. You may choose more than 1 proxy.  Do not resuscitate (DNR) order:  A DNR order is used in case your heart stops beating or you stop breathing. It is a request not to have certain forms of treatment, such as CPR. A DNR order may be included in other types of advance directives.  Medical directive:  This covers the care that you want if you are in a coma, near death, or unable to make decisions for yourself. You can list the treatments you want for each condition. Treatment may include pain medicine, surgery, blood transfusions, dialysis, IV or tube feedings, and a ventilator (breathing machine).  Values history:  This document has questions about your views, beliefs, and how you feel and think about life. This information can help others choose the care that you would choose.  Why are advance directives important?  An advance directive helps you control your care. Although spoken wishes may be used, it is better to have your wishes written down. Spoken wishes can be misunderstood, or not followed. Treatments may be given even if you  do not want them. An advance directive may make it easier for your family to make difficult choices about your care.   Fall Prevention    Fall prevention  includes ways to make your home and other areas safer. It also includes ways you can move more carefully to prevent a fall. Health conditions that cause changes in your blood pressure, vision, or muscle strength and coordination may increase your risk for falls. Medicines may also increase your risk for falls if they make you dizzy, weak, or sleepy.   Fall prevention tips:   Stand or sit up slowly.    Use assistive devices as directed.    Wear shoes that fit well and have soles that .    Wear a personal alarm.    Stay active.    Manage your medical conditions.    Home Safety Tips:  Add items to prevent falls in the bathroom.    Keep paths clear.    Install bright lights in your home.    Keep items you use often on shelves within reach.    Paint or place reflective tape on the edges of your stairs.    Narcotic (Opioid) Safety    Use narcotics safely:  Take prescribed narcotics exactly as directed  Do not give narcotics to others or take narcotics that belong to someone else  Do not mix narcotics without medicines or alcohol  Do not drive or operate heavy machinery after you take the narcotic  Monitor for side effects and notify your healthcare provider if you experienced side effects such as nausea, sleepiness, itching, or trouble thinking clearly.    Manage constipation:    Constipation is the most common side effect of narcotic medicine. Constipation is when you have hard, dry bowel movements, or you go longer than usual between bowel movements. Tell your healthcare provider about all changes in your bowel movements while you are taking narcotics. He or she may recommend laxative medicine to help you have a bowel movement. He or she may also change the kind of narcotic you are taking, or change when you take it. The following are more ways you can prevent or  relieve constipation:    Drink liquids as directed.  You may need to drink extra liquids to help soften and move your bowels. Ask how much liquid to drink each day and which liquids are best for you.  Eat high-fiber foods.  This may help decrease constipation by adding bulk to your bowel movements. High-fiber foods include fruits, vegetables, whole-grain breads and cereals, and beans. Your healthcare provider or dietitian can help you create a high-fiber meal plan. Your provider may also recommend a fiber supplement if you cannot get enough fiber from food.  Exercise regularly.  Regular physical activity can help stimulate your intestines. Walking is a good exercise to prevent or relieve constipation. Ask which exercises are best for you.  Schedule a time each day to have a bowel movement.  This may help train your body to have regular bowel movements. Bend forward while you are on the toilet to help move the bowel movement out. Sit on the toilet for at least 10 minutes, even if you do not have a bowel movement.    Store narcotics safely:   Store narcotics where others cannot easily get them.  Keep them in a locked cabinet or secure area. Do not  keep them in a purse or other bag you carry with you. A person may be looking for something else and find the narcotics.  Make sure narcotics are stored out of the reach of children.  A child can easily overdose on narcotics. Narcotics may look like candy to a small child.    The best way to dispose of narcotics:      The laws vary by country and area. In the [place], the best way is to return the narcotics through a take-back program. This program is offered by the [place] Drug Enforcement Agency (PARTHA). The following are options for using the program:  Take the narcotics to a PARTHA collection site.  The site is often a law enforcement center. Call your local law enforcement center for scheduled take-back days in your area. You will be given information on where to go if the  collection site is in a different location.  Take the narcotics to an approved pharmacy or hospital.  A pharmacy or hospital may be set up as a collection site. You will need to ask if it is a PARTHA collection site if you were not directed there. A pharmacy or doctor's office may not be able to take back narcotics unless it is a PAR[employer]A site.  Use a mail-back system.  This means you are given containers to put the narcotics into. You will then mail them in the containers.  Use a take-back drop box.  This is a place to leave the narcotics at any time. People and animals will not be able to get into the box. Your local law enforcement agency can tell you where to find a drop box in your area.    Other ways to manage pain:   Ask your healthcare provider about non-narcotic medicines to control pain.  Nonprescription medicines include NSAIDs (such as ibuprofen) and acetaminophen. Prescription medicines include muscle relaxers, antidepressants, and steroids.  Pain may be managed without any medicines.  Some ways to relieve pain include massage, aromatherapy, or meditation. Physical or occupational therapy may also help.    For more information:   Drug Enforcement Administration  [address] [place] [address]  Phone: 5- 337 - 412-7131  Web Address: https://www.deadiversion.[facility].gov/drug_disposal/    US Food and Drug Administration  [address] [place] [address]  Phone: 9- 208 - 860-5628  Web Address: http://www.fda.gov     © Copyright [employer] 2018 Information is for End User's use only and may not be sold, redistributed or otherwise used for commercial purposes. All illustrations and images included in CareNotes® are the copyrighted property of A.D.A.M., Inc. or [employer]omebody.

## 2024-09-26 NOTE — ASSESSMENT & PLAN NOTE
Orders:    Albumin / creatinine urine ratio; Future    Comprehensive metabolic panel; Future    Lipid Panel with Direct LDL reflex; Future

## 2024-09-26 NOTE — PROGRESS NOTES
Ambulatory Visit  Name: Lay Marie      : 1955      MRN: [id number]  Encounter Provider: Felipe Shell DO  Encounter Date: 2024   Encounter department: [facility]    Assessment & Plan  Encounter for immunization         Primary ovarian failure    Orders:    DXA bone density spine hip and pelvis; Future    Essential hypertension    Orders:    Albumin / creatinine urine ratio; Future    Comprehensive metabolic panel; Future    Lipid Panel with Direct LDL reflex; Future    Asthma-COPD overlap syndrome (HCC)         Primary osteoarthritis involving multiple joints         Osteopenia of multiple sites    Orders:    TSH, 3rd generation; Future    Chronic renal impairment, stage 3b (HCC)  Lab Results   Component Value Date    EGFR 41 2024    EGFR 37 2023    EGFR 45 2023    CREATININE 1.33 (H) 2024    CREATININE 1.44 (H) 2023    CREATININE 1.22 2023       Orders:    Albumin / creatinine urine ratio; Future    CBC and differential; Future    Comprehensive metabolic panel; Future    Hemoglobin A1C; Future    Magnesium; Future    Uric acid; Future    PTH, intact; Future    Mild major depression, single episode (HCC)      Orders:    TSH, 3rd generation; Future    Chronic midline low back pain, unspecified whether sciatica present         Pre-diabetes    Orders:    Hemoglobin A1C; Future    Borderline high serum cholesterol    Orders:    Lipid Panel with Direct LDL reflex; Future    Screening for lipid disorders         Screening for diabetes mellitus (DM)         Screening for thyroid disorder    Orders:    Lipid Panel with Direct LDL reflex; Future    TSH, 3rd generation; Future    Screening for deficiency anemia         Menopause ovarian failure         Encounter for diabetic foot exam (HCC)       A/P: Doing ok and will check labs. Discussed vaccines and wants to research pneumonia. . Reminded pt to get her dexa and mammo done. Appreciate specialist's  "input. Continue current treatment and RTC four months for routine.      History of Present Illness     WF RTC for f/u HTN, CKD, etc. Doing ok and no new issues or concerns. Remains active w/o difficulty and no falls. Denies CP, SOB, edema, palpitations, orthopnea or PND. CHronic pain is manageable. Seeing specialists for her pain and renal. No reflux. RAD is controlled and limited rescue MDI use. ZACK/MDD are controlled. Due for labs, vaccines, mammo, and dexa.           Review of Systems   Constitutional:  Negative for activity change, chills, diaphoresis, fatigue and fever.   HENT: Negative.     Eyes:  Negative for visual disturbance.   Respiratory:  Negative for cough, chest tightness, shortness of breath and wheezing.    Cardiovascular:  Negative for chest pain, palpitations and leg swelling.   Gastrointestinal:  Negative for abdominal pain, constipation, diarrhea, nausea and vomiting.   Endocrine: Negative for cold intolerance and heat intolerance.   Genitourinary:  Negative for difficulty urinating, dysuria and frequency.   Musculoskeletal:  Positive for arthralgias. Negative for gait problem and myalgias.   Neurological:  Negative for dizziness, seizures, syncope, weakness, light-headedness and headaches.   Psychiatric/Behavioral:  Negative for confusion, dysphoric mood and sleep disturbance. The patient is not nervous/anxious.            Objective     /74 (BP Location: Left arm, Patient Position: Sitting)   Pulse 80   Temp (!) 97.1 °F (36.2 °C) (Tympanic)   Ht 5' 1\" (1.549 m)   Wt 46 kg (101 lb 6 oz)   LMP  (LMP Unknown)   SpO2 97%   BMI 19.15 kg/m²     Physical Exam  Vitals and nursing note reviewed.   Constitutional:       General: She is not in acute distress.     Appearance: Normal appearance. She is not ill-appearing.   HENT:      Head: Normocephalic and atraumatic.      Mouth/Throat:      Mouth: Mucous membranes are moist.   Eyes:      Extraocular Movements: Extraocular movements intact.    "   Conjunctiva/sclera: Conjunctivae normal.      Pupils: Pupils are equal, round, and reactive to light.   Neck:      Vascular: No carotid bruit.   Cardiovascular:      Rate and Rhythm: Normal rate and regular rhythm.      Heart sounds: Normal heart sounds. No murmur heard.  Pulmonary:      Effort: Pulmonary effort is normal. No respiratory distress.      Breath sounds: Normal breath sounds. No wheezing, rhonchi or rales.   Abdominal:      General: Bowel sounds are normal. There is no distension.      Palpations: Abdomen is soft.      Tenderness: There is no abdominal tenderness.   Musculoskeletal:      Cervical back: Neck supple.      Right lower leg: No edema.      Left lower leg: No edema.   Neurological:      General: No focal deficit present.      Mental Status: She is alert and oriented to person, place, and time. Mental status is at baseline.   Psychiatric:         Mood and Affect: Mood normal.         Behavior: Behavior normal.         Thought Content: Thought content normal.         Judgment: Judgment normal.

## 2024-09-26 NOTE — ASSESSMENT & PLAN NOTE
Lab Results   Component Value Date    EGFR 41 06/21/2024    EGFR 37 09/21/2023    EGFR 45 07/12/2023    CREATININE 1.33 (H) 06/21/2024    CREATININE 1.44 (H) 09/21/2023    CREATININE 1.22 07/12/2023       Orders:    Albumin / creatinine urine ratio; Future    CBC and differential; Future    Comprehensive metabolic panel; Future    Hemoglobin A1C; Future    Magnesium; Future    Uric acid; Future    PTH, intact; Future

## 2024-10-07 DIAGNOSIS — N95.1 VASOMOTOR SYMPTOMS DUE TO MENOPAUSE: ICD-10-CM

## 2024-10-08 RX ORDER — ESTRADIOL 0.07 MG/D
PATCH TRANSDERMAL
Qty: 12 PATCH | Refills: 1 | Status: SHIPPED | OUTPATIENT
Start: 2024-10-08 | End: 2024-10-17

## 2024-10-10 ENCOUNTER — TELEPHONE (OUTPATIENT)
Age: 69
End: 2024-10-10

## 2024-10-10 NOTE — TELEPHONE ENCOUNTER
Pt calling with concerns for current 0.075 estradiol patch. States that whenever she bends, patch bends/creases on abdomen and cuts into skin. Pt asking to bump back down to 0.05 dosage patch which has she used before which was working well for patient. The current patch is too large for body/abdomen. Pharmacy on file verified ([facility] in [place]). Pt confirmed follow up appt in December with provider, and advised if she has further concerns or needs, will discuss with provider at that time. RN advised will route patient's request to provider. Pt agreeable to plan. No further questions.

## 2024-10-15 NOTE — TELEPHONE ENCOUNTER
Pt calling again to follow up on new prescription. Asking order for 0.075 patch is canceled, and new order for 0.05 patch is placed and sent to pharmacy.

## 2024-10-17 DIAGNOSIS — N95.1 VASOMOTOR SYMPTOMS DUE TO MENOPAUSE: Primary | ICD-10-CM

## 2024-10-17 RX ORDER — ESTRADIOL 0.05 MG/D
1 PATCH TRANSDERMAL WEEKLY
Qty: 9 PATCH | Refills: 3 | Status: SHIPPED | OUTPATIENT
Start: 2024-10-17

## 2024-10-17 NOTE — TELEPHONE ENCOUNTER
"Patient calling in to check on status of response. Advised still waiting to hear back from provider. Patient states she has been out of her medication for some time now. States she was getting great progress and is afraid she will go back to where she was prior to beginning medication. Patient states 0.075 patch is too large for abdomen. States when she sits, the patch folds and digs into skin and causes bleeding/irritation at the site. Patient states she tried the 0.075 patch for a month, but could not tolerate it. Requesting previous patch 0.05 patch be sent to pharmacy.     ESC to on-call provider for review and to determine if able to send prescription in.   See response from Dr. Harrell: \"I sent it over. please apologize to the patient and remind her that we do our best to get back to patient but the average turn around is 5-7 business days for Non emergency prescription requests.\"    Called patient and made aware of prescription sent and provider response. Patient verbalized understanding and appreciative.   "

## 2024-11-08 ENCOUNTER — APPOINTMENT (OUTPATIENT)
Dept: LAB | Facility: CLINIC | Age: 69
End: 2024-11-08
Payer: MEDICARE

## 2024-11-08 DIAGNOSIS — N18.31 CHRONIC KIDNEY DISEASE (CKD) STAGE G3A/A1, MODERATELY DECREASED GLOMERULAR FILTRATION RATE (GFR) BETWEEN 45-59 ML/MIN/1.73 SQUARE METER AND ALBUMINURIA CREATININE RATIO LESS THAN 30 MG/G (HCC): ICD-10-CM

## 2024-11-08 DIAGNOSIS — D63.1 ANEMIA OF CHRONIC RENAL FAILURE, UNSPECIFIED CKD STAGE: ICD-10-CM

## 2024-11-08 DIAGNOSIS — N18.9 ANEMIA OF CHRONIC RENAL FAILURE, UNSPECIFIED CKD STAGE: ICD-10-CM

## 2024-11-08 DIAGNOSIS — E87.1 HYPONATREMIA: ICD-10-CM

## 2024-11-08 DIAGNOSIS — I10 ESSENTIAL HYPERTENSION, BENIGN: ICD-10-CM

## 2024-11-08 LAB
ALBUMIN SERPL BCG-MCNC: 3.9 G/DL (ref 3.5–5)
ANION GAP SERPL CALCULATED.3IONS-SCNC: 6 MMOL/L (ref 4–13)
BACTERIA UR QL AUTO: ABNORMAL /HPF
BILIRUB UR QL STRIP: NEGATIVE
BUN SERPL-MCNC: 16 MG/DL (ref 5–25)
CALCIUM SERPL-MCNC: 8.8 MG/DL (ref 8.4–10.2)
CHLORIDE SERPL-SCNC: 102 MMOL/L (ref 96–108)
CLARITY UR: CLEAR
CO2 SERPL-SCNC: 30 MMOL/L (ref 21–32)
COLOR UR: ABNORMAL
CREAT SERPL-MCNC: 1.15 MG/DL (ref 0.6–1.3)
CREAT UR-MCNC: 51.6 MG/DL
ERYTHROCYTE [DISTWIDTH] IN BLOOD BY AUTOMATED COUNT: 12.4 % (ref 11.6–15.1)
FERRITIN SERPL-MCNC: 114 NG/ML (ref 11–307)
FOLATE SERPL-MCNC: 3.6 NG/ML
GFR SERPL CREATININE-BSD FRML MDRD: 48 ML/MIN/1.73SQ M
GLUCOSE P FAST SERPL-MCNC: 71 MG/DL (ref 65–99)
GLUCOSE UR STRIP-MCNC: NEGATIVE MG/DL
HCT VFR BLD AUTO: 34.6 % (ref 34.8–46.1)
HGB BLD-MCNC: 11.3 G/DL (ref 11.5–15.4)
HGB UR QL STRIP.AUTO: ABNORMAL
IRON SATN MFR SERPL: 32 % (ref 15–50)
IRON SERPL-MCNC: 80 UG/DL (ref 50–212)
KETONES UR STRIP-MCNC: NEGATIVE MG/DL
LEUKOCYTE ESTERASE UR QL STRIP: NEGATIVE
MAGNESIUM SERPL-MCNC: 1.7 MG/DL (ref 1.9–2.7)
MCH RBC QN AUTO: 32.6 PG (ref 26.8–34.3)
MCHC RBC AUTO-ENTMCNC: 32.7 G/DL (ref 31.4–37.4)
MCV RBC AUTO: 100 FL (ref 82–98)
NITRITE UR QL STRIP: NEGATIVE
NON-SQ EPI CELLS URNS QL MICRO: ABNORMAL /HPF
PH UR STRIP.AUTO: 7.5 [PH]
PHOSPHATE SERPL-MCNC: 2.7 MG/DL (ref 2.3–4.1)
PLATELET # BLD AUTO: 273 THOUSANDS/UL (ref 149–390)
PMV BLD AUTO: 10.7 FL (ref 8.9–12.7)
POTASSIUM SERPL-SCNC: 3.6 MMOL/L (ref 3.5–5.3)
PROT UR STRIP-MCNC: NEGATIVE MG/DL
PROT UR-MCNC: 4.5 MG/DL
PROT/CREAT UR: 0.1 MG/G{CREAT} (ref 0–0.1)
RBC # BLD AUTO: 3.47 MILLION/UL (ref 3.81–5.12)
RBC #/AREA URNS AUTO: ABNORMAL /HPF
SODIUM SERPL-SCNC: 138 MMOL/L (ref 135–147)
SP GR UR STRIP.AUTO: 1.01 (ref 1–1.03)
TIBC SERPL-MCNC: 251 UG/DL (ref 250–450)
UIBC SERPL-MCNC: 171 UG/DL (ref 155–355)
UROBILINOGEN UR STRIP-ACNC: <2 MG/DL
VIT B12 SERPL-MCNC: 2427 PG/ML (ref 180–914)
WBC # BLD AUTO: 6.25 THOUSAND/UL (ref 4.31–10.16)
WBC #/AREA URNS AUTO: ABNORMAL /HPF

## 2024-11-08 PROCEDURE — 83735 ASSAY OF MAGNESIUM: CPT

## 2024-11-08 PROCEDURE — 82746 ASSAY OF FOLIC ACID SERUM: CPT

## 2024-11-08 PROCEDURE — 84156 ASSAY OF PROTEIN URINE: CPT

## 2024-11-08 PROCEDURE — 82607 VITAMIN B-12: CPT

## 2024-11-08 PROCEDURE — 83540 ASSAY OF IRON: CPT

## 2024-11-08 PROCEDURE — 83550 IRON BINDING TEST: CPT

## 2024-11-08 PROCEDURE — 82728 ASSAY OF FERRITIN: CPT

## 2024-11-08 PROCEDURE — 81001 URINALYSIS AUTO W/SCOPE: CPT

## 2024-11-08 PROCEDURE — 80069 RENAL FUNCTION PANEL: CPT

## 2024-11-08 PROCEDURE — 85027 COMPLETE CBC AUTOMATED: CPT

## 2024-11-08 PROCEDURE — 82570 ASSAY OF URINE CREATININE: CPT

## 2024-11-08 PROCEDURE — 36415 COLL VENOUS BLD VENIPUNCTURE: CPT

## 2024-11-20 ENCOUNTER — PATIENT MESSAGE (OUTPATIENT)
Dept: OBGYN CLINIC | Facility: CLINIC | Age: 69
End: 2024-11-20

## 2024-11-20 ENCOUNTER — TELEPHONE (OUTPATIENT)
Age: 69
End: 2024-11-20

## 2024-11-20 DIAGNOSIS — N95.1 VASOMOTOR SYMPTOMS DUE TO MENOPAUSE: Primary | ICD-10-CM

## 2024-11-20 NOTE — TELEPHONE ENCOUNTER
Patient called and is asking if Progestrone provera can be removed from her chart she never took it.        She takes progestrone proleium 100 mg tablets.       was originally sending her 30 tablets and insurance had denied and she was paying out of pocket.        It would be less expensive if 90 tablets would be sent.      Please call patient if this can be done    Please advise.

## 2024-11-21 RX ORDER — PROGESTERONE 100 MG/1
1 CAPSULE ORAL
Qty: 90 CAPSULE | Refills: 0 | Status: SHIPPED | OUTPATIENT
Start: 2024-11-21

## 2024-12-02 DIAGNOSIS — R51.9 NONINTRACTABLE EPISODIC HEADACHE, UNSPECIFIED HEADACHE TYPE: ICD-10-CM

## 2024-12-02 RX ORDER — FLUTICASONE PROPIONATE 50 MCG
1 SPRAY, SUSPENSION (ML) NASAL DAILY
Qty: 16 G | Refills: 1 | Status: SHIPPED | OUTPATIENT
Start: 2024-12-02

## 2024-12-02 NOTE — TELEPHONE ENCOUNTER
Patient called she is requesting a refill on her     fluticasone (FLONASE) 50 mcg/act nasal spray     The patient is out of her medication and would like it to go to the [facility] pharmacy.     Please advise, thank you.

## 2024-12-16 ENCOUNTER — OFFICE VISIT (OUTPATIENT)
Dept: OBGYN CLINIC | Facility: CLINIC | Age: 69
End: 2024-12-16
Payer: MEDICARE

## 2024-12-16 VITALS — WEIGHT: 102 LBS | BODY MASS INDEX: 19.26 KG/M2 | HEIGHT: 61 IN | RESPIRATION RATE: 2 BRPM

## 2024-12-16 DIAGNOSIS — N95.1 VASOMOTOR SYMPTOMS DUE TO MENOPAUSE: Primary | ICD-10-CM

## 2024-12-16 PROBLEM — E11.9 ENCOUNTER FOR DIABETIC FOOT EXAM (HCC): Status: ACTIVE | Noted: 2024-12-16

## 2024-12-16 PROBLEM — F11.20 OPIOID DEPENDENCE, UNCOMPLICATED (HCC): Status: ACTIVE | Noted: 2024-12-16

## 2024-12-16 PROCEDURE — 99213 OFFICE O/P EST LOW 20 MIN: CPT | Performed by: STUDENT IN AN ORGANIZED HEALTH CARE EDUCATION/TRAINING PROGRAM

## 2024-12-16 RX ORDER — PROGESTERONE 100 MG/1
1 CAPSULE ORAL
Qty: 90 CAPSULE | Refills: 3 | Status: SHIPPED | OUTPATIENT
Start: 2024-12-16

## 2024-12-16 RX ORDER — ESTRADIOL 0.05 MG/D
1 PATCH TRANSDERMAL WEEKLY
Qty: 9 PATCH | Refills: 3 | Status: SHIPPED | OUTPATIENT
Start: 2024-12-16

## 2024-12-16 NOTE — PROGRESS NOTES
"Name: Lay Marie      : 1955      MRN: [id number]  Encounter Provider: Angela Myers MD  Encounter Date: 2024   Encounter department: [facility] [place]  :  Assessment & Plan  Vasomotor symptoms due to menopause  - Opts to continue current regimen, understanding of risks benefits and alternatives  - Due to an insurance formulary change she anticipates changing to Divigel in January    Orders:    Progesterone 100 MG CAPS; Take 1 capsule by mouth daily at bedtime    estradiol (Climara) 0.05 mg/24 hr; Place 1 patch on the skin over 7 days once a week        History of Present Illness   Lay presents for routine surveillance of her HRT.  We took over prescribing after she moved from [place]. She noted a worsening in her menopausal symptoms when she had a lapse in her HRT and asked us to continue prescribing.  Since restarting her HRT she has noted marked improvement in her symptoms.    She notes that in January she is being moved to another insurance provider, and she was notified that the climara patch is not on formulary. She noted she would need to switch to Divigel.      Lay Marie is a 69 y.o. female who presents for HRT surveillance.    History obtained from: patient    Review of Systems   Constitutional:  Negative for chills and fever.   Respiratory:  Negative for cough and shortness of breath.    Cardiovascular:  Negative for chest pain and leg swelling.   Gastrointestinal:  Negative for abdominal pain, nausea and vomiting.   Genitourinary:  Negative for dysuria, frequency and urgency.   Neurological:  Negative for dizziness, light-headedness and headaches.     Medical History Reviewed by provider this encounter:  Tobacco  Allergies  Meds  Problems  Med Hx  Surg Hx  Fam Hx     .     Objective   Resp (!) 2   Ht 5' 1\" (1.549 m)   Wt 46.3 kg (102 lb)   LMP  (LMP Unknown)   BMI 19.27 kg/m²      Physical Exam  Constitutional:       Appearance: Normal " appearance.   HENT:      Head: Normocephalic and atraumatic.   Cardiovascular:      Rate and Rhythm: Normal rate.   Pulmonary:      Effort: Pulmonary effort is normal.   Skin:     General: Skin is warm.   Neurological:      General: No focal deficit present.      Mental Status: She is alert.   Psychiatric:         Mood and Affect: Mood normal.             Angela Myers MD  [facility]  [facility]  12/16/2024 2:42 PM

## 2024-12-16 NOTE — ASSESSMENT & PLAN NOTE
- Opts to continue current regimen, understanding of risks benefits and alternatives  - Due to an insurance formulary change she anticipates changing to Divigel in January    Orders:    Progesterone 100 MG CAPS; Take 1 capsule by mouth daily at bedtime    estradiol (Climara) 0.05 mg/24 hr; Place 1 patch on the skin over 7 days once a week

## 2025-01-14 ENCOUNTER — APPOINTMENT (OUTPATIENT)
Dept: LAB | Facility: CLINIC | Age: 70
End: 2025-01-14

## 2025-01-14 ENCOUNTER — OFFICE VISIT (OUTPATIENT)
Dept: INTERNAL MEDICINE CLINIC | Facility: CLINIC | Age: 70
End: 2025-01-14
Payer: MEDICARE

## 2025-01-14 VITALS
WEIGHT: 101.6 LBS | HEIGHT: 61 IN | OXYGEN SATURATION: 99 % | BODY MASS INDEX: 19.18 KG/M2 | TEMPERATURE: 98 F | DIASTOLIC BLOOD PRESSURE: 70 MMHG | SYSTOLIC BLOOD PRESSURE: 122 MMHG | HEART RATE: 83 BPM

## 2025-01-14 DIAGNOSIS — F32.0 MILD MAJOR DEPRESSION, SINGLE EPISODE (HCC): ICD-10-CM

## 2025-01-14 DIAGNOSIS — R73.03 PRE-DIABETES: ICD-10-CM

## 2025-01-14 DIAGNOSIS — M85.89 OTHER SPECIFIED DISORDERS OF BONE DENSITY AND STRUCTURE, MULTIPLE SITES: ICD-10-CM

## 2025-01-14 DIAGNOSIS — N18.32 CHRONIC RENAL IMPAIRMENT, STAGE 3B (HCC): ICD-10-CM

## 2025-01-14 DIAGNOSIS — Z13.29 SCREENING FOR THYROID DISORDER: ICD-10-CM

## 2025-01-14 DIAGNOSIS — M15.0 PRIMARY OSTEOARTHRITIS INVOLVING MULTIPLE JOINTS: ICD-10-CM

## 2025-01-14 DIAGNOSIS — I10 ESSENTIAL HYPERTENSION: ICD-10-CM

## 2025-01-14 DIAGNOSIS — Z13.820 SCREENING FOR OSTEOPOROSIS: ICD-10-CM

## 2025-01-14 DIAGNOSIS — F41.1 GAD (GENERALIZED ANXIETY DISORDER): ICD-10-CM

## 2025-01-14 DIAGNOSIS — M85.89 OSTEOPENIA OF MULTIPLE SITES: ICD-10-CM

## 2025-01-14 DIAGNOSIS — Z12.11 SCREENING FOR COLON CANCER: ICD-10-CM

## 2025-01-14 DIAGNOSIS — Z23 ENCOUNTER FOR IMMUNIZATION: ICD-10-CM

## 2025-01-14 DIAGNOSIS — E53.8 FOLIC ACID DEFICIENCY: ICD-10-CM

## 2025-01-14 DIAGNOSIS — E78.9 BORDERLINE HIGH SERUM CHOLESTEROL: ICD-10-CM

## 2025-01-14 DIAGNOSIS — J44.89 ASTHMA-COPD OVERLAP SYNDROME (HCC): ICD-10-CM

## 2025-01-14 DIAGNOSIS — L98.9 SKIN LESION OF RIGHT LEG: ICD-10-CM

## 2025-01-14 DIAGNOSIS — F11.20 UNCOMPLICATED OPIOID DEPENDENCE (HCC): ICD-10-CM

## 2025-01-14 DIAGNOSIS — I10 ESSENTIAL HYPERTENSION: Primary | ICD-10-CM

## 2025-01-14 PROBLEM — E11.9 ENCOUNTER FOR DIABETIC FOOT EXAM (HCC): Status: RESOLVED | Noted: 2024-12-16 | Resolved: 2025-01-14

## 2025-01-14 PROCEDURE — 99214 OFFICE O/P EST MOD 30 MIN: CPT | Performed by: INTERNAL MEDICINE

## 2025-01-14 RX ORDER — FOLIC ACID 1 MG/1
1 TABLET ORAL DAILY
COMMUNITY
Start: 2024-11-12

## 2025-01-14 NOTE — PROGRESS NOTES
Name: Lay Marie      : 1955      MRN: [id number]  Encounter Provider: Felipe Shell DO  Encounter Date: 2025   Encounter department: [facility]  :  Assessment & Plan  Screening for osteoporosis    Orders:    DXA bone density spine hip and pelvis; Future    Encounter for immunization         Screening for colon cancer    Orders:    Ambulatory Referral to Gastroenterology; Future    Essential hypertension    Orders:    CBC and differential; Future    Asthma-COPD overlap syndrome (HCC)    Orders:    CBC and differential; Future    Osteopenia of multiple sites         Primary osteoarthritis involving multiple joints         Chronic renal impairment, stage 3b (HCC)  Lab Results   Component Value Date    EGFR 48 2024    EGFR 41 2024    EGFR 37 2023    CREATININE 1.15 2024    CREATININE 1.33 (H) 2024    CREATININE 1.44 (H) 2023       Orders:    CBC and differential; Future    Comprehensive metabolic panel; Future    Hemoglobin A1C; Future    Lipid Panel with Direct LDL reflex; Future    Magnesium; Future    Mild major depression, single episode (HCC)           ZACK (generalized anxiety disorder)    Orders:    TSH, 3rd generation; Future    Borderline high serum cholesterol         Pre-diabetes    Orders:    Hemoglobin A1C; Future    Uncomplicated opioid dependence (HCC)         Other specified disorders of bone density and structure, multiple sites    Orders:    DXA bone density spine hip and pelvis; Future    Folic acid deficiency    Orders:    Folate; Future    Vitamin B12; Future    Skin lesion of right leg    Orders:    Ambulatory referral to General Surgery; Future    A/P: Doing ok and will check labs. Discussed vaccines defers all at this time.. Order CRC and dexa. Skin lesion moderate size, tender, and concern is that it blanches. ?vascular component with pt having spider veins. . Will refer to general sx for a second opinion and see if  "therapeutic and diagnostic excision is in order. . Continue current treatment and RTC four months for routine.        History of Present Illness     WF RTC for f/u HTN, CKD, etc. Doing ok, but c/o 4 week h/o a right  ankle lesion. No trauma or insect bites. No increase temp, erythema, or swelling. Has tried various OTC topicals w/o relief. Remains active w/o difficulty and no falls. ACOS is controlled with limited rescue MDI use. Chronic pain is manageable. MDD/ZACK are controlled. Due for labs, dexa, CRC< and vaccines.       Review of Systems   Constitutional:  Negative for activity change, chills, diaphoresis, fatigue and fever.   HENT: Negative.     Eyes:  Negative for visual disturbance.   Respiratory:  Negative for cough, chest tightness, shortness of breath and wheezing.    Cardiovascular:  Negative for chest pain, palpitations and leg swelling.   Gastrointestinal:  Negative for abdominal pain, constipation, diarrhea, nausea and vomiting.   Endocrine: Negative for cold intolerance and heat intolerance.   Genitourinary:  Negative for difficulty urinating, dysuria and frequency.   Musculoskeletal:  Negative for arthralgias, gait problem and myalgias.   Skin:  Positive for wound.   Neurological:  Negative for dizziness, seizures, syncope, weakness, light-headedness and headaches.   Psychiatric/Behavioral:  Negative for confusion, dysphoric mood and sleep disturbance. The patient is not nervous/anxious.        Objective   /70   Pulse 83   Temp 98 °F (36.7 °C)   Ht 5' 1\" (1.549 m)   Wt 46.1 kg (101 lb 9.6 oz)   LMP  (LMP Unknown)   SpO2 99%   BMI 19.20 kg/m²      Physical Exam  Vitals and nursing note reviewed.   Constitutional:       General: She is not in acute distress.     Appearance: Normal appearance. She is not ill-appearing.   HENT:      Head: Normocephalic and atraumatic.      Mouth/Throat:      Mouth: Mucous membranes are moist.   Eyes:      Extraocular Movements: Extraocular movements intact. "      Conjunctiva/sclera: Conjunctivae normal.      Pupils: Pupils are equal, round, and reactive to light.   Neck:      Vascular: No carotid bruit.   Cardiovascular:      Rate and Rhythm: Normal rate and regular rhythm.      Heart sounds: Normal heart sounds. No murmur heard.  Pulmonary:      Effort: Pulmonary effort is normal. No respiratory distress.      Breath sounds: Normal breath sounds. No wheezing, rhonchi or rales.   Abdominal:      General: Bowel sounds are normal. There is no distension.      Palpations: Abdomen is soft.      Tenderness: There is no abdominal tenderness.   Musculoskeletal:      Cervical back: Neck supple.      Right lower leg: No edema.      Left lower leg: No edema.   Skin:     Findings: Lesion (lateral above the right malleolus with blanching fluctant only slightly mobile and tender lesion. Dry skin. No increase temp or swelling. slight erythema.) present.   Neurological:      General: No focal deficit present.      Mental Status: She is alert and oriented to person, place, and time. Mental status is at baseline.   Psychiatric:         Mood and Affect: Mood normal.         Behavior: Behavior normal.         Thought Content: Thought content normal.         Judgment: Judgment normal.

## 2025-01-14 NOTE — ASSESSMENT & PLAN NOTE
Lab Results   Component Value Date    EGFR 48 11/08/2024    EGFR 41 06/21/2024    EGFR 37 09/21/2023    CREATININE 1.15 11/08/2024    CREATININE 1.33 (H) 06/21/2024    CREATININE 1.44 (H) 09/21/2023       Orders:    CBC and differential; Future    Comprehensive metabolic panel; Future    Hemoglobin A1C; Future    Lipid Panel with Direct LDL reflex; Future    Magnesium; Future

## 2025-01-15 ENCOUNTER — TELEPHONE (OUTPATIENT)
Age: 70
End: 2025-01-15

## 2025-01-15 NOTE — TELEPHONE ENCOUNTER
Patient called in requesting a referral to dermatology due to her ankle. Patient was seen yesterday with Dr Shell. Please advise.

## 2025-02-19 ENCOUNTER — PATIENT MESSAGE (OUTPATIENT)
Dept: VASCULAR SURGERY | Facility: CLINIC | Age: 70
End: 2025-02-19

## 2025-02-19 PROBLEM — I87.2 VENOUS INSUFFICIENCY OF BOTH LOWER EXTREMITIES: Status: RESOLVED | Noted: 2023-04-12 | Resolved: 2025-02-19

## 2025-02-19 NOTE — PATIENT COMMUNICATION
Called pt to gather more info. Pt was seen on 4/12/2023. Pt stated she remembers being dx with spider veins however does not remember being dx with venous insufficiency. Informed pt the provider suspects pt has venous insufficiency however ordered an LEVDR to further investigate. Pt never had this done. Pt would like this dx removed from her chart if she does not have it.     Advised will send a message to the provider to review and advise.

## 2025-03-17 PROBLEM — F11.20 OPIOID DEPENDENCE, UNCOMPLICATED (HCC): Status: RESOLVED | Noted: 2024-12-16 | Resolved: 2025-03-17

## 2025-03-27 ENCOUNTER — OFFICE VISIT (OUTPATIENT)
Dept: INTERNAL MEDICINE CLINIC | Facility: CLINIC | Age: 70
End: 2025-03-27
Payer: MEDICARE

## 2025-03-27 VITALS
SYSTOLIC BLOOD PRESSURE: 140 MMHG | DIASTOLIC BLOOD PRESSURE: 72 MMHG | BODY MASS INDEX: 19.33 KG/M2 | HEIGHT: 61 IN | OXYGEN SATURATION: 99 % | WEIGHT: 102.38 LBS | HEART RATE: 74 BPM | TEMPERATURE: 97.5 F

## 2025-03-27 DIAGNOSIS — M48.061 SPINAL STENOSIS OF LUMBAR REGION WITHOUT NEUROGENIC CLAUDICATION: ICD-10-CM

## 2025-03-27 DIAGNOSIS — H65.03 NON-RECURRENT ACUTE SEROUS OTITIS MEDIA OF BOTH EARS: ICD-10-CM

## 2025-03-27 DIAGNOSIS — I10 ESSENTIAL HYPERTENSION: Primary | ICD-10-CM

## 2025-03-27 DIAGNOSIS — H69.93 EUSTACHIAN TUBE DYSFUNCTION, BILATERAL: ICD-10-CM

## 2025-03-27 DIAGNOSIS — R73.03 PRE-DIABETES: ICD-10-CM

## 2025-03-27 DIAGNOSIS — N18.32 CHRONIC RENAL IMPAIRMENT, STAGE 3B (HCC): ICD-10-CM

## 2025-03-27 DIAGNOSIS — F32.0 MILD MAJOR DEPRESSION, SINGLE EPISODE (HCC): ICD-10-CM

## 2025-03-27 DIAGNOSIS — Z23 ENCOUNTER FOR IMMUNIZATION: ICD-10-CM

## 2025-03-27 DIAGNOSIS — F41.1 GAD (GENERALIZED ANXIETY DISORDER): ICD-10-CM

## 2025-03-27 DIAGNOSIS — Z13.29 SCREENING FOR THYROID DISORDER: ICD-10-CM

## 2025-03-27 DIAGNOSIS — E78.9 BORDERLINE HIGH SERUM CHOLESTEROL: ICD-10-CM

## 2025-03-27 DIAGNOSIS — Z12.31 ENCOUNTER FOR SCREENING MAMMOGRAM FOR BREAST CANCER: ICD-10-CM

## 2025-03-27 DIAGNOSIS — M85.89 OSTEOPENIA OF MULTIPLE SITES: ICD-10-CM

## 2025-03-27 DIAGNOSIS — Z13.0 SCREENING FOR DEFICIENCY ANEMIA: ICD-10-CM

## 2025-03-27 DIAGNOSIS — Z13.1 SCREENING FOR DIABETES MELLITUS: ICD-10-CM

## 2025-03-27 DIAGNOSIS — M15.0 PRIMARY OSTEOARTHRITIS INVOLVING MULTIPLE JOINTS: ICD-10-CM

## 2025-03-27 DIAGNOSIS — Z13.220 SCREENING FOR HYPERLIPIDEMIA: ICD-10-CM

## 2025-03-27 DIAGNOSIS — J44.89 ASTHMA-COPD OVERLAP SYNDROME (HCC): ICD-10-CM

## 2025-03-27 DIAGNOSIS — Z12.11 SCREENING FOR COLON CANCER: ICD-10-CM

## 2025-03-27 PROCEDURE — G2211 COMPLEX E/M VISIT ADD ON: HCPCS | Performed by: INTERNAL MEDICINE

## 2025-03-27 PROCEDURE — 99214 OFFICE O/P EST MOD 30 MIN: CPT | Performed by: INTERNAL MEDICINE

## 2025-03-27 RX ORDER — FLUTICASONE PROPIONATE 50 MCG
1 SPRAY, SUSPENSION (ML) NASAL DAILY
Qty: 16 G | Refills: 0 | Status: SHIPPED | OUTPATIENT
Start: 2025-03-27

## 2025-03-27 RX ORDER — GUAIFENESIN 600 MG/1
600 TABLET, EXTENDED RELEASE ORAL EVERY 12 HOURS SCHEDULED
Qty: 20 TABLET | Refills: 0 | Status: SHIPPED | OUTPATIENT
Start: 2025-03-27

## 2025-03-27 NOTE — PATIENT INSTRUCTIONS
"Patient Education     High blood pressure in adults   The Basics   Written by the doctors and editors at [facility]   What is high blood pressure? -- High blood pressure is a condition that puts you at risk for heart attack, stroke, and kidney disease. It does not usually cause symptoms. But it can be serious.  When your doctor or nurse tells you your blood pressure, they say 2 numbers. For instance, your doctor or nurse might say that your blood pressure is \"130 over 80.\" The top number is the pressure inside your arteries when your heart is ruy. The bottom number is the pressure inside your arteries when your heart is relaxed.  \"Elevated blood pressure\" is a term doctors or nurses use as a warning. People with elevated blood pressure do not yet have high blood pressure. But their blood pressure is not as low as it should be for good health.  Many experts define high, elevated, and normal blood pressure as follows:   High - Top number of 130 or above and/or bottom number of 80 or above.   Elevated - Top number between 120 and 129 and bottom number of 79 or below.   Normal - Top number of 119 or below and bottom number of 79 or below.  This information is also in the table (table 1).  How can I lower my blood pressure? -- If your doctor or nurse prescribed blood pressure medicine, the most important thing you can do is to take it. If it causes side effects, do not just stop taking it. Instead, talk to your doctor or nurse about the problems it causes. They might be able to lower your dose or switch you to another medicine. If cost is a problem, mention that, too. They might be able to put you on a less expensive medicine. Taking your blood pressure medicine can keep you from having a heart attack or stroke, and it can save your life!  Can I do anything on my own? -- You have a lot of control over your blood pressure. To lower it:   Lose weight (if you are overweight).   Choose a diet low in fat and rich in " "fruits, vegetables, and low-fat dairy products.   Eat less salt.   Do something active for at least 30 minutes a day on most days of the week.   Drink less alcohol (if you drink more than 2 alcoholic drinks per day).  It's also a good idea to get a home blood pressure meter. People who check their own blood pressure at home do better at keeping it low and can sometimes even reduce the amount of medicine they take.  All topics are updated as new evidence becomes available and our peer review process is complete.  This topic retrieved from [employer] on: Feb 26, 2024.  Topic 23591 Version 23.0  Release: 32.2.4 - C32.56  © 2024 UpToDate, Inc. and/or its affiliates. All rights reserved.  table 1: Definition of normal and high blood pressure  Level  Top number  Bottom number    High 130 or above 80 or above   Elevated 120 to 129 79 or below   Normal 119 or below 79 or below   These definitions are from the American College of Cardiology/American Heart Association. Other expert groups might use slightly different definitions.  \"Elevated blood pressure\" is a term doctor or nurses use as a warning. It means you do not yet have high blood pressure, but your blood pressure is not as low as it should be for good health.  Graphic 78408 Version 6.0  Consumer Information Use and Disclaimer   Disclaimer: This generalized information is a limited summary of diagnosis, treatment, and/or medication information. It is not meant to be comprehensive and should be used as a tool to help the user understand and/or assess potential diagnostic and treatment options. It does NOT include all information about conditions, treatments, medications, side effects, or risks that may apply to a specific patient. It is not intended to be medical advice or a substitute for the medical advice, diagnosis, or treatment of a health care provider based on the health care provider's examination and assessment of a patient's specific and unique circumstances. " Patients must speak with a health care provider for complete information about their health, medical questions, and treatment options, including any risks or benefits regarding use of medications. This information does not endorse any treatments or medications as safe, effective, or approved for treating a specific patient. UpToDate, Inc. and its affiliates disclaim any warranty or liability relating to this information or the use thereof.The use of this information is governed by the Terms of Use, available at https://www.woltersArkmicrouwer.com/en/know/clinical-effectiveness-terms. 2024© UpToDate, Inc. and its affiliates and/or licensors. All rights reserved.  Copyright   © 2024 UpToDate, Inc. and/or its affiliates. All rights reserved.

## 2025-03-27 NOTE — ASSESSMENT & PLAN NOTE
Lab Results   Component Value Date    EGFR 48 11/08/2024    EGFR 41 06/21/2024    EGFR 37 09/21/2023    CREATININE 1.15 11/08/2024    CREATININE 1.33 (H) 06/21/2024    CREATININE 1.44 (H) 09/21/2023       Orders:    Albumin / creatinine urine ratio; Future    CBC and differential; Future    Comprehensive metabolic panel; Future    Hemoglobin A1C; Future    Lipid Panel with Direct LDL reflex; Future    TSH, 3rd generation; Future

## 2025-03-27 NOTE — PROGRESS NOTES
Name: Lay Marie      : 1955      MRN: [id number]  Encounter Provider: Felipe Shell DO  Encounter Date: 3/27/2025   Encounter department: [facility]  :  Assessment & Plan  Encounter for immunization    Orders:    Pneumococcal Conjugate Vaccine 20-valent (Pcv20)    influenza vaccine, high-dose, PF 0.5 mL (Fluzone High Dose)    Encounter for screening mammogram for breast cancer    Orders:    Mammo screening bilateral w 3d and cad; Future    Screening for colon cancer    Orders:    Cologuard    Ambulatory Referral to Gastroenterology; Future    Essential hypertension    Orders:    Albumin / creatinine urine ratio; Future    CBC and differential; Future    Comprehensive metabolic panel; Future    Asthma-COPD overlap syndrome (HCC)         Osteopenia of multiple sites         Primary osteoarthritis involving multiple joints         Chronic renal impairment, stage 3b (HCC)  Lab Results   Component Value Date    EGFR 48 2024    EGFR 41 2024    EGFR 37 2023    CREATININE 1.15 2024    CREATININE 1.33 (H) 2024    CREATININE 1.44 (H) 2023       Orders:    Albumin / creatinine urine ratio; Future    CBC and differential; Future    Comprehensive metabolic panel; Future    Hemoglobin A1C; Future    Lipid Panel with Direct LDL reflex; Future    TSH, 3rd generation; Future    ZACK (generalized anxiety disorder)    Orders:    TSH, 3rd generation; Future    Mild major depression, single episode (HCC)           Spinal stenosis of lumbar region without neurogenic claudication         Borderline high serum cholesterol    Orders:    Lipid Panel with Direct LDL reflex; Future    Pre-diabetes    Orders:    Hemoglobin A1C; Future    Screening for diabetes mellitus         Screening for thyroid disorder         Screening for deficiency anemia         Screening for hyperlipidemia         Eustachian tube dysfunction, bilateral    Orders:    amoxicillin-clavulanate  (AUGMENTIN) 875-125 mg per tablet; Take 1 tablet by mouth every 12 (twelve) hours for 7 days    guaiFENesin (Mucinex) 600 mg 12 hr tablet; Take 1 tablet (600 mg total) by mouth every 12 (twelve) hours    fluticasone (FLONASE) 50 mcg/act nasal spray; 1 spray into each nostril daily    Non-recurrent acute serous otitis media of both ears    Orders:    amoxicillin-clavulanate (AUGMENTIN) 875-125 mg per tablet; Take 1 tablet by mouth every 12 (twelve) hours for 7 days    guaiFENesin (Mucinex) 600 mg 12 hr tablet; Take 1 tablet (600 mg total) by mouth every 12 (twelve) hours    fluticasone (FLONASE) 50 mcg/act nasal spray; 1 spray into each nostril daily      A/P: Doing ok and will check labs. Order mammo and dexa.. Discussed vaccines defers all. Will treate the OM and ETD. Continue current treatment and RTC four months for routine.      History of Present Illness   WF RTC for f/u HTN, ACOS, etc. Doing ok and no new issues except for some ear discomfort s/sp URI. . Remains active w/o difficulty and no falls. ACOS is controlled and limited use WING use. Chronic pain is manageable. MDD/ZACK are controlled. Due for labs, mammo, dexa,  and vaccines.       Review of Systems   Constitutional:  Negative for activity change, chills, diaphoresis, fatigue and fever.   HENT:  Positive for ear pain. Negative for congestion, ear discharge, facial swelling, postnasal drip, rhinorrhea, sinus pressure, sinus pain, sneezing and sore throat.    Eyes:  Negative for visual disturbance.   Respiratory:  Negative for cough, chest tightness, shortness of breath and wheezing.    Cardiovascular:  Negative for chest pain, palpitations and leg swelling.   Gastrointestinal:  Negative for abdominal pain, constipation, diarrhea, nausea and vomiting.   Genitourinary:  Negative for difficulty urinating, dysuria and frequency.   Musculoskeletal:  Negative for arthralgias, gait problem and myalgias.   Neurological:  Negative for dizziness, seizures,  "syncope, weakness, light-headedness and headaches.   Psychiatric/Behavioral:  Negative for confusion, dysphoric mood and sleep disturbance. The patient is not nervous/anxious.        Objective   /72 (BP Location: Left arm, Patient Position: Sitting)   Pulse 74   Temp 97.5 °F (36.4 °C) (Tympanic)   Ht 5' 1\" (1.549 m)   Wt 46.4 kg (102 lb 6 oz)   LMP  (LMP Unknown)   SpO2 99%   BMI 19.34 kg/m²      Physical Exam  Vitals and nursing note reviewed.   Constitutional:       General: She is not in acute distress.     Appearance: Normal appearance. She is not ill-appearing.   HENT:      Head: Normocephalic and atraumatic.      Right Ear: Ear canal and external ear normal. There is no impacted cerumen.      Left Ear: Ear canal and external ear normal. There is no impacted cerumen.      Ears:      Comments: AU with TM intact and bulging with air fluid level, AD greater than AS.     Mouth/Throat:      Mouth: Mucous membranes are moist.   Eyes:      Extraocular Movements: Extraocular movements intact.      Conjunctiva/sclera: Conjunctivae normal.      Pupils: Pupils are equal, round, and reactive to light.   Neck:      Vascular: No carotid bruit.   Cardiovascular:      Rate and Rhythm: Normal rate and regular rhythm.      Heart sounds: Normal heart sounds. No murmur heard.  Pulmonary:      Effort: Pulmonary effort is normal. No respiratory distress.      Breath sounds: Normal breath sounds. No wheezing, rhonchi or rales.   Abdominal:      General: Bowel sounds are normal. There is no distension.      Palpations: Abdomen is soft.      Tenderness: There is no abdominal tenderness.   Musculoskeletal:      Cervical back: Neck supple.      Right lower leg: No edema.      Left lower leg: No edema.   Neurological:      General: No focal deficit present.      Mental Status: She is alert and oriented to person, place, and time. Mental status is at baseline.   Psychiatric:         Mood and Affect: Mood normal.         " Behavior: Behavior normal.         Thought Content: Thought content normal.         Judgment: Judgment normal.

## 2025-03-28 ENCOUNTER — TELEPHONE (OUTPATIENT)
Age: 70
End: 2025-03-28

## 2025-03-28 NOTE — TELEPHONE ENCOUNTER
Pt calling to ask for her recall date I let the pt know she was due. Pt wants to see Dr Pisano first. I let her know he moved to the [facility] office. Pt is checking on the location and will call back to schedule

## 2025-03-31 ENCOUNTER — TELEPHONE (OUTPATIENT)
Age: 70
End: 2025-03-31

## 2025-03-31 DIAGNOSIS — J06.9 UPPER RESPIRATORY TRACT INFECTION, UNSPECIFIED TYPE: Primary | ICD-10-CM

## 2025-03-31 RX ORDER — AZITHROMYCIN 250 MG/1
TABLET, FILM COATED ORAL
Qty: 6 TABLET | Refills: 0 | Status: SHIPPED | OUTPATIENT
Start: 2025-03-31 | End: 2025-04-04

## 2025-03-31 NOTE — TELEPHONE ENCOUNTER
Patient called in stating she was started on new medication of the amoxicillin. Patient states that she took food with it first dose was fine but second dose she has been sick since. Patient is asking if provider could send in another medication that is kidney friendly please advise

## 2025-04-07 DIAGNOSIS — J06.9 UPPER RESPIRATORY TRACT INFECTION, UNSPECIFIED TYPE: Primary | ICD-10-CM

## 2025-04-07 RX ORDER — AZITHROMYCIN 250 MG/1
TABLET, FILM COATED ORAL
Qty: 6 TABLET | Refills: 0 | Status: SHIPPED | OUTPATIENT
Start: 2025-04-07 | End: 2025-04-12

## 2025-04-07 RX ORDER — METHYLPREDNISOLONE 4 MG/1
TABLET ORAL
Qty: 21 EACH | Refills: 0 | Status: SHIPPED | OUTPATIENT
Start: 2025-04-07

## 2025-04-07 NOTE — TELEPHONE ENCOUNTER
Patient called in, she was upset her symptoms have not improved with the medication she was prescribed at her last visit. She stated her ears and still hurting and she doesn't feel any better. Wanted to know why a different antibiotics wasn't prescribed for her.     She would like a call back with what her provider recommends.     Pharmacy on file is best, please advise.    Thank you

## 2025-05-15 ENCOUNTER — HOSPITAL ENCOUNTER (OUTPATIENT)
Dept: MAMMOGRAPHY | Facility: HOSPITAL | Age: 70
End: 2025-05-15
Payer: MEDICARE

## 2025-05-15 DIAGNOSIS — Z12.31 ENCOUNTER FOR SCREENING MAMMOGRAM FOR MALIGNANT NEOPLASM OF BREAST: ICD-10-CM

## 2025-05-15 PROCEDURE — 77067 SCR MAMMO BI INCL CAD: CPT

## 2025-05-15 PROCEDURE — 77063 BREAST TOMOSYNTHESIS BI: CPT

## 2025-05-20 ENCOUNTER — TELEPHONE (OUTPATIENT)
Age: 70
End: 2025-05-20

## 2025-05-20 NOTE — TELEPHONE ENCOUNTER
Called patient.  Patient is anxious about results. I told patient radiology need to sent us her Mammo results Doctor needs to review it and that might take 7-14 office day. Patient verbalized understanding.

## 2025-05-20 NOTE — TELEPHONE ENCOUNTER
Patient called in and had a mammogram done and is looking for the results. Patient is saying she is having alot alot of anxiety. Would like a call back at [phone]  Thank You

## 2025-05-22 ENCOUNTER — RESULTS FOLLOW-UP (OUTPATIENT)
Dept: LABOR AND DELIVERY | Facility: HOSPITAL | Age: 70
End: 2025-05-22

## 2025-06-10 ENCOUNTER — APPOINTMENT (OUTPATIENT)
Dept: LAB | Facility: CLINIC | Age: 70
End: 2025-06-10
Attending: INTERNAL MEDICINE
Payer: MEDICARE

## 2025-06-10 DIAGNOSIS — N18.32 CHRONIC RENAL IMPAIRMENT, STAGE 3B (HCC): ICD-10-CM

## 2025-06-10 DIAGNOSIS — Z13.29 SCREENING FOR THYROID DISORDER: ICD-10-CM

## 2025-06-10 DIAGNOSIS — E78.9 BORDERLINE HIGH SERUM CHOLESTEROL: ICD-10-CM

## 2025-06-10 DIAGNOSIS — N18.31 STAGE 3A CHRONIC KIDNEY DISEASE (CKD) (HCC): ICD-10-CM

## 2025-06-10 DIAGNOSIS — I10 ESSENTIAL HYPERTENSION: ICD-10-CM

## 2025-06-10 DIAGNOSIS — F41.1 GAD (GENERALIZED ANXIETY DISORDER): ICD-10-CM

## 2025-06-10 DIAGNOSIS — D75.89 MACROCYTOSIS WITHOUT ANEMIA: ICD-10-CM

## 2025-06-10 DIAGNOSIS — E87.1 HYPONATREMIA: ICD-10-CM

## 2025-06-10 DIAGNOSIS — R80.9 PROTEINURIA, UNSPECIFIED TYPE: ICD-10-CM

## 2025-06-10 DIAGNOSIS — E53.8 FOLATE DEFICIENCY: ICD-10-CM

## 2025-06-10 DIAGNOSIS — D52.0 DIETARY FOLATE DEFICIENCY ANEMIA: ICD-10-CM

## 2025-06-10 DIAGNOSIS — R73.03 PRE-DIABETES: ICD-10-CM

## 2025-06-10 LAB
ALBUMIN SERPL BCG-MCNC: 4.1 G/DL (ref 3.5–5)
ANION GAP SERPL CALCULATED.3IONS-SCNC: 7 MMOL/L (ref 4–13)
BACTERIA UR QL AUTO: ABNORMAL /HPF
BILIRUB UR QL STRIP: NEGATIVE
BUN SERPL-MCNC: 14 MG/DL (ref 5–25)
CALCIUM SERPL-MCNC: 9.2 MG/DL (ref 8.4–10.2)
CHLORIDE SERPL-SCNC: 101 MMOL/L (ref 96–108)
CLARITY UR: CLEAR
CO2 SERPL-SCNC: 32 MMOL/L (ref 21–32)
COLOR UR: ABNORMAL
CREAT SERPL-MCNC: 1.07 MG/DL (ref 0.6–1.3)
CREAT UR-MCNC: 180.6 MG/DL
ERYTHROCYTE [DISTWIDTH] IN BLOOD BY AUTOMATED COUNT: 12.2 % (ref 11.6–15.1)
FOLATE SERPL-MCNC: >22.3 NG/ML
GFR SERPL CREATININE-BSD FRML MDRD: 53 ML/MIN/1.73SQ M
GLUCOSE SERPL-MCNC: 77 MG/DL (ref 65–140)
GLUCOSE UR STRIP-MCNC: NEGATIVE MG/DL
HCT VFR BLD AUTO: 35.7 % (ref 34.8–46.1)
HGB BLD-MCNC: 11.5 G/DL (ref 11.5–15.4)
HGB UR QL STRIP.AUTO: NEGATIVE
KETONES UR STRIP-MCNC: NEGATIVE MG/DL
LEUKOCYTE ESTERASE UR QL STRIP: NEGATIVE
MAGNESIUM SERPL-MCNC: 1.6 MG/DL (ref 1.9–2.7)
MCH RBC QN AUTO: 32.8 PG (ref 26.8–34.3)
MCHC RBC AUTO-ENTMCNC: 32.2 G/DL (ref 31.4–37.4)
MCV RBC AUTO: 102 FL (ref 82–98)
MICROALBUMIN UR-MCNC: 16.8 MG/L
MICROALBUMIN/CREAT 24H UR: 9 MG/G CREATININE (ref 0–30)
NITRITE UR QL STRIP: NEGATIVE
NON-SQ EPI CELLS URNS QL MICRO: ABNORMAL /HPF
PH UR STRIP.AUTO: 6.5 [PH]
PHOSPHATE SERPL-MCNC: 3.6 MG/DL (ref 2.3–4.1)
PLATELET # BLD AUTO: 249 THOUSANDS/UL (ref 149–390)
PMV BLD AUTO: 12.5 FL (ref 8.9–12.7)
POTASSIUM SERPL-SCNC: 3.7 MMOL/L (ref 3.5–5.3)
PROT UR STRIP-MCNC: ABNORMAL MG/DL
RBC # BLD AUTO: 3.51 MILLION/UL (ref 3.81–5.12)
RBC #/AREA URNS AUTO: ABNORMAL /HPF
SODIUM SERPL-SCNC: 140 MMOL/L (ref 135–147)
SP GR UR STRIP.AUTO: 1.02 (ref 1–1.03)
UROBILINOGEN UR STRIP-ACNC: <2 MG/DL
WBC # BLD AUTO: 6.65 THOUSAND/UL (ref 4.31–10.16)
WBC #/AREA URNS AUTO: ABNORMAL /HPF

## 2025-06-10 PROCEDURE — 83735 ASSAY OF MAGNESIUM: CPT

## 2025-06-10 PROCEDURE — 85027 COMPLETE CBC AUTOMATED: CPT

## 2025-06-10 PROCEDURE — 82043 UR ALBUMIN QUANTITATIVE: CPT

## 2025-06-10 PROCEDURE — 82570 ASSAY OF URINE CREATININE: CPT

## 2025-06-10 PROCEDURE — 82746 ASSAY OF FOLIC ACID SERUM: CPT

## 2025-06-10 PROCEDURE — 81001 URINALYSIS AUTO W/SCOPE: CPT

## 2025-06-10 PROCEDURE — 36415 COLL VENOUS BLD VENIPUNCTURE: CPT

## 2025-06-10 PROCEDURE — 80069 RENAL FUNCTION PANEL: CPT

## 2025-07-09 DIAGNOSIS — N95.1 VASOMOTOR SYMPTOMS DUE TO MENOPAUSE: ICD-10-CM

## 2025-07-09 RX ORDER — ESTRADIOL 0.05 MG/D
1 PATCH TRANSDERMAL WEEKLY
Qty: 9 PATCH | Refills: 1 | Status: SHIPPED | OUTPATIENT
Start: 2025-07-09

## 2025-07-09 NOTE — TELEPHONE ENCOUNTER
Patient requested refill of estradiol (Climara) 0.05. Confirmed pharmacy is [facility] [address] [place] [address].

## 2025-07-18 NOTE — TELEPHONE ENCOUNTER
Patient is calling because she has a procedure scheduled for 2/20 and she is very upset because she received a call from the office to confirm and that the procedure is with Dr Kramer pt stated Dr Pisano is is her Dr and that they schedule the procedure with the wrong provider and she was very upset and stated she was not going to show up 
Discharged

## 2025-08-14 ENCOUNTER — APPOINTMENT (OUTPATIENT)
Dept: LAB | Facility: CLINIC | Age: 70
End: 2025-08-14
Attending: INTERNAL MEDICINE
Payer: MEDICARE